# Patient Record
Sex: MALE | Race: BLACK OR AFRICAN AMERICAN | HISPANIC OR LATINO | URBAN - METROPOLITAN AREA
[De-identification: names, ages, dates, MRNs, and addresses within clinical notes are randomized per-mention and may not be internally consistent; named-entity substitution may affect disease eponyms.]

---

## 2019-11-25 ENCOUNTER — EMERGENCY (EMERGENCY)
Facility: HOSPITAL | Age: 59
LOS: 0 days | Discharge: HOME | End: 2019-11-25
Attending: EMERGENCY MEDICINE
Payer: COMMERCIAL

## 2019-11-25 VITALS
OXYGEN SATURATION: 95 % | TEMPERATURE: 99 F | HEART RATE: 72 BPM | DIASTOLIC BLOOD PRESSURE: 82 MMHG | RESPIRATION RATE: 19 BRPM | SYSTOLIC BLOOD PRESSURE: 156 MMHG

## 2019-11-25 VITALS
HEART RATE: 88 BPM | TEMPERATURE: 98 F | WEIGHT: 195.99 LBS | DIASTOLIC BLOOD PRESSURE: 76 MMHG | SYSTOLIC BLOOD PRESSURE: 145 MMHG | RESPIRATION RATE: 17 BRPM | OXYGEN SATURATION: 96 % | HEIGHT: 74 IN

## 2019-11-25 DIAGNOSIS — F17.200 NICOTINE DEPENDENCE, UNSPECIFIED, UNCOMPLICATED: ICD-10-CM

## 2019-11-25 DIAGNOSIS — M79.604 PAIN IN RIGHT LEG: ICD-10-CM

## 2019-11-25 LAB
ALBUMIN SERPL ELPH-MCNC: 3.5 G/DL — SIGNIFICANT CHANGE UP (ref 3.3–5)
ALP SERPL-CCNC: 87 U/L — SIGNIFICANT CHANGE UP (ref 40–120)
ALT FLD-CCNC: 20 U/L — SIGNIFICANT CHANGE UP (ref 12–78)
ANION GAP SERPL CALC-SCNC: 8 MMOL/L — SIGNIFICANT CHANGE UP (ref 5–17)
APTT BLD: 30.4 SEC — SIGNIFICANT CHANGE UP (ref 27.5–36.3)
AST SERPL-CCNC: 10 U/L — LOW (ref 15–37)
BILIRUB SERPL-MCNC: 0.4 MG/DL — SIGNIFICANT CHANGE UP (ref 0.2–1.2)
BUN SERPL-MCNC: 16 MG/DL — SIGNIFICANT CHANGE UP (ref 7–23)
CALCIUM SERPL-MCNC: 9.3 MG/DL — SIGNIFICANT CHANGE UP (ref 8.5–10.1)
CHLORIDE SERPL-SCNC: 103 MMOL/L — SIGNIFICANT CHANGE UP (ref 96–108)
CO2 SERPL-SCNC: 28 MMOL/L — SIGNIFICANT CHANGE UP (ref 22–31)
CREAT SERPL-MCNC: 1.59 MG/DL — HIGH (ref 0.5–1.3)
GLUCOSE SERPL-MCNC: 286 MG/DL — HIGH (ref 70–99)
HCT VFR BLD CALC: 45.4 % — SIGNIFICANT CHANGE UP (ref 39–50)
HGB BLD-MCNC: 15.4 G/DL — SIGNIFICANT CHANGE UP (ref 13–17)
INR BLD: 0.97 RATIO — SIGNIFICANT CHANGE UP (ref 0.88–1.16)
MCHC RBC-ENTMCNC: 32 PG — SIGNIFICANT CHANGE UP (ref 27–34)
MCHC RBC-ENTMCNC: 33.9 GM/DL — SIGNIFICANT CHANGE UP (ref 32–36)
MCV RBC AUTO: 94.2 FL — SIGNIFICANT CHANGE UP (ref 80–100)
NRBC # BLD: 0 /100 WBCS — SIGNIFICANT CHANGE UP (ref 0–0)
PLATELET # BLD AUTO: 255 K/UL — SIGNIFICANT CHANGE UP (ref 150–400)
POTASSIUM SERPL-MCNC: 4.4 MMOL/L — SIGNIFICANT CHANGE UP (ref 3.5–5.3)
POTASSIUM SERPL-SCNC: 4.4 MMOL/L — SIGNIFICANT CHANGE UP (ref 3.5–5.3)
PROT SERPL-MCNC: 6.5 GM/DL — SIGNIFICANT CHANGE UP (ref 6–8.3)
PROTHROM AB SERPL-ACNC: 10.8 SEC — SIGNIFICANT CHANGE UP (ref 10–12.9)
RBC # BLD: 4.82 M/UL — SIGNIFICANT CHANGE UP (ref 4.2–5.8)
RBC # FLD: 12.5 % — SIGNIFICANT CHANGE UP (ref 10.3–14.5)
SODIUM SERPL-SCNC: 139 MMOL/L — SIGNIFICANT CHANGE UP (ref 135–145)
WBC # BLD: 13 K/UL — HIGH (ref 3.8–10.5)
WBC # FLD AUTO: 13 K/UL — HIGH (ref 3.8–10.5)

## 2019-11-25 PROCEDURE — 99284 EMERGENCY DEPT VISIT MOD MDM: CPT

## 2019-11-25 PROCEDURE — 93971 EXTREMITY STUDY: CPT | Mod: 26,RT

## 2019-11-25 PROCEDURE — 93926 LOWER EXTREMITY STUDY: CPT | Mod: 26,RT

## 2019-11-25 RX ORDER — GABAPENTIN 400 MG/1
300 CAPSULE ORAL ONCE
Refills: 0 | Status: COMPLETED | OUTPATIENT
Start: 2019-11-25 | End: 2019-11-25

## 2019-11-25 RX ORDER — OXYCODONE AND ACETAMINOPHEN 5; 325 MG/1; MG/1
1 TABLET ORAL ONCE
Refills: 0 | Status: DISCONTINUED | OUTPATIENT
Start: 2019-11-25 | End: 2019-11-25

## 2019-11-25 RX ORDER — IBUPROFEN 200 MG
1 TABLET ORAL
Qty: 20 | Refills: 0
Start: 2019-11-25 | End: 2019-11-29

## 2019-11-25 RX ORDER — GABAPENTIN 400 MG/1
1 CAPSULE ORAL
Qty: 42 | Refills: 0
Start: 2019-11-25 | End: 2019-12-08

## 2019-11-25 RX ADMIN — OXYCODONE AND ACETAMINOPHEN 1 TABLET(S): 5; 325 TABLET ORAL at 14:06

## 2019-11-25 RX ADMIN — OXYCODONE AND ACETAMINOPHEN 1 TABLET(S): 5; 325 TABLET ORAL at 15:06

## 2019-11-25 RX ADMIN — GABAPENTIN 300 MILLIGRAM(S): 400 CAPSULE ORAL at 14:06

## 2019-11-25 NOTE — ED PROVIDER NOTE - PATIENT PORTAL LINK FT
You can access the FollowMyHealth Patient Portal offered by [facility] by registering at the following website: [email or web address]. By joining [facility]’s FollowMyHealth portal, you will also be able to view your health information using other applications (apps) compatible with our system.

## 2019-11-25 NOTE — ED ADULT TRIAGE NOTE - CHIEF COMPLAINT QUOTE
Pt sent in by dr Huston for RLE pain to r/o PAD vs neuropathy, denies open wounds to the leg hx DM + pedal pulses

## 2019-11-25 NOTE — ED PROVIDER NOTE - OBJECTIVE STATEMENT
58 yo M with RLE pain for about 1-2 weeks.  Pt.'s doctor thinks it's neuropathy, but sent him for US to r/o PVD/clot.  Pt. says pain has gotten so severe he can't walk or go to work.  He denies trauma.  No inciting event.  He feels well otherwise, negative constitutional symptoms.   ROS: negative for fever, cough, headache, chest pain, shortness of breath, abd pain, nausea, vomiting, diarrhea, rash, paresthesia, and weakness--all other systems reviewed are negative.   PMH: HTN, DM; Meds: See EMR; SH: Denies drinking/drug use, + smoker  PCP: Dr. Madiha Huston

## 2019-11-25 NOTE — ED PROVIDER NOTE - PROGRESS NOTE DETAILS
Results reported to patient--grossly benign, labs wnl, US duplex shows no evidence for acute vascular occlusion/DVT, some narrowing of superficial femoral and runoffs noted, non-emergent f/u with vascular will be sufficient   Pt. reports feeling better after meds  pt. agrees to f/u with primary care outpt. as well as vasc--referral given   pt. understands to return to ED if symptoms worsen; will d/c

## 2019-11-25 NOTE — ED PROVIDER NOTE - CARE PROVIDER_API CALL
Lane Garcia)  Surgery  [address], [address]  [place] [address]  Phone: [phone]  Fax: [phone]  Follow Up Time: 4-6 Days

## 2019-11-25 NOTE — ED ADULT NURSE NOTE - OBJECTIVE STATEMENT
Pt c/magnolia right leg pain x 7 days with associated numbness and tingling. Pt was sent by Dr Graham to  rule out DVT.

## 2019-11-25 NOTE — ED PROVIDER NOTE - PHYSICAL EXAMINATION
Vitals: WNL  Gen: AAOx3, NAD, sitting uncomfortably in stretcher, non-toxic   Head: ncat, perrla, eomi b/l  Neck: supple, no lymphadenopathy, no midline deviation  Heart: rrr, no m/r/g  Lungs: CTA b/l, no rales/ronchi/wheezes  Abd: soft, nontender, non-distended, no rebound or guarding  Ext: no clubbing/cyanosis/edema, 1+ dorsalis pedis on R (faint), no discoloration of RLE, no rash, + TTP of R calf, no significant difference in LE circumference noted   Neuro: sensation and muscle strength intact b/l, steady gait

## 2019-11-25 NOTE — ED PROVIDER NOTE - CLINICAL SUMMARY MEDICAL DECISION MAKING FREE TEXT BOX
58 yo M with RLE pain, 60 yo M with RLE pain, doubt acute arterial occlusion, must r/o DVT, likely neuropathy  -basic labs, coags, US RLE doppler venous/arterial, gabapentin, percocet  -f/u results, reeval

## 2020-02-20 PROBLEM — Z00.00 ENCOUNTER FOR PREVENTIVE HEALTH EXAMINATION: Status: ACTIVE | Noted: 2020-02-20

## 2020-03-09 ENCOUNTER — APPOINTMENT (OUTPATIENT)
Dept: VASCULAR SURGERY | Facility: CLINIC | Age: 60
End: 2020-03-09
Payer: COMMERCIAL

## 2020-03-09 ENCOUNTER — FORM ENCOUNTER (OUTPATIENT)
Age: 60
End: 2020-03-09

## 2020-03-09 ENCOUNTER — TRANSCRIPTION ENCOUNTER (OUTPATIENT)
Age: 60
End: 2020-03-09

## 2020-03-09 VITALS
DIASTOLIC BLOOD PRESSURE: 76 MMHG | HEART RATE: 108 BPM | HEIGHT: 74 IN | SYSTOLIC BLOOD PRESSURE: 120 MMHG | TEMPERATURE: 98 F | BODY MASS INDEX: 22.07 KG/M2 | WEIGHT: 172 LBS

## 2020-03-09 DIAGNOSIS — F17.200 NICOTINE DEPENDENCE, UNSPECIFIED, UNCOMPLICATED: ICD-10-CM

## 2020-03-09 DIAGNOSIS — E11.9 TYPE 2 DIABETES MELLITUS W/OUT COMPLICATIONS: ICD-10-CM

## 2020-03-09 DIAGNOSIS — L97.519 NON-PRESSURE CHRONIC ULCER OF OTHER PART OF RIGHT FOOT WITH UNSPECIFIED SEVERITY: ICD-10-CM

## 2020-03-09 PROCEDURE — 93978 VASCULAR STUDY: CPT

## 2020-03-09 PROCEDURE — 93923 UPR/LXTR ART STDY 3+ LVLS: CPT

## 2020-03-09 PROCEDURE — 99204 OFFICE O/P NEW MOD 45 MIN: CPT

## 2020-03-09 RX ORDER — GLIPIZIDE 2.5 MG/1
TABLET ORAL
Refills: 0 | Status: ACTIVE | COMMUNITY

## 2020-03-09 RX ORDER — METFORMIN HYDROCHLORIDE 625 MG/1
TABLET ORAL
Refills: 0 | Status: ACTIVE | COMMUNITY

## 2020-03-10 ENCOUNTER — LABORATORY RESULT (OUTPATIENT)
Age: 60
End: 2020-03-10

## 2020-03-10 ENCOUNTER — APPOINTMENT (OUTPATIENT)
Dept: ENDOVASCULAR SURGERY | Facility: CLINIC | Age: 60
End: 2020-03-10
Payer: COMMERCIAL

## 2020-03-10 VITALS
RESPIRATION RATE: 16 BRPM | HEART RATE: 89 BPM | WEIGHT: 172 LBS | TEMPERATURE: 97.5 F | BODY MASS INDEX: 22.07 KG/M2 | OXYGEN SATURATION: 98 % | DIASTOLIC BLOOD PRESSURE: 73 MMHG | HEIGHT: 74 IN | SYSTOLIC BLOOD PRESSURE: 113 MMHG

## 2020-03-10 PROCEDURE — 37232Z: CUSTOM | Mod: 59,RT

## 2020-03-10 PROCEDURE — 37227Z: CUSTOM | Mod: RT

## 2020-03-10 PROCEDURE — 37228Z: CUSTOM | Mod: 59,RT

## 2020-03-10 PROCEDURE — 75625 CONTRAST EXAM ABDOMINL AORTA: CPT

## 2020-03-10 RX ORDER — CLOPIDOGREL 75 MG/1
75 TABLET, FILM COATED ORAL
Refills: 0 | Status: ACTIVE | COMMUNITY

## 2020-03-10 NOTE — PAST MEDICAL HISTORY
[Increasing age ( >40 years old)] : Increasing age ( >40 years old) [No therapy indicated for cases scheduled for less than one hour] : No therapy indicated for cases scheduled for less than one hour. [Altered mobility] : Altered mobility [FreeTextEntry1] : Malignant Hyperthermia Screening Tool and Risk of Bleeding Assessment\par \par Mr. BIA RAYMOND denies family history of unexpected death following Anesthesia or Exercise.\par Denies Family history of Malignant Hyperthermia, Muscle or Neuromuscular disorder and High Temperature following exercise.\par \par Mr. BIA RAYMOND denies history of Muscle Spasm, Dark or Chocolate - Colored urine and Unanticipated fever immediately following anesthesia or serious exercise. \par Mr. RAYMOND also denies bleeding tendencies/ Risks of Bleeding.\par

## 2020-03-10 NOTE — PROCEDURE
[D/C IV on discharge] : D/C IV on discharge [Resume diet] : resume diet [FreeTextEntry1] : aortogram, right leg angiogram, atherectomy, stent and angioplasty

## 2020-03-10 NOTE — HISTORY OF PRESENT ILLNESS
[FreeTextEntry1] : Cr:0.76 1/28/2020\par accompanied by wife\par uses crutches\par no meds today\par  [FreeTextEntry5] : 4;30pm 3/9/2020 [FreeTextEntry6] : Dr. Evans

## 2020-03-13 NOTE — CONSULT LETTER
[Dear  ___] : Dear  [unfilled], [Consult Letter:] : I had the pleasure of evaluating your patient, [unfilled]. [Please see my note below.] : Please see my note below. [Consult Closing:] : Thank you very much for allowing me to participate in the care of this patient.  If you have any questions, please do not hesitate to contact me. [Sincerely,] : Sincerely, [FreeTextEntry3] : Pierre Perkins M.D., F.FRANCIA.S., R.P.V.I.\par  of Vascular Surgery\par Chief, Vascular Surgery at [facility]\par Chief, Endovascular Surgery at [facility]\par Medical Director of Endovascular Program\par [facility]\par

## 2020-03-13 NOTE — PHYSICAL EXAM
[0] : right 0 [2+] : left 2+ [Calm] : calm [JVD] : no jugular venous distention  [Normal Breath Sounds] : Normal breath sounds [Normal Heart Sounds] : normal heart sounds [Ankle Swelling (On Exam)] : not present [Varicose Veins Of Lower Extremities] : not present [] : not present [Abdomen Masses] : No abdominal masses [Alert] : alert [Oriented to Person] : oriented to person [Oriented to Place] : oriented to place [de-identified] : appears well  [de-identified] : . [de-identified] : right foot with multiple ischemic foot ulcers 2 along the plantar aspect, 1 heal ulcer, 1 ulcer along the lateral aspect of the foot at the base of the 5th digit and the right posterior calf

## 2020-03-13 NOTE — HISTORY OF PRESENT ILLNESS
[FreeTextEntry1] : 58 yo male active daily smoker with a history of dm presents for evaluation of right lower extremity ulcers x 2 months.  pt reports constant pain in the right foot \par pt denies any history of claudications prior to the development of his lower extremity wounds

## 2020-03-13 NOTE — ASSESSMENT
[FreeTextEntry1] : 60 yo male active daily smoker with a history of dm presents for evaluation of right lower extremity ulcers x 2 months\par osmany/pvr shows severe pad right lower extremity \par aortic duplex shows no evidence of aaa \par \par will arrange for angiogram of the right lower extremity \par

## 2020-03-31 ENCOUNTER — APPOINTMENT (OUTPATIENT)
Dept: VASCULAR SURGERY | Facility: CLINIC | Age: 60
End: 2020-03-31
Payer: COMMERCIAL

## 2020-03-31 PROCEDURE — 99213 OFFICE O/P EST LOW 20 MIN: CPT

## 2020-03-31 PROCEDURE — 93926 LOWER EXTREMITY STUDY: CPT

## 2020-03-31 NOTE — HISTORY OF PRESENT ILLNESS
[FreeTextEntry1] : 59-year-old gentleman with a history of smoking status post right lower extremity angioplasty and stenting for foot ulceration.  Patient notes improvement in the wound.

## 2020-03-31 NOTE — REASON FOR VISIT
[Follow-Up: _____] : a [unfilled] follow-up visit [FreeTextEntry1] : Status post angioplasty for right foot ulcer

## 2020-03-31 NOTE — ASSESSMENT
[FreeTextEntry1] : Patient underwent a right leg duplex which shows a patent stent and angioplasty area.\par Currently doing well.  Continue with Plavix.  Follow-up 3 months.

## 2020-03-31 NOTE — PHYSICAL EXAM
[JVD] : no jugular venous distention  [Normal Breath Sounds] : Normal breath sounds [2+] : left 2+ [Ankle Swelling (On Exam)] : not present [Varicose Veins Of Lower Extremities] : not present [] : not present [Abdomen Tenderness] : ~T ~M No abdominal tenderness [No Rash or Lesion] : No rash or lesion [Skin Ulcer] : no ulcer [Alert] : alert [Calm] : calm [de-identified] : Appears well

## 2020-07-28 ENCOUNTER — APPOINTMENT (OUTPATIENT)
Dept: VASCULAR SURGERY | Facility: CLINIC | Age: 60
End: 2020-07-28
Payer: COMMERCIAL

## 2020-07-28 VITALS — TEMPERATURE: 96.9 F

## 2020-07-28 PROCEDURE — 99213 OFFICE O/P EST LOW 20 MIN: CPT

## 2020-07-28 PROCEDURE — 93926 LOWER EXTREMITY STUDY: CPT

## 2020-07-28 NOTE — PHYSICAL EXAM
[Normal Breath Sounds] : Normal breath sounds [JVD] : no jugular venous distention  [2+] : left 2+ [] : not present [Varicose Veins Of Lower Extremities] : not present [Ankle Swelling (On Exam)] : not present [No Rash or Lesion] : No rash or lesion [Abdomen Tenderness] : ~T ~M No abdominal tenderness [Skin Ulcer] : no ulcer [Alert] : alert [Calm] : calm [de-identified] : Appears well

## 2020-07-28 NOTE — HISTORY OF PRESENT ILLNESS
[FreeTextEntry1] : 60-year-old gentleman with a history of smoking status post right lower extremity angioplasty and stenting for foot ulceration.  Patient notes that the wound is healed\par ambulates better

## 2020-10-29 ENCOUNTER — APPOINTMENT (OUTPATIENT)
Dept: VASCULAR SURGERY | Facility: CLINIC | Age: 60
End: 2020-10-29
Payer: COMMERCIAL

## 2020-10-29 VITALS — TEMPERATURE: 97.1 F

## 2020-10-29 PROCEDURE — 99406 BEHAV CHNG SMOKING 3-10 MIN: CPT

## 2020-10-29 PROCEDURE — 99213 OFFICE O/P EST LOW 20 MIN: CPT

## 2020-10-29 PROCEDURE — 93923 UPR/LXTR ART STDY 3+ LVLS: CPT

## 2020-10-29 PROCEDURE — 93880 EXTRACRANIAL BILAT STUDY: CPT

## 2020-10-29 NOTE — PHYSICAL EXAM
[JVD] : no jugular venous distention  [Normal Breath Sounds] : Normal breath sounds [2+] : left 2+ [Ankle Swelling (On Exam)] : not present [Varicose Veins Of Lower Extremities] : not present [] : not present [Abdomen Tenderness] : ~T ~M No abdominal tenderness [No Rash or Lesion] : No rash or lesion [Skin Ulcer] : no ulcer [Alert] : alert [Calm] : calm [de-identified] : Appears well

## 2020-10-29 NOTE — ASSESSMENT
[FreeTextEntry1] : Patient underwent a right leg duplex which shows a patent stent and angioplasty area.\par Currently doing well.  Continue with Plavix.  Follow-up 3 months.\par advised against smoking

## 2021-03-18 ENCOUNTER — APPOINTMENT (OUTPATIENT)
Dept: VASCULAR SURGERY | Facility: CLINIC | Age: 61
End: 2021-03-18
Payer: COMMERCIAL

## 2021-03-18 PROCEDURE — 99072 ADDL SUPL MATRL&STAF TM PHE: CPT

## 2021-03-18 PROCEDURE — 93926 LOWER EXTREMITY STUDY: CPT

## 2021-06-14 RX ORDER — CLOPIDOGREL BISULFATE 75 MG/1
75 TABLET, FILM COATED ORAL
Qty: 30 | Refills: 0 | Status: ACTIVE | COMMUNITY
Start: 2020-03-09 | End: 1900-01-01

## 2021-06-24 ENCOUNTER — APPOINTMENT (OUTPATIENT)
Dept: VASCULAR SURGERY | Facility: CLINIC | Age: 61
End: 2021-06-24

## 2021-07-14 ENCOUNTER — RX RENEWAL (OUTPATIENT)
Age: 61
End: 2021-07-14

## 2022-02-11 ENCOUNTER — INPATIENT (INPATIENT)
Facility: HOSPITAL | Age: 62
LOS: 9 days | Discharge: ANOTHER HOSPITAL | End: 2022-02-21
Attending: INTERNAL MEDICINE | Admitting: INTERNAL MEDICINE
Payer: COMMERCIAL

## 2022-02-11 VITALS
HEART RATE: 102 BPM | RESPIRATION RATE: 19 BRPM | TEMPERATURE: 100 F | HEIGHT: 74 IN | WEIGHT: 179.9 LBS | SYSTOLIC BLOOD PRESSURE: 117 MMHG | OXYGEN SATURATION: 96 % | DIASTOLIC BLOOD PRESSURE: 70 MMHG

## 2022-02-11 LAB
ALBUMIN SERPL ELPH-MCNC: 2.4 G/DL — LOW (ref 3.3–5)
ALP SERPL-CCNC: 82 U/L — SIGNIFICANT CHANGE UP (ref 40–120)
ALT FLD-CCNC: 22 U/L — SIGNIFICANT CHANGE UP (ref 12–78)
ANION GAP SERPL CALC-SCNC: 15 MMOL/L — SIGNIFICANT CHANGE UP (ref 5–17)
APTT BLD: 32.8 SEC — SIGNIFICANT CHANGE UP (ref 27.5–35.5)
AST SERPL-CCNC: 12 U/L — LOW (ref 15–37)
BASOPHILS # BLD AUTO: 0.05 K/UL — SIGNIFICANT CHANGE UP (ref 0–0.2)
BASOPHILS NFR BLD AUTO: 0.2 % — SIGNIFICANT CHANGE UP (ref 0–2)
BILIRUB SERPL-MCNC: 0.5 MG/DL — SIGNIFICANT CHANGE UP (ref 0.2–1.2)
BUN SERPL-MCNC: 17 MG/DL — SIGNIFICANT CHANGE UP (ref 7–23)
CALCIUM SERPL-MCNC: 9.6 MG/DL — SIGNIFICANT CHANGE UP (ref 8.5–10.1)
CHLORIDE SERPL-SCNC: 100 MMOL/L — SIGNIFICANT CHANGE UP (ref 96–108)
CO2 SERPL-SCNC: 18 MMOL/L — LOW (ref 22–31)
CREAT SERPL-MCNC: 0.87 MG/DL — SIGNIFICANT CHANGE UP (ref 0.5–1.3)
EOSINOPHIL # BLD AUTO: 0.03 K/UL — SIGNIFICANT CHANGE UP (ref 0–0.5)
EOSINOPHIL NFR BLD AUTO: 0.1 % — SIGNIFICANT CHANGE UP (ref 0–6)
ERYTHROCYTE [SEDIMENTATION RATE] IN BLOOD: 61 MM/HR — HIGH (ref 0–20)
FLUAV AG NPH QL: SIGNIFICANT CHANGE UP
FLUBV AG NPH QL: SIGNIFICANT CHANGE UP
GLUCOSE BLDC GLUCOMTR-MCNC: 148 MG/DL — HIGH (ref 70–99)
GLUCOSE BLDC GLUCOMTR-MCNC: 165 MG/DL — HIGH (ref 70–99)
GLUCOSE SERPL-MCNC: 128 MG/DL — HIGH (ref 70–99)
HCT VFR BLD CALC: 47.6 % — SIGNIFICANT CHANGE UP (ref 39–50)
HGB BLD-MCNC: 15.3 G/DL — SIGNIFICANT CHANGE UP (ref 13–17)
IMM GRANULOCYTES NFR BLD AUTO: 0.6 % — SIGNIFICANT CHANGE UP (ref 0–1.5)
INR BLD: 1.23 RATIO — HIGH (ref 0.88–1.16)
LACTATE SERPL-SCNC: 0.9 MMOL/L — SIGNIFICANT CHANGE UP (ref 0.7–2)
LYMPHOCYTES # BLD AUTO: 1.06 K/UL — SIGNIFICANT CHANGE UP (ref 1–3.3)
LYMPHOCYTES # BLD AUTO: 5.2 % — LOW (ref 13–44)
MAGNESIUM SERPL-MCNC: 2 MG/DL — SIGNIFICANT CHANGE UP (ref 1.6–2.6)
MCHC RBC-ENTMCNC: 29.1 PG — SIGNIFICANT CHANGE UP (ref 27–34)
MCHC RBC-ENTMCNC: 32.1 G/DL — SIGNIFICANT CHANGE UP (ref 32–36)
MCV RBC AUTO: 90.7 FL — SIGNIFICANT CHANGE UP (ref 80–100)
MONOCYTES # BLD AUTO: 1.14 K/UL — HIGH (ref 0–0.9)
MONOCYTES NFR BLD AUTO: 5.6 % — SIGNIFICANT CHANGE UP (ref 2–14)
NEUTROPHILS # BLD AUTO: 17.86 K/UL — HIGH (ref 1.8–7.4)
NEUTROPHILS NFR BLD AUTO: 88.3 % — HIGH (ref 43–77)
NRBC # BLD: 0 /100 WBCS — SIGNIFICANT CHANGE UP (ref 0–0)
PLATELET # BLD AUTO: 459 K/UL — HIGH (ref 150–400)
POTASSIUM SERPL-MCNC: 4.7 MMOL/L — SIGNIFICANT CHANGE UP (ref 3.5–5.3)
POTASSIUM SERPL-SCNC: 4.7 MMOL/L — SIGNIFICANT CHANGE UP (ref 3.5–5.3)
PROT SERPL-MCNC: 7.6 GM/DL — SIGNIFICANT CHANGE UP (ref 6–8.3)
PROTHROM AB SERPL-ACNC: 14.1 SEC — HIGH (ref 10.6–13.6)
RBC # BLD: 5.25 M/UL — SIGNIFICANT CHANGE UP (ref 4.2–5.8)
RBC # FLD: 12.8 % — SIGNIFICANT CHANGE UP (ref 10.3–14.5)
SARS-COV-2 RNA SPEC QL NAA+PROBE: SIGNIFICANT CHANGE UP
SODIUM SERPL-SCNC: 133 MMOL/L — LOW (ref 135–145)
WBC # BLD: 20.26 K/UL — HIGH (ref 3.8–10.5)
WBC # FLD AUTO: 20.26 K/UL — HIGH (ref 3.8–10.5)

## 2022-02-11 PROCEDURE — 99285 EMERGENCY DEPT VISIT HI MDM: CPT

## 2022-02-11 PROCEDURE — 99222 1ST HOSP IP/OBS MODERATE 55: CPT

## 2022-02-11 PROCEDURE — 93010 ELECTROCARDIOGRAM REPORT: CPT

## 2022-02-11 PROCEDURE — 73610 X-RAY EXAM OF ANKLE: CPT | Mod: 26,RT

## 2022-02-11 PROCEDURE — 71045 X-RAY EXAM CHEST 1 VIEW: CPT | Mod: 26

## 2022-02-11 PROCEDURE — 93306 TTE W/DOPPLER COMPLETE: CPT | Mod: 26

## 2022-02-11 PROCEDURE — 73630 X-RAY EXAM OF FOOT: CPT | Mod: 26,RT

## 2022-02-11 RX ORDER — PIPERACILLIN AND TAZOBACTAM 4; .5 G/20ML; G/20ML
3.38 INJECTION, POWDER, LYOPHILIZED, FOR SOLUTION INTRAVENOUS EVERY 8 HOURS
Refills: 0 | Status: DISCONTINUED | OUTPATIENT
Start: 2022-02-11 | End: 2022-02-15

## 2022-02-11 RX ORDER — SODIUM CHLORIDE 9 MG/ML
1000 INJECTION INTRAMUSCULAR; INTRAVENOUS; SUBCUTANEOUS ONCE
Refills: 0 | Status: COMPLETED | OUTPATIENT
Start: 2022-02-11 | End: 2022-02-11

## 2022-02-11 RX ORDER — OXYCODONE AND ACETAMINOPHEN 5; 325 MG/1; MG/1
1 TABLET ORAL EVERY 4 HOURS
Refills: 0 | Status: DISCONTINUED | OUTPATIENT
Start: 2022-02-11 | End: 2022-02-12

## 2022-02-11 RX ORDER — INSULIN LISPRO 100/ML
VIAL (ML) SUBCUTANEOUS
Refills: 0 | Status: DISCONTINUED | OUTPATIENT
Start: 2022-02-11 | End: 2022-02-15

## 2022-02-11 RX ORDER — OXYCODONE AND ACETAMINOPHEN 5; 325 MG/1; MG/1
1 TABLET ORAL ONCE
Refills: 0 | Status: DISCONTINUED | OUTPATIENT
Start: 2022-02-11 | End: 2022-02-11

## 2022-02-11 RX ORDER — ATORVASTATIN CALCIUM 80 MG/1
10 TABLET, FILM COATED ORAL AT BEDTIME
Refills: 0 | Status: DISCONTINUED | OUTPATIENT
Start: 2022-02-11 | End: 2022-02-15

## 2022-02-11 RX ORDER — DEXTROSE 50 % IN WATER 50 %
25 SYRINGE (ML) INTRAVENOUS ONCE
Refills: 0 | Status: DISCONTINUED | OUTPATIENT
Start: 2022-02-11 | End: 2022-02-15

## 2022-02-11 RX ORDER — HEPARIN SODIUM 5000 [USP'U]/ML
5000 INJECTION INTRAVENOUS; SUBCUTANEOUS EVERY 12 HOURS
Refills: 0 | Status: DISCONTINUED | OUTPATIENT
Start: 2022-02-11 | End: 2022-02-14

## 2022-02-11 RX ORDER — CEFEPIME 1 G/1
1000 INJECTION, POWDER, FOR SOLUTION INTRAMUSCULAR; INTRAVENOUS ONCE
Refills: 0 | Status: COMPLETED | OUTPATIENT
Start: 2022-02-11 | End: 2022-02-11

## 2022-02-11 RX ORDER — VANCOMYCIN HCL 1 G
1000 VIAL (EA) INTRAVENOUS ONCE
Refills: 0 | Status: COMPLETED | OUTPATIENT
Start: 2022-02-11 | End: 2022-02-11

## 2022-02-11 RX ORDER — SODIUM CHLORIDE 9 MG/ML
1000 INJECTION, SOLUTION INTRAVENOUS
Refills: 0 | Status: DISCONTINUED | OUTPATIENT
Start: 2022-02-11 | End: 2022-02-15

## 2022-02-11 RX ORDER — DEXTROSE 50 % IN WATER 50 %
12.5 SYRINGE (ML) INTRAVENOUS ONCE
Refills: 0 | Status: DISCONTINUED | OUTPATIENT
Start: 2022-02-11 | End: 2022-02-15

## 2022-02-11 RX ORDER — GLUCAGON INJECTION, SOLUTION 0.5 MG/.1ML
1 INJECTION, SOLUTION SUBCUTANEOUS ONCE
Refills: 0 | Status: DISCONTINUED | OUTPATIENT
Start: 2022-02-11 | End: 2022-02-15

## 2022-02-11 RX ORDER — ASPIRIN/CALCIUM CARB/MAGNESIUM 324 MG
81 TABLET ORAL DAILY
Refills: 0 | Status: DISCONTINUED | OUTPATIENT
Start: 2022-02-11 | End: 2022-02-14

## 2022-02-11 RX ORDER — DEXTROSE 50 % IN WATER 50 %
15 SYRINGE (ML) INTRAVENOUS ONCE
Refills: 0 | Status: DISCONTINUED | OUTPATIENT
Start: 2022-02-11 | End: 2022-02-15

## 2022-02-11 RX ADMIN — ATORVASTATIN CALCIUM 10 MILLIGRAM(S): 80 TABLET, FILM COATED ORAL at 21:26

## 2022-02-11 RX ADMIN — Medication 250 MILLIGRAM(S): at 18:46

## 2022-02-11 RX ADMIN — OXYCODONE AND ACETAMINOPHEN 1 TABLET(S): 5; 325 TABLET ORAL at 18:27

## 2022-02-11 RX ADMIN — OXYCODONE AND ACETAMINOPHEN 1 TABLET(S): 5; 325 TABLET ORAL at 22:40

## 2022-02-11 RX ADMIN — HEPARIN SODIUM 5000 UNIT(S): 5000 INJECTION INTRAVENOUS; SUBCUTANEOUS at 19:37

## 2022-02-11 RX ADMIN — PIPERACILLIN AND TAZOBACTAM 25 GRAM(S): 4; .5 INJECTION, POWDER, LYOPHILIZED, FOR SOLUTION INTRAVENOUS at 21:25

## 2022-02-11 RX ADMIN — SODIUM CHLORIDE 2000 MILLILITER(S): 9 INJECTION INTRAMUSCULAR; INTRAVENOUS; SUBCUTANEOUS at 17:46

## 2022-02-11 RX ADMIN — OXYCODONE AND ACETAMINOPHEN 1 TABLET(S): 5; 325 TABLET ORAL at 22:10

## 2022-02-11 RX ADMIN — CEFEPIME 100 MILLIGRAM(S): 1 INJECTION, POWDER, FOR SOLUTION INTRAMUSCULAR; INTRAVENOUS at 17:49

## 2022-02-11 NOTE — H&P ADULT - HISTORY OF PRESENT ILLNESS
Patient is a 61y old  Male   h/o  DM  2  on   oral meds,   who presents with a chief complaint of right foot gangrene .  4/5  th toes  for past 3  to 4  months   denies  cp/sob/ abd  pain/ fevers  seen by podiatry in er/  vascular called

## 2022-02-11 NOTE — H&P ADULT - ASSESSMENT
60yo M    h/o  DM 2,  active   smoker     w/ right foot gangrene .  for past  4  months  or  more     *  Right foot gangrene digits 4 and 5 to level of MPJ, still demarcating .     with dusky appearance to the right foot,  right foot plantar hallux eschar   wbc  is 20,000   foot and ankle xrays , no gas, no OM right foot   s/p   I/D  by  podiatry   on  iv  zosyn  vascular  called  by  er  *  DM  2    follow  fs   on Glipizide,  metformin  * smoker  tobacco cessation/  counselling   not ready to  quit   * on asa/  lipitor   echo/  cxr/  ekg  pending    on  dvt ppx   60yo M    h/o  DM 2,  active   smoker     w/ right foot gangrene .  for past  4  months  or  more     *  Right foot gangrene digits 4 and 5 to level of MP,  still demarcating .   with dusky appearance to the right foot,  right foot plantar hallux eschar   wbc  is 20,000   foot and ankle xrays , no gas, no OM right foot   s/p   I/D  by  podiatry   on  iv  zosyn  vascular  called  by  er/  doppler  in 2019, no pad  *  DM  2    follow  fs   on Glipizide,  metformin  * smoker  tobacco cessation/  counselling   not ready to  quit   * on asa/  lipitor   echo/  cxr/  ekg  pending    on  dvt ppx       ra< from: US Duplex Arterial Lower Ext Ltd, Right (11.25.19 @ 14:47)   IMPRESSION:  No evidence of arterial occlusion. Evidence of stenosis   involving distal superficial femoral artery and all three runoff vessels   as noted. Correlation with CT or MR angiography is recommended to   identify underlying vascular disease with greater resolution.  Thank you for this referral.  < end of copied text >

## 2022-02-11 NOTE — ED PROVIDER NOTE - PHYSICAL EXAMINATION
VITAL SIGNS: I have reviewed nursing notes and confirm.  CONSTITUTIONAL: well-appearing, non-toxic, NAD  SKIN: Warm dry, normal skin turgor  HEAD: NCAT  EYES: EOMI, PERRLA, no scleral icterus  ENT: Moist mucous membranes, normal pharynx with no erythema or exudates  NECK: Supple; non tender. Full ROM. No cervical LAD  CARD: RRR, no murmurs, rubs or gallops  RESP: clear to ausculation b/l.  No rales, rhonchi, or wheezing.  ABD: soft, + BS, non-tender, non-distended, no rebound or guarding. No CVA tenderness  EXT: Right foot gangrene digits 4 and 5 to level of MPJ, still demarcating w/ ischemic changes and dusky appearance to the right foot to level of rearfoot, no bogginess, no malodor, fluctuance submet 4 and 5 w/ skin sloughing   plantar hallux eschar w/ wound to subq, no fluctuance, no bogginess, no malodor, no purulence   NEURO: normal motor. normal sensory. Normal gait.  PSYCH: Cooperative, appropriate.

## 2022-02-11 NOTE — ED PROVIDER NOTE - NS ED ROS FT
Constitutional: See HPI.  Eyes: No visual changes, eye pain or discharge. No Photophobia  ENMT: No hearing changes, pain, discharge or infections. No neck pain or stiffness. No limited ROM  Cardiac: No SOB or edema. No chest pain with exertion.  Respiratory: No cough or respiratory distress.   GI: No nausea, vomiting, diarrhea or abdominal pain.  : No dysuria, frequency or burning. No Discharge  MS: No myalgia, muscle weakness, joint pain or back pain.  Neuro: No headache or weakness. No LOC.  Skin: see hpi   Except as documented in the HPI, all other systems are negative.

## 2022-02-11 NOTE — H&P ADULT - NSHPPHYSICALEXAM_GEN_ALL_CORE
PHYSICAL EXAMINATION:  Vital Signs Last 24 Hrs  T(C): 37.6 (11 Feb 2022 14:30), Max: 37.6 (11 Feb 2022 14:30)  T(F): 99.7 (11 Feb 2022 14:30), Max: 99.7 (11 Feb 2022 14:30)  HR: 102 (11 Feb 2022 14:30) (102 - 102)  BP: 117/70 (11 Feb 2022 14:30) (117/70 - 117/70)  BP(mean): --  RR: 19 (11 Feb 2022 14:30) (19 - 19)  SpO2: 96% (11 Feb 2022 14:30) (96% - 96%)  CAPILLARY BLOOD GLUCOSE            GENERAL: NAD, well-groomed,  HEAD:  atraumatic, normocephalic  EYES: sclera anicteric  ENMT: mucous membranes moist  NECK: supple, No JVD  CHEST/LUNG: clear to auscultation bilaterally;    no      rales   ,   no rhonchi,   HEART: normal S1, S2  ABDOMEN: BS+, soft, ND, NT   EXTREMITIES:      right foot,  4/5  th toes  with  gangrene. distal  foot  with  dusky  hue         NEURO: awake, ,     moves all extremities  SKIN: no     rash

## 2022-02-11 NOTE — H&P ADULT - NSHPLABSRESULTS_GEN_ALL_CORE
LABS:                        15.3   20.26 )-----------( 459      ( 11 Feb 2022 16:38 )             47.6     02-11    133<L>  |  100  |  17  ----------------------------<  128<H>  4.7   |  18<L>  |  0.87    Ca    9.6      11 Feb 2022 16:38  Mg     2.0     02-11    TPro  7.6  /  Alb  2.4<L>  /  TBili  0.5  /  DBili  x   /  AST  12<L>  /  ALT  22  /  AlkPhos  82  02-11    PT/INR - ( 11 Feb 2022 16:38 )   PT: 14.1 sec;   INR: 1.23 ratio         PTT - ( 11 Feb 2022 16:38 )  PTT:32.8 sec        Lactate, Blood: 0.9 mmol/L (02-11 @ 16:38)

## 2022-02-11 NOTE — ED PROVIDER NOTE - OBJECTIVE STATEMENT
61M with hx of DM on jardiance, metformin 1000 BID, glipizide p/w right foot wound for several months. Patient states evaluated by wound care in 8/2021 but has not followed up since due to COVID - patient w/ right foot pain, denies any numbness, no drainage, no fevers/chills, no nausea, vomiting or diarrhea. reports pain 8/10, constant, no alleviating factors.

## 2022-02-11 NOTE — CONSULT NOTE ADULT - SUBJECTIVE AND OBJECTIVE BOX
Podiatry pager #: [phone] ([place])/ [phone] ([facility])    Patient is a 61y old  Male who presents with a chief complaint of right foot gangrene     HPI:      PAST MEDICAL & SURGICAL HISTORY:  DM (diabetes mellitus)    No significant past surgical history        MEDICATIONS  (STANDING):  cefepime   IVPB 1000 milliGRAM(s) IV Intermittent once  sodium chloride 0.9% Bolus 1000 milliLiter(s) IV Bolus once  vancomycin  IVPB. 1000 milliGRAM(s) IV Intermittent once    MEDICATIONS  (PRN):      Allergies    No Known Allergies    Intolerances        VITALS:    Vital Signs Last 24 Hrs  T(C): 37.6 (11 Feb 2022 14:30), Max: 37.6 (11 Feb 2022 14:30)  T(F): 99.7 (11 Feb 2022 14:30), Max: 99.7 (11 Feb 2022 14:30)  HR: 102 (11 Feb 2022 14:30) (102 - 102)  BP: 117/70 (11 Feb 2022 14:30) (117/70 - 117/70)  BP(mean): --  RR: 19 (11 Feb 2022 14:30) (19 - 19)  SpO2: 96% (11 Feb 2022 14:30) (96% - 96%)    LABS:                          15.3   20.26 )-----------( 459      ( 11 Feb 2022 16:38 )             47.6               CAPILLARY BLOOD GLUCOSE              LOWER EXTREMITY PHYSICAL EXAM:    Vascular: DP/PT 0/4 B/L, CFT >3 seconds B/L, Temperature gradient warm to cool B/L  Neuro: Epicritic sensation intact to the level of digits B/L  Musculoskeletal/Ortho: unremarkable   Skin: Right foot gangrene digits 4 and 5 to level of MPJ, still demarcating w/ ischemic changes and dusky appearance to the right foot to level of rearfoot, no bogginess, no malodor, fluctuance submet 4 and 5 w/ skin sloughing   pressure wounds to right foot lateral 5th met styloid, posterior heel, and medial malleolus to level of subq, no probing to bone  plantar hallux eschar w/ wound to subq, no fluctuance, no bogginess, no malodor, no purulence     RADIOLOGY & ADDITIONAL STUDIES:    < from: Xray Foot AP + Lateral + Oblique, Right (02.11.22 @ 15:42) >    ACC: [id number] EXAM:  XR FOOT COMP MIN 3 VIEWS RT                        ACC: [id number] EXAM:  XR ANKLE COMP MIN 3 VIEWS RT                          PROCEDURE DATE:  02/11/2022          INTERPRETATION:  Right ankle and right foot. Patient has and ankle ulcer.    Right ankle. 3 views.    COMPARISON: None available.    There is rather mild degeneration of the malleolar tips.    No bone destruction or fracture.    Right foot. 3 views.    Slight first MTP degeneration.    No bone destruction or fracture.    IMPRESSION: No acute findings.    --- End of Report ---            MABEL ESQUEDA MD; Attending Radiologist  This document has been electronically signed. Feb 11 2022  4:03PM    < end of copied text >   Podiatry pager #: [phone] ([place])/ [phone] ([facility])    Patient is a 61y old  Male who presents with a chief complaint of right foot gangrene     HPI:    Patient is a 61y old  Male   h/o  DM  2  on   oral meds,   who presents with a chief complaint of right foot gangrene .  4/5  th toes  for past 3  to 4  months   denies  cp/sob/ abd  pain/ fevers    PAST MEDICAL & SURGICAL HISTORY:  DM (diabetes mellitus)    No significant past surgical history        MEDICATIONS  (STANDING):  cefepime   IVPB 1000 milliGRAM(s) IV Intermittent once  sodium chloride 0.9% Bolus 1000 milliLiter(s) IV Bolus once  vancomycin  IVPB. 1000 milliGRAM(s) IV Intermittent once    MEDICATIONS  (PRN):      Allergies    No Known Allergies    Intolerances        VITALS:    Vital Signs Last 24 Hrs  T(C): 37.6 (11 Feb 2022 14:30), Max: 37.6 (11 Feb 2022 14:30)  T(F): 99.7 (11 Feb 2022 14:30), Max: 99.7 (11 Feb 2022 14:30)  HR: 102 (11 Feb 2022 14:30) (102 - 102)  BP: 117/70 (11 Feb 2022 14:30) (117/70 - 117/70)  BP(mean): --  RR: 19 (11 Feb 2022 14:30) (19 - 19)  SpO2: 96% (11 Feb 2022 14:30) (96% - 96%)    LABS:                          15.3   20.26 )-----------( 459      ( 11 Feb 2022 16:38 )             47.6               CAPILLARY BLOOD GLUCOSE              LOWER EXTREMITY PHYSICAL EXAM:    Vascular: DP/PT 0/4 B/L, CFT >3 seconds B/L, Temperature gradient warm to cool B/L  Neuro: Epicritic sensation intact to the level of digits B/L  Musculoskeletal/Ortho: unremarkable   Skin: Right foot gangrene digits 4 and 5 to level of MPJ, still demarcating w/ ischemic changes and dusky appearance to the right foot to level of rearfoot, no bogginess, no malodor, fluctuance submet 4 and 5 w/ skin sloughing   pressure wounds to right foot lateral 5th met styloid, posterior heel, and medial malleolus to level of subq, no probing to bone  plantar hallux eschar w/ wound to subq, no fluctuance, no bogginess, no malodor, no purulence     RADIOLOGY & ADDITIONAL STUDIES:    < from: Xray Foot AP + Lateral + Oblique, Right (02.11.22 @ 15:42) >    ACC: [id number] EXAM:  XR FOOT COMP MIN 3 VIEWS RT                        ACC: [id number] EXAM:  XR ANKLE COMP MIN 3 VIEWS RT                          PROCEDURE DATE:  02/11/2022          INTERPRETATION:  Right ankle and right foot. Patient has and ankle ulcer.    Right ankle. 3 views.    COMPARISON: None available.    There is rather mild degeneration of the malleolar tips.    No bone destruction or fracture.    Right foot. 3 views.    Slight first MTP degeneration.    No bone destruction or fracture.    IMPRESSION: No acute findings.    --- End of Report ---            MABLE ESQUEDA MD; Attending Radiologist  This document has been electronically signed. Feb 11 2022  4:03PM    < end of copied text >

## 2022-02-11 NOTE — ED ADULT NURSE NOTE - OBJECTIVE STATEMENT
pt presents to ed a&ox4 breathing spont/unlabored to RA. c/o R leg pain and worsening wounds. pt says it has been painful x 2 month and he has been unable to to get an appt with pCP pt presents to ed a&ox4 breathing spont/unlabored to RA. c/o R leg pain and worsening wounds. pt says it has been painful x 2 month and he has been unable to get an appt with PCP.

## 2022-02-11 NOTE — PATIENT PROFILE ADULT - FALL HARM RISK - HARM RISK INTERVENTIONS

## 2022-02-11 NOTE — CONSULT NOTE ADULT - ASSESSMENT
62yo M w/ right foot gangrene   - pt seen and evaluated  - afebrile to 99.7, WBC 20.26  - Right foot gangrene digits 4 and 5 to level of MPJ, still demarcating w/ ischemic changes and dusky appearance to the right foot to level of rearfoot, no bogginess, no malodor, fluctuance submet 4 and 5 w/ skin sloughing, right foot cool to touch   - pressure wounds to right foot lateral 5th met styloid, posterior heel, and medial malleolus to level of subq, no probing to bone, no acute signs of infection   - right foot plantar hallux eschar w/ wound to subq, no fluctuance, no bogginess, no malodor, no purulence   - Right foot and ankle xrays showing no gas, no OM right foot, mild degeneration of malleolar tips   - 20cc 1% lidocaine injection administered as ankle block to right foot  - incision and drainage performed to right plantar lateral foot to level of subq, but not beyond subq, using sterile #15 blade and suture removal kit- no purulence expressed, fascial planes intact, no tracking proximally, no malodor   - right foot wound culture taken from I&D site   - recommend admission to medicine  - recommend IV Vanco, Cefepime  - ordered TISH/PVR   - recommend vascular consult for potential proximal amputation   - salvageability of right foot is highly guarded, discussed possible proximal amputation w/ patient pending TISH/PVR results and he is amenable   - pod plan [facility] pending TISH/vasc recs   - discussed w/ attending

## 2022-02-12 LAB
A1C WITH ESTIMATED AVERAGE GLUCOSE RESULT: 8.6 % — HIGH (ref 4–5.6)
ANION GAP SERPL CALC-SCNC: 17 MMOL/L — SIGNIFICANT CHANGE UP (ref 5–17)
BUN SERPL-MCNC: 17 MG/DL — SIGNIFICANT CHANGE UP (ref 7–23)
CALCIUM SERPL-MCNC: 9.4 MG/DL — SIGNIFICANT CHANGE UP (ref 8.5–10.1)
CHLORIDE SERPL-SCNC: 97 MMOL/L — SIGNIFICANT CHANGE UP (ref 96–108)
CO2 SERPL-SCNC: 17 MMOL/L — LOW (ref 22–31)
CREAT SERPL-MCNC: 0.87 MG/DL — SIGNIFICANT CHANGE UP (ref 0.5–1.3)
CRP SERPL-MCNC: 181 MG/L — HIGH
ESTIMATED AVERAGE GLUCOSE: 200 MG/DL — HIGH (ref 68–114)
GLUCOSE BLDC GLUCOMTR-MCNC: 181 MG/DL — HIGH (ref 70–99)
GLUCOSE BLDC GLUCOMTR-MCNC: 196 MG/DL — HIGH (ref 70–99)
GLUCOSE BLDC GLUCOMTR-MCNC: 196 MG/DL — HIGH (ref 70–99)
GLUCOSE BLDC GLUCOMTR-MCNC: 209 MG/DL — HIGH (ref 70–99)
GLUCOSE SERPL-MCNC: 188 MG/DL — HIGH (ref 70–99)
HCT VFR BLD CALC: 48.1 % — SIGNIFICANT CHANGE UP (ref 39–50)
HCV AB S/CO SERPL IA: 1 S/CO — HIGH (ref 0–0.99)
HCV AB SERPL-IMP: ABNORMAL
HGB BLD-MCNC: 15.3 G/DL — SIGNIFICANT CHANGE UP (ref 13–17)
MCHC RBC-ENTMCNC: 29.4 PG — SIGNIFICANT CHANGE UP (ref 27–34)
MCHC RBC-ENTMCNC: 31.8 G/DL — LOW (ref 32–36)
MCV RBC AUTO: 92.3 FL — SIGNIFICANT CHANGE UP (ref 80–100)
NRBC # BLD: 0 /100 WBCS — SIGNIFICANT CHANGE UP (ref 0–0)
PLATELET # BLD AUTO: 472 K/UL — HIGH (ref 150–400)
POTASSIUM SERPL-MCNC: 4.5 MMOL/L — SIGNIFICANT CHANGE UP (ref 3.5–5.3)
POTASSIUM SERPL-SCNC: 4.5 MMOL/L — SIGNIFICANT CHANGE UP (ref 3.5–5.3)
RBC # BLD: 5.21 M/UL — SIGNIFICANT CHANGE UP (ref 4.2–5.8)
RBC # FLD: 12.7 % — SIGNIFICANT CHANGE UP (ref 10.3–14.5)
SODIUM SERPL-SCNC: 131 MMOL/L — LOW (ref 135–145)
WBC # BLD: 21.95 K/UL — HIGH (ref 3.8–10.5)
WBC # FLD AUTO: 21.95 K/UL — HIGH (ref 3.8–10.5)

## 2022-02-12 PROCEDURE — 73720 MRI LWR EXTREMITY W/O&W/DYE: CPT | Mod: 26,RT

## 2022-02-12 PROCEDURE — 93923 UPR/LXTR ART STDY 3+ LVLS: CPT | Mod: 26

## 2022-02-12 PROCEDURE — 99233 SBSQ HOSP IP/OBS HIGH 50: CPT

## 2022-02-12 RX ORDER — LIDOCAINE HCL 20 MG/ML
20 VIAL (ML) INJECTION ONCE
Refills: 0 | Status: DISCONTINUED | OUTPATIENT
Start: 2022-02-12 | End: 2022-02-12

## 2022-02-12 RX ORDER — OXYCODONE HYDROCHLORIDE 5 MG/1
5 TABLET ORAL EVERY 4 HOURS
Refills: 0 | Status: DISCONTINUED | OUTPATIENT
Start: 2022-02-12 | End: 2022-02-15

## 2022-02-12 RX ORDER — METOCLOPRAMIDE HCL 10 MG
10 TABLET ORAL ONCE
Refills: 0 | Status: COMPLETED | OUTPATIENT
Start: 2022-02-12 | End: 2022-02-12

## 2022-02-12 RX ORDER — ONDANSETRON 8 MG/1
8 TABLET, FILM COATED ORAL EVERY 6 HOURS
Refills: 0 | Status: DISCONTINUED | OUTPATIENT
Start: 2022-02-12 | End: 2022-02-15

## 2022-02-12 RX ORDER — ACETAMINOPHEN 500 MG
650 TABLET ORAL EVERY 4 HOURS
Refills: 0 | Status: DISCONTINUED | OUTPATIENT
Start: 2022-02-12 | End: 2022-02-15

## 2022-02-12 RX ORDER — OXYCODONE HYDROCHLORIDE 5 MG/1
10 TABLET ORAL EVERY 4 HOURS
Refills: 0 | Status: DISCONTINUED | OUTPATIENT
Start: 2022-02-12 | End: 2022-02-15

## 2022-02-12 RX ORDER — NICOTINE POLACRILEX 2 MG
1 GUM BUCCAL DAILY
Refills: 0 | Status: DISCONTINUED | OUTPATIENT
Start: 2022-02-12 | End: 2022-02-15

## 2022-02-12 RX ORDER — OXYCODONE HYDROCHLORIDE 5 MG/1
10 TABLET ORAL EVERY 12 HOURS
Refills: 0 | Status: DISCONTINUED | OUTPATIENT
Start: 2022-02-12 | End: 2022-02-15

## 2022-02-12 RX ADMIN — OXYCODONE HYDROCHLORIDE 10 MILLIGRAM(S): 5 TABLET ORAL at 18:55

## 2022-02-12 RX ADMIN — PIPERACILLIN AND TAZOBACTAM 25 GRAM(S): 4; .5 INJECTION, POWDER, LYOPHILIZED, FOR SOLUTION INTRAVENOUS at 13:08

## 2022-02-12 RX ADMIN — OXYCODONE HYDROCHLORIDE 10 MILLIGRAM(S): 5 TABLET ORAL at 12:06

## 2022-02-12 RX ADMIN — Medication 1: at 17:35

## 2022-02-12 RX ADMIN — PIPERACILLIN AND TAZOBACTAM 25 GRAM(S): 4; .5 INJECTION, POWDER, LYOPHILIZED, FOR SOLUTION INTRAVENOUS at 05:05

## 2022-02-12 RX ADMIN — Medication 81 MILLIGRAM(S): at 12:07

## 2022-02-12 RX ADMIN — Medication 1: at 08:19

## 2022-02-12 RX ADMIN — OXYCODONE HYDROCHLORIDE 10 MILLIGRAM(S): 5 TABLET ORAL at 19:50

## 2022-02-12 RX ADMIN — Medication 30 MILLILITER(S): at 21:04

## 2022-02-12 RX ADMIN — OXYCODONE HYDROCHLORIDE 10 MILLIGRAM(S): 5 TABLET ORAL at 21:05

## 2022-02-12 RX ADMIN — PIPERACILLIN AND TAZOBACTAM 25 GRAM(S): 4; .5 INJECTION, POWDER, LYOPHILIZED, FOR SOLUTION INTRAVENOUS at 21:04

## 2022-02-12 RX ADMIN — Medication 2: at 11:57

## 2022-02-12 RX ADMIN — HEPARIN SODIUM 5000 UNIT(S): 5000 INJECTION INTRAVENOUS; SUBCUTANEOUS at 17:36

## 2022-02-12 RX ADMIN — Medication 1 PATCH: at 19:52

## 2022-02-12 RX ADMIN — OXYCODONE HYDROCHLORIDE 10 MILLIGRAM(S): 5 TABLET ORAL at 13:00

## 2022-02-12 RX ADMIN — OXYCODONE HYDROCHLORIDE 10 MILLIGRAM(S): 5 TABLET ORAL at 17:36

## 2022-02-12 RX ADMIN — OXYCODONE HYDROCHLORIDE 5 MILLIGRAM(S): 5 TABLET ORAL at 15:47

## 2022-02-12 RX ADMIN — HEPARIN SODIUM 5000 UNIT(S): 5000 INJECTION INTRAVENOUS; SUBCUTANEOUS at 05:05

## 2022-02-12 RX ADMIN — ONDANSETRON 8 MILLIGRAM(S): 8 TABLET, FILM COATED ORAL at 15:47

## 2022-02-12 RX ADMIN — OXYCODONE HYDROCHLORIDE 5 MILLIGRAM(S): 5 TABLET ORAL at 16:39

## 2022-02-12 RX ADMIN — Medication 10 MILLIGRAM(S): at 21:04

## 2022-02-12 RX ADMIN — ATORVASTATIN CALCIUM 10 MILLIGRAM(S): 80 TABLET, FILM COATED ORAL at 21:04

## 2022-02-12 RX ADMIN — Medication 1 PATCH: at 12:07

## 2022-02-12 RX ADMIN — ONDANSETRON 8 MILLIGRAM(S): 8 TABLET, FILM COATED ORAL at 07:05

## 2022-02-12 NOTE — PROGRESS NOTE ADULT - ASSESSMENT
62yo M w/ right foot gangrene   - pt seen and evaluated  - afebrile to 99.1, WBC 21.95  - Right foot dry gangrene digits 4 and 5 to level of MPJ, still demarcating w/ ischemic changes and dusky appearance to the right foot to level of rearfoot, no bogginess, no malodor, no fluctuance, no purulence expressed, right foot cool to touch  - plantar right foot submet 4/5 I&D site further explored today w/ fascial planes intact, no purulence expressed, no malodor, no tracking  - pressure wounds to right foot lateral 5th met styloid, posterior heel, and medial malleolus to level of subq, no probing to bone, no acute signs of infection   - right foot plantar hallux eschar w/ wound to bone, no fluctuance, no bogginess, no malodor, no purulence   - Right foot and ankle xrays showing no gas, no OM right foot, mild degeneration of malleolar tips   - right foot wound culture results pending   - recommend continue IV Vanco, Cefepime  - TISH/PVR pending  - ordered RFMR r/o abscess   - recommend vascular consult for potential proximal amputation   - salvageability of right foot is highly guarded, discussed possible proximal amputation w/ patient pending TISH/PVR results and he is amenable   - pod plan [facility] pending TISH/vasc recs   - discussed w/ attending  60yo M w/ right foot gangrene   - pt seen and evaluated  - afebrile to 99.1, WBC 21.95  - Right foot dry gangrene digits 4 and 5 to level of MPJ, still demarcating w/ ischemic changes and dusky appearance to the right foot to level of rearfoot, no bogginess, no malodor, no fluctuance, no purulence expressed, right foot cool to touch  - plantar right foot submet 4/5 I&D site further explored today w/ fascial planes intact, no purulence expressed, no malodor, no tracking  - pressure wounds to right foot lateral 5th met styloid, posterior heel, and medial malleolus to level of subq, no probing to bone, no acute signs of infection   - right foot plantar hallux eschar w/ wound to bone, no fluctuance, no bogginess, no malodor, no purulence   - Right foot and ankle xrays showing no gas, no OM right foot, mild degeneration of malleolar tips   - right foot wound culture results pending   - recommend continue IV Vanco, Cefepime  - TISH/PVR pending  - ordered RFMR r/o abscess   - Recommend ID consult  - recommend vascular consult for potential proximal amputation   - salvageability of right foot is highly guarded, discussed possible proximal amputation w/ patient pending TISH/PVR results and he is amenable   - pod plan [facility] pending TISH/vasc recs   - discussed w/ attending  60yo M w/ right foot gangrene   - pt seen and evaluated  - afebrile to 99.1, WBC 21.95  - Right foot dry gangrene digits 4 and 5 to level of MPJ, still demarcating w/ ischemic changes and dusky appearance to the right foot to level of rearfoot, no bogginess, no malodor, no fluctuance, no purulence expressed, right foot cool to touch  - plantar right foot submet 4/5 I&D site further explored today w/ fascial planes intact, no purulence expressed, no malodor, no tracking  - pressure wounds to right foot lateral 5th met styloid, posterior heel, and medial malleolus to level of subq, no probing to bone, no acute signs of infection   - right foot plantar hallux eschar w/ wound to bone, no fluctuance, no bogginess, no malodor, no purulence   - Right foot and ankle xrays showing no gas, no OM right foot, mild degeneration of malleolar tips   - right foot wound culture results pending   - recommend continue IV Cristiano Luna  - TISH/PVR pending  - ordered RFMR r/o abscess   - Recommend ID consult  - recommend vascular consult for potential proximal amputation   - salvageability of right foot is highly guarded, discussed possible proximal amputation w/ patient pending TISH/PVR results and he is amenable   - pod plan [facility] pending TISH/vasc recs   - discussed w/ attending

## 2022-02-12 NOTE — PROGRESS NOTE ADULT - SUBJECTIVE AND OBJECTIVE BOX
Patient is a 61y old  Male who presents with a chief complaint of gangrene foot (2022 08:23)    HPI:    Patient is a 61y old  Male   h/o  DM  2  on   oral meds,   who presents with a chief complaint of right foot gangrene .  4/5  th toes  for past 3  to 4  months   denies  cp/sob/ abd  pain/ fevers  seen by podiatry in er/  vascular called       (2022 17:26)    SUBJECTIVE & OBJECTIVE: Pt seen and examined at bedside.   PHYSICAL EXAM:  ICU Vital Signs Last 24 Hrs  T(C): 36.8 (2022 11:32), Max: 37.6 (2022 14:30)  T(F): 98.3 (2022 11:32), Max: 99.7 (2022 14:30)  HR: 67 (2022 11:32) (67 - 102)  BP: 142/91 (2022 11:32) (117/70 - 165/74)  BP(mean): --  ABP: --  ABP(mean): --  RR: 18 (2022 11:32) (16 - 19)  SpO2: 95% (2022 11:32) (94% - 99%)  Daily Height in cm: 188 (2022 20:54)    Daily Weight in k.3 (2022 05:39)I&O's Detail    2022 07:01  -  2022 07:00  --------------------------------------------------------  IN:    IV PiggyBack: 400 mL    Oral Fluid: 500 mL  Total IN: 900 mL    OUT:    Voided (mL): 700 mL  Total OUT: 700 mL    Total NET: 200 mL        GENERAL: NAD, well-groomed, well-developed  HEAD:  Atraumatic, Normocephalic  EYES: EOMI, PERRLA, conjunctiva and sclera clear  ENMT: Moist mucous membranes  NECK: Supple, No JVD  NERVOUS SYSTEM:  Alert & Oriented X3, Motor Strength 5/5 B/L upper and lower extremities; DTRs 2+ intact and symmetric  CHEST/LUNG: Clear to auscultation bilaterally; No rales, rhonchi, wheezing, or rubs  HEART: Regular rate and rhythm; No murmurs, rubs, or gallops  ABDOMEN: Soft, Nontender, Nondistended; Bowel sounds present  EXTREMITIES:  2+ Peripheral Pulses, No clubbing, cyanosis, or edema  LABS:                        15.3   21.95 )-----------( 472      ( 2022 07:07 )             48.1   PT/INR - ( 2022 16:38 )   PT: 14.1 sec;   INR: 1.23 ratio         PTT - ( 2022 16:38 )  PTT:32.8 secCAPILLARY BLOOD GLUCOSE      POCT Blood Glucose.: 209 mg/dL (2022 11:18)  POCT Blood Glucose.: 196 mg/dL (2022 08:17)  POCT Blood Glucose.: 165 mg/dL (2022 21:17)  POCT Blood Glucose.: 148 mg/dL (2022 18:30)    RECENT CULTURES:  RADIOLOGY & ADDITIONAL TESTS:

## 2022-02-12 NOTE — PROGRESS NOTE ADULT - SUBJECTIVE AND OBJECTIVE BOX
Podiatry pager #: [phone] ([place])/ [phone] ([facility])    Patient is a 61y old  Male who presents with a chief complaint of gangrene foot (11 Feb 2022 17:26)       INTERVAL HPI/OVERNIGHT EVENTS:  Patient seen and evaluated at bedside.  Pt is resting comfortable in NAD. Denies N/V/F/C.     Allergies    No Known Allergies    Intolerances        Vital Signs Last 24 Hrs  T(C): 37.3 (12 Feb 2022 05:39), Max: 37.6 (11 Feb 2022 14:30)  T(F): 99.1 (12 Feb 2022 05:39), Max: 99.7 (11 Feb 2022 14:30)  HR: 90 (12 Feb 2022 05:39) (84 - 102)  BP: 160/82 (12 Feb 2022 05:04) (117/70 - 165/74)  BP(mean): --  RR: 19 (12 Feb 2022 05:39) (16 - 19)  SpO2: 94% (12 Feb 2022 05:39) (94% - 99%)    LABS:                        15.3   21.95 )-----------( 472      ( 12 Feb 2022 07:07 )             48.1     02-12    131<L>  |  97  |  17  ----------------------------<  188<H>  4.5   |  17<L>  |  0.87    Ca    9.4      12 Feb 2022 07:07  Mg     2.0     02-11    TPro  7.6  /  Alb  2.4<L>  /  TBili  0.5  /  DBili  x   /  AST  12<L>  /  ALT  22  /  AlkPhos  82  02-11    PT/INR - ( 11 Feb 2022 16:38 )   PT: 14.1 sec;   INR: 1.23 ratio         PTT - ( 11 Feb 2022 16:38 )  PTT:32.8 sec    CAPILLARY BLOOD GLUCOSE      POCT Blood Glucose.: 196 mg/dL (12 Feb 2022 08:17)  POCT Blood Glucose.: 165 mg/dL (11 Feb 2022 21:17)  POCT Blood Glucose.: 148 mg/dL (11 Feb 2022 18:30)      Lower Extremity Physical Exam:    Vascular: DP/PT 0/4 B/L, CFT >3 seconds B/L, Temperature gradient warm to cool B/L  Neuro: Epicritic sensation intact to the level of digits B/L  Musculoskeletal/Ortho: unremarkable   Skin: Right foot gangrene digits 4 and 5 to level of MPJ, still demarcating w/ ischemic changes and dusky appearance to the right foot to level of rearfoot, no bogginess, no malodor, no fluctuance  pressure wounds to right foot lateral 5th met styloid, posterior heel, and medial malleolus to level of subq, no probing to bone  plantar hallux eschar w/ wound to subq, no fluctuance, no bogginess, no malodor, no purulence     RADIOLOGY & ADDITIONAL TESTS:

## 2022-02-12 NOTE — PROGRESS NOTE ADULT - ASSESSMENT
62yo M w/ right foot gangrene   - pt seen and evaluated  - afebrile to 99.1, WBC 21.95  - Right foot dry gangrene digits 4 and 5 to level of MPJ, still demarcating w/ ischemic changes and dusky appearance to the right foot to level of rearfoot, no bogginess, no malodor, no fluctuance, no purulence expressed, right foot cool to touch  - plantar right foot submet 4/5 I&D site further explored today w/ fascial planes intact, no purulence expressed, no malodor, no tracking  - pressure wounds to right foot lateral 5th met styloid, posterior heel, and medial malleolus to level of subq, no probing to bone, no acute signs of infection   - right foot plantar hallux eschar w/ wound to bone, no fluctuance, no bogginess, no malodor, no purulence   - Right foot and ankle xrays showing no gas, no OM right foot, mild degeneration of malleolar tips   - right foot wound culture results pending   - recommend continue IV Cristiano Luna  - TISH/PVR pending  - ordered RFMR r/o abscess   - Recommend ID consult  - recommend vascular consult for potential proximal amputation   - salvageability of right foot is highly guarded, discussed possible proximal amputation w/ patient pending TISH/PVR results and he is amenable   - pod plan [facility] pending TISH/vasc recs   - discussed w/ attending

## 2022-02-13 DIAGNOSIS — E43 UNSPECIFIED SEVERE PROTEIN-CALORIE MALNUTRITION: ICD-10-CM

## 2022-02-13 DIAGNOSIS — E11.9 TYPE 2 DIABETES MELLITUS WITHOUT COMPLICATIONS: ICD-10-CM

## 2022-02-13 DIAGNOSIS — Z72.0 TOBACCO USE: ICD-10-CM

## 2022-02-13 DIAGNOSIS — I96 GANGRENE, NOT ELSEWHERE CLASSIFIED: ICD-10-CM

## 2022-02-13 LAB
CHOLEST SERPL-MCNC: 174 MG/DL — SIGNIFICANT CHANGE UP
GLUCOSE BLDC GLUCOMTR-MCNC: 203 MG/DL — HIGH (ref 70–99)
GLUCOSE BLDC GLUCOMTR-MCNC: 208 MG/DL — HIGH (ref 70–99)
GLUCOSE BLDC GLUCOMTR-MCNC: 210 MG/DL — HIGH (ref 70–99)
GLUCOSE BLDC GLUCOMTR-MCNC: 225 MG/DL — HIGH (ref 70–99)
GLUCOSE BLDC GLUCOMTR-MCNC: 231 MG/DL — HIGH (ref 70–99)
HDLC SERPL-MCNC: 47 MG/DL — SIGNIFICANT CHANGE UP
LIPID PNL WITH DIRECT LDL SERPL: 102 MG/DL — HIGH
NON HDL CHOLESTEROL: 130 MG/DL — HIGH
TRIGL SERPL-MCNC: 123 MG/DL — SIGNIFICANT CHANGE UP

## 2022-02-13 PROCEDURE — 99233 SBSQ HOSP IP/OBS HIGH 50: CPT

## 2022-02-13 RX ORDER — METOCLOPRAMIDE HCL 10 MG
10 TABLET ORAL ONCE
Refills: 0 | Status: COMPLETED | OUTPATIENT
Start: 2022-02-13 | End: 2022-02-13

## 2022-02-13 RX ADMIN — OXYCODONE HYDROCHLORIDE 10 MILLIGRAM(S): 5 TABLET ORAL at 08:05

## 2022-02-13 RX ADMIN — Medication 2: at 17:01

## 2022-02-13 RX ADMIN — OXYCODONE HYDROCHLORIDE 10 MILLIGRAM(S): 5 TABLET ORAL at 05:03

## 2022-02-13 RX ADMIN — PIPERACILLIN AND TAZOBACTAM 25 GRAM(S): 4; .5 INJECTION, POWDER, LYOPHILIZED, FOR SOLUTION INTRAVENOUS at 21:02

## 2022-02-13 RX ADMIN — OXYCODONE HYDROCHLORIDE 10 MILLIGRAM(S): 5 TABLET ORAL at 06:14

## 2022-02-13 RX ADMIN — OXYCODONE HYDROCHLORIDE 10 MILLIGRAM(S): 5 TABLET ORAL at 18:53

## 2022-02-13 RX ADMIN — OXYCODONE HYDROCHLORIDE 10 MILLIGRAM(S): 5 TABLET ORAL at 20:59

## 2022-02-13 RX ADMIN — Medication 10 MILLIGRAM(S): at 17:02

## 2022-02-13 RX ADMIN — Medication 1 PATCH: at 19:26

## 2022-02-13 RX ADMIN — Medication 2: at 11:25

## 2022-02-13 RX ADMIN — OXYCODONE HYDROCHLORIDE 5 MILLIGRAM(S): 5 TABLET ORAL at 17:08

## 2022-02-13 RX ADMIN — ATORVASTATIN CALCIUM 10 MILLIGRAM(S): 80 TABLET, FILM COATED ORAL at 21:03

## 2022-02-13 RX ADMIN — Medication 1 PATCH: at 07:53

## 2022-02-13 RX ADMIN — Medication 30 MILLILITER(S): at 21:00

## 2022-02-13 RX ADMIN — OXYCODONE HYDROCHLORIDE 10 MILLIGRAM(S): 5 TABLET ORAL at 09:05

## 2022-02-13 RX ADMIN — OXYCODONE HYDROCHLORIDE 10 MILLIGRAM(S): 5 TABLET ORAL at 21:43

## 2022-02-13 RX ADMIN — Medication 1 PATCH: at 11:26

## 2022-02-13 RX ADMIN — Medication 1 PATCH: at 11:30

## 2022-02-13 RX ADMIN — HEPARIN SODIUM 5000 UNIT(S): 5000 INJECTION INTRAVENOUS; SUBCUTANEOUS at 17:03

## 2022-02-13 RX ADMIN — PIPERACILLIN AND TAZOBACTAM 25 GRAM(S): 4; .5 INJECTION, POWDER, LYOPHILIZED, FOR SOLUTION INTRAVENOUS at 13:25

## 2022-02-13 RX ADMIN — Medication 81 MILLIGRAM(S): at 11:28

## 2022-02-13 RX ADMIN — HEPARIN SODIUM 5000 UNIT(S): 5000 INJECTION INTRAVENOUS; SUBCUTANEOUS at 05:05

## 2022-02-13 RX ADMIN — OXYCODONE HYDROCHLORIDE 5 MILLIGRAM(S): 5 TABLET ORAL at 16:05

## 2022-02-13 RX ADMIN — ONDANSETRON 8 MILLIGRAM(S): 8 TABLET, FILM COATED ORAL at 16:05

## 2022-02-13 RX ADMIN — Medication 2: at 08:05

## 2022-02-13 RX ADMIN — OXYCODONE HYDROCHLORIDE 10 MILLIGRAM(S): 5 TABLET ORAL at 17:03

## 2022-02-13 RX ADMIN — PIPERACILLIN AND TAZOBACTAM 25 GRAM(S): 4; .5 INJECTION, POWDER, LYOPHILIZED, FOR SOLUTION INTRAVENOUS at 05:05

## 2022-02-13 NOTE — PROGRESS NOTE ADULT - SUBJECTIVE AND OBJECTIVE BOX
Podiatry pager #: [phone] ([place])/ [phone] ([facility])    Patient is a 61y old  Male who presents with a chief complaint of gangrene foot (12 Feb 2022 13:27)       INTERVAL HPI/OVERNIGHT EVENTS:  Patient seen and evaluated at bedside.  Pt is resting comfortable in NAD. Denies N/V/F/C.     Allergies    No Known Allergies    Intolerances        Vital Signs Last 24 Hrs  T(C): 36.8 (13 Feb 2022 05:38), Max: 36.8 (12 Feb 2022 11:32)  T(F): 98.2 (13 Feb 2022 05:38), Max: 98.3 (12 Feb 2022 11:32)  HR: 97 (13 Feb 2022 05:38) (67 - 102)  BP: 128/83 (13 Feb 2022 05:38) (128/83 - 155/72)  BP(mean): --  RR: 19 (13 Feb 2022 05:38) (17 - 19)  SpO2: 98% (13 Feb 2022 05:38) (94% - 98%)    LABS:                        15.3   21.95 )-----------( 472      ( 12 Feb 2022 07:07 )             48.1     02-12    131<L>  |  97  |  17  ----------------------------<  188<H>  4.5   |  17<L>  |  0.87    Ca    9.4      12 Feb 2022 07:07  Mg     2.0     02-11    TPro  7.6  /  Alb  2.4<L>  /  TBili  0.5  /  DBili  x   /  AST  12<L>  /  ALT  22  /  AlkPhos  82  02-11    PT/INR - ( 11 Feb 2022 16:38 )   PT: 14.1 sec;   INR: 1.23 ratio         PTT - ( 11 Feb 2022 16:38 )  PTT:32.8 sec    CAPILLARY BLOOD GLUCOSE      POCT Blood Glucose.: 231 mg/dL (13 Feb 2022 07:52)  POCT Blood Glucose.: 203 mg/dL (13 Feb 2022 01:01)  POCT Blood Glucose.: 196 mg/dL (12 Feb 2022 21:15)  POCT Blood Glucose.: 181 mg/dL (12 Feb 2022 16:48)  POCT Blood Glucose.: 209 mg/dL (12 Feb 2022 11:18)  POCT Blood Glucose.: 196 mg/dL (12 Feb 2022 08:17)      Lower Extremity Physical Exam:    Vascular: DP/PT 0/4 B/L, CFT >3 seconds B/L, Temperature gradient warm to cool B/L  Neuro: Epicritic sensation intact to the level of digits B/L  Musculoskeletal/Ortho: unremarkable   Skin: Right foot gangrene digits 4 and 5 now demarcating to plantar met necks w/ ischemic changes and dusky appearance to the right foot to level of rearfoot, no bogginess, no malodor, no fluctuance  pressure wounds to right foot lateral 5th met styloid, posterior heel, and medial malleolus to level of subq, no probing to bone  plantar hallux eschar w/ wound to bone, no fluctuance, no bogginess, no malodor, no purulence     RADIOLOGY & ADDITIONAL TESTS:  < from: [facility] Physiol Extremity Lower 3+ Level, BI (02.12.22 @ 09:52) >    ACC: [id number] EXAM:  US PHYSIOL LWR EXT 3+ LEV BI                          PROCEDURE DATE:  02/12/2022          INTERPRETATION:  Clinical Information: Peripheral vascular disease. Right   foot gangrene    Technique: Bilateral lower extremity ABIs/PVR    Comparison: Arterial duplex 11/25/2019    Findings/  Impression:  Right lower extremity: The ankle brachial index is unobtainable. The   pulse waveforms are reduced in the calf and ankle.    Left lower extremity: The ankle brachial index is unobtainable. The pulse   waveforms are monophasic with normal to near-normal amplitude.    Diffusely calcified noncompressible vessels limiting evaluation.    Diminished flow below the right knee.    --- End of Report ---            ROD GRIFFIN MD; Attending Radiologist  This document has been electronically signed. Feb 12 2022  1:07PM    < end of copied text >

## 2022-02-13 NOTE — PROGRESS NOTE ADULT - PROBLEM SELECTOR PLAN 1
-MR foot reordered today, first study subopitimal due to patient movemnet  - TISH ordered  - Discussed case with Vascular who will see patient tomorrow  - ID called and message left with service   - Podiatry on board, planning surgery this week  - pain control with oral medications  - wbc increased likely due to infectious process.   - check crp and procalciotnin  - check am cbc

## 2022-02-13 NOTE — PROGRESS NOTE ADULT - SUBJECTIVE AND OBJECTIVE BOX
Patient is a 61y old  Male who presents with a chief complaint of gangrene foot (13 Feb 2022 08:14)    HPI:    Patient is a 61y old  Male   h/o  DM  2  on   oral meds,   who presents with a chief complaint of right foot gangrene .  4/5  th toes  for past 3  to 4  months   denies  cp/sob/ abd  pain/ fevers  seen by podiatry in er/  vascular called       (11 Feb 2022 17:26)    SUBJECTIVE & OBJECTIVE: Pt seen and examined at bedside.   PHYSICAL EXAM:  T(C): 37.2 (02-13-22 @ 17:10), Max: 37.2 (02-13-22 @ 13:16)  HR: 73 (02-13-22 @ 17:10) (73 - 102)  BP: 115/73 (02-13-22 @ 17:10) (115/73 - 150/86)  RR: 19 (02-13-22 @ 17:10) (18 - 19)  SpO2: 96% (02-13-22 @ 17:10) (94% - 98%) Daily     Daily I&O's Detail    13 Feb 2022 07:01  -  13 Feb 2022 22:02  --------------------------------------------------------  IN:    IV PiggyBack: 100 mL  Total IN: 100 mL    OUT:    Oral Fluid: 0 mL  Total OUT: 0 mL    Total NET: 100 mL        GENERAL: NAD, thin and emaciated  HEAD:  Atraumatic, Normocephalic  EYES: EOMI, PERRLA, conjunctiva and sclera clear  ENMT: Moist mucous membranes  NECK: Supple, No JVD  NERVOUS SYSTEM:  Alert & Oriented X3, Motor Strength 5/5 B/L upper and lower extremities; DTRs 2+ intact and symmetric  CHEST/LUNG: Clear to auscultation bilaterally; No rales, rhonchi, wheezing, or rubs  HEART: Regular rate and rhythm; No murmurs, rubs, or gallops  ABDOMEN: Soft, Nontender, Nondistended; Bowel sounds present  EXTREMITIES:  right foot cool to touch, diminished pedal pulses, surgical dressing in place  LABS:                        15.3   21.95 )-----------( 472      ( 12 Feb 2022 07:07 )             48.1   CAPILLARY BLOOD GLUCOSE      POCT Blood Glucose.: 225 mg/dL (13 Feb 2022 21:49)  POCT Blood Glucose.: 208 mg/dL (13 Feb 2022 16:40)  POCT Blood Glucose.: 210 mg/dL (13 Feb 2022 10:55)  POCT Blood Glucose.: 231 mg/dL (13 Feb 2022 07:52)  POCT Blood Glucose.: 203 mg/dL (13 Feb 2022 01:01)    RECENT CULTURES:   RADIOLOGY & ADDITIONAL TESTS:

## 2022-02-13 NOTE — PROGRESS NOTE ADULT - PROBLEM SELECTOR PROBLEM 5
R/O DM (diabetes mellitus) Paramedian Forehead Flap Text: A decision was made to reconstruct the defect utilizing an interpolation axial flap and a staged reconstruction.  A telfa template was made of the defect.  This telfa template was then used to outline the paramedian forehead pedicle flap.  The donor area for the pedicle flap was then injected with anesthesia.  The flap was excised through the skin and subcutaneous tissue down to the layer of the underlying musculature.  The pedicle flap was carefully excised within this deep plane to maintain its blood supply.  The edges of the donor site were undermined.   The donor site was closed in a primary fashion.  The pedicle was then rotated into position and sutured.  Once the tube was sutured into place, adequate blood supply was confirmed with blanching and refill.  The pedicle was then wrapped with xeroform gauze and dressed appropriately with a telfa and gauze bandage to ensure continued blood supply and protect the attached pedicle.

## 2022-02-14 ENCOUNTER — TRANSCRIPTION ENCOUNTER (OUTPATIENT)
Age: 62
End: 2022-02-14

## 2022-02-14 LAB
-  AMIKACIN: SIGNIFICANT CHANGE UP
-  AMOXICILLIN/CLAVULANIC ACID: SIGNIFICANT CHANGE UP
-  AMPICILLIN/SULBACTAM: SIGNIFICANT CHANGE UP
-  AMPICILLIN/SULBACTAM: SIGNIFICANT CHANGE UP
-  AMPICILLIN: SIGNIFICANT CHANGE UP
-  AZTREONAM: SIGNIFICANT CHANGE UP
-  CEFAZOLIN: SIGNIFICANT CHANGE UP
-  CEFAZOLIN: SIGNIFICANT CHANGE UP
-  CEFEPIME: SIGNIFICANT CHANGE UP
-  CEFOXITIN: SIGNIFICANT CHANGE UP
-  CEFTRIAXONE: SIGNIFICANT CHANGE UP
-  CIPROFLOXACIN: SIGNIFICANT CHANGE UP
-  CLINDAMYCIN: SIGNIFICANT CHANGE UP
-  ERTAPENEM: SIGNIFICANT CHANGE UP
-  ERYTHROMYCIN: SIGNIFICANT CHANGE UP
-  GENTAMICIN: SIGNIFICANT CHANGE UP
-  GENTAMICIN: SIGNIFICANT CHANGE UP
-  LEVOFLOXACIN: SIGNIFICANT CHANGE UP
-  MEROPENEM: SIGNIFICANT CHANGE UP
-  OXACILLIN: SIGNIFICANT CHANGE UP
-  PENICILLIN: SIGNIFICANT CHANGE UP
-  PIPERACILLIN/TAZOBACTAM: SIGNIFICANT CHANGE UP
-  RIFAMPIN: SIGNIFICANT CHANGE UP
-  TETRACYCLINE: SIGNIFICANT CHANGE UP
-  TOBRAMYCIN: SIGNIFICANT CHANGE UP
-  TRIMETHOPRIM/SULFAMETHOXAZOLE: SIGNIFICANT CHANGE UP
-  TRIMETHOPRIM/SULFAMETHOXAZOLE: SIGNIFICANT CHANGE UP
-  VANCOMYCIN: SIGNIFICANT CHANGE UP
ANION GAP SERPL CALC-SCNC: 14 MMOL/L — SIGNIFICANT CHANGE UP (ref 5–17)
BUN SERPL-MCNC: 31 MG/DL — HIGH (ref 7–23)
CALCIUM SERPL-MCNC: 9.9 MG/DL — SIGNIFICANT CHANGE UP (ref 8.5–10.1)
CHLORIDE SERPL-SCNC: 96 MMOL/L — SIGNIFICANT CHANGE UP (ref 96–108)
CO2 SERPL-SCNC: 23 MMOL/L — SIGNIFICANT CHANGE UP (ref 22–31)
CREAT SERPL-MCNC: 1.23 MG/DL — SIGNIFICANT CHANGE UP (ref 0.5–1.3)
CRP SERPL-MCNC: 150 MG/L — HIGH
CULTURE RESULTS: SIGNIFICANT CHANGE UP
GLUCOSE BLDC GLUCOMTR-MCNC: 209 MG/DL — HIGH (ref 70–99)
GLUCOSE BLDC GLUCOMTR-MCNC: 233 MG/DL — HIGH (ref 70–99)
GLUCOSE BLDC GLUCOMTR-MCNC: 254 MG/DL — HIGH (ref 70–99)
GLUCOSE BLDC GLUCOMTR-MCNC: 276 MG/DL — HIGH (ref 70–99)
GLUCOSE SERPL-MCNC: 271 MG/DL — HIGH (ref 70–99)
HCT VFR BLD CALC: 48.9 % — SIGNIFICANT CHANGE UP (ref 39–50)
HGB BLD-MCNC: 15.8 G/DL — SIGNIFICANT CHANGE UP (ref 13–17)
MCHC RBC-ENTMCNC: 29.2 PG — SIGNIFICANT CHANGE UP (ref 27–34)
MCHC RBC-ENTMCNC: 32.3 G/DL — SIGNIFICANT CHANGE UP (ref 32–36)
MCV RBC AUTO: 90.4 FL — SIGNIFICANT CHANGE UP (ref 80–100)
METHOD TYPE: SIGNIFICANT CHANGE UP
METHOD TYPE: SIGNIFICANT CHANGE UP
NRBC # BLD: 0 /100 WBCS — SIGNIFICANT CHANGE UP (ref 0–0)
ORGANISM # SPEC MICROSCOPIC CNT: SIGNIFICANT CHANGE UP
PLATELET # BLD AUTO: 523 K/UL — HIGH (ref 150–400)
POTASSIUM SERPL-MCNC: 4.6 MMOL/L — SIGNIFICANT CHANGE UP (ref 3.5–5.3)
POTASSIUM SERPL-SCNC: 4.6 MMOL/L — SIGNIFICANT CHANGE UP (ref 3.5–5.3)
PROCALCITONIN SERPL-MCNC: 0.07 NG/ML — SIGNIFICANT CHANGE UP (ref 0.02–0.1)
RBC # BLD: 5.41 M/UL — SIGNIFICANT CHANGE UP (ref 4.2–5.8)
RBC # FLD: 12.8 % — SIGNIFICANT CHANGE UP (ref 10.3–14.5)
SODIUM SERPL-SCNC: 133 MMOL/L — LOW (ref 135–145)
SPECIMEN SOURCE: SIGNIFICANT CHANGE UP
WBC # BLD: 19.36 K/UL — HIGH (ref 3.8–10.5)
WBC # FLD AUTO: 19.36 K/UL — HIGH (ref 3.8–10.5)

## 2022-02-14 PROCEDURE — 99233 SBSQ HOSP IP/OBS HIGH 50: CPT

## 2022-02-14 PROCEDURE — 99223 1ST HOSP IP/OBS HIGH 75: CPT

## 2022-02-14 PROCEDURE — 73718 MRI LOWER EXTREMITY W/O DYE: CPT | Mod: 26,RT

## 2022-02-14 PROCEDURE — 74018 RADEX ABDOMEN 1 VIEW: CPT | Mod: 26

## 2022-02-14 PROCEDURE — 75635 CT ANGIO ABDOMINAL ARTERIES: CPT | Mod: 26

## 2022-02-14 PROCEDURE — 93925 LOWER EXTREMITY STUDY: CPT | Mod: 26

## 2022-02-14 RX ORDER — SODIUM CHLORIDE 9 MG/ML
1000 INJECTION INTRAMUSCULAR; INTRAVENOUS; SUBCUTANEOUS
Refills: 0 | Status: DISCONTINUED | OUTPATIENT
Start: 2022-02-14 | End: 2022-02-15

## 2022-02-14 RX ORDER — SENNA PLUS 8.6 MG/1
2 TABLET ORAL AT BEDTIME
Refills: 0 | Status: DISCONTINUED | OUTPATIENT
Start: 2022-02-14 | End: 2022-02-14

## 2022-02-14 RX ORDER — ONDANSETRON 8 MG/1
4 TABLET, FILM COATED ORAL ONCE
Refills: 0 | Status: COMPLETED | OUTPATIENT
Start: 2022-02-14 | End: 2022-02-14

## 2022-02-14 RX ORDER — METOCLOPRAMIDE HCL 10 MG
10 TABLET ORAL ONCE
Refills: 0 | Status: COMPLETED | OUTPATIENT
Start: 2022-02-14 | End: 2022-02-14

## 2022-02-14 RX ORDER — PANTOPRAZOLE SODIUM 20 MG/1
40 TABLET, DELAYED RELEASE ORAL EVERY 12 HOURS
Refills: 0 | Status: DISCONTINUED | OUTPATIENT
Start: 2022-02-14 | End: 2022-02-15

## 2022-02-14 RX ORDER — POLYETHYLENE GLYCOL 3350 17 G/17G
17 POWDER, FOR SOLUTION ORAL DAILY
Refills: 0 | Status: DISCONTINUED | OUTPATIENT
Start: 2022-02-14 | End: 2022-02-15

## 2022-02-14 RX ADMIN — Medication 3: at 16:46

## 2022-02-14 RX ADMIN — PIPERACILLIN AND TAZOBACTAM 25 GRAM(S): 4; .5 INJECTION, POWDER, LYOPHILIZED, FOR SOLUTION INTRAVENOUS at 13:43

## 2022-02-14 RX ADMIN — OXYCODONE HYDROCHLORIDE 10 MILLIGRAM(S): 5 TABLET ORAL at 18:03

## 2022-02-14 RX ADMIN — Medication 81 MILLIGRAM(S): at 11:54

## 2022-02-14 RX ADMIN — SODIUM CHLORIDE 100 MILLILITER(S): 9 INJECTION INTRAMUSCULAR; INTRAVENOUS; SUBCUTANEOUS at 13:43

## 2022-02-14 RX ADMIN — PANTOPRAZOLE SODIUM 40 MILLIGRAM(S): 20 TABLET, DELAYED RELEASE ORAL at 20:40

## 2022-02-14 RX ADMIN — OXYCODONE HYDROCHLORIDE 10 MILLIGRAM(S): 5 TABLET ORAL at 06:04

## 2022-02-14 RX ADMIN — Medication 30 MILLILITER(S): at 12:21

## 2022-02-14 RX ADMIN — ONDANSETRON 8 MILLIGRAM(S): 8 TABLET, FILM COATED ORAL at 16:46

## 2022-02-14 RX ADMIN — ATORVASTATIN CALCIUM 10 MILLIGRAM(S): 80 TABLET, FILM COATED ORAL at 21:35

## 2022-02-14 RX ADMIN — Medication 1 PATCH: at 11:50

## 2022-02-14 RX ADMIN — Medication 2: at 11:52

## 2022-02-14 RX ADMIN — Medication 1 PATCH: at 11:52

## 2022-02-14 RX ADMIN — PIPERACILLIN AND TAZOBACTAM 25 GRAM(S): 4; .5 INJECTION, POWDER, LYOPHILIZED, FOR SOLUTION INTRAVENOUS at 21:36

## 2022-02-14 RX ADMIN — ONDANSETRON 8 MILLIGRAM(S): 8 TABLET, FILM COATED ORAL at 06:00

## 2022-02-14 RX ADMIN — OXYCODONE HYDROCHLORIDE 10 MILLIGRAM(S): 5 TABLET ORAL at 17:51

## 2022-02-14 RX ADMIN — Medication 2: at 08:20

## 2022-02-14 RX ADMIN — PIPERACILLIN AND TAZOBACTAM 25 GRAM(S): 4; .5 INJECTION, POWDER, LYOPHILIZED, FOR SOLUTION INTRAVENOUS at 05:02

## 2022-02-14 RX ADMIN — Medication 30 MILLILITER(S): at 06:00

## 2022-02-14 RX ADMIN — HEPARIN SODIUM 5000 UNIT(S): 5000 INJECTION INTRAVENOUS; SUBCUTANEOUS at 17:52

## 2022-02-14 RX ADMIN — POLYETHYLENE GLYCOL 3350 17 GRAM(S): 17 POWDER, FOR SOLUTION ORAL at 21:36

## 2022-02-14 RX ADMIN — ONDANSETRON 4 MILLIGRAM(S): 8 TABLET, FILM COATED ORAL at 10:04

## 2022-02-14 RX ADMIN — Medication 10 MILLIGRAM(S): at 21:35

## 2022-02-14 RX ADMIN — HEPARIN SODIUM 5000 UNIT(S): 5000 INJECTION INTRAVENOUS; SUBCUTANEOUS at 05:02

## 2022-02-14 RX ADMIN — OXYCODONE HYDROCHLORIDE 10 MILLIGRAM(S): 5 TABLET ORAL at 05:02

## 2022-02-14 RX ADMIN — Medication 1 PATCH: at 08:20

## 2022-02-14 RX ADMIN — SENNA PLUS 2 TABLET(S): 8.6 TABLET ORAL at 21:35

## 2022-02-14 NOTE — CONSULT NOTE ADULT - ATTENDING COMMENTS
I have seen and examined the patient , agree with the above findings, ,dry gangre right 4th and 5th toes, non healing right med malleolus ulcer, PAD, no palpable pulses distal to femorals Bilaterally.  I have discussed with DR Alves, IR, for angio, CTA is orders, NO amputation of toes for now.Potential for poor healing.

## 2022-02-14 NOTE — PROGRESS NOTE ADULT - ASSESSMENT
60 y/o male w/ PMH DM, PAD, p/w R 4th/5th digit dry gangrene w/ non healing R medial malleolar ulcer, no palpable pulses distal to femoral b/l. Seen by podiatry and vascular, MRI not suggestive of acute osteomyelitis, ? suspected fluid collection. LE Arterial Doppler showed occluded R superficial femoral and popliteal arteries with multiple stents.  Occluded proximal and mid left superficial femoral artery with reconstitution distally.      # Gangrene of right foot - Seen by podiatry and vascular, MRI not suggestive of acute osteomyelitis, ? suspected fluid collection. LE Arterial Doppler showed occluded R superficial femoral and popliteal arteries with multiple stents.  Occluded proximal and mid left superficial femoral artery with reconstitution distally.  Awaiting ID input.  Surgery, vascular following.  Continue Zosyn  # Diabetes Type II - monitor fingersticks.  Insulin coverage for hyperglycemia.  # Severe protein-calorie malnutrition. ·  Plan: - nutriton consult- would benefit from glucerna.  # Smoker - Nicotine Patch.  I strongly encouraged the patient to quit smoking.  I outlined the extensive negative impact of smoking on quality of life and the numerous consequences of continued smoking including lung disease, heart attack, stroke, vascular disease, malignancy, increased mortality, etc.  The patient understood these effects and agreed on smoking cessation.  # Inpatient DVT prophylaxis - subcutaneous Heparin

## 2022-02-14 NOTE — PROGRESS NOTE ADULT - ASSESSMENT
62yo M w/ right foot gangrene   - pt seen and evaluated  - afebrile, WBC 19   - Right foot dry gangrene digits 4 and 5 now demarcating to plantar met necks w/ ischemic changes and dusky appearance to the right foot to level of rearfoot, no bogginess, no malodor, no fluctuance, no purulence expressed, right foot cool to touch  - using suture removal kit plantar right foot submet 4/5 I&D site further explored today w/ fascial planes intact, no purulence expressed, no malodor, no tracking  - verbal consent obtained for stab incisions to sub mt5, using sterile suture removal kit stab incision made to sub mt 4, no pus, no discharge, no malodor   - pressure wounds to right foot lateral 5th met styloid, posterior heel, and medial malleolus to level of subq, no probing to bone, no acute signs of infection   - right foot plantar hallux eschar w/ wound to bone, no fluctuance, no bogginess, no malodor, no purulence   - left foot no open wounds or lesions  - Right foot and ankle xrays showing no gas, no OM right foot, mild degeneration of malleolar tips   - right foot wound culture staph aureus, serratia, strep agalactiae prelim  - recommend continue IV Vanco, Zosyn  - TISH/PVR: calcified noncompressible vessels b/l, RLE waveforms reduced in calf and ankle, LLE waveforms monophasic   - RFMR results pending  - Recommend ID consult  - recommend vascular consult for potential proximal amputation   - salvageability of right foot is highly guarded, discussed possible proximal amputation w/ patient pending vasc recs and he is amenable   - pod plan [facility] pending vasc recs   - discussed w/ attending

## 2022-02-14 NOTE — PROGRESS NOTE ADULT - SUBJECTIVE AND OBJECTIVE BOX
Patient: BIA RAYMOND [id number] 61y Male                            Hospitalist Attending Note    No complaints.  No pain.    ____________________PHYSICAL EXAM:  GENERAL:  NAD Alert and Oriented x 3   HEENT: NCAT  CARDIOVASCULAR:  S1, S2  LUNGS: CTAB  ABDOMEN:  soft, (-) tenderness, (-) distension, (+) bowel sounds, (-) guarding, (-) rebound (-) rigidity  EXTREMITIES:  R 4th and 5th toe gangrene  ____________________     VITALS:  Vital Signs Last 24 Hrs  T(C): 36.7 (14 Feb 2022 12:22), Max: 37.2 (13 Feb 2022 17:10)  T(F): 98.1 (14 Feb 2022 12:22), Max: 99 (13 Feb 2022 17:10)  HR: 110 (14 Feb 2022 12:22) (73 - 110)  BP: 141/74 (14 Feb 2022 12:22) (109/70 - 141/74)  BP(mean): --  RR: 18 (14 Feb 2022 12:22) (18 - 19)  SpO2: 95% (14 Feb 2022 12:22) (95% - 96%) Daily     Daily   CAPILLARY BLOOD GLUCOSE      POCT Blood Glucose.: 254 mg/dL (14 Feb 2022 16:36)  POCT Blood Glucose.: 233 mg/dL (14 Feb 2022 11:46)  POCT Blood Glucose.: 209 mg/dL (14 Feb 2022 08:05)  POCT Blood Glucose.: 225 mg/dL (13 Feb 2022 21:49)    I&O's Summary    13 Feb 2022 07:01  -  14 Feb 2022 07:00  --------------------------------------------------------  IN: 200 mL / OUT: 0 mL / NET: 200 mL        HISTORY:  PAST MEDICAL & SURGICAL HISTORY:  DM (diabetes mellitus)    No significant past surgical history    Allergies    No Known Allergies    Intolerances       LABS:                        15.8   19.36 )-----------( 523      ( 14 Feb 2022 06:46 )             48.9       Culture - Abscess with Gram Stain (collected 12 Feb 2022 01:13)  Source: .Abscess right foot  Final Report (14 Feb 2022 12:50):    Numerous Staphylococcus aureus    Numerous Serratia marcescens    Numerous Streptococcus agalactiae (Group B) isolated    Group B streptococci are susceptible to ampicillin,    penicillin and cefazolin, but may be resistant to    erythromycin and clindamycin.    Recommendations for intrapartum prophylaxis for Group B    streptococci are penicillin or ampicillin.    Few Finegoldia magna "Susceptibilities not performed"  Organism: Staphylococcus aureus  Serratia marcescens (14 Feb 2022 12:50)  Organism: Serratia marcescens (14 Feb 2022 12:50)  Organism: Staphylococcus aureus (14 Feb 2022 12:50)    02-14    133<L>  |  96  |  31<H>  ----------------------------<  271<H>  4.6   |  23  |  1.23    Ca    9.9      14 Feb 2022 06:46                Culture - Abscess with Gram Stain (collected 12 Feb 2022 01:13)  Source: .Abscess right foot  Final Report (14 Feb 2022 12:50):    Numerous Staphylococcus aureus    Numerous Serratia marcescens    Numerous Streptococcus agalactiae (Group B) isolated    Group B streptococci are susceptible to ampicillin,    penicillin and cefazolin, but may be resistant to    erythromycin and clindamycin.    Recommendations for intrapartum prophylaxis for Group B    streptococci are penicillin or ampicillin.    Few Finegoldia magna "Susceptibilities not performed"  Organism: Staphylococcus aureus  Serratia marcescens (14 Feb 2022 12:50)  Organism: Serratia marcescens (14 Feb 2022 12:50)  Organism: Staphylococcus aureus (14 Feb 2022 12:50)          MEDICATIONS:  MEDICATIONS  (STANDING):  aspirin enteric coated 81 milliGRAM(s) Oral daily  atorvastatin 10 milliGRAM(s) Oral at bedtime  dextrose 40% Gel 15 Gram(s) Oral once  dextrose 5%. 1000 milliLiter(s) (50 mL/Hr) IV Continuous <Continuous>  dextrose 5%. 1000 milliLiter(s) (100 mL/Hr) IV Continuous <Continuous>  dextrose 50% Injectable 25 Gram(s) IV Push once  dextrose 50% Injectable 12.5 Gram(s) IV Push once  dextrose 50% Injectable 25 Gram(s) IV Push once  glucagon  Injectable 1 milliGRAM(s) IntraMuscular once  heparin   Injectable 5000 Unit(s) SubCutaneous every 12 hours  insulin lispro (ADMELOG) corrective regimen sliding scale   SubCutaneous three times a day before meals  LORazepam   Injectable 0.5 milliGRAM(s) IV Push once  nicotine - 21 mG/24Hr(s) Patch 1 patch Transdermal daily  oxyCODONE  ER Tablet 10 milliGRAM(s) Oral every 12 hours  piperacillin/tazobactam IVPB.. 3.375 Gram(s) IV Intermittent every 8 hours  sodium chloride 0.9%. 1000 milliLiter(s) (100 mL/Hr) IV Continuous <Continuous>    MEDICATIONS  (PRN):  acetaminophen     Tablet .. 650 milliGRAM(s) Oral every 4 hours PRN Mild Pain (1 - 3)  aluminum hydroxide/magnesium hydroxide/simethicone Suspension 30 milliLiter(s) Oral every 6 hours PRN Dyspepsia  ondansetron Injectable 8 milliGRAM(s) IV Push every 6 hours PRN Nausea and/or Vomiting  oxyCODONE    IR 5 milliGRAM(s) Oral every 4 hours PRN Moderate Pain (4 - 6)  oxyCODONE    IR 10 milliGRAM(s) Oral every 4 hours PRN Severe Pain (7 - 10)

## 2022-02-14 NOTE — PROGRESS NOTE ADULT - SUBJECTIVE AND OBJECTIVE BOX
Patient is a 61y old  Male who presents with a chief complaint of gangrene foot (13 Feb 2022 22:01)       INTERVAL HPI/OVERNIGHT EVENTS:  Patient seen and evaluated at bedside.  Pt is resting comfortable in NAD. Denies N/V/F/C.  Pain rated at X/10    Allergies    No Known Allergies    Intolerances        Vital Signs Last 24 Hrs  T(C): 37.2 (14 Feb 2022 00:11), Max: 37.2 (13 Feb 2022 13:16)  T(F): 98.9 (14 Feb 2022 00:11), Max: 99 (13 Feb 2022 17:10)  HR: 103 (14 Feb 2022 00:11) (73 - 103)  BP: 109/70 (14 Feb 2022 00:11) (109/70 - 150/86)  BP(mean): --  RR: 19 (14 Feb 2022 00:11) (19 - 19)  SpO2: 95% (14 Feb 2022 00:11) (95% - 96%)    LABS:                        15.8   19.36 )-----------( 523      ( 14 Feb 2022 06:46 )             48.9     02-14    133<L>  |  96  |  31<H>  ----------------------------<  271<H>  4.6   |  23  |  1.23    Ca    9.9      14 Feb 2022 06:46          CAPILLARY BLOOD GLUCOSE      POCT Blood Glucose.: 209 mg/dL (14 Feb 2022 08:05)  POCT Blood Glucose.: 225 mg/dL (13 Feb 2022 21:49)  POCT Blood Glucose.: 208 mg/dL (13 Feb 2022 16:40)  POCT Blood Glucose.: 210 mg/dL (13 Feb 2022 10:55)      Lower Extremity Physical Exam:    Vascular: DP/PT 0/4 B/L, CFT >3 seconds B/L, Temperature gradient warm to cool B/L  Neuro: Epicritic sensation intact to the level of digits B/L  Musculoskeletal/Ortho: unremarkable   Skin: Right foot gangrene digits 4 and 5 now demarcating to plantar met necks w/ ischemic changes and dusky appearance to the right foot to level of rearfoot, no bogginess, no malodor, no fluctuance  pressure wounds to right foot lateral 5th met styloid, posterior heel, and medial malleolus to level of subq, no probing to bone  plantar hallux eschar w/ wound to bone, no fluctuance, no bogginess, no malodor, no purulence     2/14 s/p stab incision to R foot sub mt 4: no drainage, no purulence, no malodor     RADIOLOGY & ADDITIONAL TESTS:

## 2022-02-14 NOTE — CONSULT NOTE ADULT - SUBJECTIVE AND OBJECTIVE BOX
Patient is a 61y old  Male who presents with a chief complaint of gangrene foot (14 Feb 2022 10:08)      HPI:    Patient is a 61y old  Male   h/o  DM  2  on   oral meds,   who presents with a chief complaint of right foot gangrene .  4/5  th toes  for past 3  to 4  months   denies  cp/sob/ abd  pain/ fevers  seen by podiatry in er/  vascular called   (11 Feb 2022 17:26)    Pt admitted with dry gangrene R 4th and 5th toe, non healing r medial malleolus ulcer, PAD, non palpable pulses distal to femorals b/l.   Scheduled for IR angio 2/16      PAST MEDICAL & SURGICAL HISTORY:  DM (diabetes mellitus)    No significant past surgical history        Allergies    No Known Allergies    Intolerances        MEDICATIONS  (STANDING):  aspirin enteric coated 81 milliGRAM(s) Oral daily  atorvastatin 10 milliGRAM(s) Oral at bedtime  dextrose 40% Gel 15 Gram(s) Oral once  dextrose 5%. 1000 milliLiter(s) (50 mL/Hr) IV Continuous <Continuous>  dextrose 5%. 1000 milliLiter(s) (100 mL/Hr) IV Continuous <Continuous>  dextrose 50% Injectable 25 Gram(s) IV Push once  dextrose 50% Injectable 12.5 Gram(s) IV Push once  dextrose 50% Injectable 25 Gram(s) IV Push once  glucagon  Injectable 1 milliGRAM(s) IntraMuscular once  heparin   Injectable 5000 Unit(s) SubCutaneous every 12 hours  insulin lispro (ADMELOG) corrective regimen sliding scale   SubCutaneous three times a day before meals  LORazepam   Injectable 0.5 milliGRAM(s) IV Push once  nicotine - 21 mG/24Hr(s) Patch 1 patch Transdermal daily  oxyCODONE  ER Tablet 10 milliGRAM(s) Oral every 12 hours  piperacillin/tazobactam IVPB.. 3.375 Gram(s) IV Intermittent every 8 hours  sodium chloride 0.9%. 1000 milliLiter(s) (100 mL/Hr) IV Continuous <Continuous>    MEDICATIONS  (PRN):  acetaminophen     Tablet .. 650 milliGRAM(s) Oral every 4 hours PRN Mild Pain (1 - 3)  aluminum hydroxide/magnesium hydroxide/simethicone Suspension 30 milliLiter(s) Oral every 6 hours PRN Dyspepsia  ondansetron Injectable 8 milliGRAM(s) IV Push every 6 hours PRN Nausea and/or Vomiting  oxyCODONE    IR 5 milliGRAM(s) Oral every 4 hours PRN Moderate Pain (4 - 6)  oxyCODONE    IR 10 milliGRAM(s) Oral every 4 hours PRN Severe Pain (7 - 10)      PHYSICAL EXAM:    Vital Signs Last 24 Hrs  T(C): 36.7 (14 Feb 2022 12:22), Max: 37.2 (13 Feb 2022 17:10)  T(F): 98.1 (14 Feb 2022 12:22), Max: 99 (13 Feb 2022 17:10)  HR: 110 (14 Feb 2022 12:22) (73 - 110)  BP: 141/74 (14 Feb 2022 12:22) (109/70 - 141/74)  BP(mean): --  RR: 18 (14 Feb 2022 12:22) (18 - 19)  SpO2: 95% (14 Feb 2022 12:22) (95% - 96%)    General:  Appears stated age, NAD, A&O x3  Lungs:  CTAB  Cardiovascular:  S1, S2, no murmurs  Abdomen:  Soft, non-tender, non-distended,   Extremities: R 4th/5th digits dry gangrene, 1/x cm chronic R lateral foot ulcer, no palpable pulses distally.   Musculoskeletal:  Full ROM in all joints w/o swelling/tenderness/effusion      LABS:                        15.8   19.36 )-----------( 523      ( 14 Feb 2022 06:46 )             48.9     02-14    133<L>  |  96  |  31<H>  ----------------------------<  271<H>  4.6   |  23  |  1.23    Ca    9.9      14 Feb 2022 06:46

## 2022-02-14 NOTE — CONSULT NOTE ADULT - SUBJECTIVE AND OBJECTIVE BOX
HPI:  Patient is a 61 year old male with h/o DM who presented to the ED c/o right foot infection, worsening over the last few months. Had been following up with a wound care center, but stopped during the pandemic.       PAST MEDICAL & SURGICAL HISTORY:  DM (diabetes mellitus)  No significant past surgical history    REVIEW OF SYSTEMS  Contained within \[facility]\""     MEDICATIONS  (STANDING):  aspirin enteric coated 81 milliGRAM(s) Oral daily  atorvastatin 10 milliGRAM(s) Oral at bedtime  dextrose 40% Gel 15 Gram(s) Oral once  dextrose 5%. 1000 milliLiter(s) (50 mL/Hr) IV Continuous <Continuous>  dextrose 5%. 1000 milliLiter(s) (100 mL/Hr) IV Continuous <Continuous>  dextrose 50% Injectable 25 Gram(s) IV Push once  dextrose 50% Injectable 12.5 Gram(s) IV Push once  dextrose 50% Injectable 25 Gram(s) IV Push once  glucagon  Injectable 1 milliGRAM(s) IntraMuscular once  heparin   Injectable 5000 Unit(s) SubCutaneous every 12 hours  insulin lispro (ADMELOG) corrective regimen sliding scale   SubCutaneous three times a day before meals  LORazepam   Injectable 0.5 milliGRAM(s) IV Push once  nicotine - 21 mG/24Hr(s) Patch 1 patch Transdermal daily  oxyCODONE  ER Tablet 10 milliGRAM(s) Oral every 12 hours  piperacillin/tazobactam IVPB.. 3.375 Gram(s) IV Intermittent every 8 hours    MEDICATIONS  (PRN):  acetaminophen     Tablet .. 650 milliGRAM(s) Oral every 4 hours PRN Mild Pain (1 - 3)  aluminum hydroxide/magnesium hydroxide/simethicone Suspension 30 milliLiter(s) Oral every 6 hours PRN Dyspepsia  ondansetron Injectable 8 milliGRAM(s) IV Push every 6 hours PRN Nausea and/or Vomiting  oxyCODONE    IR 5 milliGRAM(s) Oral every 4 hours PRN Moderate Pain (4 - 6)  oxyCODONE    IR 10 milliGRAM(s) Oral every 4 hours PRN Severe Pain (7 - 10)      Allergies  No Known Allergies      Vital Signs Last 24 Hrs  T(C): 37.2 (14 Feb 2022 00:11), Max: 37.2 (13 Feb 2022 13:16)  T(F): 98.9 (14 Feb 2022 00:11), Max: 99 (13 Feb 2022 17:10)  HR: 103 (14 Feb 2022 00:11) (73 - 103)  BP: 109/70 (14 Feb 2022 00:11) (109/70 - 150/86)  RR: 19 (14 Feb 2022 00:11) (19 - 19)  SpO2: 95% (14 Feb 2022 00:11) (95% - 96%)    PHYSICAL EXAM:  Gen: Alert, NAD  CV: S1S2  Lungs: Respirations nonlabored  Ext: R foot dry gangrene of digits 4 and 5, mummified. 1x1cm chronic ulcer right lateral foot. 2+ b/l femoral pulses, no palpable pulses distally.     LABS:                        15.8   19.36 )-----------( 523      ( 14 Feb 2022 06:46 )             48.9     02-14    133<L>  |  96  |  31<H>  ----------------------------<  271<H>  4.6   |  23  |  1.23    Ca    9.9      14 Feb 2022 06:46      RADIOLOGY & ADDITIONAL STUDIES  < from: US Duplex Arterial Lower Ext Ltd, Right (11.25.19 @ 14:47) >  Right leg: There is no evidence of arterial occlusion in the right leg.   There is an abnormal velocity decrease between the right distal   superficial femoral artery and popliteal artery indicating underlying   stenosis. There also are abnormal velocity and waveform decrease is   between the proximal anterior tibial artery and mid anterior tibial   artery indicating underlying stenosis. There is also an abnormal velocity   and waveform decrease between the right popliteal artery both the   posterior tibial artery and peroneal artery indicating underlying   stenoses.        IMPRESSION:  No evidence of arterial occlusion. Evidence of stenosis   involving distal superficial femoral artery and all three runoff vessels   as noted. Correlation with CT or MR angiography is recommended to   identify underlying vascular disease with greater resolution.    Thank you for this referral.    < end of copied text >    < from: [facility] Physiol Extremity Lower 3+ Level, BI (02.12.22 @ 09:52) >  Findings/  Impression:  Right lower extremity: The ankle brachial index is unobtainable. The   pulse waveforms are reduced in the calf and ankle.    Left lower extremity: The ankle brachial index is unobtainable. The pulse   waveforms are monophasic with normal to near-normal amplitude.    Diffusely calcified noncompressible vessels limiting evaluation.    Diminished flow below the right knee.    --- End of Report ---      < end of copied text >      Impression and Plan:

## 2022-02-14 NOTE — CHART NOTE - NSCHARTNOTEFT_GEN_A_CORE
Called by RN stating patient had 2 episodes of small amount of coffee ground emesis.  Patient states that he has had multiple episodes of vomiting over the last few days.  Patient denies abdominal pain.  Denies h/o gastric ulcers of h/o GI bleeding in the past.    Vitals /73    T 98.3  General: sitting in bed, NAD, noted to vomit coffee ground material x 1  Lungs: CTAB  Cardiac: + S1, S2  Abdomen: hypoactive BS, abdomen soft, non-distended, non-tender  Ext: no edema, RLE dressing C/D/I    A/P: 62 y/o male w/ PMH DM, PAD, active smoker admitted with R 4th/5th digit dry gangrene with non healing R medial malleolar ulcer.  Patient now with coffee ground emesis.  -Hold ASA & subcut heparin  -Stat CBC & T & S  -IV PPI bid  -stat KUB  -Will cont to monitor closely Called by RN stating patient had 2 episodes of small amount of coffee ground emesis.  Patient states that he has had multiple episodes of vomiting over the last few days.  Patient denies abdominal pain.  Denies h/o gastric ulcers of h/o GI bleeding in the past.    Vitals /73    T 98.3  General: sitting in bed, NAD, noted to vomit coffee ground material x 1  Lungs: CTAB  Cardiac: + S1, S2  Abdomen: hypoactive BS, abdomen soft, non-distended, non-tender  Ext: no edema, RLE dressing C/D/I    A/P: 60 y/o male w/ PMH DM, PAD, active smoker admitted with R 4th/5th digit dry gangrene with non healing R medial malleolar ulcer.  Patient now with coffee ground emesis.  -Hold subcut heparin; will continue ASA for now given h/o PAD with stents  -Stat CBC & T & S  -IV PPI bid  -stat KUB  -Will cont to monitor closely Called by RN stating patient had 2 episodes of small amount of coffee ground emesis.  Patient states that he has had multiple episodes of vomiting over the last few days.  Patient denies abdominal pain.  Denies h/o gastric ulcers of h/o GI bleeding in the past.    Vitals /73    T 98.3  General: sitting in bed, NAD, noted to vomit coffee ground material x 1  Lungs: CTAB  Cardiac: + S1, S2  Abdomen: hypoactive BS, abdomen soft, non-distended, non-tender  Ext: no edema, RLE dressing C/D/I    A/P: 62 y/o male w/ PMH DM, PAD, active smoker admitted with R 4th/5th digit dry gangrene with non healing R medial malleolar ulcer.  Patient now with coffee ground emesis.  -Hold subcut heparin; will continue ASA for now given h/o PAD with stents  -Stat CBC & T & S  -IV PPI bid  -stat KUB  -Will cont to monitor closely    Addendum  Received sign out to follow up repeat cbc after coffee ground emesis .  Initial cbc h/h 15.8/48.9  and repeat cbc   16.3 /50.5.   will continue to monitor the patient  [facility]

## 2022-02-14 NOTE — CONSULT NOTE ADULT - ASSESSMENT
A/P: 61M a/w dry gangrene of right digits #4 and #5, b/l LE PAD  -- recommend CTA b/l LE  -- possible IR angio pending results  -- would hold off on amputation for now   -- discussed with DR. Garcia

## 2022-02-14 NOTE — CONSULT NOTE ADULT - ASSESSMENT
62 y/o male w/ PMH DM, PAD, p/w R 4th/5th digit dry gangrene w/ non healing R medial malleolar ulcer, no palpable pulses distal to femoral b/l.  - CTA 2/16  - Rpt COVID (ordered)  - F/U Am labs 2/16 at 3am  - Hold heparin am on 2/16  - NPO p MN 2/15  - Consent signed

## 2022-02-15 ENCOUNTER — RESULT REVIEW (OUTPATIENT)
Age: 62
End: 2022-02-15

## 2022-02-15 ENCOUNTER — TRANSCRIPTION ENCOUNTER (OUTPATIENT)
Age: 62
End: 2022-02-15

## 2022-02-15 LAB
ALBUMIN SERPL ELPH-MCNC: 2.5 G/DL — LOW (ref 3.3–5)
ALP SERPL-CCNC: 88 U/L — SIGNIFICANT CHANGE UP (ref 40–120)
ALT FLD-CCNC: 17 U/L — SIGNIFICANT CHANGE UP (ref 12–78)
ANION GAP SERPL CALC-SCNC: 11 MMOL/L — SIGNIFICANT CHANGE UP (ref 5–17)
ANION GAP SERPL CALC-SCNC: 12 MMOL/L — SIGNIFICANT CHANGE UP (ref 5–17)
APTT BLD: 31.3 SEC — SIGNIFICANT CHANGE UP (ref 27.5–35.5)
AST SERPL-CCNC: 12 U/L — LOW (ref 15–37)
BILIRUB DIRECT SERPL-MCNC: 0.1 MG/DL — SIGNIFICANT CHANGE UP (ref 0–0.3)
BILIRUB INDIRECT FLD-MCNC: 0.4 MG/DL — SIGNIFICANT CHANGE UP (ref 0.2–1)
BILIRUB SERPL-MCNC: 0.5 MG/DL — SIGNIFICANT CHANGE UP (ref 0.2–1.2)
BLD GP AB SCN SERPL QL: SIGNIFICANT CHANGE UP
BUN SERPL-MCNC: 38 MG/DL — HIGH (ref 7–23)
BUN SERPL-MCNC: 39 MG/DL — HIGH (ref 7–23)
CALCIUM SERPL-MCNC: 9.4 MG/DL — SIGNIFICANT CHANGE UP (ref 8.5–10.1)
CALCIUM SERPL-MCNC: 9.7 MG/DL — SIGNIFICANT CHANGE UP (ref 8.5–10.1)
CHLORIDE SERPL-SCNC: 95 MMOL/L — LOW (ref 96–108)
CHLORIDE SERPL-SCNC: 97 MMOL/L — SIGNIFICANT CHANGE UP (ref 96–108)
CO2 SERPL-SCNC: 29 MMOL/L — SIGNIFICANT CHANGE UP (ref 22–31)
CO2 SERPL-SCNC: 29 MMOL/L — SIGNIFICANT CHANGE UP (ref 22–31)
CREAT SERPL-MCNC: 1.28 MG/DL — SIGNIFICANT CHANGE UP (ref 0.5–1.3)
CREAT SERPL-MCNC: 1.31 MG/DL — HIGH (ref 0.5–1.3)
GLUCOSE BLDC GLUCOMTR-MCNC: 200 MG/DL — HIGH (ref 70–99)
GLUCOSE BLDC GLUCOMTR-MCNC: 214 MG/DL — HIGH (ref 70–99)
GLUCOSE BLDC GLUCOMTR-MCNC: 249 MG/DL — HIGH (ref 70–99)
GLUCOSE BLDC GLUCOMTR-MCNC: 253 MG/DL — HIGH (ref 70–99)
GLUCOSE BLDC GLUCOMTR-MCNC: 260 MG/DL — HIGH (ref 70–99)
GLUCOSE BLDC GLUCOMTR-MCNC: 286 MG/DL — HIGH (ref 70–99)
GLUCOSE SERPL-MCNC: 262 MG/DL — HIGH (ref 70–99)
GLUCOSE SERPL-MCNC: 288 MG/DL — HIGH (ref 70–99)
HCT VFR BLD CALC: 47.8 % — SIGNIFICANT CHANGE UP (ref 39–50)
HCT VFR BLD CALC: 50.5 % — HIGH (ref 39–50)
HGB BLD-MCNC: 15.7 G/DL — SIGNIFICANT CHANGE UP (ref 13–17)
HGB BLD-MCNC: 16.3 G/DL — SIGNIFICANT CHANGE UP (ref 13–17)
INR BLD: 1.19 RATIO — HIGH (ref 0.88–1.16)
MCHC RBC-ENTMCNC: 29.2 PG — SIGNIFICANT CHANGE UP (ref 27–34)
MCHC RBC-ENTMCNC: 29.5 PG — SIGNIFICANT CHANGE UP (ref 27–34)
MCHC RBC-ENTMCNC: 32.3 G/DL — SIGNIFICANT CHANGE UP (ref 32–36)
MCHC RBC-ENTMCNC: 32.8 G/DL — SIGNIFICANT CHANGE UP (ref 32–36)
MCV RBC AUTO: 89.7 FL — SIGNIFICANT CHANGE UP (ref 80–100)
MCV RBC AUTO: 90.3 FL — SIGNIFICANT CHANGE UP (ref 80–100)
NRBC # BLD: 0 /100 WBCS — SIGNIFICANT CHANGE UP (ref 0–0)
NRBC # BLD: 0 /100 WBCS — SIGNIFICANT CHANGE UP (ref 0–0)
OB PNL STL: POSITIVE
PLATELET # BLD AUTO: 499 K/UL — HIGH (ref 150–400)
PLATELET # BLD AUTO: 563 K/UL — HIGH (ref 150–400)
POTASSIUM SERPL-MCNC: 4.6 MMOL/L — SIGNIFICANT CHANGE UP (ref 3.5–5.3)
POTASSIUM SERPL-MCNC: 4.7 MMOL/L — SIGNIFICANT CHANGE UP (ref 3.5–5.3)
POTASSIUM SERPL-SCNC: 4.6 MMOL/L — SIGNIFICANT CHANGE UP (ref 3.5–5.3)
POTASSIUM SERPL-SCNC: 4.7 MMOL/L — SIGNIFICANT CHANGE UP (ref 3.5–5.3)
PROT SERPL-MCNC: 7.8 GM/DL — SIGNIFICANT CHANGE UP (ref 6–8.3)
PROTHROM AB SERPL-ACNC: 13.7 SEC — HIGH (ref 10.6–13.6)
RAPID RVP RESULT: SIGNIFICANT CHANGE UP
RAPID RVP RESULT: SIGNIFICANT CHANGE UP
RBC # BLD: 5.33 M/UL — SIGNIFICANT CHANGE UP (ref 4.2–5.8)
RBC # BLD: 5.59 M/UL — SIGNIFICANT CHANGE UP (ref 4.2–5.8)
RBC # FLD: 12.7 % — SIGNIFICANT CHANGE UP (ref 10.3–14.5)
RBC # FLD: 12.8 % — SIGNIFICANT CHANGE UP (ref 10.3–14.5)
SARS-COV-2 RNA SPEC QL NAA+PROBE: SIGNIFICANT CHANGE UP
SARS-COV-2 RNA SPEC QL NAA+PROBE: SIGNIFICANT CHANGE UP
SODIUM SERPL-SCNC: 136 MMOL/L — SIGNIFICANT CHANGE UP (ref 135–145)
SODIUM SERPL-SCNC: 137 MMOL/L — SIGNIFICANT CHANGE UP (ref 135–145)
WBC # BLD: 18.63 K/UL — HIGH (ref 3.8–10.5)
WBC # BLD: 21.3 K/UL — HIGH (ref 3.8–10.5)
WBC # FLD AUTO: 18.63 K/UL — HIGH (ref 3.8–10.5)
WBC # FLD AUTO: 21.3 K/UL — HIGH (ref 3.8–10.5)

## 2022-02-15 PROCEDURE — 99233 SBSQ HOSP IP/OBS HIGH 50: CPT

## 2022-02-15 PROCEDURE — 93010 ELECTROCARDIOGRAM REPORT: CPT

## 2022-02-15 PROCEDURE — 99222 1ST HOSP IP/OBS MODERATE 55: CPT

## 2022-02-15 PROCEDURE — 88307 TISSUE EXAM BY PATHOLOGIST: CPT | Mod: 26

## 2022-02-15 PROCEDURE — 99232 SBSQ HOSP IP/OBS MODERATE 35: CPT

## 2022-02-15 RX ORDER — DEXTROSE 50 % IN WATER 50 %
12.5 SYRINGE (ML) INTRAVENOUS ONCE
Refills: 0 | Status: DISCONTINUED | OUTPATIENT
Start: 2022-02-15 | End: 2022-02-21

## 2022-02-15 RX ORDER — INSULIN HUMAN 100 [IU]/ML
5 INJECTION, SOLUTION SUBCUTANEOUS ONCE
Refills: 0 | Status: COMPLETED | OUTPATIENT
Start: 2022-02-15 | End: 2022-02-15

## 2022-02-15 RX ORDER — CEFEPIME 1 G/1
1000 INJECTION, POWDER, FOR SOLUTION INTRAMUSCULAR; INTRAVENOUS EVERY 8 HOURS
Refills: 0 | Status: DISCONTINUED | OUTPATIENT
Start: 2022-02-15 | End: 2022-02-15

## 2022-02-15 RX ORDER — METRONIDAZOLE 500 MG
500 TABLET ORAL THREE TIMES A DAY
Refills: 0 | Status: DISCONTINUED | OUTPATIENT
Start: 2022-02-15 | End: 2022-02-15

## 2022-02-15 RX ORDER — ONDANSETRON 8 MG/1
8 TABLET, FILM COATED ORAL EVERY 6 HOURS
Refills: 0 | Status: DISCONTINUED | OUTPATIENT
Start: 2022-02-15 | End: 2022-02-21

## 2022-02-15 RX ORDER — MORPHINE SULFATE 50 MG/1
2 CAPSULE, EXTENDED RELEASE ORAL EVERY 4 HOURS
Refills: 0 | Status: DISCONTINUED | OUTPATIENT
Start: 2022-02-15 | End: 2022-02-21

## 2022-02-15 RX ORDER — CEFEPIME 1 G/1
1000 INJECTION, POWDER, FOR SOLUTION INTRAMUSCULAR; INTRAVENOUS EVERY 8 HOURS
Refills: 0 | Status: DISCONTINUED | OUTPATIENT
Start: 2022-02-15 | End: 2022-02-21

## 2022-02-15 RX ORDER — OXYCODONE AND ACETAMINOPHEN 5; 325 MG/1; MG/1
1 TABLET ORAL EVERY 4 HOURS
Refills: 0 | Status: DISCONTINUED | OUTPATIENT
Start: 2022-02-15 | End: 2022-02-21

## 2022-02-15 RX ORDER — METRONIDAZOLE 500 MG
500 TABLET ORAL THREE TIMES A DAY
Refills: 0 | Status: DISCONTINUED | OUTPATIENT
Start: 2022-02-15 | End: 2022-02-21

## 2022-02-15 RX ORDER — SIMETHICONE 80 MG/1
80 TABLET, CHEWABLE ORAL EVERY 6 HOURS
Refills: 0 | Status: DISCONTINUED | OUTPATIENT
Start: 2022-02-15 | End: 2022-02-21

## 2022-02-15 RX ORDER — SODIUM CHLORIDE 9 MG/ML
1000 INJECTION, SOLUTION INTRAVENOUS
Refills: 0 | Status: DISCONTINUED | OUTPATIENT
Start: 2022-02-15 | End: 2022-02-21

## 2022-02-15 RX ORDER — METOPROLOL TARTRATE 50 MG
5 TABLET ORAL ONCE
Refills: 0 | Status: COMPLETED | OUTPATIENT
Start: 2022-02-15 | End: 2022-02-15

## 2022-02-15 RX ORDER — SODIUM CHLORIDE 9 MG/ML
1000 INJECTION INTRAMUSCULAR; INTRAVENOUS; SUBCUTANEOUS
Refills: 0 | Status: DISCONTINUED | OUTPATIENT
Start: 2022-02-15 | End: 2022-02-21

## 2022-02-15 RX ORDER — PANTOPRAZOLE SODIUM 20 MG/1
40 TABLET, DELAYED RELEASE ORAL EVERY 12 HOURS
Refills: 0 | Status: DISCONTINUED | OUTPATIENT
Start: 2022-02-15 | End: 2022-02-21

## 2022-02-15 RX ORDER — INSULIN LISPRO 100/ML
VIAL (ML) SUBCUTANEOUS
Refills: 0 | Status: DISCONTINUED | OUTPATIENT
Start: 2022-02-15 | End: 2022-02-21

## 2022-02-15 RX ORDER — ASPIRIN/CALCIUM CARB/MAGNESIUM 324 MG
81 TABLET ORAL DAILY
Refills: 0 | Status: DISCONTINUED | OUTPATIENT
Start: 2022-02-15 | End: 2022-02-15

## 2022-02-15 RX ORDER — HYDROMORPHONE HYDROCHLORIDE 2 MG/ML
0.5 INJECTION INTRAMUSCULAR; INTRAVENOUS; SUBCUTANEOUS
Refills: 0 | Status: DISCONTINUED | OUTPATIENT
Start: 2022-02-15 | End: 2022-02-15

## 2022-02-15 RX ORDER — ASPIRIN/CALCIUM CARB/MAGNESIUM 324 MG
81 TABLET ORAL DAILY
Refills: 0 | Status: DISCONTINUED | OUTPATIENT
Start: 2022-02-15 | End: 2022-02-17

## 2022-02-15 RX ORDER — HYDROMORPHONE HYDROCHLORIDE 2 MG/ML
1 INJECTION INTRAMUSCULAR; INTRAVENOUS; SUBCUTANEOUS
Refills: 0 | Status: DISCONTINUED | OUTPATIENT
Start: 2022-02-15 | End: 2022-02-15

## 2022-02-15 RX ORDER — ACETAMINOPHEN 500 MG
650 TABLET ORAL EVERY 4 HOURS
Refills: 0 | Status: DISCONTINUED | OUTPATIENT
Start: 2022-02-15 | End: 2022-02-21

## 2022-02-15 RX ORDER — DEXTROSE 50 % IN WATER 50 %
25 SYRINGE (ML) INTRAVENOUS ONCE
Refills: 0 | Status: DISCONTINUED | OUTPATIENT
Start: 2022-02-15 | End: 2022-02-21

## 2022-02-15 RX ORDER — NICOTINE POLACRILEX 2 MG
1 GUM BUCCAL DAILY
Refills: 0 | Status: DISCONTINUED | OUTPATIENT
Start: 2022-02-15 | End: 2022-02-21

## 2022-02-15 RX ORDER — METOPROLOL TARTRATE 50 MG
5 TABLET ORAL ONCE
Refills: 0 | Status: DISCONTINUED | OUTPATIENT
Start: 2022-02-15 | End: 2022-02-15

## 2022-02-15 RX ORDER — POLYETHYLENE GLYCOL 3350 17 G/17G
17 POWDER, FOR SOLUTION ORAL DAILY
Refills: 0 | Status: DISCONTINUED | OUTPATIENT
Start: 2022-02-15 | End: 2022-02-21

## 2022-02-15 RX ORDER — ATORVASTATIN CALCIUM 80 MG/1
10 TABLET, FILM COATED ORAL AT BEDTIME
Refills: 0 | Status: DISCONTINUED | OUTPATIENT
Start: 2022-02-15 | End: 2022-02-21

## 2022-02-15 RX ORDER — GLUCAGON INJECTION, SOLUTION 0.5 MG/.1ML
1 INJECTION, SOLUTION SUBCUTANEOUS ONCE
Refills: 0 | Status: DISCONTINUED | OUTPATIENT
Start: 2022-02-15 | End: 2022-02-21

## 2022-02-15 RX ORDER — SODIUM CHLORIDE 9 MG/ML
1000 INJECTION, SOLUTION INTRAVENOUS
Refills: 0 | Status: DISCONTINUED | OUTPATIENT
Start: 2022-02-15 | End: 2022-02-15

## 2022-02-15 RX ORDER — SENNA PLUS 8.6 MG/1
2 TABLET ORAL AT BEDTIME
Refills: 0 | Status: DISCONTINUED | OUTPATIENT
Start: 2022-02-15 | End: 2022-02-21

## 2022-02-15 RX ORDER — ONDANSETRON 8 MG/1
4 TABLET, FILM COATED ORAL ONCE
Refills: 0 | Status: DISCONTINUED | OUTPATIENT
Start: 2022-02-15 | End: 2022-02-15

## 2022-02-15 RX ADMIN — Medication 5 MILLIGRAM(S): at 19:51

## 2022-02-15 RX ADMIN — Medication 1 PATCH: at 11:18

## 2022-02-15 RX ADMIN — PIPERACILLIN AND TAZOBACTAM 25 GRAM(S): 4; .5 INJECTION, POWDER, LYOPHILIZED, FOR SOLUTION INTRAVENOUS at 05:31

## 2022-02-15 RX ADMIN — ATORVASTATIN CALCIUM 10 MILLIGRAM(S): 80 TABLET, FILM COATED ORAL at 22:00

## 2022-02-15 RX ADMIN — Medication 1 PATCH: at 20:43

## 2022-02-15 RX ADMIN — Medication 1 PATCH: at 07:00

## 2022-02-15 RX ADMIN — PANTOPRAZOLE SODIUM 40 MILLIGRAM(S): 20 TABLET, DELAYED RELEASE ORAL at 17:00

## 2022-02-15 RX ADMIN — Medication 81 MILLIGRAM(S): at 11:18

## 2022-02-15 RX ADMIN — SODIUM CHLORIDE 100 MILLILITER(S): 9 INJECTION INTRAMUSCULAR; INTRAVENOUS; SUBCUTANEOUS at 22:00

## 2022-02-15 RX ADMIN — Medication 3: at 11:16

## 2022-02-15 RX ADMIN — OXYCODONE HYDROCHLORIDE 10 MILLIGRAM(S): 5 TABLET ORAL at 05:32

## 2022-02-15 RX ADMIN — Medication 1 PATCH: at 11:23

## 2022-02-15 RX ADMIN — Medication 500 MILLIGRAM(S): at 21:59

## 2022-02-15 RX ADMIN — CEFEPIME 100 MILLIGRAM(S): 1 INJECTION, POWDER, FOR SOLUTION INTRAMUSCULAR; INTRAVENOUS at 21:59

## 2022-02-15 RX ADMIN — Medication 3: at 08:20

## 2022-02-15 RX ADMIN — ONDANSETRON 8 MILLIGRAM(S): 8 TABLET, FILM COATED ORAL at 08:21

## 2022-02-15 RX ADMIN — OXYCODONE HYDROCHLORIDE 10 MILLIGRAM(S): 5 TABLET ORAL at 17:00

## 2022-02-15 RX ADMIN — SENNA PLUS 2 TABLET(S): 8.6 TABLET ORAL at 21:59

## 2022-02-15 RX ADMIN — PANTOPRAZOLE SODIUM 40 MILLIGRAM(S): 20 TABLET, DELAYED RELEASE ORAL at 05:32

## 2022-02-15 RX ADMIN — SODIUM CHLORIDE 100 MILLILITER(S): 9 INJECTION, SOLUTION INTRAVENOUS at 19:52

## 2022-02-15 RX ADMIN — Medication 1 ENEMA: at 01:42

## 2022-02-15 RX ADMIN — Medication 3: at 16:53

## 2022-02-15 RX ADMIN — OXYCODONE HYDROCHLORIDE 10 MILLIGRAM(S): 5 TABLET ORAL at 09:22

## 2022-02-15 RX ADMIN — PIPERACILLIN AND TAZOBACTAM 25 GRAM(S): 4; .5 INJECTION, POWDER, LYOPHILIZED, FOR SOLUTION INTRAVENOUS at 13:42

## 2022-02-15 NOTE — PROGRESS NOTE ADULT - ASSESSMENT
62yo M w/ right foot gangrene   - pt seen and evaluated  - afebrile, WBC trending up to 21, ESR 61, CRP  18  - Right foot and ankle xrays showing no gas, no OM right foot, mild degeneration of malleolar tips   - RFMR:   - right foot hallux plantar fibronecrotic wound with fissuring at sulcus and exposed tendon, Right foot gangrene digits 4 and 5 with plantar tenderness and bogginess, , malodor present, 5 cc of purulence, tracking distal to 2nd interspace, tracking proximal to midfoot    pressure wounds to right foot lateral 5th met styloid, posterior heel, and medial malleolus to level of subq, with no acute signs of infection  - consented pt for bedside right foot I&D  - right PT block using 5 cc of 1 % lidocaine plain, then performed sharp incision and drainage to the level of and not beyond subQ using #a5 blade and suture removal kit  - discussed with pt the need for OR debridement and removal of all infected skin soft tissue and bone, consented for right foot TMA for source control  - pressure wounds to right foot lateral 5th met styloid, posterior heel, and medial malleolus to level of subq, no probing to bone, no acute signs of infection   - left foot no open wounds or lesions  - NPO STAT, Coags STAT, pt is COVID negative on 2/14/22  - added on for R foot TMA today after 5PM with Dr. Dimas for source control  - salvageability of right foot is guarded  - discussed w/ attending  62yo M w/ right foot gangrene   - pt seen and evaluated  - afebrile, WBC trending up to 21, ESR 61, CRP  18  - Right foot and ankle xrays showing no gas, no OM right foot, mild degeneration of malleolar tips   - RFMR:   - right foot hallux plantar fibronecrotic wound with fissuring at sulcus and exposed tendon, Right foot gangrene digits 4 and 5 with plantar tenderness and bogginess, , malodor present, 5 cc of purulence, tracking distal to 2nd interspace, tracking proximal to midfoot    pressure wounds to right foot lateral 5th met styloid, posterior heel, and medial malleolus to level of subq, with no acute signs of infection  - consented pt for bedside right foot I&D  - right PT block using 5 cc of 1 % lidocaine plain, then performed sharp incision and drainage to the level of and not beyond subQ using #a5 blade and suture removal kit  - discussed with pt the need for OR debridement and removal of all infected skin soft tissue and bone, consented for right foot TMA for source control  - pressure wounds to right foot lateral 5th met styloid, posterior heel, and medial malleolus to level of subq, no probing to bone, no acute signs of infection   - left foot no open wounds or lesions  - NPO STAT, Coags STAT, pt is COVID negative on 2/14/22  - added on for R foot TMA today after 5PM with Dr. Dimas for source control  - salvageability of right foot is guarded  - please document medical optimization for surgery under sedation with local anesthesia  - discussed w/ attending  62yo M w/ right foot gangrene   - pt seen and evaluated  - afebrile, WBC trending up to 21, ESR 61, CRP  18  - Right foot and ankle xrays showing no gas, no OM right foot, mild degeneration of malleolar tips   - RFMR:   - right foot hallux plantar fibronecrotic wound with fissuring at sulcus and exposed tendon, Right foot gangrene digits 4 and 5 with plantar tenderness and bogginess, , malodor present, 5 cc of purulence, tracking distal to 2nd interspace, tracking proximal to midfoot    pressure wounds to right foot lateral 5th met styloid, posterior heel, and medial malleolus to level of subq, with no acute signs of infection  - consented pt for bedside right foot I&D  - right PT block using 5 cc of 1 % lidocaine plain, then performed sharp incision and drainage to the level of and not beyond subQ using #a5 blade and suture removal kit  - discussed with pt the need for OR debridement and removal of all infected skin soft tissue and bone, consented for right foot TMA for source control  - pressure wounds to right foot lateral 5th met styloid, posterior heel, and medial malleolus to level of subq, no probing to bone, no acute signs of infection   - WBC unresponsive to IV abx, purulence expressed today, concerning for worsening infection and sepsis, emergent open TMA indicated for infection source control  - left foot no open wounds or lesions  - NPO STAT, Coags STAT, pt is COVID negative on 2/14/22  - added on for R foot TMA today after 5PM with Dr. Dimas for source control  - salvageability of right foot is guarded  - please document medical optimization for surgery under sedation with local anesthesia  - discussed w/ attending

## 2022-02-15 NOTE — PROGRESS NOTE ADULT - SUBJECTIVE AND OBJECTIVE BOX
Patient: BIA RAYMOND [id number] 61y Male                            Hospitalist Attending Note    C/o some nausea.  No vomiting.  No Abdominal pain.   Per podiatry, they were able to illicit 5ml of purulent drainage this am.   Pt denies pain.  No fever / chills.       ____________________PHYSICAL EXAM:  GENERAL:  NAD Alert and Oriented x 3   HEENT: NCAT  CARDIOVASCULAR:  S1, S2  LUNGS: CTAB  ABDOMEN:  soft, (-) tenderness, (-) distension, (+) bowel sounds, (-) guarding, (-) rebound (-) rigidity  EXTREMITIES:  R 4th and 5th toe gangrene  ____________________    VITALS:  Vital Signs Last 24 Hrs  T(C): 36.6 (15 Feb 2022 11:48), Max: 37.2 (14 Feb 2022 23:38)  T(F): 97.9 (15 Feb 2022 11:48), Max: 99 (14 Feb 2022 23:38)  HR: 101 (15 Feb 2022 11:48) (97 - 112)  BP: 102/68 (15 Feb 2022 11:48) (102/68 - 161/75)  BP(mean): --  RR: 18 (15 Feb 2022 11:48) (17 - 18)  SpO2: 94% (15 Feb 2022 11:48) (94% - 98%) Daily     Daily   CAPILLARY BLOOD GLUCOSE      POCT Blood Glucose.: 260 mg/dL (15 Feb 2022 11:15)  POCT Blood Glucose.: 253 mg/dL (15 Feb 2022 07:47)  POCT Blood Glucose.: 276 mg/dL (14 Feb 2022 21:14)  POCT Blood Glucose.: 254 mg/dL (14 Feb 2022 16:36)    I&O's Summary    14 Feb 2022 07:01  -  15 Feb 2022 07:00  --------------------------------------------------------  IN: 1850 mL / OUT: 425 mL / NET: 1425 mL        LABS:                        15.7   18.63 )-----------( 499      ( 15 Feb 2022 11:45 )             47.8     02-15    137  |  97  |  39<H>  ----------------------------<  288<H>  4.7   |  29  |  1.31<H>    Ca    9.4      15 Feb 2022 11:45    TPro  7.8  /  Alb  2.5<L>  /  TBili  0.5  /  DBili  0.1  /  AST  12<L>  /  ALT  17  /  AlkPhos  88  02-15    PT/INR - ( 15 Feb 2022 11:45 )   PT: 13.7 sec;   INR: 1.19 ratio         PTT - ( 15 Feb 2022 11:45 )  PTT:31.3 sec  LIVER FUNCTIONS - ( 15 Feb 2022 01:19 )  Alb: 2.5 g/dL / Pro: 7.8 gm/dL / ALK PHOS: 88 U/L / ALT: 17 U/L / AST: 12 U/L / GGT: x                     MEDICATIONS:  acetaminophen     Tablet .. 650 milliGRAM(s) Oral every 4 hours PRN  aluminum hydroxide/magnesium hydroxide/simethicone Suspension 30 milliLiter(s) Oral every 6 hours PRN  aspirin enteric coated 81 milliGRAM(s) Oral daily  atorvastatin 10 milliGRAM(s) Oral at bedtime  cefepime   IVPB 1000 milliGRAM(s) IV Intermittent every 8 hours  dextrose 40% Gel 15 Gram(s) Oral once  dextrose 5%. 1000 milliLiter(s) IV Continuous <Continuous>  dextrose 5%. 1000 milliLiter(s) IV Continuous <Continuous>  dextrose 50% Injectable 25 Gram(s) IV Push once  dextrose 50% Injectable 12.5 Gram(s) IV Push once  dextrose 50% Injectable 25 Gram(s) IV Push once  glucagon  Injectable 1 milliGRAM(s) IntraMuscular once  insulin lispro (ADMELOG) corrective regimen sliding scale   SubCutaneous three times a day before meals  LORazepam   Injectable 0.5 milliGRAM(s) IV Push once  metroNIDAZOLE    Tablet 500 milliGRAM(s) Oral three times a day  nicotine - 21 mG/24Hr(s) Patch 1 patch Transdermal daily  ondansetron Injectable 8 milliGRAM(s) IV Push every 6 hours PRN  oxyCODONE    IR 5 milliGRAM(s) Oral every 4 hours PRN  oxyCODONE    IR 10 milliGRAM(s) Oral every 4 hours PRN  oxyCODONE  ER Tablet 10 milliGRAM(s) Oral every 12 hours  pantoprazole  Injectable 40 milliGRAM(s) IV Push every 12 hours  polyethylene glycol 3350 17 Gram(s) Oral daily PRN  sodium chloride 0.9%. 1000 milliLiter(s) IV Continuous <Continuous>

## 2022-02-15 NOTE — CONSULT NOTE ADULT - SUBJECTIVE AND OBJECTIVE BOX
BIA RAYMOND  MRN-[id number]    Patient is a 61y old  Male who presents with a chief complaint of gangrene foot (15 Feb 2022 10:46)    HPI: Patient is a 61y old  Male  h/o  DM  2  on   oral meds, who presents with a chief complaint of right foot gangrene . 4/5th toes for past 3  to 4  months  denies  cp/sob/ abd  pain/ fevers seen by podiatry in er/  vascular called (11 Feb 2022 17:26)    ID consulted for workup and antibiotic management     PAST MEDICAL & SURGICAL HISTORY:  DM (diabetes mellitus)    No significant past surgical history        Allergies  No Known Allergies        ANTIMICROBIALS:  cefepime   IVPB 1000 every 8 hours  metroNIDAZOLE    Tablet 500 three times a day      MEDICATIONS  (STANDING):  cefepime   IVPB   100 mL/Hr IV Intermittent (02-11-22 @ 17:49)    piperacillin/tazobactam IVPB..   25 mL/Hr IV Intermittent (02-15-22 @ 13:42)   25 mL/Hr IV Intermittent (02-15-22 @ 05:31)   25 mL/Hr IV Intermittent (02-14-22 @ 21:36)   25 mL/Hr IV Intermittent (02-14-22 @ 13:43)   25 mL/Hr IV Intermittent (02-14-22 @ 05:02)   25 mL/Hr IV Intermittent (02-13-22 @ 21:02)   25 mL/Hr IV Intermittent (02-13-22 @ 13:25)   25 mL/Hr IV Intermittent (02-13-22 @ 05:05)   25 mL/Hr IV Intermittent (02-12-22 @ 21:04)   25 mL/Hr IV Intermittent (02-12-22 @ 13:08)   25 mL/Hr IV Intermittent (02-12-22 @ 05:05)   25 mL/Hr IV Intermittent (02-11-22 @ 21:25)    vancomycin  IVPB.   250 mL/Hr IV Intermittent (02-11-22 @ 18:46)        OTHER MEDS: MEDICATIONS  (STANDING):  acetaminophen     Tablet .. 650 every 4 hours PRN  aluminum hydroxide/magnesium hydroxide/simethicone Suspension 30 every 6 hours PRN  aspirin enteric coated 81 daily  atorvastatin 10 at bedtime  dextrose 40% Gel 15 once  dextrose 50% Injectable 25 once  dextrose 50% Injectable 12.5 once  dextrose 50% Injectable 25 once  glucagon  Injectable 1 once  insulin lispro (ADMELOG) corrective regimen sliding scale  three times a day before meals  LORazepam   Injectable 0.5 once  ondansetron Injectable 8 every 6 hours PRN  oxyCODONE    IR 5 every 4 hours PRN  oxyCODONE    IR 10 every 4 hours PRN  oxyCODONE  ER Tablet 10 every 12 hours  pantoprazole  Injectable 40 every 12 hours  polyethylene glycol 3350 17 daily PRN      SOCIAL HISTORY:     Smoked up to this hospitalization     FAMILY HISTORY:  father and mother had DM     REVIEW OF SYSTEMS  [  ] ROS unobtainable because:    [ X ] All other systems negative except as noted below:	    Constitutional:  [ ] fever [ ] chills  [ ] weight loss  [ ] weakness  Skin:  [ ] rash [ ] phlebitis [x] right foot wounds 	  Eyes: [ ] icterus [ ] pain  [ ] discharge	  ENMT: [ ] sore throat  [ ] thrush [ ] ulcers [ ] exudates  Respiratory: [ ] dyspnea [ ] hemoptysis [ ] cough [ ] sputum	  Cardiovascular:  [ ] chest pain [ ] palpitations [ ] edema	  Gastrointestinal:  [ ] nausea [ ] vomiting [ ] diarrhea [ ] constipation [ ] pain	  Genitourinary:  [ ] dysuria [ ] frequency [ ] hematuria [ ] discharge [ ] flank pain  [ ] incontinence  Musculoskeletal:  [ ] myalgias [ ] arthralgias [ ] arthritis  [ ] back pain  Neurological:  [ ] headache [ ] seizures  [ ] confusion/altered mental status  Psychiatric:  [ ] anxiety [ ] depression	  Hematology/Lymphatics:  [ ] lymphadenopathy  Endocrine:  [ ] adrenal [ ] thyroid  Allergic/Immunologic:	 [ ] transplant [ ] seasonal    Vital Signs Last 24 Hrs  T(F): 97.9 (02-15-22 @ 11:48), Max: 99.7 (02-11-22 @ 14:30)    Vital Signs Last 24 Hrs  HR: 101 (02-15-22 @ 11:48) (97 - 112)  BP: 102/68 (02-15-22 @ 11:48) (102/68 - 161/75)  RR: 18 (02-15-22 @ 11:48)  SpO2: 94% (02-15-22 @ 11:48) (94% - 98%)  Wt(kg): --    PHYSICAL EXAM:  Constitutional: non-toxic, no distress  HEAD/EYES: anicteric, no conjunctival injection  ENT:  supple, no thrush  Cardiovascular:   normal S1, S2, no murmur, no edema  Respiratory:  clear BS bilaterally, no wheezes, no rales  GI:  soft, non-tender, normal bowel sounds  :  no diaz, no CVA tenderness  Musculoskeletal:  no synovitis, normal ROM  Neurologic: awake and alert, normal strength, no focal findings  Skin:  no rash, no erythema, no phlebitis  Heme/Onc: no lymphadenopathy   Psychiatric:  awake, alert, appropriate mood    WBC Count: 18.63 K/uL (02-15 @ 11:45)  WBC Count: 21.30 K/uL (02-15 @ 01:15)  WBC Count: 19.36 K/uL (02-14 @ 06:46)  WBC Count: 21.95 K/uL (02-12 @ 07:07)  WBC Count: 20.26 K/uL (02-11 @ 16:38)      Auto Neutrophil %: 88.3 % *H* (02-11-22 @ 16:38)  Auto Neutrophil #: 17.86 K/uL *H* (02-11-22 @ 16:38)                            15.7   18.63 )-----------( 499      ( 15 Feb 2022 11:45 )             47.8       02-15    137  |  97  |  39<H>  ----------------------------<  288<H>  4.7   |  29  |  1.31<H>    Ca    9.4      15 Feb 2022 11:45    TPro  7.8  /  Alb  2.5<L>  /  TBili  0.5  /  DBili  0.1  /  AST  12<L>  /  ALT  17  /  AlkPhos  88  02-15      Creatinine Trend: 1.31<--, 1.28<--, 1.23<--, 0.87<--, 0.87<--        MICROBIOLOGY:  .Abscess right foot  02-12-22   Numerous Staphylococcus aureus  Numerous Serratia marcescens  Numerous Streptococcus agalactiae (Group B) isolated  Group B streptococci are susceptible to ampicillin,  penicillin and cefazolin, but may be resistant to  erythromycin and clindamycin.  Recommendations for intrapartum prophylaxis for Group B  streptococci are penicillin or ampicillin.  Few Finegoldia magna "Susceptibilities not performed"  --  Staphylococcus aureus  Serratia marcescens        SARS-CoV-2: NotDetec (14 Feb 2022 22:12)    Rapid RVP Result: NotDetec (02-14 @ 22:12)      C-Reactive Protein, Serum: 150 (02-14)  C-Reactive Protein, Serum: 181 (02-12)    Procalcitonin, Serum: 0.07 (02-14-22 @ 09:52)    SARS-CoV-2: NotDetec (02-14-22 @ 22:12)  Rapid RVP Result: NotDetec (02-14-22 @ 22:12)  SARS-CoV-2 Result: NotDetec (02-11-22 @ 16:38)      RADIOLOGY:  MR Foot w/wo IV Cont, Right (02.12.22 @ 10:12)  IMPRESSION:  1.  Study limited by patient motion artifact and limited number of   sequences.  2.  Focal STIR signal is seen in the region of the plantar fascia/flexor   tendons. Consider diagnostic ultrasound of the foot forfurther   evaluation of suspected fluid collection.  3.  No MR evidence of acute osteomyelitis on this limited examination.      CT Angio Abd Aorta w/run-off w/ IV Cont (02.14.22 @ 15:37)  IMPRESSION:  Occluded right superficial femoral and popliteal arteries containing   multiple overlapping stents with reconstitution in the mid popliteal   artery.    Three-vessel runoff into the right foot.    Occluded left superficial femoral artery with reconstitution distally.    Moderate narrowing in the mid left popliteal artery.    Three-vessel runoff into the left foot.    Moderate narrowing in the proximal left external iliac artery with   associated areas of ulcerated plaque.

## 2022-02-15 NOTE — CONSULT NOTE ADULT - ASSESSMENT
Patient is a 61y old  Male h/o  DM2 on oral meds, who presents with a chief complaint of right foot gangrene . 4/5th toes  for past 3 to 4  months.  Has leukocytosis    MRI did not show acute osteomyelitis but poor study due to motion artifact   vascular US and CTA shows very extensive vascular disease with stents and occlusions    patient has pus in his toes, and is planned for OR possibly today( 2/15/22) or tomorrow   IR procedure tomorrow to help with vasculature   abscess cultures growing cefoxitin resistant serratia (possible ampC producing organism and hence zosyn not a great choice) as well as GBS and MSSA  needs source control but healing may be difficult in this patient given his vascular disease     Toe ulcer with abscess   leukocytosis   DM2 hba1c 8.6    Plan  stop zosyn   start Cefepime 1g q8hrs   start PO flagyl q6hrs   follow all cultures  OR per podiatry plan   if MRSA, then add vancomycin but for now can hold off   trend wbc until normal   control glucose especially in the setting of active infection which may make it more difficult   good but not too tight glycemic control     D/W Dr. Jose David Low, DO  Infectious Disease Attending  Reachable via Microsoft Teams or ID office: [phone]  After 5pm/weekends please call [phone] for all inquiries and new consults

## 2022-02-15 NOTE — BRIEF OPERATIVE NOTE - OPERATION/FINDINGS
Right foot transmetatarsal amputation and plantar right foot incision and drainage to the level of fascia. approximately 10cc of sero-purulent drainage expressed from plantar incision. Diffuse liquefactive necrosis of plantar soft tissue structures with wound tracking proximally along tarsal tunnel. The prognosis of the right foot is guarded, this procedure was done for emergent infection source control as is part of a stage procedure approach. Pt may need proximal amputation in the future.

## 2022-02-15 NOTE — DISCHARGE NOTE PROVIDER - NSDCCPCAREPLAN_GEN_ALL_CORE_FT
PRINCIPAL DISCHARGE DIAGNOSIS  Diagnosis: Gangrene of toe  Assessment and Plan of Treatment: R foot MRI not suggestive of acute osteomyelitis, ? suspected fluid collection.   LE Arterial Doppler showed occluded R superficial femoral and popliteal arteries with multiple stents.  Occluded proximal and mid left superficial femoral artery with reconstitution distally.   Treated with IV vanc/cefepime

## 2022-02-15 NOTE — PROGRESS NOTE ADULT - SUBJECTIVE AND OBJECTIVE BOX
Podiatry pager #: [phone] ([place])/ [phone] ([facility])    Patient is a 61y old  Male who presents with a chief complaint of gangrene foot (14 Feb 2022 16:41)       INTERVAL HPI/OVERNIGHT EVENTS:  Patient seen and evaluated at bedside.  Pt is resting comfortable in NAD. Denies N/V/F/C.     Allergies    No Known Allergies    Intolerances        Vital Signs Last 24 Hrs  T(C): 36.1 (15 Feb 2022 05:41), Max: 37.2 (14 Feb 2022 23:38)  T(F): 97 (15 Feb 2022 05:41), Max: 99 (14 Feb 2022 23:38)  HR: 102 (15 Feb 2022 06:54) (97 - 112)  BP: 148/82 (15 Feb 2022 06:54) (133/82 - 161/75)  BP(mean): --  RR: 18 (15 Feb 2022 05:41) (17 - 18)  SpO2: 98% (15 Feb 2022 05:41) (95% - 98%)    LABS:                        16.3   21.30 )-----------( 563      ( 15 Feb 2022 01:15 )             50.5     02-15    136  |  95<L>  |  38<H>  ----------------------------<  262<H>  4.6   |  29  |  1.28    Ca    9.7      15 Feb 2022 01:19          CAPILLARY BLOOD GLUCOSE      POCT Blood Glucose.: 253 mg/dL (15 Feb 2022 07:47)  POCT Blood Glucose.: 276 mg/dL (14 Feb 2022 21:14)  POCT Blood Glucose.: 254 mg/dL (14 Feb 2022 16:36)  POCT Blood Glucose.: 233 mg/dL (14 Feb 2022 11:46)      Lower Extremity Physical Exam:  Vascular: DP/PT 0/4 B/L, CFT >3 seconds B/L, Temperature gradient warm to cool B/L  Neuro: Epicritic sensation intact to the level of digits B/L  Musculoskeletal/Ortho: unremarkable   Skin: right foot hallux plantar fibronecrotic wound with fissuring at sulcus and exposed tendon, Right foot gangrene digits 4 and 5 with plantar tenderness and bogginess, malodor present, 5 cc of purulence, tracking distal to 2nd interspace, tracking proximal to midfoot   pressure wounds to right foot lateral 5th met styloid, posterior heel, and medial malleolus to level of subq, with no acute signs of infection, left foot no open wounds or lesions      RADIOLOGY & ADDITIONAL TESTS:

## 2022-02-15 NOTE — DISCHARGE NOTE PROVIDER - NSDCMRMEDTOKEN_GEN_ALL_CORE_FT
empagliflozin 25 mg oral tablet: 1 tab(s) orally once a day (in the morning)  enalapril 5 mg oral tablet: 1 tab(s) orally once a day  gabapentin 400 mg oral capsule: 1 cap(s) orally 3 times a day  glipiZIDE 10 mg oral tablet: 1 tab(s) orally 2 times a day  ibuprofen 600 mg oral tablet: 1 tab(s) orally every 6 hours, as needed for pain  Jardiance 25 mg oral tablet: 1 tab(s) orally once a day (in the morning)  metFORMIN 1000 mg oral tablet: 1 tab(s) orally 2 times a day  Plavix 75 mg oral tablet: 1 tab(s) orally once a day  pregabalin 300 mg oral capsule: 1 cap(s) orally 2 times a day   acetaminophen 325 mg oral tablet: 2 tab(s) orally every 4 hours, As needed, Mild Pain (1 - 3)  aluminum hydroxide-magnesium hydroxide 200 mg-200 mg/5 mL oral suspension: 30 milliliter(s) orally every 6 hours, As needed, Dyspepsia  Aspirin Enteric Coated 81 mg oral delayed release tablet: 1 tab(s) orally once a day  atorvastatin 10 mg oral tablet: 1 tab(s) orally once a day (at bedtime)  cefepime 1 g intravenous injection: 1 gram(s) intravenous every 8 hours  metroNIDAZOLE 500 mg oral tablet: 1 tab(s) orally 3 times a day  nicotine 21 mg/24 hr transdermal film, extended release: transdermal once a day  oxycodone-acetaminophen 2.5 mg-325 mg oral tablet: 1 tab(s) orally every 6 hours, As Needed - 6) for moderate pain (4-6)  pantoprazole 40 mg intravenous injection: 40 milligram(s) intravenous every 12 hours  simethicone 80 mg oral tablet, chewable: 1 tab(s) orally every 6 hours, As needed, Dyspepsia

## 2022-02-15 NOTE — PROGRESS NOTE ADULT - ASSESSMENT
62 y/o male w/ PMH DM, PAD, p/w R 4th/5th digit dry gangrene w/ non healing R medial malleolar ulcer, no palpable pulses distal to femoral b/l. Seen by podiatry and vascular, MRI not suggestive of acute osteomyelitis, ? suspected fluid collection. LE Arterial Doppler showed occluded R superficial femoral and popliteal arteries with multiple stents.  Occluded proximal and mid left superficial femoral artery with reconstitution distally.      # Gangrene of right foot - Seen by podiatry and vascular, MRI not suggestive of acute osteomyelitis, ? suspected fluid collection. ID consulted, discussed with Dr. Low.  Surgery, vascular following.  Continue Cefepime, Vancomycin.  Podiatry plans TMA today.  Pt medically optimized for the procedure.  Based on the Revised Cardiac Risk Index for Pre-Operative Risk, patient is at a Class II risk, respresenting a 6% 30-day risk of death, MI, or cardiac arrest.  # PAD - LE Arterial Doppler showed occluded R superficial femoral and popliteal arteries with multiple stents.  Occluded proximal and mid left superficial femoral artery with reconstitution distally.  Discussed with Dr. Garcia - plan transfer to [facility] 2/16 for IR thrombectomy 2/17.    # Diabetes Type II - monitor fingersticks.  Insulin coverage for hyperglycemia.  Will titrate Insulin post procedure as pt currently NPO.   # Severe protein-calorie malnutrition. ·  Plan: - nutriton consult- would benefit from glucerna.  # Smoker - Nicotine Patch.    # Inpatient DVT prophylaxis - subcutaneous Heparin      TMA planned 2/15  Plan transfer to [facility] 2/16 for IR thrombectomy 2/17.

## 2022-02-15 NOTE — BRIEF OPERATIVE NOTE - COMMENTS
Pt is s/p R foot TMA  - Diffuse necrotic soft tissue structures with wound tracking proximally along tarsal tunnel, high concern for residual infection  - Inadequate intra-operative bleeding, high concern for viability Pt is s/p R foot TMA  - Diffuse necrotic soft tissue structures with wound tracking proximally along tarsal tunnel, high concern for residual infection  - Inadequate intra-operative bleeding, high concern for viability  - Pod to assess viability of surgical site tomorrow and f/u OR culture  - Will follow here and after transfer to [facility], will likely require revisional surgery s/p vascular intervention pending viability

## 2022-02-15 NOTE — CHART NOTE - NSCHARTNOTEFT_GEN_A_CORE
Called [facility] for KUB reading - moderate amount of stool, no obstruction noted.  -Miralax  -Fleet enema   -Will cont to monitor

## 2022-02-15 NOTE — DISCHARGE NOTE PROVIDER - HOSPITAL COURSE
62 y/o male w/ PMH DM, PAD, p/w R 4th/5th digit dry gangrene w/ non healing R medial malleolar ulcer, no palpable pulses distal to femoral b/l. Seen by podiatry and vascular, MRI not suggestive of acute osteomyelitis, ? suspected fluid collection. LE Arterial Doppler showed occluded R superficial femoral and popliteal arteries with multiple stents.  Occluded proximal and mid left superficial femoral artery with reconstitution distally. ID consulted and on cefepime and vancomycin. wound culture of $ foot abscess grew numerous staph auerus, serratia marcescens, and strep agalactase. Patient was medically optimized for TMA on 2/15. Per vascular plan is for patient to transfer to [facility] for IR thrombectomy for R superficial femoral and popliteal artery occlusion with multiple stents and Occluded proximal and mid left superficial femoral artery with reconstitution distally seen on LE arterial doppler.   60 y/o male w/ PMH DM, PAD, p/w R 4th/5th digit dry gangrene w/ non healing R medial malleolar ulcer, no palpable pulses distal to femoral b/l. Seen by podiatry and vascular, MRI not suggestive of acute osteomyelitis; suspected fluid collection. LE Arterial Doppler showed occluded R superficial femoral and popliteal arteries with multiple stents.  Occluded proximal and mid left superficial femoral artery with reconstitution distally. ID consulted and on cefepime and vancomycin. wound culture of $ foot abscess grew numerous staph auerus, serratia marcescens, and strep agalactase. Patient was medically optimized for TMA on 2/15. Per vascular plan is for patient to transfer to [facility] for IR thrombectomy for R superficial femoral and popliteal artery occlusion with multiple stents and Occluded proximal and mid left superficial femoral artery with reconstitution distally seen on LE arterial doppler.   62 y/o male w/ PMH DM, PAD, p/w R 4th/5th digit dry gangrene w/ non healing R medial malleolar ulcer, no palpable pulses distal to femoral b/l. Seen by podiatry and vascular, MRI not suggestive of acute osteomyelitis; suspected fluid collection. LE Arterial Doppler showed occluded R superficial femoral and popliteal arteries with multiple stents.  Occluded proximal and mid left superficial femoral artery with reconstitution distally. ID consulted and treated R foot wound with zosyn 2/11-2/15, STAT vanc, then switched over to cefepime and flagyl. wound culture of R foot abscess grew numerous staph aureus, serratia marcescens, and strep agalactase. Patient was medically optimized for TMA on 2/15. Per vascular plan is for patient to transfer to [facility] for IR thrombectomy for R superficial femoral and popliteal artery occlusion with multiple stents and Occluded proximal and mid left superficial femoral artery with reconstitution distally seen on LE arterial doppler.   62 y/o male w/ PMH of DM II & PAD p/w R 4th/5th digit dry gangrene w/ non healing R medial malleolar ulcer, no palpable pulses distal to femoral b/l. He was seen by podiatry and vascular. MRI was not suggestive of acute osteomyelitis. Vascular study showed an unobtainable ankle brachial index w/ diffusely calcified noncompressible vessels limiting evaluation and diminished flow below the right knee. US showed occluded right superficial femoral and popliteal arteries with multiple stents. CTA showed occluded right superficial femoral and popliteal arteries containing multiple overlapping stents with reconstitution in the mid popliteal artery, three-vessel runoff into b/l feet, occluded left superficial femoral artery with reconstitution distally, moderate narrowing in the mid left popliteal artery and moderate narrowing in the proximal left external iliac artery with associated areas of ulcerated plaque. ID was consulted and treated R foot wound with zosyn 2/11-2/15, STAT vanc, then switched over to cefepime and flagyl. Wound culture of R foot abscess grew MSSA, serratia marcescens, and strep agalactase. Patient had TMA on 2/15. Per vascular patient was subsequently transferred to [facility] for IR thrombectomy.     Hospital course was complicated by Coffee ground emesis on 2/14. Pt was put on Protonix & Carafate. EGD on 2/18 showed distal moderate esophagitis s/p biopsy, proximal-mid esophageal whitish exudate s/p biopsy, mild antral gastritis s/p biopsy & mild duodenitis s/p biopsy. As per GI, pt's ASA was restarted on 2/20.    Of note, the transfer to [facility] was delayed because the patient developed A. fib post TMA on EKG per anaesthesia. Echo showed EF 55-60% & moderate aortic stenosis. Cardio was consulted and noted that they would ideally start AC for A. fib but could not in light of recent GIB/coffee grounds emesis. Pt was started on metoprolol & was also on ASA.       Discharge time: 43 minutes

## 2022-02-15 NOTE — DISCHARGE NOTE PROVIDER - NSDCFUADDINST_GEN_ALL_CORE_FT
Podiatry Discharge Instructions:  Follow up: Please follow up with Dr. Steve Dimas within 1 week of discharge from the hospital, please call [phone] for appointment and discuss that you recently were seen in the hospital.  Wound Care: Please apply 1/4 iodoform packing to plantar right foot wound and dress right foot with sterile 4x4 gauze, abd pads, kerlex, daily  Weight bearing: Please remain off-weight bearing to right foot  Antibiotics: Please continue as instructed.

## 2022-02-16 LAB
ANION GAP SERPL CALC-SCNC: 9 MMOL/L — SIGNIFICANT CHANGE UP (ref 5–17)
APTT BLD: 28.8 SEC — SIGNIFICANT CHANGE UP (ref 27.5–35.5)
BUN SERPL-MCNC: 33 MG/DL — HIGH (ref 7–23)
CALCIUM SERPL-MCNC: 9.4 MG/DL — SIGNIFICANT CHANGE UP (ref 8.5–10.1)
CHLORIDE SERPL-SCNC: 97 MMOL/L — SIGNIFICANT CHANGE UP (ref 96–108)
CO2 SERPL-SCNC: 29 MMOL/L — SIGNIFICANT CHANGE UP (ref 22–31)
CREAT SERPL-MCNC: 1.12 MG/DL — SIGNIFICANT CHANGE UP (ref 0.5–1.3)
GLUCOSE BLDC GLUCOMTR-MCNC: 180 MG/DL — HIGH (ref 70–99)
GLUCOSE BLDC GLUCOMTR-MCNC: 208 MG/DL — HIGH (ref 70–99)
GLUCOSE BLDC GLUCOMTR-MCNC: 214 MG/DL — HIGH (ref 70–99)
GLUCOSE BLDC GLUCOMTR-MCNC: 237 MG/DL — HIGH (ref 70–99)
GLUCOSE SERPL-MCNC: 332 MG/DL — HIGH (ref 70–99)
HCT VFR BLD CALC: 45.7 % — SIGNIFICANT CHANGE UP (ref 39–50)
HCV RNA FLD QL NAA+PROBE: SIGNIFICANT CHANGE UP
HGB BLD-MCNC: 14.7 G/DL — SIGNIFICANT CHANGE UP (ref 13–17)
INR BLD: 1.26 RATIO — HIGH (ref 0.88–1.16)
MCHC RBC-ENTMCNC: 29.3 PG — SIGNIFICANT CHANGE UP (ref 27–34)
MCHC RBC-ENTMCNC: 32.2 G/DL — SIGNIFICANT CHANGE UP (ref 32–36)
MCV RBC AUTO: 91 FL — SIGNIFICANT CHANGE UP (ref 80–100)
NRBC # BLD: 0 /100 WBCS — SIGNIFICANT CHANGE UP (ref 0–0)
PLATELET # BLD AUTO: 494 K/UL — HIGH (ref 150–400)
POTASSIUM SERPL-MCNC: 4.9 MMOL/L — SIGNIFICANT CHANGE UP (ref 3.5–5.3)
POTASSIUM SERPL-SCNC: 4.9 MMOL/L — SIGNIFICANT CHANGE UP (ref 3.5–5.3)
PROTHROM AB SERPL-ACNC: 14.5 SEC — HIGH (ref 10.6–13.6)
RBC # BLD: 5.02 M/UL — SIGNIFICANT CHANGE UP (ref 4.2–5.8)
RBC # FLD: 12.8 % — SIGNIFICANT CHANGE UP (ref 10.3–14.5)
SODIUM SERPL-SCNC: 135 MMOL/L — SIGNIFICANT CHANGE UP (ref 135–145)
WBC # BLD: 15.58 K/UL — HIGH (ref 3.8–10.5)
WBC # FLD AUTO: 15.58 K/UL — HIGH (ref 3.8–10.5)

## 2022-02-16 PROCEDURE — 99232 SBSQ HOSP IP/OBS MODERATE 35: CPT

## 2022-02-16 PROCEDURE — 73630 X-RAY EXAM OF FOOT: CPT | Mod: 26,RT

## 2022-02-16 RX ORDER — EMPAGLIFLOZIN 10 MG/1
1 TABLET, FILM COATED ORAL
Qty: 0 | Refills: 0 | DISCHARGE

## 2022-02-16 RX ORDER — CLOPIDOGREL BISULFATE 75 MG/1
1 TABLET, FILM COATED ORAL
Qty: 0 | Refills: 0 | DISCHARGE

## 2022-02-16 RX ORDER — LACTULOSE 10 G/15ML
20 SOLUTION ORAL
Refills: 0 | Status: COMPLETED | OUTPATIENT
Start: 2022-02-16 | End: 2022-02-18

## 2022-02-16 RX ORDER — NICOTINE POLACRILEX 2 MG
0 GUM BUCCAL
Qty: 0 | Refills: 0 | DISCHARGE
Start: 2022-02-16

## 2022-02-16 RX ORDER — ATORVASTATIN CALCIUM 80 MG/1
1 TABLET, FILM COATED ORAL
Qty: 0 | Refills: 0 | DISCHARGE
Start: 2022-02-16

## 2022-02-16 RX ORDER — METRONIDAZOLE 500 MG
1 TABLET ORAL
Qty: 0 | Refills: 0 | DISCHARGE
Start: 2022-02-16

## 2022-02-16 RX ORDER — GABAPENTIN 400 MG/1
100 CAPSULE ORAL THREE TIMES A DAY
Refills: 0 | Status: DISCONTINUED | OUTPATIENT
Start: 2022-02-16 | End: 2022-02-18

## 2022-02-16 RX ORDER — CEFEPIME 1 G/1
1 INJECTION, POWDER, FOR SOLUTION INTRAMUSCULAR; INTRAVENOUS
Qty: 0 | Refills: 0 | DISCHARGE
Start: 2022-02-16

## 2022-02-16 RX ORDER — SIMETHICONE 80 MG/1
1 TABLET, CHEWABLE ORAL
Qty: 0 | Refills: 0 | DISCHARGE
Start: 2022-02-16

## 2022-02-16 RX ORDER — PANTOPRAZOLE SODIUM 20 MG/1
40 TABLET, DELAYED RELEASE ORAL
Qty: 0 | Refills: 0 | DISCHARGE
Start: 2022-02-16

## 2022-02-16 RX ORDER — GABAPENTIN 400 MG/1
1 CAPSULE ORAL
Qty: 0 | Refills: 0 | DISCHARGE

## 2022-02-16 RX ORDER — METFORMIN HYDROCHLORIDE 850 MG/1
1 TABLET ORAL
Qty: 0 | Refills: 0 | DISCHARGE

## 2022-02-16 RX ORDER — ACETAMINOPHEN 500 MG
2 TABLET ORAL
Qty: 0 | Refills: 0 | DISCHARGE
Start: 2022-02-16

## 2022-02-16 RX ADMIN — OXYCODONE AND ACETAMINOPHEN 1 TABLET(S): 5; 325 TABLET ORAL at 22:09

## 2022-02-16 RX ADMIN — CEFEPIME 100 MILLIGRAM(S): 1 INJECTION, POWDER, FOR SOLUTION INTRAMUSCULAR; INTRAVENOUS at 13:30

## 2022-02-16 RX ADMIN — GABAPENTIN 100 MILLIGRAM(S): 400 CAPSULE ORAL at 22:12

## 2022-02-16 RX ADMIN — Medication 2: at 17:22

## 2022-02-16 RX ADMIN — MORPHINE SULFATE 2 MILLIGRAM(S): 50 CAPSULE, EXTENDED RELEASE ORAL at 23:09

## 2022-02-16 RX ADMIN — SENNA PLUS 2 TABLET(S): 8.6 TABLET ORAL at 22:12

## 2022-02-16 RX ADMIN — CEFEPIME 100 MILLIGRAM(S): 1 INJECTION, POWDER, FOR SOLUTION INTRAMUSCULAR; INTRAVENOUS at 06:07

## 2022-02-16 RX ADMIN — Medication 2: at 11:44

## 2022-02-16 RX ADMIN — MORPHINE SULFATE 2 MILLIGRAM(S): 50 CAPSULE, EXTENDED RELEASE ORAL at 23:22

## 2022-02-16 RX ADMIN — SODIUM CHLORIDE 100 MILLILITER(S): 9 INJECTION INTRAMUSCULAR; INTRAVENOUS; SUBCUTANEOUS at 06:07

## 2022-02-16 RX ADMIN — SIMETHICONE 80 MILLIGRAM(S): 80 TABLET, CHEWABLE ORAL at 06:07

## 2022-02-16 RX ADMIN — PANTOPRAZOLE SODIUM 40 MILLIGRAM(S): 20 TABLET, DELAYED RELEASE ORAL at 06:07

## 2022-02-16 RX ADMIN — Medication 500 MILLIGRAM(S): at 06:07

## 2022-02-16 RX ADMIN — Medication 1 PATCH: at 11:30

## 2022-02-16 RX ADMIN — Medication 1 PATCH: at 07:44

## 2022-02-16 RX ADMIN — Medication 500 MILLIGRAM(S): at 22:12

## 2022-02-16 RX ADMIN — OXYCODONE AND ACETAMINOPHEN 1 TABLET(S): 5; 325 TABLET ORAL at 21:10

## 2022-02-16 RX ADMIN — PANTOPRAZOLE SODIUM 40 MILLIGRAM(S): 20 TABLET, DELAYED RELEASE ORAL at 17:22

## 2022-02-16 RX ADMIN — Medication 500 MILLIGRAM(S): at 13:31

## 2022-02-16 RX ADMIN — Medication 1 PATCH: at 11:44

## 2022-02-16 RX ADMIN — SIMETHICONE 80 MILLIGRAM(S): 80 TABLET, CHEWABLE ORAL at 00:24

## 2022-02-16 RX ADMIN — Medication 81 MILLIGRAM(S): at 11:44

## 2022-02-16 RX ADMIN — ATORVASTATIN CALCIUM 10 MILLIGRAM(S): 80 TABLET, FILM COATED ORAL at 22:12

## 2022-02-16 RX ADMIN — Medication 2: at 08:08

## 2022-02-16 RX ADMIN — Medication 1 PATCH: at 19:00

## 2022-02-16 RX ADMIN — GABAPENTIN 100 MILLIGRAM(S): 400 CAPSULE ORAL at 13:30

## 2022-02-16 RX ADMIN — CEFEPIME 100 MILLIGRAM(S): 1 INJECTION, POWDER, FOR SOLUTION INTRAMUSCULAR; INTRAVENOUS at 22:12

## 2022-02-16 NOTE — PROGRESS NOTE ADULT - SUBJECTIVE AND OBJECTIVE BOX
62 yo male w/ history of DM II, PAD, p/w R 4th/5th digit dry gangrene w/ non healing R medial malleolar ulcer, no palpable pulses distal to femoral b/l. He is lying in bed in NAD.    MEDICATIONS  (STANDING):  aspirin enteric coated 81 milliGRAM(s) Oral daily  atorvastatin 10 milliGRAM(s) Oral at bedtime  cefepime   IVPB 1000 milliGRAM(s) IV Intermittent every 8 hours  dextrose 5%. 1000 milliLiter(s) (100 mL/Hr) IV Continuous <Continuous>  dextrose 5%. 1000 milliLiter(s) (50 mL/Hr) IV Continuous <Continuous>  dextrose 50% Injectable 25 Gram(s) IV Push once  dextrose 50% Injectable 12.5 Gram(s) IV Push once  dextrose 50% Injectable 25 Gram(s) IV Push once  gabapentin 100 milliGRAM(s) Oral three times a day  glucagon  Injectable 1 milliGRAM(s) IntraMuscular once  insulin lispro (ADMELOG) corrective regimen sliding scale   SubCutaneous three times a day before meals  LORazepam   Injectable 0.5 milliGRAM(s) IV Push once  metroNIDAZOLE    Tablet 500 milliGRAM(s) Oral three times a day  nicotine - 21 mG/24Hr(s) Patch 1 patch Transdermal daily  pantoprazole  Injectable 40 milliGRAM(s) IV Push every 12 hours  senna 2 Tablet(s) Oral at bedtime  sodium chloride 0.9%. 1000 milliLiter(s) (100 mL/Hr) IV Continuous <Continuous>    MEDICATIONS  (PRN):  acetaminophen     Tablet .. 650 milliGRAM(s) Oral every 4 hours PRN Mild Pain (1 - 3)  aluminum hydroxide/magnesium hydroxide/simethicone Suspension 30 milliLiter(s) Oral every 6 hours PRN Dyspepsia  morphine  - Injectable 2 milliGRAM(s) IV Push every 4 hours PRN Severe Pain (7 - 10)  ondansetron Injectable 8 milliGRAM(s) IV Push every 6 hours PRN Nausea and/or Vomiting  oxycodone    5 mG/acetaminophen 325 mG 1 Tablet(s) Oral every 4 hours PRN Moderate Pain (4 - 6)  polyethylene glycol 3350 17 Gram(s) Oral daily PRN Constipation  simethicone 80 milliGRAM(s) Chew every 6 hours PRN Dyspepsia      Allergies    No Known Allergies    Intolerances        Vital Signs Last 24 Hrs  T(C): 36.5 (16 Feb 2022 16:50), Max: 37 (15 Feb 2022 21:35)  T(F): 97.7 (16 Feb 2022 16:50), Max: 98.6 (15 Feb 2022 21:35)  HR: 80 (16 Feb 2022 16:50) (76 - 115)  BP: 131/84 (16 Feb 2022 16:50) (119/75 - 166/83)  BP(mean): --  RR: 18 (16 Feb 2022 16:50) (18 - 20)  SpO2: 97% (16 Feb 2022 16:50) (93% - 97%)    PHYSICAL EXAM:  GENERAL: NAD, well-groomed, well-developed  HEAD: Atraumatic, Normocephalic  EYES: EOMI, PERRLA   NECK: Supple   NERVOUS SYSTEM:  Alert  CHEST/LUNG: Clear to auscultation bilaterally; No rales, rhonchi, wheezing, or rubs  HEART: Regular rate and rhythm; No murmurs, rubs, or gallops  ABDOMEN: Soft, Nontender, Nondistended; Bowel sounds present  EXTREMITIES: RLE foot wound dressed and wrapped       LABS:                        14.7   15.58 )-----------( 494      ( 16 Feb 2022 03:27 )             45.7     02-16    135  |  97  |  33<H>  ----------------------------<  332<H>  4.9   |  29  |  1.12    Ca    9.4      16 Feb 2022 03:27    TPro  7.8  /  Alb  2.5<L>  /  TBili  0.5  /  DBili  0.1  /  AST  12<L>  /  ALT  17  /  AlkPhos  88  02-15    PT/INR - ( 16 Feb 2022 03:27 )   PT: 14.5 sec;   INR: 1.26 ratio         PTT - ( 16 Feb 2022 03:27 )  PTT:28.8 sec    CAPILLARY BLOOD GLUCOSE      POCT Blood Glucose.: 214 mg/dL (16 Feb 2022 16:45)  POCT Blood Glucose.: 208 mg/dL (16 Feb 2022 11:43)  POCT Blood Glucose.: 237 mg/dL (16 Feb 2022 08:07)  POCT Blood Glucose.: 200 mg/dL (15 Feb 2022 20:56)      Culture - Abscess with Gram Stain (collected 12 Feb 2022 01:13)  Source: .Abscess right foot  Final Report (14 Feb 2022 12:50):    Numerous Staphylococcus aureus    Numerous Serratia marcescens    Numerous Streptococcus agalactiae (Group B) isolated    Group B streptococci are susceptible to ampicillin,    penicillin and cefazolin, but may be resistant to    erythromycin and clindamycin.    Recommendations for intrapartum prophylaxis for Group B    streptococci are penicillin or ampicillin.    Few Finegoldia magna "Susceptibilities not performed"  Organism: Staphylococcus aureus  Serratia marcescens (14 Feb 2022 12:50)  Organism: Serratia marcescens (14 Feb 2022 12:50)  Organism: Staphylococcus aureus (14 Feb 2022 12:50)      RADIOLOGY & ADDITIONAL TESTS:    02-15-22 @ 07:01  -  02-16-22 @ 07:00  --------------------------------------------------------  IN:    Lactated Ringers: 100 mL  Total IN: 100 mL    OUT:    Voided (mL): 400 mL  Total OUT: 400 mL    Total NET: -300 mL

## 2022-02-16 NOTE — PROGRESS NOTE ADULT - SUBJECTIVE AND OBJECTIVE BOX
Podiatry pager #: [phone] ([place])/ [phone] ([facility])    Patient is a 61y old  Male who presents with a chief complaint of gangrene foot (15 Feb 2022 21:26)       INTERVAL HPI/OVERNIGHT EVENTS:  Patient seen and evaluated at bedside.  Pt is resting comfortable in NAD. Denies N/V/F/C.     Allergies    No Known Allergies    Intolerances        Vital Signs Last 24 Hrs  T(C): 36.3 (16 Feb 2022 05:20), Max: 37 (15 Feb 2022 21:35)  T(F): 97.3 (16 Feb 2022 05:20), Max: 98.6 (15 Feb 2022 21:35)  HR: 85 (16 Feb 2022 05:20) (76 - 135)  BP: 120/77 (16 Feb 2022 05:20) (99/71 - 136/74)  BP(mean): --  RR: 18 (16 Feb 2022 05:20) (13 - 20)  SpO2: 97% (16 Feb 2022 05:20) (93% - 100%)    LABS:                        14.7   15.58 )-----------( 494      ( 16 Feb 2022 03:27 )             45.7     02-16    135  |  97  |  33<H>  ----------------------------<  332<H>  4.9   |  29  |  1.12    Ca    9.4      16 Feb 2022 03:27    TPro  7.8  /  Alb  2.5<L>  /  TBili  0.5  /  DBili  0.1  /  AST  12<L>  /  ALT  17  /  AlkPhos  88  02-15    PT/INR - ( 16 Feb 2022 03:27 )   PT: 14.5 sec;   INR: 1.26 ratio         PTT - ( 16 Feb 2022 03:27 )  PTT:28.8 sec    CAPILLARY BLOOD GLUCOSE      POCT Blood Glucose.: 237 mg/dL (16 Feb 2022 08:07)  POCT Blood Glucose.: 200 mg/dL (15 Feb 2022 20:56)  POCT Blood Glucose.: 214 mg/dL (15 Feb 2022 19:52)  POCT Blood Glucose.: 249 mg/dL (15 Feb 2022 18:00)  POCT Blood Glucose.: 286 mg/dL (15 Feb 2022 16:13)  POCT Blood Glucose.: 260 mg/dL (15 Feb 2022 11:15)      Lower Extremity Physical Exam:  Vascular: DP/PT 0/4 B/L, CFT >3 seconds B/L, Temperature gradient warm to cool B/L  Neuro: Epicritic sensation intact to the level of digits B/L  Musculoskeletal/Ortho: unremarkable   Skin: s/p 2/15/22 right foot TMA open: mild strikethrough dressing, flaps warm and viable, plantar incision and flap with no signs of necrosis, sanguinous drainage, no purulence, no malodor, left foot no open wounds or lesions    RADIOLOGY & ADDITIONAL TESTS:

## 2022-02-16 NOTE — PROGRESS NOTE ADULT - ASSESSMENT
60yo M w/ right foot gangrene   - pt seen and evaluated  - afebrile, WBC down to 15.5, ESR 61, CRP 18  -s/p 2/15/22 right foot TMA open: mild strikethrough dressing, flaps warm and viable, plantar incision and flap with no signs of necrosis, sanguinous drainage, no purulence, no malodor, left foot no open wounds or lesions  - per intraop findings high concern for residual infection and viability  - OR wound culture pending  - continue IV cefepime, PO flagyl per ID recs, appreciated  - Rec cardiology consult as pt had A.fib on postop EKG per anaesthesia   - salvageability of right foot is guarded  - pt may be transferred to [facility] for further vascular work up  - will follow  - discussed w/ attending

## 2022-02-16 NOTE — PROGRESS NOTE ADULT - ASSESSMENT
60 yo male w/ history of DM II, PAD, p/w R 4th/5th digit dry gangrene w/ non healing R medial malleolar ulcer, no palpable pulses distal to femoral b/l.     R foot gangrene w/ pus  - podiatry and vascular following  - MRI not suggestive of acute osteomyelitis, but was a poor study due to motion artifact   - vascular study showed an unobtainable ankle brachial index w/ diffusely calcified noncompressible vessels limiting evaluation and diminished flow below the right knee  - US showed occluded right superficial femoral and popliteal arteries with multiple stents   - CTA showed occluded right superficial femoral and popliteal arteries containing multiple overlapping stents with reconstitution in the mid popliteal artery, three-vessel runoff into b/l feet, occluded left superficial femoral artery with reconstitution distally, moderate narrowing in the mid left popliteal artery and moderate narrowing in the proximal left external iliac artery with associated areas of ulcerated plaque  - abscess cultures grew Serratia, GBS and MSSA  - as per ID, will c/w Cefepime & Flagyl  - s/p TMA 2/15 - surgical culture and pathology are pending  - c/w morphine and percocet for pain    PAD  - US showed occluded right superficial femoral and popliteal arteries with multiple stents   - CTA showed occluded right superficial femoral and popliteal arteries containing multiple overlapping stents with reconstitution in the mid popliteal artery, three-vessel runoff into b/l feet, occluded left superficial femoral artery with reconstitution distally, moderate narrowing in the mid left popliteal artery and moderate narrowing in the proximal left external iliac artery with associated areas of ulcerated plaque  - as per Dr. Garcia, patient to be transferred to [facility] 2/16 for IR thrombectomy 2/17  - c/w ASA & Lipitor    A. fib on post TMA EKG per anaesthesia   - no more episodes of A. fib on tele today  - Echo showed EF 55-60% & moderate aortic stenosis  - will hold off on AC given GIB  - c/w ASA    Coffee ground emesis on 2/14  - c/w Protonix  - FOBT positive  - will consult GI if episode recurs and if patient does not get transferred to [facility] tomorrow  - H&H is stable    Hiccups  - c/w Protonix  - start gabapentin    Constipation  - c/w Senna  - add lactulose  - c/w PRN Miralax    DM II  - c/w ISS  - Hgb A1C is 8.6    Severe protein-calorie malnutrition  - nutrition consult     Smoker  - c/w Nicotine Patch    Prophylaxis:  DVT: subcutaneous Heparin on hold post TMA and due to possible GI bleed  GI: Protonix    Plan transfer to [facility] 2/16 for IR thrombectomy 2/17.    I called the pt's wife, Richa [phone] and updated her on his status and plan of care.

## 2022-02-16 NOTE — PROGRESS NOTE ADULT - ASSESSMENT
Patient is a 61y old  Male h/o  DM2 on oral meds, who presents with a chief complaint of right foot gangrene . 4/5th toes  for past 3 to 4  months.  Has leukocytosis    MRI did not show acute osteomyelitis but poor study due to motion artifact   vascular US and CTA shows very extensive vascular disease with stents and occlusions    patient has pus in his toes, and is planned for OR possibly today( 2/15/22) or tomorrow   IR procedure tomorrow to help with vasculature   abscess cultures growing cefoxitin resistant serratia (possible ampC producing organism and hence zosyn not a great choice) as well as GBS and MSSA  needs source control but healing may be difficult in this patient given his vascular disease     2/16: afebrile, on RA, WBC better 15.58, Cr normalized, RVP negative, surgical culture and pathology are pending, high concern for residual infection as per podiatry note, Cefepime and flagyl continued. ?possible transfer to another facility for further vascular workup.     Toe ulcer with abscess   leukocytosis   DM2 hba1c 8.6    Plan  continue Cefepime 1g q8hrs   continue PO flagyl q6hrs   follow all cultures  awaiting for surgical culture and pathology   if MRSA, then add vancomycin but for now can hold off   trend wbc until normal   control glucose especially in the setting of active infection which may make it more difficult   good but not too tight glycemic control

## 2022-02-16 NOTE — PROGRESS NOTE ADULT - SUBJECTIVE AND OBJECTIVE BOX
BIA RAYMOND  MRN-[id number]    Follow Up:  acute OM, R foot gangrene     Interval History: The pt was seen and examined earlier, no distress, no new complains. The pt is afebrile, WBC better, Cr normalized. The pt is s/p transmetatarsal amputation of right foot, had a episode of Afib,  now on tele unit.     PAST MEDICAL & SURGICAL HISTORY:  DM (diabetes mellitus)    No significant past surgical history        ROS:    [ ] Unobtainable because:  [ x] All other systems negative    Constitutional: no fever, no chills  Head: no trauma  Eyes: no vision changes, no eye pain  ENT:  no sore throat, no rhinorrhea  Cardiovascular:  no chest pain, no palpitation  Respiratory:  no SOB, no cough  GI:  no abd pain, no vomiting, no diarrhea  urinary: no dysuria, no hematuria, no flank pain  musculoskeletal:  post op pain is controlled   skin:  no rash  neurology:  no headache, no seizure, no change in mental status  psych: no anxiety, no depression         Allergies  No Known Allergies        ANTIMICROBIALS:  cefepime   IVPB 1000 every 8 hours  metroNIDAZOLE    Tablet 500 three times a day      OTHER MEDS:  acetaminophen     Tablet .. 650 milliGRAM(s) Oral every 4 hours PRN  aluminum hydroxide/magnesium hydroxide/simethicone Suspension 30 milliLiter(s) Oral every 6 hours PRN  aspirin enteric coated 81 milliGRAM(s) Oral daily  atorvastatin 10 milliGRAM(s) Oral at bedtime  dextrose 5%. 1000 milliLiter(s) IV Continuous <Continuous>  dextrose 5%. 1000 milliLiter(s) IV Continuous <Continuous>  dextrose 50% Injectable 25 Gram(s) IV Push once  dextrose 50% Injectable 12.5 Gram(s) IV Push once  dextrose 50% Injectable 25 Gram(s) IV Push once  gabapentin 100 milliGRAM(s) Oral three times a day  glucagon  Injectable 1 milliGRAM(s) IntraMuscular once  insulin lispro (ADMELOG) corrective regimen sliding scale   SubCutaneous three times a day before meals  LORazepam   Injectable 0.5 milliGRAM(s) IV Push once  morphine  - Injectable 2 milliGRAM(s) IV Push every 4 hours PRN  nicotine - 21 mG/24Hr(s) Patch 1 patch Transdermal daily  ondansetron Injectable 8 milliGRAM(s) IV Push every 6 hours PRN  oxycodone    5 mG/acetaminophen 325 mG 1 Tablet(s) Oral every 4 hours PRN  pantoprazole  Injectable 40 milliGRAM(s) IV Push every 12 hours  polyethylene glycol 3350 17 Gram(s) Oral daily PRN  senna 2 Tablet(s) Oral at bedtime  simethicone 80 milliGRAM(s) Chew every 6 hours PRN  sodium chloride 0.9%. 1000 milliLiter(s) IV Continuous <Continuous>      Vital Signs Last 24 Hrs  T(C): 36.5 (16 Feb 2022 16:50), Max: 37 (15 Feb 2022 21:35)  T(F): 97.7 (16 Feb 2022 16:50), Max: 98.6 (15 Feb 2022 21:35)  HR: 80 (16 Feb 2022 16:50) (76 - 135)  BP: 131/84 (16 Feb 2022 16:50) (99/71 - 166/83)  BP(mean): --  RR: 18 (16 Feb 2022 16:50) (13 - 20)  SpO2: 97% (16 Feb 2022 16:50) (93% - 100%)    Physical Exam:  Constitutional: non-toxic, no distress  HEAD/EYES: anicteric, no conjunctival injection  ENT:  supple, no thrush  Cardiovascular:   normal S1, S2, no murmur, no edema  Respiratory:  clear BS bilaterally, no wheezes, no rales  GI:  soft, non-tender, normal bowel sounds  :  no diaz, no CVA tenderness  Musculoskeletal:  no synovitis, normal ROM, Right foot dressed with surgical dressing and ace bandage   Neurologic: awake and alert, normal strength, no focal findings  Skin:  no rash, no erythema, no phlebitis  Heme/Onc: no lymphadenopathy   Psychiatric:  awake, alert, appropriate mood    WBC Count: 15.58 K/uL (02-16 @ 03:27)  WBC Count: 18.63 K/uL (02-15 @ 11:45)  WBC Count: 21.30 K/uL (02-15 @ 01:15)  WBC Count: 19.36 K/uL (02-14 @ 06:46)  WBC Count: 21.95 K/uL (02-12 @ 07:07)  WBC Count: 20.26 K/uL (02-11 @ 16:38)                            14.7   15.58 )-----------( 494      ( 16 Feb 2022 03:27 )             45.7       02-16    135  |  97  |  33<H>  ----------------------------<  332<H>  4.9   |  29  |  1.12    Ca    9.4      16 Feb 2022 03:27    TPro  7.8  /  Alb  2.5<L>  /  TBili  0.5  /  DBili  0.1  /  AST  12<L>  /  ALT  17  /  AlkPhos  88  02-15          Creatinine Trend: 1.12<--, 1.31<--, 1.28<--, 1.23<--, 0.87<--, 0.87<--      MICROBIOLOGY:  v  .Abscess right foot  02-12-22   Numerous Staphylococcus aureus  Numerous Serratia marcescens  Numerous Streptococcus agalactiae (Group B) isolated  Group B streptococci are susceptible to ampicillin,  penicillin and cefazolin, but may be resistant to  erythromycin and clindamycin.  Recommendations for intrapartum prophylaxis for Group B  streptococci are penicillin or ampicillin.  Few Finegoldia magna "Susceptibilities not performed"  --  Staphylococcus aureus  Serratia marcescens          Rapid RVP Result: NotDetec (02-15 @ 15:26)  Rapid RVP Result: NotDetec (02-14 @ 22:12)        C-Reactive Protein, Serum: 150 (02-14)  C-Reactive Protein, Serum: 181 (02-12)          Procalcitonin, Serum: 0.07 (02-14-22 @ 09:52)    SARS-CoV-2: NotDetec (02-15-22 @ 15:26)  Rapid RVP Result: NotDetec (02-15-22 @ 15:26)  SARS-CoV-2: NotDetec (02-14-22 @ 22:12)  Rapid RVP Result: NotDetec (02-14-22 @ 22:12)    SARS-CoV-2: NotDetec (15 Feb 2022 15:26)  SARS-CoV-2: NotDetec (14 Feb 2022 22:12)    RADIOLOGY:

## 2022-02-17 ENCOUNTER — TRANSCRIPTION ENCOUNTER (OUTPATIENT)
Age: 62
End: 2022-02-17

## 2022-02-17 LAB
ANION GAP SERPL CALC-SCNC: 9 MMOL/L — SIGNIFICANT CHANGE UP (ref 5–17)
APTT BLD: 30.4 SEC — SIGNIFICANT CHANGE UP (ref 27.5–35.5)
BUN SERPL-MCNC: 25 MG/DL — HIGH (ref 7–23)
CALCIUM SERPL-MCNC: 8.8 MG/DL — SIGNIFICANT CHANGE UP (ref 8.5–10.1)
CHLORIDE SERPL-SCNC: 101 MMOL/L — SIGNIFICANT CHANGE UP (ref 96–108)
CO2 SERPL-SCNC: 26 MMOL/L — SIGNIFICANT CHANGE UP (ref 22–31)
CREAT SERPL-MCNC: 0.76 MG/DL — SIGNIFICANT CHANGE UP (ref 0.5–1.3)
GLUCOSE BLDC GLUCOMTR-MCNC: 156 MG/DL — HIGH (ref 70–99)
GLUCOSE BLDC GLUCOMTR-MCNC: 178 MG/DL — HIGH (ref 70–99)
GLUCOSE BLDC GLUCOMTR-MCNC: 180 MG/DL — HIGH (ref 70–99)
GLUCOSE BLDC GLUCOMTR-MCNC: 231 MG/DL — HIGH (ref 70–99)
GLUCOSE SERPL-MCNC: 203 MG/DL — HIGH (ref 70–99)
HCT VFR BLD CALC: 46.3 % — SIGNIFICANT CHANGE UP (ref 39–50)
HGB BLD-MCNC: 14.5 G/DL — SIGNIFICANT CHANGE UP (ref 13–17)
INR BLD: 1.36 RATIO — HIGH (ref 0.88–1.16)
MAGNESIUM SERPL-MCNC: 2.2 MG/DL — SIGNIFICANT CHANGE UP (ref 1.6–2.6)
MCHC RBC-ENTMCNC: 29.1 PG — SIGNIFICANT CHANGE UP (ref 27–34)
MCHC RBC-ENTMCNC: 31.3 G/DL — LOW (ref 32–36)
MCV RBC AUTO: 93 FL — SIGNIFICANT CHANGE UP (ref 80–100)
NRBC # BLD: 0 /100 WBCS — SIGNIFICANT CHANGE UP (ref 0–0)
PHOSPHATE SERPL-MCNC: 3.2 MG/DL — SIGNIFICANT CHANGE UP (ref 2.5–4.5)
PLATELET # BLD AUTO: 442 K/UL — HIGH (ref 150–400)
POTASSIUM SERPL-MCNC: 4.3 MMOL/L — SIGNIFICANT CHANGE UP (ref 3.5–5.3)
POTASSIUM SERPL-SCNC: 4.3 MMOL/L — SIGNIFICANT CHANGE UP (ref 3.5–5.3)
PROTHROM AB SERPL-ACNC: 15.5 SEC — HIGH (ref 10.6–13.6)
RBC # BLD: 4.98 M/UL — SIGNIFICANT CHANGE UP (ref 4.2–5.8)
RBC # FLD: 12.7 % — SIGNIFICANT CHANGE UP (ref 10.3–14.5)
SODIUM SERPL-SCNC: 136 MMOL/L — SIGNIFICANT CHANGE UP (ref 135–145)
WBC # BLD: 13.07 K/UL — HIGH (ref 3.8–10.5)
WBC # FLD AUTO: 13.07 K/UL — HIGH (ref 3.8–10.5)

## 2022-02-17 PROCEDURE — 99232 SBSQ HOSP IP/OBS MODERATE 35: CPT

## 2022-02-17 PROCEDURE — 99221 1ST HOSP IP/OBS SF/LOW 40: CPT

## 2022-02-17 RX ORDER — METOPROLOL TARTRATE 50 MG
12.5 TABLET ORAL
Refills: 0 | Status: DISCONTINUED | OUTPATIENT
Start: 2022-02-17 | End: 2022-02-21

## 2022-02-17 RX ORDER — METOCLOPRAMIDE HCL 10 MG
10 TABLET ORAL ONCE
Refills: 0 | Status: COMPLETED | OUTPATIENT
Start: 2022-02-17 | End: 2022-02-17

## 2022-02-17 RX ADMIN — Medication 500 MILLIGRAM(S): at 21:18

## 2022-02-17 RX ADMIN — ATORVASTATIN CALCIUM 10 MILLIGRAM(S): 80 TABLET, FILM COATED ORAL at 21:18

## 2022-02-17 RX ADMIN — CEFEPIME 100 MILLIGRAM(S): 1 INJECTION, POWDER, FOR SOLUTION INTRAMUSCULAR; INTRAVENOUS at 13:41

## 2022-02-17 RX ADMIN — Medication 500 MILLIGRAM(S): at 05:28

## 2022-02-17 RX ADMIN — Medication 500 MILLIGRAM(S): at 13:42

## 2022-02-17 RX ADMIN — OXYCODONE AND ACETAMINOPHEN 1 TABLET(S): 5; 325 TABLET ORAL at 11:00

## 2022-02-17 RX ADMIN — SODIUM CHLORIDE 100 MILLILITER(S): 9 INJECTION INTRAMUSCULAR; INTRAVENOUS; SUBCUTANEOUS at 08:01

## 2022-02-17 RX ADMIN — Medication 10 MILLIGRAM(S): at 22:48

## 2022-02-17 RX ADMIN — Medication 1 PATCH: at 11:50

## 2022-02-17 RX ADMIN — OXYCODONE AND ACETAMINOPHEN 1 TABLET(S): 5; 325 TABLET ORAL at 10:28

## 2022-02-17 RX ADMIN — LACTULOSE 20 GRAM(S): 10 SOLUTION ORAL at 17:31

## 2022-02-17 RX ADMIN — PANTOPRAZOLE SODIUM 40 MILLIGRAM(S): 20 TABLET, DELAYED RELEASE ORAL at 17:31

## 2022-02-17 RX ADMIN — Medication 1: at 17:32

## 2022-02-17 RX ADMIN — CEFEPIME 100 MILLIGRAM(S): 1 INJECTION, POWDER, FOR SOLUTION INTRAMUSCULAR; INTRAVENOUS at 05:28

## 2022-02-17 RX ADMIN — OXYCODONE AND ACETAMINOPHEN 1 TABLET(S): 5; 325 TABLET ORAL at 02:40

## 2022-02-17 RX ADMIN — Medication 650 MILLIGRAM(S): at 00:21

## 2022-02-17 RX ADMIN — CEFEPIME 100 MILLIGRAM(S): 1 INJECTION, POWDER, FOR SOLUTION INTRAMUSCULAR; INTRAVENOUS at 21:17

## 2022-02-17 RX ADMIN — PANTOPRAZOLE SODIUM 40 MILLIGRAM(S): 20 TABLET, DELAYED RELEASE ORAL at 05:28

## 2022-02-17 RX ADMIN — Medication 81 MILLIGRAM(S): at 11:47

## 2022-02-17 RX ADMIN — Medication 12.5 MILLIGRAM(S): at 17:32

## 2022-02-17 RX ADMIN — OXYCODONE AND ACETAMINOPHEN 1 TABLET(S): 5; 325 TABLET ORAL at 01:52

## 2022-02-17 RX ADMIN — Medication 1 PATCH: at 11:47

## 2022-02-17 RX ADMIN — Medication 650 MILLIGRAM(S): at 01:10

## 2022-02-17 RX ADMIN — Medication 2: at 11:48

## 2022-02-17 RX ADMIN — GABAPENTIN 100 MILLIGRAM(S): 400 CAPSULE ORAL at 13:41

## 2022-02-17 RX ADMIN — MORPHINE SULFATE 2 MILLIGRAM(S): 50 CAPSULE, EXTENDED RELEASE ORAL at 23:15

## 2022-02-17 RX ADMIN — GABAPENTIN 100 MILLIGRAM(S): 400 CAPSULE ORAL at 05:28

## 2022-02-17 RX ADMIN — MORPHINE SULFATE 2 MILLIGRAM(S): 50 CAPSULE, EXTENDED RELEASE ORAL at 05:28

## 2022-02-17 RX ADMIN — LACTULOSE 20 GRAM(S): 10 SOLUTION ORAL at 05:28

## 2022-02-17 RX ADMIN — Medication 1 PATCH: at 07:58

## 2022-02-17 RX ADMIN — GABAPENTIN 100 MILLIGRAM(S): 400 CAPSULE ORAL at 21:18

## 2022-02-17 NOTE — PROGRESS NOTE ADULT - ASSESSMENT
60yo M w/ right foot gangrene   - pt seen and evaluated  - afebrile, WBC down to 13.07  -s/p 2/15/22 right foot TMA open: mild strikethrough dressing, flaps warm and viable, plantar incision and flap with no signs of necrosis, sanguinous drainage, no purulence, no malodor, left foot no open wounds or lesions  - per intraop findings high concern for residual infection and viability  - continue IV cefepime, PO flagyl per ID recs, appreciated  - Rec cardiology consult as pt had A.fib on postop EKG per anaesthesia   - salvageability of right foot is guarded  - IR plan to transfer pt to [facility] for further vascular work up/ IR thrombectomy 2/17  - podiatry plan pending IR/vascular intervention  - will follow  - discussed w/ attending  62yo M w/ right foot gangrene   - pt seen and evaluated  - afebrile, WBC down to 13.07  -s/p 2/15/22 right foot TMA open: mild strikethrough dressing, flaps warm and viable, plantar incision and flap with no signs of necrosis, sanguinous drainage, no purulence, no malodor, left foot no open wounds or lesions  - per intraop findings high concern for residual infection and viability  - continue IV cefepime, PO flagyl per ID recs, appreciated  - Rec cardiology consult as pt had A.fib on postop EKG per anaesthesia   - salvageability of right foot is guarded  - IR plan to transfer pt to [facility] for further vascular work up/ IR thrombectomy 2/17  - podiatry plan pending IR/vascular intervention  - will follow  - discussed w/ attending  60yo M w/ right foot gangrene   - pt seen and evaluated  - afebrile, WBC down to 13.07  -s/p 2/15/22 right foot TMA open: mild strikethrough dressing, flaps cool to touch but still viable, plantar incision and flap with no signs of necrosis, sanguinous drainage, no purulence, no malodor, left foot no open wounds or lesions  - per intraop findings high concern for residual infection and viability  - continue IV cefepime, PO flagyl per ID recs, appreciated  - Rec cardiology consult as pt had A.fib on postop EKG per anaesthesia   - salvageability of right foot is guarded  - IR plan to transfer pt to [facility] for further vascular work up/ IR thrombectomy 2/17  - podiatry plan pending IR/vascular intervention  - will follow  - discussed w/ attending

## 2022-02-17 NOTE — PROGRESS NOTE ADULT - ASSESSMENT
60 yo male w/ history of DM II, PAD, p/w R 4th/5th digit dry gangrene w/ non healing R medial malleolar ulcer, no palpable pulses distal to femoral b/l.     R foot gangrene w/ pus  - podiatry and vascular following  - MRI not suggestive of acute osteomyelitis, but was a poor study due to motion artifact   - vascular study showed an unobtainable ankle brachial index w/ diffusely calcified noncompressible vessels limiting evaluation and diminished flow below the right knee  - US showed occluded right superficial femoral and popliteal arteries with multiple stents   - CTA showed occluded right superficial femoral and popliteal arteries containing multiple overlapping stents with reconstitution in the mid popliteal artery, three-vessel runoff into b/l feet, occluded left superficial femoral artery with reconstitution distally, moderate narrowing in the mid left popliteal artery and moderate narrowing in the proximal left external iliac artery with associated areas of ulcerated plaque  - abscess cultures grew Serratia, GBS and MSSA  - as per ID, will c/w Cefepime & Flagyl  - s/p TMA 2/15 - surgical culture and pathology are pending  - c/w morphine and percocet for pain    PAD  - US showed occluded right superficial femoral and popliteal arteries with multiple stents   - CTA showed occluded right superficial femoral and popliteal arteries containing multiple overlapping stents with reconstitution in the mid popliteal artery, three-vessel runoff into b/l feet, occluded left superficial femoral artery with reconstitution distally, moderate narrowing in the mid left popliteal artery and moderate narrowing in the proximal left external iliac artery with associated areas of ulcerated plaque  - as per Dr. Garcia, patient to be transferred to [facility] 2/16 for IR thrombectomy 2/17  - c/w ASA & Lipitor    A. fib on post TMA EKG per anaesthesia   - no more episodes of A. fib on tele today  - Echo showed EF 55-60% & moderate aortic stenosis  - as per cardio, would ideally start AC for A. fib but in light of recent GIB/coffee grounds emesis will hold off  - started metoprolol   - ASA on hold as per GI    Coffee ground emesis on 2/14  - c/w Protonix  - FOBT positive  - as per GI, will get EGD tomorrow  - H&H is stable    Hiccups  - c/w Protonix  - c/w gabapentin    Constipation  - c/w Senna  - add lactulose  - c/w PRN Miralax    DM II  - c/w ISS  - Hgb A1C is 8.6    Severe protein-calorie malnutrition  - nutrition consult     Smoker  - c/w Nicotine Patch    Prophylaxis:  DVT: subcutaneous Heparin on hold post TMA and due to possible GI bleed  GI: Protonix    Plan pending transfer to [facility] for IR thrombectomy on Monday or Tuesday    I called the pt's wife, Richa [phone] and updated her on his status and plan of care.

## 2022-02-17 NOTE — CONSULT NOTE ADULT - SUBJECTIVE AND OBJECTIVE BOX
CARDIOLOGY CONSULT NOTE    Patient is a 61y Male with a known history of :  Gangrene of right foot [I96]    DM (diabetes mellitus) [E11.9]    Severe protein-calorie malnutrition [E43]    Tobacco user [Z72.0]      HPI:  60 yo male w/ history of DM II, PAD, p/w R 4th/5th digit dry gangrene w/ non healing R medial malleolar ulcer, no palpable pulses distal to femoral b/l.  Initially awaiting tx to NS for further surgical care.  Overnight with coffee ground emesis... awaiting EGD now.  No prior hx of afib/flutter; briefly went into flutter; now back in NSR.  ECG: sinus 86bpm  ECG2: aflutter 120bpm  Echo: LVEF 60%; moderate AS  Hg 14    REVIEW OF SYSTEMS:  CONSTITUTIONAL: No fever, weight loss, or fatigue  EYES: No eye pain, visual disturbances, or discharge  ENMT:  No difficulty hearing, tinnitus, vertigo; No sinus or throat pain  NECK: No pain or stiffness  BREASTS: No pain, masses, or nipple discharge  RESPIRATORY: No cough, wheezing, chills or hemoptysis; No shortness of breath  CARDIOVASCULAR: No chest pain, palpitations, dizziness, or leg swelling  GASTROINTESTINAL: No abdominal or epigastric pain. No nausea, vomiting, or hematemesis; No diarrhea or constipation. No melena or hematochezia.  GENITOURINARY: No dysuria, frequency, hematuria, or incontinence  NEUROLOGICAL: No headaches, memory loss, loss of strength, numbness, or tremors  SKIN: No itching, burning, rashes, or lesions   LYMPH NODES: No enlarged glands  ENDOCRINE: No heat or cold intolerance; No hair loss  MUSCULOSKELETAL: No joint pain or swelling; No muscle, back, or extremity pain  PSYCHIATRIC: No depression, anxiety, mood swings, or difficulty sleeping  HEME/LYMPH: No easy bruising, or bleeding gums  ALLERGY AND IMMUNOLOGIC: No hives or eczema    MEDICATIONS  (STANDING):  atorvastatin 10 milliGRAM(s) Oral at bedtime  cefepime   IVPB 1000 milliGRAM(s) IV Intermittent every 8 hours  gabapentin 100 milliGRAM(s) Oral three times a day  glucagon  Injectable 1 milliGRAM(s) IntraMuscular once  insulin lispro (ADMELOG) corrective regimen sliding scale   SubCutaneous three times a day before meals  lactulose Syrup 20 Gram(s) Oral two times a day  LORazepam   Injectable 0.5 milliGRAM(s) IV Push once  metroNIDAZOLE    Tablet 500 milliGRAM(s) Oral three times a day  nicotine - 21 mG/24Hr(s) Patch 1 patch Transdermal daily  pantoprazole  Injectable 40 milliGRAM(s) IV Push every 12 hours  senna 2 Tablet(s) Oral at bedtime  sodium chloride 0.9%. 1000 milliLiter(s) (100 mL/Hr) IV Continuous <Continuous>    MEDICATIONS  (PRN):  acetaminophen     Tablet .. 650 milliGRAM(s) Oral every 4 hours PRN Mild Pain (1 - 3)  aluminum hydroxide/magnesium hydroxide/simethicone Suspension 30 milliLiter(s) Oral every 6 hours PRN Dyspepsia  morphine  - Injectable 2 milliGRAM(s) IV Push every 4 hours PRN Severe Pain (7 - 10)  ondansetron Injectable 8 milliGRAM(s) IV Push every 6 hours PRN Nausea and/or Vomiting  oxycodone    5 mG/acetaminophen 325 mG 1 Tablet(s) Oral every 4 hours PRN Moderate Pain (4 - 6)  polyethylene glycol 3350 17 Gram(s) Oral daily PRN Constipation  simethicone 80 milliGRAM(s) Chew every 6 hours PRN Dyspepsia      ALLERGIES: No Known Allergies      FAMILY HISTORY:      PHYSICAL EXAMINATION:  -----------------------------  T(C): 36.4 (02-17-22 @ 10:50), Max: 36.7 (02-17-22 @ 04:44)  HR: 69 (02-17-22 @ 10:50) (69 - 87)  BP: 137/73 (02-17-22 @ 10:50) (124/61 - 137/73)  RR: 18 (02-17-22 @ 10:50) (18 - 18)  SpO2: 99% (02-17-22 @ 10:50) (95% - 99%)      Constitutional: well developed, normal appearance, well groomed, well nourished, no deformities and no acute distress.   Eyes: the conjunctiva exhibited no abnormalities and the eyelids demonstrated no xanthelasmas.   HEENT: normal oral mucosa, no oral pallor and no oral cyanosis.   Neck: normal jugular venous A waves present, normal jugular venous V waves present and no jugular venous pastrana A waves.   Pulmonary: no respiratory distress, normal respiratory rhythm and effort, no accessory muscle use and lungs were clear to auscultation bilaterally.   Cardiovascular: heart rate and rhythm were normal, 2/6 SM  Abdomen: soft, non-tender, no hepato-splenomegaly and no abdominal mass palpated.   Musculoskeletal: the gait could not be assessed..   Extremities: R foot bandaged  Skin: normal skin color and pigmentation, no rash, no venous stasis, no skin lesions, no skin ulcer and no xanthoma was observed.   Psychiatric: oriented to person, place, and time, the affect was normal, the mood was normal and not feeling anxious.     LABS:   --------  02-17    136  |  101  |  25<H>  ----------------------------<  203<H>  4.3   |  26  |  0.76    Ca    8.8      17 Feb 2022 08:32  Phos  3.2     02-17  Mg     2.2     02-17                           14.5   13.07 )-----------( 442      ( 17 Feb 2022 08:32 )             46.3     PT/INR - ( 17 Feb 2022 08:32 )   PT: 15.5 sec;   INR: 1.36 ratio         PTT - ( 17 Feb 2022 08:32 )  PTT:30.4 sec        Culture Results:   Few Serratia marcescens Susceptibility to follow.  Few Streptococcus agalactiae (Group B) isolated  Group B streptococci are susceptible to ampicillin,  penicillin and cefazolin, but may be resistant to  erythromycin and clindamycin.  Recommendations for intrapartum prophylaxis for Group B  streptococci are penicillin or ampicillin. (02-16 @ 08:53)      RADIOLOGY:  -----------------    ECG: sinus 86bpm  ECG2: aflutter 120bpm    < from: TTE Echo Complete w/o Contrast w/ Doppler (02.11.22 @ 19:51) >  Summary:   1. Left ventricular ejection fraction, by visual estimation, is 55 to   60%.   2. Normal global left ventricular systolic function.   3. Trace mitral valve regurgitation.   4. Mild aortic regurgitation.   5. Sclerotic aortic valve with normal opening.   6. Peak transaortic gradient equals 31.9 mmHg, mean transaortic gradient   equals 14.6 mmHg, the calculated aortic valve area equals 1.25 cm² by the   continuity equation consistent with moderate aortic stenosis.      [id number] Tay Huffman MD, [facility]  Electronically signed on 2/12/2022 at 10:12:45 AM      < end of copied text >

## 2022-02-17 NOTE — PROGRESS NOTE ADULT - SUBJECTIVE AND OBJECTIVE BOX
BIA RAYMOND  MRN-[id number]    Follow Up:  OM    Interval History: The pt was seen and examined earlier, no distress, no new complains. The pt is afebrile, on RA, WBC better.     PAST MEDICAL & SURGICAL HISTORY:  DM (diabetes mellitus)    No significant past surgical history        ROS:    [ ] Unobtainable because:  [x ] All other systems negative    Constitutional: no fever, no chills  Head: no trauma  Eyes: no vision changes, no eye pain  ENT:  no sore throat, no rhinorrhea  Cardiovascular:  no chest pain, no palpitation  Respiratory:  no SOB, no cough  GI:  no abd pain, no vomiting, no diarrhea  urinary: no dysuria, no hematuria, no flank pain  musculoskeletal:  post op pain is controlled   skin:  no rash  neurology:  no headache, no seizure, no change in mental status  psych: no anxiety, no depression         Allergies  No Known Allergies        ANTIMICROBIALS:  cefepime   IVPB 1000 every 8 hours  metroNIDAZOLE    Tablet 500 three times a day      OTHER MEDS:  acetaminophen     Tablet .. 650 milliGRAM(s) Oral every 4 hours PRN  aluminum hydroxide/magnesium hydroxide/simethicone Suspension 30 milliLiter(s) Oral every 6 hours PRN  atorvastatin 10 milliGRAM(s) Oral at bedtime  dextrose 5%. 1000 milliLiter(s) IV Continuous <Continuous>  dextrose 5%. 1000 milliLiter(s) IV Continuous <Continuous>  dextrose 50% Injectable 25 Gram(s) IV Push once  dextrose 50% Injectable 12.5 Gram(s) IV Push once  dextrose 50% Injectable 25 Gram(s) IV Push once  gabapentin 100 milliGRAM(s) Oral three times a day  glucagon  Injectable 1 milliGRAM(s) IntraMuscular once  insulin lispro (ADMELOG) corrective regimen sliding scale   SubCutaneous three times a day before meals  lactulose Syrup 20 Gram(s) Oral two times a day  LORazepam   Injectable 0.5 milliGRAM(s) IV Push once  metoprolol tartrate 12.5 milliGRAM(s) Oral two times a day  morphine  - Injectable 2 milliGRAM(s) IV Push every 4 hours PRN  nicotine - 21 mG/24Hr(s) Patch 1 patch Transdermal daily  ondansetron Injectable 8 milliGRAM(s) IV Push every 6 hours PRN  oxycodone    5 mG/acetaminophen 325 mG 1 Tablet(s) Oral every 4 hours PRN  pantoprazole  Injectable 40 milliGRAM(s) IV Push every 12 hours  polyethylene glycol 3350 17 Gram(s) Oral daily PRN  senna 2 Tablet(s) Oral at bedtime  simethicone 80 milliGRAM(s) Chew every 6 hours PRN  sodium chloride 0.9%. 1000 milliLiter(s) IV Continuous <Continuous>      Vital Signs Last 24 Hrs  T(C): 36.4 (17 Feb 2022 10:50), Max: 36.7 (17 Feb 2022 04:44)  T(F): 97.5 (17 Feb 2022 10:50), Max: 98.1 (17 Feb 2022 04:44)  HR: 69 (17 Feb 2022 10:50) (69 - 87)  BP: 137/73 (17 Feb 2022 10:50) (124/61 - 137/73)  BP(mean): --  RR: 18 (17 Feb 2022 10:50) (18 - 18)  SpO2: 99% (17 Feb 2022 10:50) (95% - 99%)    Physical Exam:  Constitutional: non-toxic, no distress  HEAD/EYES: anicteric, no conjunctival injection  ENT:  supple, no thrush  Cardiovascular:   normal S1, S2, no murmur, no edema  Respiratory:  clear BS bilaterally, no wheezes, no rales  GI:  soft, non-tender, normal bowel sounds  :  no diaz, no CVA tenderness  Musculoskeletal:  no synovitis, normal ROM, Right foot dressed with surgical dressing and ace bandage   Neurologic: awake and alert, normal strength, no focal findings  Skin:  no rash, no erythema, no phlebitis  Heme/Onc: no lymphadenopathy   Psychiatric:  awake, alert, appropriate mood    WBC Count: 13.07 K/uL (02-17 @ 08:32)  WBC Count: 15.58 K/uL (02-16 @ 03:27)  WBC Count: 18.63 K/uL (02-15 @ 11:45)  WBC Count: 21.30 K/uL (02-15 @ 01:15)  WBC Count: 19.36 K/uL (02-14 @ 06:46)  WBC Count: 21.95 K/uL (02-12 @ 07:07)  WBC Count: 20.26 K/uL (02-11 @ 16:38)                            14.5   13.07 )-----------( 442      ( 17 Feb 2022 08:32 )             46.3       02-17    136  |  101  |  25<H>  ----------------------------<  203<H>  4.3   |  26  |  0.76    Ca    8.8      17 Feb 2022 08:32  Phos  3.2     02-17  Mg     2.2     02-17            Creatinine Trend: 0.76<--, 1.12<--, 1.31<--, 1.28<--, 1.23<--, 0.87<--      MICROBIOLOGY:  v  .Surgical Swab RIGHT FOOT DEEP WOUND  02-16-22   Few Serratia marcescens Susceptibility to follow.  Few Streptococcus agalactiae (Group B) isolated  Group B streptococci are susceptible to ampicillin,  penicillin and cefazolin, but may be resistant to  erythromycin and clindamycin.  Recommendations for intrapartum prophylaxis for Group B  streptococci are penicillin or ampicillin.  --  --      .Abscess right foot  02-12-22   Numerous Staphylococcus aureus  Numerous Serratia marcescens  Numerous Streptococcus agalactiae (Group B) isolated  Group B streptococci are susceptible to ampicillin,  penicillin and cefazolin, but may be resistant to  erythromycin and clindamycin.  Recommendations for intrapartum prophylaxis for Group B  streptococci are penicillin or ampicillin.  Few Finegoldia magna "Susceptibilities not performed"  --  Staphylococcus aureus  Serratia marcescens          Rapid RVP Result: NotDetec (02-15 @ 15:26)  Rapid RVP Result: NotDetec (02-14 @ 22:12)        C-Reactive Protein, Serum: 150 (02-14)  C-Reactive Protein, Serum: 181 (02-12)          Procalcitonin, Serum: 0.07 (02-14-22 @ 09:52)    SARS-CoV-2: NotDetec (02-15-22 @ 15:26)  Rapid RVP Result: NotDetec (02-15-22 @ 15:26)  SARS-CoV-2: NotDetec (02-14-22 @ 22:12)  Rapid RVP Result: NotDetec (02-14-22 @ 22:12)    SARS-CoV-2: NotDetec (15 Feb 2022 15:26)  SARS-CoV-2: Jones (14 Feb 2022 22:12)    RADIOLOGY:

## 2022-02-17 NOTE — CONSULT NOTE ADULT - SUBJECTIVE AND OBJECTIVE BOX
HPI:    Patient is a 61y old  Male   h/o  DM  2  on   oral meds,   who presents with a chief complaint of right foot gangrene .  4/5  th toes  for past 3  to 4  months   denies  cp/sob/ abd  pain/ fevers  seen by podiatry in er/  vascular called       (11 Feb 2022 17:26)      PAST MEDICAL & SURGICAL HISTORY:  DM (diabetes mellitus)    No significant past surgical history        MEDICATIONS  (STANDING):  aspirin enteric coated 81 milliGRAM(s) Oral daily  atorvastatin 10 milliGRAM(s) Oral at bedtime  cefepime   IVPB 1000 milliGRAM(s) IV Intermittent every 8 hours  dextrose 5%. 1000 milliLiter(s) (50 mL/Hr) IV Continuous <Continuous>  dextrose 5%. 1000 milliLiter(s) (100 mL/Hr) IV Continuous <Continuous>  dextrose 50% Injectable 25 Gram(s) IV Push once  dextrose 50% Injectable 12.5 Gram(s) IV Push once  dextrose 50% Injectable 25 Gram(s) IV Push once  gabapentin 100 milliGRAM(s) Oral three times a day  glucagon  Injectable 1 milliGRAM(s) IntraMuscular once  insulin lispro (ADMELOG) corrective regimen sliding scale   SubCutaneous three times a day before meals  lactulose Syrup 20 Gram(s) Oral two times a day  LORazepam   Injectable 0.5 milliGRAM(s) IV Push once  metroNIDAZOLE    Tablet 500 milliGRAM(s) Oral three times a day  nicotine - 21 mG/24Hr(s) Patch 1 patch Transdermal daily  pantoprazole  Injectable 40 milliGRAM(s) IV Push every 12 hours  senna 2 Tablet(s) Oral at bedtime  sodium chloride 0.9%. 1000 milliLiter(s) (100 mL/Hr) IV Continuous <Continuous>    MEDICATIONS  (PRN):  acetaminophen     Tablet .. 650 milliGRAM(s) Oral every 4 hours PRN Mild Pain (1 - 3)  aluminum hydroxide/magnesium hydroxide/simethicone Suspension 30 milliLiter(s) Oral every 6 hours PRN Dyspepsia  morphine  - Injectable 2 milliGRAM(s) IV Push every 4 hours PRN Severe Pain (7 - 10)  ondansetron Injectable 8 milliGRAM(s) IV Push every 6 hours PRN Nausea and/or Vomiting  oxycodone    5 mG/acetaminophen 325 mG 1 Tablet(s) Oral every 4 hours PRN Moderate Pain (4 - 6)  polyethylene glycol 3350 17 Gram(s) Oral daily PRN Constipation  simethicone 80 milliGRAM(s) Chew every 6 hours PRN Dyspepsia      Allergies    No Known Allergies    Intolerances        FAMILY HISTORY:      REVIEW OF SYSTEMS:    CONSTITUTIONAL: No fever, weight loss, or fatigue  EYES: No eye pain, visual disturbances, or discharge  ENMT:  No difficulty hearing, tinnitus, vertigo; No sinus or throat pain  NECK: No pain or stiffness  BREASTS: No pain, masses, or nipple discharge  RESPIRATORY: No cough, wheezing, chills or hemoptysis; No shortness of breath  CARDIOVASCULAR: No chest pain, palpitations, dizziness, or leg swelling  GASTROINTESTINAL: No abdominal or epigastric pain. No nausea, vomiting, or hematemesis; No diarrhea or constipation. No melena or hematochezia.  GENITOURINARY: No dysuria, frequency, hematuria, or incontinence  NEUROLOGICAL: No headaches, memory loss, loss of strength, numbness, or tremors  SKIN: No itching, burning, rashes, or lesions   LYMPH NODES: No enlarged glands  ENDOCRINE: No heat or cold intolerance; No hair loss  MUSCULOSKELETAL: No joint pain or swelling; No muscle, back, or extremity pain  PSYCHIATRIC: No depression, anxiety, mood swings, or difficulty sleeping  HEME/LYMPH: No easy bruising, or bleeding gums  ALLERGY AND IMMUNOLOGIC: No hives or eczema          SOCIAL HISTORY:    FAMILY HISTORY:      Vital Signs Last 24 Hrs  T(C): 36.4 (17 Feb 2022 10:50), Max: 36.7 (17 Feb 2022 04:44)  T(F): 97.5 (17 Feb 2022 10:50), Max: 98.1 (17 Feb 2022 04:44)  HR: 69 (17 Feb 2022 10:50) (69 - 87)  BP: 137/73 (17 Feb 2022 10:50) (124/61 - 137/73)  BP(mean): --  RR: 18 (17 Feb 2022 10:50) (18 - 18)  SpO2: 99% (17 Feb 2022 10:50) (95% - 99%)    PHYSICAL EXAM:    GENERAL: NAD, well-groomed, well-developed  HEAD:  Atraumatic, Normocephalic  EYES: EOMI, PERRLA, conjunctiva and sclera clear  ENMT: No tonsillar erythema, exudates, or enlargement; Moist mucous membranes, Good dentition, No lesions  NECK: Supple, No JVD, Normal thyroid  NERVOUS SYSTEM:  Alert & Oriented X3, Good concentration; Motor Strength 5/5 B/L upper and lower extremities; DTRs 2+ intact and symmetric  CHEST/LUNG: Clear to percussion bilaterally; No rales, rhonchi, wheezing, or rubs  HEART: Regular rate and rhythm; No murmurs, rubs, or gallops  ABDOMEN: Soft, Nontender, Nondistended; Bowel sounds present  EXTREMITIES:  2+ Peripheral Pulses, No clubbing, cyanosis, or edema  LYMPH: No lymphadenopathy noted   RECTAL: No masses, prostate normal size and smooth, Guaiaci negative   BREAST: No palpable masses, skin no lesions no retractions, no discharges. adnexal no palpable masses noted   GYN: uterus normal size, adnexal, no palpable masses, no CMT, no uterine discharge   SKIN: No rashes or lesions    LABS:                        14.5   13.07 )-----------( 442      ( 17 Feb 2022 08:32 )             46.3       CBC:  02-17 @ 08:32  WBC  13.07  HGB 14.5  HCT 46.3 Plate 442  MCV 93.0  02-16 @ 03:27  WBC  15.58  HGB 14.7  HCT 45.7 Plate 494  MCV 91.0  02-15 @ 11:45  WBC  18.63  HGB 15.7  HCT 47.8 Plate 499  MCV 89.7  02-15 @ 01:15  WBC  21.30  HGB 16.3  HCT 50.5 Plate 563  MCV 90.3  02-14 @ 06:46  WBC  19.36  HGB 15.8  HCT 48.9 Plate 523  MCV 90.4  02-12 @ 07:07  WBC  21.95  HGB 15.3  HCT 48.1 Plate 472  MCV 92.3  02-11 @ 16:38  WBC  20.26  HGB 15.3  HCT 47.6 Plate 459  MCV 90.7           17 Feb 2022 08:32    136    |  101    |  25     ----------------------------<  203    4.3     |  26     |  0.76   16 Feb 2022 03:27    135    |  97     |  33     ----------------------------<  332    4.9     |  29     |  1.12   15 Feb 2022 11:45    137    |  97     |  39     ----------------------------<  288    4.7     |  29     |  1.31   15 Feb 2022 01:19    136    |  95     |  38     ----------------------------<  262    4.6     |  29     |  1.28   14 Feb 2022 06:46    133    |  96     |  31     ----------------------------<  271    4.6     |  23     |  1.23   12 Feb 2022 07:07    131    |  97     |  17     ----------------------------<  188    4.5     |  17     |  0.87   11 Feb 2022 16:38    133    |  100    |  17     ----------------------------<  128    4.7     |  18     |  0.87     Ca    8.8        17 Feb 2022 08:32  Ca    9.4        16 Feb 2022 03:27  Ca    9.4        15 Feb 2022 11:45  Ca    9.7        15 Feb 2022 01:19  Ca    9.9        14 Feb 2022 06:46  Ca    9.4        12 Feb 2022 07:07  Ca    9.6        11 Feb 2022 16:38  Phos  3.2       17 Feb 2022 08:32  Mg     2.2       17 Feb 2022 08:32  Mg     2.0       11 Feb 2022 16:38    TPro  7.8    /  Alb  2.5    /  TBili  0.5    /  DBili  0.1    /  AST  12     /  ALT  17     /  AlkPhos  88     15 Feb 2022 01:19  TPro  7.6    /  Alb  2.4    /  TBili  0.5    /  DBili  x      /  AST  12     /  ALT  22     /  AlkPhos  82     11 Feb 2022 16:38    PT/INR - ( 17 Feb 2022 08:32 )   PT: 15.5 sec;   INR: 1.36 ratio         PTT - ( 17 Feb 2022 08:32 )  PTT:30.4 sec    C-Reactive Protein, Serum: 150 mg/L (02-14 @ 09:52)  C-Reactive Protein, Serum: 181 mg/L (02-12 @ 10:30)      RADIOLOGY & ADDITIONAL STUDIES: HPI:    Patient is a 61y old  Male   h/o  DM  2  on   oral meds,   who presents with a chief complaint of right foot gangrene .  4/5  th toes  for past 3  to 4  months   denies  cp/sob/ abd  pain/ fevers  seen by podiatry in er/  vascular called       (11 Feb 2022 17:26)  ----- As Above ------- His  Called to see patient because of coffee ground emesis. It happened [id number] at night time x 2. It was not associated with abdominal pain. Patient denies  melena, hematochezia, hematemesis, nausea, vomiting, abdominal pain, constipation, diarrhea, or change in bowel movements since then. Patient on ASA and a PPI BID. Only c/o is hiccups.   Prior to this admission, the patient / wife deny melena, hematochezia, hematemesis, nausea, vomiting, abdominal pain, constipation, diarrhea, or change in bowel movements Patient never had a GI bleed before nor had any history of PUD  Patient today denies all GI symptoms except hiccups.       PAST MEDICAL & SURGICAL HISTORY:  DM (diabetes mellitus)    No significant past surgical history        MEDICATIONS  (STANDING):  aspirin enteric coated 81 milliGRAM(s) Oral daily  atorvastatin 10 milliGRAM(s) Oral at bedtime  cefepime   IVPB 1000 milliGRAM(s) IV Intermittent every 8 hours  dextrose 5%. 1000 milliLiter(s) (50 mL/Hr) IV Continuous <Continuous>  dextrose 5%. 1000 milliLiter(s) (100 mL/Hr) IV Continuous <Continuous>  dextrose 50% Injectable 25 Gram(s) IV Push once  dextrose 50% Injectable 12.5 Gram(s) IV Push once  dextrose 50% Injectable 25 Gram(s) IV Push once  gabapentin 100 milliGRAM(s) Oral three times a day  glucagon  Injectable 1 milliGRAM(s) IntraMuscular once  insulin lispro (ADMELOG) corrective regimen sliding scale   SubCutaneous three times a day before meals  lactulose Syrup 20 Gram(s) Oral two times a day  LORazepam   Injectable 0.5 milliGRAM(s) IV Push once  metroNIDAZOLE    Tablet 500 milliGRAM(s) Oral three times a day  nicotine - 21 mG/24Hr(s) Patch 1 patch Transdermal daily  pantoprazole  Injectable 40 milliGRAM(s) IV Push every 12 hours  senna 2 Tablet(s) Oral at bedtime  sodium chloride 0.9%. 1000 milliLiter(s) (100 mL/Hr) IV Continuous <Continuous>    MEDICATIONS  (PRN):  acetaminophen     Tablet .. 650 milliGRAM(s) Oral every 4 hours PRN Mild Pain (1 - 3)  aluminum hydroxide/magnesium hydroxide/simethicone Suspension 30 milliLiter(s) Oral every 6 hours PRN Dyspepsia  morphine  - Injectable 2 milliGRAM(s) IV Push every 4 hours PRN Severe Pain (7 - 10)  ondansetron Injectable 8 milliGRAM(s) IV Push every 6 hours PRN Nausea and/or Vomiting  oxycodone    5 mG/acetaminophen 325 mG 1 Tablet(s) Oral every 4 hours PRN Moderate Pain (4 - 6)  polyethylene glycol 3350 17 Gram(s) Oral daily PRN Constipation  simethicone 80 milliGRAM(s) Chew every 6 hours PRN Dyspepsia      Allergies    No Known Allergies    Intolerances        FAMILY HISTORY:      REVIEW OF SYSTEMS:    CONSTITUTIONAL: No fever, weight loss,  EYES: No eye pain, visual disturbances, or discharge  ENMT:  No difficulty hearing, tinnitus, vertigo; No sinus or throat pain  NECK: No pain or stiffness  BREASTS: No pain, masses, or nipple discharge  RESPIRATORY: No cough, wheezing, chills or hemoptysis; No shortness of breath  CARDIOVASCULAR: No chest pain, palpitations, dizziness, or leg swelling  GASTROINTESTINAL: See above   GENITOURINARY: No dysuria, frequency, hematuria, or incontinence  NEUROLOGICAL: No headaches, memory loss, loss of strength, numbness, or tremors  SKIN: No itching, burning, rashes, or lesions   LYMPH NODES: No enlarged glands  ENDOCRINE: No heat or cold intolerance; No hair loss  MUSCULOSKELETAL: No joint pain or swelling; No muscle, back, or extremity pain  PSYCHIATRIC: No depression, anxiety, mood swings, or difficulty sleeping  HEME/LYMPH: No easy bruising, or bleeding gums  ALLERGY AND IMMUNOLOGIC: No hives or eczema          SOCIAL HISTORY:    FAMILY HISTORY:      Vital Signs Last 24 Hrs  T(C): 36.4 (17 Feb 2022 10:50), Max: 36.7 (17 Feb 2022 04:44)  T(F): 97.5 (17 Feb 2022 10:50), Max: 98.1 (17 Feb 2022 04:44)  HR: 69 (17 Feb 2022 10:50) (69 - 87)  BP: 137/73 (17 Feb 2022 10:50) (124/61 - 137/73)  BP(mean): --  RR: 18 (17 Feb 2022 10:50) (18 - 18)  SpO2: 99% (17 Feb 2022 10:50) (95% - 99%)    PHYSICAL EXAM:    GENERAL: NAD, well-groomed, well-developed  HEAD:  Atraumatic, Normocephalic  EYES: EOMI, PERRLA, conjunctiva and sclera clear  NECK: Supple, No JVD, Normal thyroid  NERVOUS SYSTEM:  Alert & Oriented X3, Good concentration; Motor Strength 5/5 B/L upper and lower extremities;   CHEST/LUNG: Clear to percussion bilaterally; No rales, rhonchi, wheezing, or rubs  HEART: Regular rate and rhythm; No murmurs, rubs, or gallops  ABDOMEN: Soft, Nontender, Nondistended; Bowel sounds present  EXTREMITIES:  2+ Peripheral Pulses, No clubbing, cyanosis, or edema  LYMPH: No lymphadenopathy noted   RECTAL: Patient refused ( discussed importance )   SKIN: No rashes or lesions    LABS:                        14.5   13.07 )-----------( 442      ( 17 Feb 2022 08:32 )             46.3       CBC:  02-17 @ 08:32  WBC  13.07  HGB 14.5  HCT 46.3 Plate 442  MCV 93.0  02-16 @ 03:27  WBC  15.58  HGB 14.7  HCT 45.7 Plate 494  MCV 91.0  02-15 @ 11:45  WBC  18.63  HGB 15.7  HCT 47.8 Plate 499  MCV 89.7  02-15 @ 01:15  WBC  21.30  HGB 16.3  HCT 50.5 Plate 563  MCV 90.3  02-14 @ 06:46  WBC  19.36  HGB 15.8  HCT 48.9 Plate 523  MCV 90.4  02-12 @ 07:07  WBC  21.95  HGB 15.3  HCT 48.1 Plate 472  MCV 92.3  02-11 @ 16:38  WBC  20.26  HGB 15.3  HCT 47.6 Plate 459  MCV 90.7           17 Feb 2022 08:32    136    |  101    |  25     ----------------------------<  203    4.3     |  26     |  0.76   16 Feb 2022 03:27    135    |  97     |  33     ----------------------------<  332    4.9     |  29     |  1.12   15 Feb 2022 11:45    137    |  97     |  39     ----------------------------<  288    4.7     |  29     |  1.31   15 Feb 2022 01:19    136    |  95     |  38     ----------------------------<  262    4.6     |  29     |  1.28   14 Feb 2022 06:46    133    |  96     |  31     ----------------------------<  271    4.6     |  23     |  1.23   12 Feb 2022 07:07    131    |  97     |  17     ----------------------------<  188    4.5     |  17     |  0.87   11 Feb 2022 16:38    133    |  100    |  17     ----------------------------<  128    4.7     |  18     |  0.87     Ca    8.8        17 Feb 2022 08:32  Ca    9.4        16 Feb 2022 03:27  Ca    9.4        15 Feb 2022 11:45  Ca    9.7        15 Feb 2022 01:19  Ca    9.9        14 Feb 2022 06:46  Ca    9.4        12 Feb 2022 07:07  Ca    9.6        11 Feb 2022 16:38  Phos  3.2       17 Feb 2022 08:32  Mg     2.2       17 Feb 2022 08:32  Mg     2.0       11 Feb 2022 16:38    TPro  7.8    /  Alb  2.5    /  TBili  0.5    /  DBili  0.1    /  AST  12     /  ALT  17     /  AlkPhos  88     15 Feb 2022 01:19  TPro  7.6    /  Alb  2.4    /  TBili  0.5    /  DBili  x      /  AST  12     /  ALT  22     /  AlkPhos  82     11 Feb 2022 16:38    PT/INR - ( 17 Feb 2022 08:32 )   PT: 15.5 sec;   INR: 1.36 ratio         PTT - ( 17 Feb 2022 08:32 )  PTT:30.4 sec    C-Reactive Protein, Serum: 150 mg/L (02-14 @ 09:52)  C-Reactive Protein, Serum: 181 mg/L (02-12 @ 10:30)      RADIOLOGY & ADDITIONAL STUDIES:  < from: Xray Kidney Ureter Bladder (02.14.22 @ 21:17) >    ACC: [id number] EXAM:  XR KUB 1 VIEW                          PROCEDURE DATE:  02/14/2022          INTERPRETATION:  Clinical history: 61-year-old male, coffee ground emesis.    Portable view of the abdomen is correlated with an abdominal CT 6 hours   prior.    FINDINGS: Nonobstructive bowel gas pattern with no pneumoperitoneum.    IMPRESSION:  Nonobstructive bowel gas pattern,, unchanged    --- End of Report ---            ROBIN WREN DO; Attending Radiologist  This document has been electronically signed. Feb 15 2022  9:48AM    < end of copied text >

## 2022-02-17 NOTE — CONSULT NOTE ADULT - ASSESSMENT
HPI:    Patient is a 61y old  Male   h/o  DM  2  on   oral meds,   who presents with a chief complaint of right foot gangrene .  4/5  th toes  for past 3  to 4  months   denies  cp/sob/ abd  pain/ fevers  seen by podiatry in er/  vascular called       (11 Feb 2022 17:26)  ----- As Above ------- His  Called to see patient because of coffee ground emesis. It happened 021422 at night time x 2. It was not associated with abdominal pain. Patient denies  melena, hematochezia, hematemesis, nausea, vomiting, abdominal pain, constipation, diarrhea, or change in bowel movements since then. Patient on ASA and a PPI BID. Only c/o is hiccups.   Prior to this admission, the patient / wife deny melena, hematochezia, hematemesis, nausea, vomiting, abdominal pain, constipation, diarrhea, or change in bowel movements Patient never had a GI bleed before nor had any history of PUD  Patient today denies all GI symptoms except hiccups.     A) Coffee ground emesis - Patient on ASA - See KUB - 1) Hold ASA temporarily  2) EGD tomorrow 30 F/U labs 4) PPI 5) Hold Carafate   B) Hiccups HPI:    Patient is a 61y old  Male   h/o  DM  2  on   oral meds,   who presents with a chief complaint of right foot gangrene .  4/5  th toes  for past 3  to 4  months   denies  cp/sob/ abd  pain/ fevers  seen by podiatry in er/  vascular called       (11 Feb 2022 17:26)  ----- As Above ------- His  Called to see patient because of coffee ground emesis. It happened 021422 at night time x 2. It was not associated with abdominal pain. Patient denies  melena, hematochezia, hematemesis, nausea, vomiting, abdominal pain, constipation, diarrhea, or change in bowel movements since then. Patient on ASA and a PPI BID. Only c/o is hiccups.   Prior to this admission, the patient / wife deny melena, hematochezia, hematemesis, nausea, vomiting, abdominal pain, constipation, diarrhea, or change in bowel movements Patient never had a GI bleed before nor had any history of PUD  Patient today denies all GI symptoms except hiccups.     A) Coffee ground emesis - Patient on ASA - See KUB - 1) Hold ASA temporarily  2) EGD tomorrow 30 F/U labs 4) PPI 5) Hold Carafate 6) Cardiology consult  B) Hiccups  - Presently on Gabapentin and a PPI  1) Thorazine PRN    C) colon cancer screening  - Refer as out patient

## 2022-02-17 NOTE — PROGRESS NOTE ADULT - ASSESSMENT
Patient is a 61y old  Male h/o  DM2 on oral meds, who presents with a chief complaint of right foot gangrene . 4/5th toes  for past 3 to 4  months.  Has leukocytosis    MRI did not show acute osteomyelitis but poor study due to motion artifact   vascular US and CTA shows very extensive vascular disease with stents and occlusions    patient has pus in his toes, and is planned for OR possibly today( 2/15/22) or tomorrow   IR procedure tomorrow to help with vasculature   abscess cultures growing cefoxitin resistant serratia (possible ampC producing organism and hence zosyn not a great choice) as well as GBS and MSSA  needs source control but healing may be difficult in this patient given his vascular disease     2/16: afebrile, on RA, WBC better 15.58, Cr normalized, RVP negative, surgical culture and pathology are pending, high concern for residual infection as per podiatry note, Cefepime and flagyl continued. ?possible transfer to another facility for further vascular workup.   2/17: pending transfer for further vascular workup, surgical culture with Few Serratia marcescens, Few Streptococcus agalactiae (Group B) - sensitivity to follow, Cefepime and flagyl continued, pathology is pending.     Toe ulcer with abscess   leukocytosis   DM2 hba1c 8.6    Plan  continue Cefepime 1g q8hrs   continue PO flagyl q6hrs   follow all cultures  awaiting for pathology   awaiting for surgical culture sensitivity  no MRSA, no role for vanco  trend wbc until normal   control glucose especially in the setting of active infection which may make it more difficult   good but not too tight glycemic control

## 2022-02-17 NOTE — PROGRESS NOTE ADULT - SUBJECTIVE AND OBJECTIVE BOX
Podiatry pager #: [phone] ([place])/ [phone] ([facility])    Patient is a 61y old  Male who presents with a chief complaint of gangrene foot (16 Feb 2022 20:31)       INTERVAL HPI/OVERNIGHT EVENTS:  Patient seen and evaluated at bedside.  Pt is resting comfortable in NAD. Denies N/V/F/C.     Allergies    No Known Allergies    Intolerances        Vital Signs Last 24 Hrs  T(C): 36.7 (17 Feb 2022 04:44), Max: 36.7 (17 Feb 2022 04:44)  T(F): 98.1 (17 Feb 2022 04:44), Max: 98.1 (17 Feb 2022 04:44)  HR: 69 (17 Feb 2022 04:44) (69 - 87)  BP: 136/71 (17 Feb 2022 04:44) (124/61 - 166/83)  BP(mean): --  RR: 18 (17 Feb 2022 04:44) (18 - 18)  SpO2: 95% (17 Feb 2022 04:44) (95% - 97%)    LABS:                        14.5   13.07 )-----------( 442      ( 17 Feb 2022 08:32 )             46.3     02-17    136  |  101  |  25<H>  ----------------------------<  203<H>  4.3   |  26  |  0.76    Ca    8.8      17 Feb 2022 08:32  Phos  3.2     02-17  Mg     2.2     02-17      PT/INR - ( 17 Feb 2022 08:32 )   PT: 15.5 sec;   INR: 1.36 ratio         PTT - ( 17 Feb 2022 08:32 )  PTT:30.4 sec    CAPILLARY BLOOD GLUCOSE      POCT Blood Glucose.: 180 mg/dL (17 Feb 2022 08:16)  POCT Blood Glucose.: 180 mg/dL (16 Feb 2022 20:47)  POCT Blood Glucose.: 214 mg/dL (16 Feb 2022 16:45)  POCT Blood Glucose.: 208 mg/dL (16 Feb 2022 11:43)      Lower Extremity Physical Exam:  Vascular: DP/PT 0/4 B/L, CFT >3 seconds B/L, Temperature gradient warm to cool B/L  Neuro: Epicritic sensation intact to the level of digits B/L  Musculoskeletal/Ortho: unremarkable   Skin: s/p 2/15/22 right foot TMA open: strikethrough noted on dressing, flaps warm and viable, plantar incision and flap with no signs of necrosis, sanguinous drainage, no purulence, no malodor, left foot no open wounds or lesions    RADIOLOGY & ADDITIONAL TESTS:   Podiatry pager #: [phone] ([place])/ [phone] ([facility])    Patient is a 61y old  Male who presents with a chief complaint of gangrene foot (16 Feb 2022 20:31)       INTERVAL HPI/OVERNIGHT EVENTS:  Patient seen and evaluated at bedside.  Pt is resting comfortable in NAD. Denies N/V/F/C.     Allergies    No Known Allergies    Intolerances        Vital Signs Last 24 Hrs  T(C): 36.7 (17 Feb 2022 04:44), Max: 36.7 (17 Feb 2022 04:44)  T(F): 98.1 (17 Feb 2022 04:44), Max: 98.1 (17 Feb 2022 04:44)  HR: 69 (17 Feb 2022 04:44) (69 - 87)  BP: 136/71 (17 Feb 2022 04:44) (124/61 - 166/83)  BP(mean): --  RR: 18 (17 Feb 2022 04:44) (18 - 18)  SpO2: 95% (17 Feb 2022 04:44) (95% - 97%)    LABS:                        14.5   13.07 )-----------( 442      ( 17 Feb 2022 08:32 )             46.3     02-17    136  |  101  |  25<H>  ----------------------------<  203<H>  4.3   |  26  |  0.76    Ca    8.8      17 Feb 2022 08:32  Phos  3.2     02-17  Mg     2.2     02-17      PT/INR - ( 17 Feb 2022 08:32 )   PT: 15.5 sec;   INR: 1.36 ratio         PTT - ( 17 Feb 2022 08:32 )  PTT:30.4 sec    CAPILLARY BLOOD GLUCOSE      POCT Blood Glucose.: 180 mg/dL (17 Feb 2022 08:16)  POCT Blood Glucose.: 180 mg/dL (16 Feb 2022 20:47)  POCT Blood Glucose.: 214 mg/dL (16 Feb 2022 16:45)  POCT Blood Glucose.: 208 mg/dL (16 Feb 2022 11:43)      Lower Extremity Physical Exam:  Vascular: DP/PT 0/4 B/L, CFT >3 seconds B/L, Temperature gradient warm to cool B/L  Neuro: Epicritic sensation intact to the level of digits B/L  Musculoskeletal/Ortho: unremarkable   Skin: s/p 2/15/22 right foot TMA open: strikethrough noted on dressing, flaps cool to touch but still viable, plantar incision and flap with no signs of necrosis, sanguinous drainage, no purulence, no malodor, left foot no open wounds or lesions    RADIOLOGY & ADDITIONAL TESTS:

## 2022-02-17 NOTE — CONSULT NOTE ADULT - CONSULT REQUESTED DATE/TIME
14-Feb-2022
11-Feb-2022 16:43
14-Feb-2022 13:38
15-Feb-2022 15:28
17-Feb-2022 13:00
17-Feb-2022 16:28

## 2022-02-17 NOTE — PROGRESS NOTE ADULT - SUBJECTIVE AND OBJECTIVE BOX
62 yo male w/ history of DM II, PAD, p/w R 4th/5th digit dry gangrene w/ non healing R medial malleolar ulcer, no palpable pulses distal to femoral b/l. He is lying in bed in NAD.      MEDICATIONS  (STANDING):  atorvastatin 10 milliGRAM(s) Oral at bedtime  cefepime   IVPB 1000 milliGRAM(s) IV Intermittent every 8 hours  dextrose 5%. 1000 milliLiter(s) (50 mL/Hr) IV Continuous <Continuous>  dextrose 5%. 1000 milliLiter(s) (100 mL/Hr) IV Continuous <Continuous>  dextrose 50% Injectable 25 Gram(s) IV Push once  dextrose 50% Injectable 12.5 Gram(s) IV Push once  dextrose 50% Injectable 25 Gram(s) IV Push once  gabapentin 100 milliGRAM(s) Oral three times a day  glucagon  Injectable 1 milliGRAM(s) IntraMuscular once  insulin lispro (ADMELOG) corrective regimen sliding scale Subcutaneous three times a day before meals  lactulose Syrup 20 Gram(s) Oral two times a day  LORazepam   Injectable 0.5 milliGRAM(s) IV Push once  metoprolol tartrate 12.5 milliGRAM(s) Oral two times a day  metroNIDAZOLE    Tablet 500 milliGRAM(s) Oral three times a day  nicotine - 21 mG/24Hr(s) Patch 1 patch Transdermal daily  pantoprazole  Injectable 40 milliGRAM(s) IV Push every 12 hours  senna 2 Tablet(s) Oral at bedtime  sodium chloride 0.9%. 1000 milliLiter(s) (100 mL/Hr) IV Continuous <Continuous>    MEDICATIONS  (PRN):  acetaminophen     Tablet .. 650 milliGRAM(s) Oral every 4 hours PRN Mild Pain (1 - 3)  aluminum hydroxide/magnesium hydroxide/simethicone Suspension 30 milliLiter(s) Oral every 6 hours PRN Dyspepsia  morphine  - Injectable 2 milliGRAM(s) IV Push every 4 hours PRN Severe Pain (7 - 10)  ondansetron Injectable 8 milliGRAM(s) IV Push every 6 hours PRN Nausea and/or Vomiting  oxycodone    5 mG/acetaminophen 325 mG 1 Tablet(s) Oral every 4 hours PRN Moderate Pain (4 - 6)  polyethylene glycol 3350 17 Gram(s) Oral daily PRN Constipation  simethicone 80 milliGRAM(s) Chew every 6 hours PRN Dyspepsia      Allergies    No Known Allergies    Intolerances      Vital Signs Last 24 Hrs  T(C): 36.5 (17 Feb 2022 16:33), Max: 36.7 (17 Feb 2022 04:44)  T(F): 97.7 (17 Feb 2022 16:33), Max: 98.1 (17 Feb 2022 04:44)  HR: 70 (17 Feb 2022 16:33) (69 - 83)  BP: 157/73 (17 Feb 2022 16:33) (124/61 - 157/73)   RR: 18 (17 Feb 2022 16:33) (18 - 18)  SpO2: 98% (17 Feb 2022 16:33) (95% - 99%)    PHYSICAL EXAM:  GENERAL: NAD, well-groomed, well-developed  HEAD: Atraumatic, Normocephalic  EYES: EOMI, PERRLA   NECK: Supple   NERVOUS SYSTEM:  Alert  CHEST/LUNG: Clear to auscultation bilaterally; No rales, rhonchi, wheezing, or rubs  HEART: Regular rate and rhythm; No murmurs, rubs, or gallops  ABDOMEN: Soft, Nontender, Nondistended; Bowel sounds present  EXTREMITIES: RLE foot wound dressed and wrapped      LABS:                        14.5   13.07 )-----------( 442      ( 17 Feb 2022 08:32 )             46.3     02-17    136  |  101  |  25<H>  ----------------------------<  203<H>  4.3   |  26  |  0.76    Ca    8.8      17 Feb 2022 08:32  Phos  3.2     02-17  Mg     2.2     02-17      PT/INR - ( 17 Feb 2022 08:32 )   PT: 15.5 sec;   INR: 1.36 ratio         PTT - ( 17 Feb 2022 08:32 )  PTT:30.4 sec    CAPILLARY BLOOD GLUCOSE      POCT Blood Glucose.: 156 mg/dL (17 Feb 2022 16:57)  POCT Blood Glucose.: 231 mg/dL (17 Feb 2022 11:25)  POCT Blood Glucose.: 180 mg/dL (17 Feb 2022 08:16)      Culture - Surgical Swab (collected 16 Feb 2022 08:53)  Source: .Surgical Swab RIGHT FOOT DEEP WOUND  Preliminary Report (17 Feb 2022 14:18):    Few Serratia marcescens Susceptibility to follow.    Few Streptococcus agalactiae (Group B) isolated    Group B streptococci are susceptible to ampicillin,    penicillin and cefazolin, but may be resistant to    erythromycin and clindamycin.    Recommendations for intrapartum prophylaxis for Group B    streptococci are penicillin or ampicillin.      RADIOLOGY & ADDITIONAL TESTS:    02-17-22 @ 07:01  -  02-17-22 @ 21:32  --------------------------------------------------------  IN:    IV PiggyBack: 100 mL    Oral Fluid: 240 mL    sodium chloride 0.9%: 1200 mL  Total IN: 1540 mL    OUT:  Total OUT: 0 mL    Total NET: 1540 mL

## 2022-02-17 NOTE — CONSULT NOTE ADULT - ASSESSMENT
60 yo male w/ history of DM II, PAD, p/w R 4th/5th digit dry gangrene w/ non healing R medial malleolar ulcer, no palpable pulses distal to femoral b/l.  Initially awaiting tx to NS for further surgical care.  Overnight with coffee ground emesis... awaiting EGD now.  No prior hx of afib/flutter; briefly went into flutter; now back in NSR.  ECG: sinus 86bpm  ECG2: aflutter 120bpm  Echo: LVEF 60%; moderate AS  Hg 14    -pt currently back in NSR and rate controlled  -would start low-dose bb  -CHADSvasc 2... would ideally start AC for afib but in light of recent GIB/coffee grounds emesis, will hold off  -keep Hg>9  -check TSH  -awaiting EGD  in AM; no signs of ischmia but pt with new arrhythmia and known moderate AS... at intermediate cardio vascular risk for a low risk procedure.

## 2022-02-17 NOTE — CONSULT NOTE ADULT - CONSULT REASON
right foot gangrene
vascular clearance for amputation
afib
Coffee ground emesis
foot abscess
RLE angiogram

## 2022-02-18 ENCOUNTER — RESULT REVIEW (OUTPATIENT)
Age: 62
End: 2022-02-18

## 2022-02-18 LAB
-  AMIKACIN: SIGNIFICANT CHANGE UP
-  AMOXICILLIN/CLAVULANIC ACID: SIGNIFICANT CHANGE UP
-  AMPICILLIN/SULBACTAM: SIGNIFICANT CHANGE UP
-  AMPICILLIN: SIGNIFICANT CHANGE UP
-  AZTREONAM: SIGNIFICANT CHANGE UP
-  CEFAZOLIN: SIGNIFICANT CHANGE UP
-  CEFEPIME: SIGNIFICANT CHANGE UP
-  CEFOXITIN: SIGNIFICANT CHANGE UP
-  CEFTRIAXONE: SIGNIFICANT CHANGE UP
-  CIPROFLOXACIN: SIGNIFICANT CHANGE UP
-  ERTAPENEM: SIGNIFICANT CHANGE UP
-  GENTAMICIN: SIGNIFICANT CHANGE UP
-  LEVOFLOXACIN: SIGNIFICANT CHANGE UP
-  MEROPENEM: SIGNIFICANT CHANGE UP
-  PIPERACILLIN/TAZOBACTAM: SIGNIFICANT CHANGE UP
-  TOBRAMYCIN: SIGNIFICANT CHANGE UP
-  TRIMETHOPRIM/SULFAMETHOXAZOLE: SIGNIFICANT CHANGE UP
ANION GAP SERPL CALC-SCNC: 10 MMOL/L — SIGNIFICANT CHANGE UP (ref 5–17)
BUN SERPL-MCNC: 14 MG/DL — SIGNIFICANT CHANGE UP (ref 7–23)
CALCIUM SERPL-MCNC: 8.2 MG/DL — LOW (ref 8.5–10.1)
CHLORIDE SERPL-SCNC: 105 MMOL/L — SIGNIFICANT CHANGE UP (ref 96–108)
CO2 SERPL-SCNC: 24 MMOL/L — SIGNIFICANT CHANGE UP (ref 22–31)
CREAT SERPL-MCNC: 0.65 MG/DL — SIGNIFICANT CHANGE UP (ref 0.5–1.3)
FLUAV AG NPH QL: SIGNIFICANT CHANGE UP
FLUBV AG NPH QL: SIGNIFICANT CHANGE UP
GLUCOSE BLDC GLUCOMTR-MCNC: 134 MG/DL — HIGH (ref 70–99)
GLUCOSE BLDC GLUCOMTR-MCNC: 148 MG/DL — HIGH (ref 70–99)
GLUCOSE BLDC GLUCOMTR-MCNC: 151 MG/DL — HIGH (ref 70–99)
GLUCOSE BLDC GLUCOMTR-MCNC: 321 MG/DL — HIGH (ref 70–99)
GLUCOSE SERPL-MCNC: 168 MG/DL — HIGH (ref 70–99)
HCT VFR BLD CALC: 44.7 % — SIGNIFICANT CHANGE UP (ref 39–50)
HGB BLD-MCNC: 14.1 G/DL — SIGNIFICANT CHANGE UP (ref 13–17)
MAGNESIUM SERPL-MCNC: 2.1 MG/DL — SIGNIFICANT CHANGE UP (ref 1.6–2.6)
MCHC RBC-ENTMCNC: 29.6 PG — SIGNIFICANT CHANGE UP (ref 27–34)
MCHC RBC-ENTMCNC: 31.5 G/DL — LOW (ref 32–36)
MCV RBC AUTO: 93.7 FL — SIGNIFICANT CHANGE UP (ref 80–100)
METHOD TYPE: SIGNIFICANT CHANGE UP
NRBC # BLD: 0 /100 WBCS — SIGNIFICANT CHANGE UP (ref 0–0)
PHOSPHATE SERPL-MCNC: 2.4 MG/DL — LOW (ref 2.5–4.5)
PLATELET # BLD AUTO: 404 K/UL — HIGH (ref 150–400)
POTASSIUM SERPL-MCNC: 4.2 MMOL/L — SIGNIFICANT CHANGE UP (ref 3.5–5.3)
POTASSIUM SERPL-SCNC: 4.2 MMOL/L — SIGNIFICANT CHANGE UP (ref 3.5–5.3)
RBC # BLD: 4.77 M/UL — SIGNIFICANT CHANGE UP (ref 4.2–5.8)
RBC # FLD: 12.8 % — SIGNIFICANT CHANGE UP (ref 10.3–14.5)
SARS-COV-2 RNA SPEC QL NAA+PROBE: SIGNIFICANT CHANGE UP
SODIUM SERPL-SCNC: 139 MMOL/L — SIGNIFICANT CHANGE UP (ref 135–145)
SURGICAL PATHOLOGY STUDY: SIGNIFICANT CHANGE UP
TSH SERPL-MCNC: 1.16 UU/ML — SIGNIFICANT CHANGE UP (ref 0.36–3.74)
WBC # BLD: 13.06 K/UL — HIGH (ref 3.8–10.5)
WBC # FLD AUTO: 13.06 K/UL — HIGH (ref 3.8–10.5)

## 2022-02-18 PROCEDURE — 88305 TISSUE EXAM BY PATHOLOGIST: CPT | Mod: 26

## 2022-02-18 PROCEDURE — 99232 SBSQ HOSP IP/OBS MODERATE 35: CPT

## 2022-02-18 PROCEDURE — 88312 SPECIAL STAINS GROUP 1: CPT | Mod: 26

## 2022-02-18 RX ORDER — GABAPENTIN 400 MG/1
300 CAPSULE ORAL THREE TIMES A DAY
Refills: 0 | Status: DISCONTINUED | OUTPATIENT
Start: 2022-02-18 | End: 2022-02-21

## 2022-02-18 RX ORDER — HYDROMORPHONE HYDROCHLORIDE 2 MG/ML
0.5 INJECTION INTRAMUSCULAR; INTRAVENOUS; SUBCUTANEOUS
Refills: 0 | Status: DISCONTINUED | OUTPATIENT
Start: 2022-02-18 | End: 2022-02-21

## 2022-02-18 RX ORDER — SODIUM CHLORIDE 9 MG/ML
1000 INJECTION, SOLUTION INTRAVENOUS
Refills: 0 | Status: DISCONTINUED | OUTPATIENT
Start: 2022-02-18 | End: 2022-02-18

## 2022-02-18 RX ORDER — INSULIN LISPRO 100/ML
VIAL (ML) SUBCUTANEOUS AT BEDTIME
Refills: 0 | Status: DISCONTINUED | OUTPATIENT
Start: 2022-02-18 | End: 2022-02-21

## 2022-02-18 RX ORDER — SUCRALFATE 1 G
1 TABLET ORAL
Refills: 0 | Status: DISCONTINUED | OUTPATIENT
Start: 2022-02-18 | End: 2022-02-21

## 2022-02-18 RX ORDER — SODIUM,POTASSIUM PHOSPHATES 278-250MG
2 POWDER IN PACKET (EA) ORAL
Refills: 0 | Status: COMPLETED | OUTPATIENT
Start: 2022-02-18 | End: 2022-02-19

## 2022-02-18 RX ORDER — ASPIRIN/CALCIUM CARB/MAGNESIUM 324 MG
81 TABLET ORAL DAILY
Refills: 0 | Status: DISCONTINUED | OUTPATIENT
Start: 2022-02-20 | End: 2022-02-21

## 2022-02-18 RX ADMIN — Medication 4: at 21:32

## 2022-02-18 RX ADMIN — HYDROMORPHONE HYDROCHLORIDE 0.5 MILLIGRAM(S): 2 INJECTION INTRAMUSCULAR; INTRAVENOUS; SUBCUTANEOUS at 17:49

## 2022-02-18 RX ADMIN — Medication 1 PATCH: at 11:22

## 2022-02-18 RX ADMIN — GABAPENTIN 300 MILLIGRAM(S): 400 CAPSULE ORAL at 21:25

## 2022-02-18 RX ADMIN — SODIUM CHLORIDE 100 MILLILITER(S): 9 INJECTION INTRAMUSCULAR; INTRAVENOUS; SUBCUTANEOUS at 05:33

## 2022-02-18 RX ADMIN — Medication 1 PATCH: at 07:51

## 2022-02-18 RX ADMIN — MORPHINE SULFATE 2 MILLIGRAM(S): 50 CAPSULE, EXTENDED RELEASE ORAL at 21:24

## 2022-02-18 RX ADMIN — SENNA PLUS 2 TABLET(S): 8.6 TABLET ORAL at 21:25

## 2022-02-18 RX ADMIN — MORPHINE SULFATE 2 MILLIGRAM(S): 50 CAPSULE, EXTENDED RELEASE ORAL at 22:00

## 2022-02-18 RX ADMIN — Medication 12.5 MILLIGRAM(S): at 17:43

## 2022-02-18 RX ADMIN — Medication 500 MILLIGRAM(S): at 21:26

## 2022-02-18 RX ADMIN — CEFEPIME 100 MILLIGRAM(S): 1 INJECTION, POWDER, FOR SOLUTION INTRAMUSCULAR; INTRAVENOUS at 05:28

## 2022-02-18 RX ADMIN — Medication 500 MILLIGRAM(S): at 13:20

## 2022-02-18 RX ADMIN — PANTOPRAZOLE SODIUM 40 MILLIGRAM(S): 20 TABLET, DELAYED RELEASE ORAL at 05:30

## 2022-02-18 RX ADMIN — Medication 2 PACKET(S): at 11:56

## 2022-02-18 RX ADMIN — MORPHINE SULFATE 2 MILLIGRAM(S): 50 CAPSULE, EXTENDED RELEASE ORAL at 08:56

## 2022-02-18 RX ADMIN — Medication 2 PACKET(S): at 17:43

## 2022-02-18 RX ADMIN — ATORVASTATIN CALCIUM 10 MILLIGRAM(S): 80 TABLET, FILM COATED ORAL at 21:25

## 2022-02-18 RX ADMIN — PANTOPRAZOLE SODIUM 40 MILLIGRAM(S): 20 TABLET, DELAYED RELEASE ORAL at 17:42

## 2022-02-18 RX ADMIN — Medication 1 PATCH: at 11:25

## 2022-02-18 RX ADMIN — HYDROMORPHONE HYDROCHLORIDE 0.5 MILLIGRAM(S): 2 INJECTION INTRAMUSCULAR; INTRAVENOUS; SUBCUTANEOUS at 16:31

## 2022-02-18 RX ADMIN — Medication 1 PATCH: at 19:55

## 2022-02-18 RX ADMIN — GABAPENTIN 300 MILLIGRAM(S): 400 CAPSULE ORAL at 11:28

## 2022-02-18 RX ADMIN — Medication 1 GRAM(S): at 17:43

## 2022-02-18 RX ADMIN — CEFEPIME 100 MILLIGRAM(S): 1 INJECTION, POWDER, FOR SOLUTION INTRAMUSCULAR; INTRAVENOUS at 13:20

## 2022-02-18 RX ADMIN — LACTULOSE 20 GRAM(S): 10 SOLUTION ORAL at 17:43

## 2022-02-18 RX ADMIN — CEFEPIME 100 MILLIGRAM(S): 1 INJECTION, POWDER, FOR SOLUTION INTRAMUSCULAR; INTRAVENOUS at 21:25

## 2022-02-18 RX ADMIN — MORPHINE SULFATE 2 MILLIGRAM(S): 50 CAPSULE, EXTENDED RELEASE ORAL at 07:50

## 2022-02-18 NOTE — PROGRESS NOTE ADULT - ASSESSMENT
60 yo male w/ history of DM II, PAD, p/w R 4th/5th digit dry gangrene w/ non healing R medial malleolar ulcer, no palpable pulses distal to femoral b/l.  Transmetatarsal amputation of right foot 2/15  awaiting tx to NS for further surgical care.  On 2/14 - coffee ground emesis, awaiting EGD now.  No prior hx of afib/flutter; briefly went into flutter; now back in NSR.  ECG: sinus 86bpm  ECG2: aflutter 120bpm  Echo: LVEF 60%; moderate AS  Hg 14    -pt currently back in NSR and rate controlled  -cont low-dose bb  -CHADSvasc 2, would ideally start AC for afib but in light of recent GIB/coffee grounds emesis, will hold off  -keep Hg>9  -TSH 1.160  -wound care and ABx as per podiatry and ID  -For EGD today

## 2022-02-18 NOTE — DIETITIAN INITIAL EVALUATION ADULT. - WEIGHT (LBS)
Scheduled  Received: Today  Charlysician KIARA Mix MD  Good afternoon   Patient scheduled:   Facility: [facility]   Date :  12.15.2021   Time:  10:15AM   Procedure:  Lap Salpingectomy   Length:  1 Hour   Anesthesia:  General     COVID-19 Testing scheduled 12.13.21 - 8:30am.       Thank you   Ezequiel      190

## 2022-02-18 NOTE — DIETITIAN NUTRITION RISK NOTIFICATION - TREATMENT: THE FOLLOWING DIET HAS BEEN RECOMMENDED
Diet, Consistent Carbohydrate w/Evening Snack:   Supplement Feeding Modality:  Oral  Glucerna Shake Cans or Servings Per Day:  1       Frequency:  Three Times a day (02-18-22 @ 11:07) [Pending Verification By Attending]  Diet, NPO after Midnight:      NPO Start Date: 17-Feb-2022,   NPO Start Time: 23:59  Except Medications  With Ice Chips/Sips of Water (02-17-22 @ 14:31) [Active]  Diet, Consistent Carbohydrate w/Evening Snack (02-17-22 @ 11:04) [Active]

## 2022-02-18 NOTE — DIETITIAN INITIAL EVALUATION ADULT. - PERTINENT LABORATORY DATA
02-18 Na139 mmol/L Glu 168 mg/dL<H> K+ 4.2 mmol/L Cr  0.65 mg/dL BUN 14 mg/dL 02-18 Phos 2.4 mg/dL<L> 02-15 Alb 2.5 g/dL<L> 02-12 Chol 174 mg/dL LDL --    HDL 47 mg/dL Trig 123 mg/dL02-15 ALT 17 U/L AST 12 U/L<L> Alkaline Phosphatase 88 [id number]/[id number] @ 12:34 A1C 8.6

## 2022-02-18 NOTE — DIETITIAN INITIAL EVALUATION ADULT. - ENERGY INTAKE
Good (>75%) 57 y/o presents with likely COVID. Will get basic labs, EKG, CXR, likely discharge home if workup negative and patient stable.

## 2022-02-18 NOTE — PROGRESS NOTE ADULT - ATTENDING COMMENTS
agree with above   continue antibiotics   transfer as per vascular and medicine     Fredis Low, DO  Infectious Disease Attending  Reachable via Microsoft Teams or ID office: [phone]  After 5pm/weekends please call [phone] for all inquiries and new consults
agree with above  source control obtained from [facility] but needs to be good glycemic cont  and revascularizations to help with blood flow   continue antibiotics and await pathology     Fredis Low, DO  Infectious Disease Attending  Reachable via Microsoft Teams or ID office: [phone]  After 5pm/weekends please call [phone] for all inquiries and new consults
Agree with above note and plan.Status post transmetatarsal amputation of the right foot and course complicated with GI bleed going for EGD today.  Consider resuming anticoagulation post procedure and when hemoglobin appears to be more stable for a flutter related stroke risk reduction.

## 2022-02-18 NOTE — PROGRESS NOTE ADULT - SUBJECTIVE AND OBJECTIVE BOX
Patient is a 61y old  Male who presents with a chief complaint of gangrene foot (18 Feb 2022 08:14)    PAST MEDICAL & SURGICAL HISTORY:  DM (diabetes mellitus)    PAD    INTERVAL HISTORY: in no acute distress, denies any chest pain or sob  	  MEDICATIONS:  MEDICATIONS  (STANDING):  atorvastatin 10 milliGRAM(s) Oral at bedtime  cefepime   IVPB 1000 milliGRAM(s) IV Intermittent every 8 hours  gabapentin 300 milliGRAM(s) Oral three times a day  insulin lispro (ADMELOG) corrective regimen sliding scale   SubCutaneous three times a day before meals  lactulose Syrup 20 Gram(s) Oral two times a day  LORazepam   Injectable 0.5 milliGRAM(s) IV Push once  metoprolol tartrate 12.5 milliGRAM(s) Oral two times a day  metroNIDAZOLE    Tablet 500 milliGRAM(s) Oral three times a day  nicotine - 21 mG/24Hr(s) Patch 1 patch Transdermal daily  pantoprazole  Injectable 40 milliGRAM(s) IV Push every 12 hours  potassium phosphate / sodium phosphate Powder (PHOS-NaK) 2 Packet(s) Oral four times a day  senna 2 Tablet(s) Oral at bedtime  sodium chloride 0.9%. 1000 milliLiter(s) (100 mL/Hr) IV Continuous <Continuous>    MEDICATIONS  (PRN):  acetaminophen     Tablet .. 650 milliGRAM(s) Oral every 4 hours PRN Mild Pain (1 - 3)  aluminum hydroxide/magnesium hydroxide/simethicone Suspension 30 milliLiter(s) Oral every 6 hours PRN Dyspepsia  morphine  - Injectable 2 milliGRAM(s) IV Push every 4 hours PRN Severe Pain (7 - 10)  ondansetron Injectable 8 milliGRAM(s) IV Push every 6 hours PRN Nausea and/or Vomiting  oxycodone    5 mG/acetaminophen 325 mG 1 Tablet(s) Oral every 4 hours PRN Moderate Pain (4 - 6)  polyethylene glycol 3350 17 Gram(s) Oral daily PRN Constipation  simethicone 80 milliGRAM(s) Chew every 6 hours PRN Dyspepsia    Vitals:  T(F): 98 (02-18-22 @ 04:39), Max: 98 (02-18-22 @ 04:39)  HR: 63 (02-18-22 @ 04:39) (63 - 98)  BP: 129/69 (02-18-22 @ 04:39) (105/56 - 157/73)  RR: 18 (02-18-22 @ 04:39) (18 - 18)  SpO2: 96% (02-18-22 @ 04:39) (96% - 98%)    02-17 @ 07:01  -  02-18 @ 07:00  --------------------------------------------------------  IN:    IV PiggyBack: 100 mL    Oral Fluid: 240 mL    sodium chloride 0.9%: 2400 mL  Total IN: 2740 mL    OUT:  Total OUT: 0 mL    Total NET: 2740 mL    PHYSICAL EXAM:  Neuro: Awake, responsive  CV: S1 S2 RRR + SM  Lungs: CTABL  GI: Soft, BS +, ND, NT  Extremities: + LE edema with Rt foot dressing intact, Lt lower leg with blister    TELEMETRY: sinus     RADIOLOGY: < from: CT Angio Abd Aorta w/run-off w/ IV Cont (02.14.22 @ 15:37) >  LOWER CHEST: Dependent changes/atelectasis at the lung bases.    LIVER: Within normal limits.  BILE DUCTS: Normal caliber.  GALLBLADDER: Within normal limits.  SPLEEN: Within normal limits.  PANCREAS: Within normal limits.  ADRENALS: Within normal limits.  KIDNEYS/URETERS: Within normal limits.    BLADDER: Unremarkable.  REPRODUCTIVE ORGANS: Enlarged.    BOWEL: Moderate amount retained fecal material in the colon. No bowel   obstruction. Appendix is normal.  PERITONEUM: No ascites.  RETROPERITONEUM/LYMPH NODES: No lymphadenopathy.  ABDOMINAL WALL: Unremarkable.  BONES: No aggressive osseous lesion. Soft tissue edema along the plantar   surface of the right foot; see foot MRI of the same day.    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    Three-vessel runoff into the right foot.    Occluded left superficial femoral artery with reconstitution distally.    Moderate narrowing in the mid left popliteal artery.    Three-vessel runoff into the left foot.    Moderate narrowing in the proximal left external iliac artery with   associated areas of ulcerated plaque.    < end of copied text >      DIAGNOSTIC TESTING:    [x]< from: TTE Echo Complete w/o Contrast w/ Doppler (02.11.22 @ 19:51) >   ] Echocardiogram: < from: TTE Echo Complete w/o Contrast w/ Doppler (02.11.22 @ 19:51) >  Left Ventricle: Normal left ventricular size and wall thicknesses, with   normal systolic and diastolic function.  Global LV systolic function was normal. Left ventricular ejection   fraction, by visual estimation, is 55 to 60%. Spectral Doppler shows   normal pattern of LV diastolic filling.  Right Ventricle: Normal right ventricular size and function.  Left Atrium: The left atrium is normal in size.  Right Atrium: The right atrium is normal in size.  Pericardium: There is no evidence of pericardial effusion.  Mitral Valve: Structurally normal mitral valve, with normal leaflet   excursion. Trace mitral valve regurgitation is seen.  Tricuspid Valve: Structurally normal tricuspid valve, with normal leaflet   excursion. No tricuspid regurgitation is visualized.  Aortic Valve: Sclerotic aortic valve with normal opening. Peak   transaortic gradient equals 31.9 mmHg, mean transaortic gradient equals   14.6 mmHg, the calculated aortic valve area equals 1.25 cm² by the   continuity equation consistent with moderate aortic stenosis. Mild aortic   valve regurgitation is seen.  Pulmonic Valve: Structurally normal pulmonic valve, with normal leaflet   excursion. No indication of pulmonic valve regurgitation.  Aorta: The aortic root and ascending aorta are structurally normal, with   no evidence of dilitation.  Pulmonary Artery: The main pulmonary artery is normal in size.      Summary:   1. Left ventricular ejection fraction, by visual estimation, is 55 to   60%.   2. Normal global left ventricular systolic function.   3. Trace mitral valve regurgitation.   4. Mild aortic regurgitation.   5. Sclerotic aortic valve with normal opening.   6. Peak transaortic gradient equals 31.9 mmHg, mean transaortic gradient   equals 14.6 mmHg, the calculated aortic valve area equals 1.25 cm² by the   continuity equation consistent with moderate aortic stenosis.    < end of copied text >    LABS:	 	    18 Feb 2022 07:35    139    |  105    |  14     ----------------------------<  168    4.2     |  24     |  0.65   17 Feb 2022 08:32    136    |  101    |  25     ----------------------------<  203    4.3     |  26     |  0.76   16 Feb 2022 03:27    135    |  97     |  33     ----------------------------<  332    4.9     |  29     |  1.12     Ca    8.2        18 Feb 2022 07:35  Phos  2.4       18 Feb 2022 07:35  Mg     2.1       18 Feb 2022 07:35                        14.1   13.06 )-----------( 404      ( 18 Feb 2022 07:35 )             44.7 ,                       14.5   13.07 )-----------( 442      ( 17 Feb 2022 08:32 )             46.3 ,                       14.7   15.58 )-----------( 494      ( 16 Feb 2022 03:27 )             45.7 ,                       15.7   18.63 )-----------( 499      ( 15 Feb 2022 11:45 )             47.8     TSH: Thyroid Stimulating Hormone, Serum: 1.160 uU/mL (02-18 @ 07:35)  INR: 1.36 ratio (02-17 @ 08:32)  INR: 1.26 ratio (02-16 @ 03:27)  INR: 1.19 ratio (02-15 @ 11:45)           Patient is a 61y old  Male who presents with a chief complaint of gangrene foot (18 Feb 2022 08:14)    PAST MEDICAL & SURGICAL HISTORY:  DM (diabetes mellitus)    PAD    INTERVAL HISTORY: in no acute distress, denies any chest pain or sob  	  MEDICATIONS:  MEDICATIONS  (STANDING):  atorvastatin 10 milliGRAM(s) Oral at bedtime  cefepime   IVPB 1000 milliGRAM(s) IV Intermittent every 8 hours  gabapentin 300 milliGRAM(s) Oral three times a day  insulin lispro (ADMELOG) corrective regimen sliding scale   SubCutaneous three times a day before meals  lactulose Syrup 20 Gram(s) Oral two times a day  LORazepam   Injectable 0.5 milliGRAM(s) IV Push once  metoprolol tartrate 12.5 milliGRAM(s) Oral two times a day  metroNIDAZOLE    Tablet 500 milliGRAM(s) Oral three times a day  nicotine - 21 mG/24Hr(s) Patch 1 patch Transdermal daily  pantoprazole  Injectable 40 milliGRAM(s) IV Push every 12 hours  potassium phosphate / sodium phosphate Powder (PHOS-NaK) 2 Packet(s) Oral four times a day  senna 2 Tablet(s) Oral at bedtime  sodium chloride 0.9%. 1000 milliLiter(s) (100 mL/Hr) IV Continuous <Continuous>    MEDICATIONS  (PRN):  acetaminophen     Tablet .. 650 milliGRAM(s) Oral every 4 hours PRN Mild Pain (1 - 3)  aluminum hydroxide/magnesium hydroxide/simethicone Suspension 30 milliLiter(s) Oral every 6 hours PRN Dyspepsia  morphine  - Injectable 2 milliGRAM(s) IV Push every 4 hours PRN Severe Pain (7 - 10)  ondansetron Injectable 8 milliGRAM(s) IV Push every 6 hours PRN Nausea and/or Vomiting  oxycodone    5 mG/acetaminophen 325 mG 1 Tablet(s) Oral every 4 hours PRN Moderate Pain (4 - 6)  polyethylene glycol 3350 17 Gram(s) Oral daily PRN Constipation  simethicone 80 milliGRAM(s) Chew every 6 hours PRN Dyspepsia    Vitals:  T(F): 98 (02-18-22 @ 04:39), Max: 98 (02-18-22 @ 04:39)  HR: 63 (02-18-22 @ 04:39) (63 - 98)  BP: 129/69 (02-18-22 @ 04:39) (105/56 - 157/73)  RR: 18 (02-18-22 @ 04:39) (18 - 18)  SpO2: 96% (02-18-22 @ 04:39) (96% - 98%)    02-17 @ 07:01  -  02-18 @ 07:00  --------------------------------------------------------  IN:    IV PiggyBack: 100 mL    Oral Fluid: 240 mL    sodium chloride 0.9%: 2400 mL  Total IN: 2740 mL    OUT:  Total OUT: 0 mL    Total NET: 2740 mL    PHYSICAL EXAM:  Neuro: Awake, responsive  CV: S1 S2 RRR + SM  Lungs: CTABL  GI: Soft, BS +, ND, NT  Extremities: + LE edema with Rt foot dressing intact, Lt lower leg with blister    TELEMETRY: sinus, few PVCs    RADIOLOGY: < from: CT Angio Abd Aorta w/run-off w/ IV Cont (02.14.22 @ 15:37) >  LOWER CHEST: Dependent changes/atelectasis at the lung bases.    LIVER: Within normal limits.  BILE DUCTS: Normal caliber.  GALLBLADDER: Within normal limits.  SPLEEN: Within normal limits.  PANCREAS: Within normal limits.  ADRENALS: Within normal limits.  KIDNEYS/URETERS: Within normal limits.    BLADDER: Unremarkable.  REPRODUCTIVE ORGANS: Enlarged.    BOWEL: Moderate amount retained fecal material in the colon. No bowel   obstruction. Appendix is normal.  PERITONEUM: No ascites.  RETROPERITONEUM/LYMPH NODES: No lymphadenopathy.  ABDOMINAL WALL: Unremarkable.  BONES: No aggressive osseous lesion. Soft tissue edema along the plantar   surface of the right foot; see foot MRI of the same day.    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    Three-vessel runoff into the right foot.    Occluded left superficial femoral artery with reconstitution distally.    Moderate narrowing in the mid left popliteal artery.    Three-vessel runoff into the left foot.    Moderate narrowing in the proximal left external iliac artery with   associated areas of ulcerated plaque.    < end of copied text >      DIAGNOSTIC TESTING:    [x]< from: TTE Echo Complete w/o Contrast w/ Doppler (02.11.22 @ 19:51) >   ] Echocardiogram: < from: TTE Echo Complete w/o Contrast w/ Doppler (02.11.22 @ 19:51) >  Left Ventricle: Normal left ventricular size and wall thicknesses, with   normal systolic and diastolic function.  Global LV systolic function was normal. Left ventricular ejection   fraction, by visual estimation, is 55 to 60%. Spectral Doppler shows   normal pattern of LV diastolic filling.  Right Ventricle: Normal right ventricular size and function.  Left Atrium: The left atrium is normal in size.  Right Atrium: The right atrium is normal in size.  Pericardium: There is no evidence of pericardial effusion.  Mitral Valve: Structurally normal mitral valve, with normal leaflet   excursion. Trace mitral valve regurgitation is seen.  Tricuspid Valve: Structurally normal tricuspid valve, with normal leaflet   excursion. No tricuspid regurgitation is visualized.  Aortic Valve: Sclerotic aortic valve with normal opening. Peak   transaortic gradient equals 31.9 mmHg, mean transaortic gradient equals   14.6 mmHg, the calculated aortic valve area equals 1.25 cm² by the   continuity equation consistent with moderate aortic stenosis. Mild aortic   valve regurgitation is seen.  Pulmonic Valve: Structurally normal pulmonic valve, with normal leaflet   excursion. No indication of pulmonic valve regurgitation.  Aorta: The aortic root and ascending aorta are structurally normal, with   no evidence of dilitation.  Pulmonary Artery: The main pulmonary artery is normal in size.      Summary:   1. Left ventricular ejection fraction, by visual estimation, is 55 to   60%.   2. Normal global left ventricular systolic function.   3. Trace mitral valve regurgitation.   4. Mild aortic regurgitation.   5. Sclerotic aortic valve with normal opening.   6. Peak transaortic gradient equals 31.9 mmHg, mean transaortic gradient   equals 14.6 mmHg, the calculated aortic valve area equals 1.25 cm² by the   continuity equation consistent with moderate aortic stenosis.    < end of copied text >    LABS:	 	    18 Feb 2022 07:35    139    |  105    |  14     ----------------------------<  168    4.2     |  24     |  0.65   17 Feb 2022 08:32    136    |  101    |  25     ----------------------------<  203    4.3     |  26     |  0.76   16 Feb 2022 03:27    135    |  97     |  33     ----------------------------<  332    4.9     |  29     |  1.12     Ca    8.2        18 Feb 2022 07:35  Phos  2.4       18 Feb 2022 07:35  Mg     2.1       18 Feb 2022 07:35                        14.1   13.06 )-----------( 404      ( 18 Feb 2022 07:35 )             44.7 ,                       14.5   13.07 )-----------( 442      ( 17 Feb 2022 08:32 )             46.3 ,                       14.7   15.58 )-----------( 494      ( 16 Feb 2022 03:27 )             45.7 ,                       15.7   18.63 )-----------( 499      ( 15 Feb 2022 11:45 )             47.8     TSH: Thyroid Stimulating Hormone, Serum: 1.160 uU/mL (02-18 @ 07:35)  INR: 1.36 ratio (02-17 @ 08:32)  INR: 1.26 ratio (02-16 @ 03:27)  INR: 1.19 ratio (02-15 @ 11:45)

## 2022-02-18 NOTE — PROGRESS NOTE ADULT - SUBJECTIVE AND OBJECTIVE BOX
60 yo male w/ history of DM II, PAD, p/w R 4th/5th digit dry gangrene w/ non healing R medial malleolar ulcer, no palpable pulses distal to femoral b/l. He is lying in bed in NAD.       MEDICATIONS  (STANDING):  atorvastatin 10 milliGRAM(s) Oral at bedtime  cefepime   IVPB 1000 milliGRAM(s) IV Intermittent every 8 hours  dextrose 5%. 1000 milliLiter(s) (100 mL/Hr) IV Continuous <Continuous>  dextrose 5%. 1000 milliLiter(s) (50 mL/Hr) IV Continuous <Continuous>  dextrose 50% Injectable 25 Gram(s) IV Push once  dextrose 50% Injectable 12.5 Gram(s) IV Push once  dextrose 50% Injectable 25 Gram(s) IV Push once  gabapentin 300 milliGRAM(s) Oral three times a day  glucagon  Injectable 1 milliGRAM(s) IntraMuscular once  insulin lispro (ADMELOG) corrective regimen sliding scale   SubCutaneous at bedtime  insulin lispro (ADMELOG) corrective regimen sliding scale   SubCutaneous three times a day before meals  LORazepam   Injectable 0.5 milliGRAM(s) IV Push once  metoprolol tartrate 12.5 milliGRAM(s) Oral two times a day  metroNIDAZOLE    Tablet 500 milliGRAM(s) Oral three times a day  nicotine - 21 mG/24Hr(s) Patch 1 patch Transdermal daily  pantoprazole  Injectable 40 milliGRAM(s) IV Push every 12 hours  potassium phosphate / sodium phosphate Powder (PHOS-NaK) 2 Packet(s) Oral four times a day  senna 2 Tablet(s) Oral at bedtime  sodium chloride 0.9%. 1000 milliLiter(s) (100 mL/Hr) IV Continuous <Continuous>  sucralfate 1 Gram(s) Oral four times a day    MEDICATIONS  (PRN):  acetaminophen     Tablet .. 650 milliGRAM(s) Oral every 4 hours PRN Mild Pain (1 - 3)  aluminum hydroxide/magnesium hydroxide/simethicone Suspension 30 milliLiter(s) Oral every 6 hours PRN Dyspepsia  HYDROmorphone  Injectable 0.5 milliGRAM(s) IV Push every 10 minutes PRN Severe Pain (7 - 10)  morphine  - Injectable 2 milliGRAM(s) IV Push every 4 hours PRN Severe Pain (7 - 10)  ondansetron Injectable 8 milliGRAM(s) IV Push every 6 hours PRN Nausea and/or Vomiting  oxycodone    5 mG/acetaminophen 325 mG 1 Tablet(s) Oral every 4 hours PRN Moderate Pain (4 - 6)  polyethylene glycol 3350 17 Gram(s) Oral daily PRN Constipation  simethicone 80 milliGRAM(s) Chew every 6 hours PRN Dyspepsia      Allergies    No Known Allergies    Intolerances        Vital Signs Last 24 Hrs  T(C): 36.3 (18 Feb 2022 17:10), Max: 36.8 (18 Feb 2022 16:14)  T(F): 97.4 (18 Feb 2022 17:10), Max: 98.3 (18 Feb 2022 16:14)  HR: 70 (18 Feb 2022 17:10) (63 - 98)  BP: 155/67 (18 Feb 2022 17:10) (105/56 - 155/67)   RR: 17 (18 Feb 2022 17:10) (17 - 21)  SpO2: 97% (18 Feb 2022 17:10) (96% - 100%)    PHYSICAL EXAM:  GENERAL: NAD, well-groomed, well-developed  HEAD: Atraumatic, Normocephalic  EYES: EOMI, PERRLA   NECK: Supple   NERVOUS SYSTEM:  Alert  CHEST/LUNG: Clear to auscultation bilaterally; No rales, rhonchi, wheezing, or rubs  HEART: Regular rate and rhythm; No murmurs, rubs, or gallops  ABDOMEN: Soft, Nontender, Nondistended; Bowel sounds present  EXTREMITIES: RLE foot wound dressed and wrapped    LABS:                        14.1   13.06 )-----------( 404      ( 18 Feb 2022 07:35 )             44.7     02-18    139  |  105  |  14  ----------------------------<  168<H>  4.2   |  24  |  0.65    Ca    8.2<L>      18 Feb 2022 07:35  Phos  2.4     02-18  Mg     2.1     02-18      PT/INR - ( 17 Feb 2022 08:32 )   PT: 15.5 sec;   INR: 1.36 ratio         PTT - ( 17 Feb 2022 08:32 )  PTT:30.4 sec    CAPILLARY BLOOD GLUCOSE      POCT Blood Glucose.: 321 mg/dL (18 Feb 2022 20:54)  POCT Blood Glucose.: 134 mg/dL (18 Feb 2022 17:35)  POCT Blood Glucose.: 151 mg/dL (18 Feb 2022 11:06)  POCT Blood Glucose.: 148 mg/dL (18 Feb 2022 07:47)      Culture - Surgical Swab (collected 16 Feb 2022 08:53)  Source: .Surgical Swab RIGHT FOOT DEEP WOUND  Preliminary Report (18 Feb 2022 12:42):    Few Serratia marcescens    Rare Staphylococcus aureus Susceptibility to follow.    Few Streptococcus agalactiae (Group B) isolated    Group B streptococci are susceptible to ampicillin,    penicillin and cefazolin, but may be resistant to    erythromycin andclindamycin.    Recommendations for intrapartum prophylaxis for Group B    streptococci are penicillin or ampicillin.  Organism: Serratia marcescens (18 Feb 2022 08:45)  Organism: Serratia marcescens (18 Feb 2022 08:45)      RADIOLOGY & ADDITIONAL TESTS:    02-17-22 @ 07:01  -  02-18-22 @ 07:00  --------------------------------------------------------  IN:    IV PiggyBack: 100 mL    Oral Fluid: 240 mL    sodium chloride 0.9%: 2400 mL  Total IN: 2740 mL    OUT:  Total OUT: 0 mL    Total NET: 2740 mL      02-18-22 @ 07:01  -  02-18-22 @ 21:46  --------------------------------------------------------  IN:    sodium chloride 0.9%: 1200 mL  Total IN: 1200 mL    OUT:  Total OUT: 0 mL    Total NET: 1200 mL

## 2022-02-18 NOTE — PRE-OP CHECKLIST - SPO2 (%)
96
96
Alert and oriented to person, place and time, memory intact, behavior appropriate to situation

## 2022-02-18 NOTE — DIETITIAN INITIAL EVALUATION ADULT. - ORAL INTAKE PTA/DIET HISTORY
Pt reports he followed a diabetic PTA and checked his FS 2 - 4x/d. Also reports UBW of 190 - 195 #. However ? accuracy of this. Pt states this wt loss is x 7days of being in hospital.

## 2022-02-18 NOTE — PROGRESS NOTE ADULT - ASSESSMENT
60yo M w/ right foot gangrene   - pt seen and evaluated  - afebrile, WBC down to 13.06  -s/p 2/15/22 right foot TMA open: mild strikethrough dressing, flaps cool to touch but still viable, plantar incision and flap with no signs of necrosis, sanguinous drainage, no purulence, no malodor, left foot no open wounds or lesions  - per intraop findings high concern for residual infection and viability  - continue IV cefepime, PO flagyl per ID recs, appreciated  - Rec cardiology consult as pt had A.fib on postop EKG per anaesthesia   - salvageability of right foot is guarded  - IR plan to transfer pt to [facility] for further vascular work up/ IR thrombectomy 2/21 or 2/22  - podiatry plan pending IR/vascular intervention  - will follow  - discussed w/ attending    60yo M w/ right foot gangrene   - pt seen and evaluated  - afebrile, WBC down to 13.06  -s/p 2/15/22 right foot TMA open: mild strikethrough dressing, flaps cool to touch but still viable, plantar incision and flap with no signs of necrosis, sanguinous drainage, no purulence, no malodor, left foot no open wounds or lesions  - per intraop findings high concern for residual infection and viability  - continue IV cefepime, PO flagyl per ID recs, appreciated  - Rec cardiology consult as pt had A.fib on postop EKG per anaesthesia   - salvageability of right foot is guarded  - Intra op culture growing serratia and group B strep prelim  - IR plan to transfer pt to [facility] for further vascular work up/ IR thrombectomy 2/21 or 2/22  - podiatry plan pending IR/vascular intervention  - will follow  - discussed w/ attending

## 2022-02-18 NOTE — DIETITIAN INITIAL EVALUATION ADULT. - DIET TYPE
Glucerna Shake 8 oz 3 times per day (660 laura, 30 gm pro)/consistent carbohydrate (evening snack)/supplement (specify)

## 2022-02-18 NOTE — PROGRESS NOTE ADULT - SUBJECTIVE AND OBJECTIVE BOX
Procedure:           Upper GI endoscopy with biopsy 02-18-22 @ 16:08    Indications:                 Coffee ground emesis    Monitored Anesthesia Care Provided by :     ____________________________________________________________________________________________________  Procedure:           Pre-Anesthesia Assessment:                       - Prior to the procedure, a History and Physical was performed, and patient                        medications and allergies were reviewed. The patient is competent. The risks                        and benefits of the procedure and the sedation options and risks were                        discussed with the patient. All questions were answered and informed consent                        was obtained. Patient identification and proposed procedure were verified by                        the physician, the nurse and the anesthesiologist in the procedure room.                        Mental Status Examination:    alert and oriented. Airway Examination: normal                        oropharyngeal airway and neck mobility. Respiratory Examination: clear to                        auscultation. CV Examination: normal. Prophylactic Antibiotics: The patient                        does not require prophylactic antibiotics.                        Grade Assessment: -. After                        reviewing the risks and benefits, the patient was deemed in satisfactory                        condition to undergo the procedure. The anesthesia plan was to use monitored                        anesthesia care (MAC). Immediately prior to administration of medications,                        the patient was re-assessed for adequacy to receive sedatives. The heart        		     rate, respiratory rate, oxygen saturations, blood pressure, adequacy of                        pulmonary ventilation, and response to care were monitored throughout the                        procedure. The physical status of the patient was re-assessed after the                        procedure.                       After obtaining informed consent, the endoscope was passed under direct                        vision. Throughout the procedure, the patient's blood pressure, pulse, and                        oxygen saturations were monitored continuously. The Endoscope was introduced                        through the mouth, and advanced to the second part of duodenum. Retroflexion was done in the stomach  The upper GI                        endoscopy was accomplished with ease. The patient tolerated the procedure                        well.    ESOPHAGUS:  Mid --> distal moderate esophagitis s/p biopsy,,,  proximal --> mid whitish exudate s/p biopsy    STOMACH:    Mild antral gastritis s/p biopsy    DUODENUM:  mild duodenitis s/p biopsy      Assessment : As Above     PLAN :   PPI / Carafate  / Restart aspirin Sunday

## 2022-02-18 NOTE — DIETITIAN INITIAL EVALUATION ADULT. - OTHER INFO
Pt adm to medical floor w/ 4th / 5th digit (R) gangrene w/ non healing R medial malleolar ulcer. H/O DM2 ( HgbA1c = 8.6 % on 2/12/22) ; coffee ground emesis on 2/14 ( for EGD on 2/18; constipation (on senna) and lactulose. Denies N/V/D/C/Chewing/Swallowing issues, No food allergies. Pt consuming 75 - 100% meals documented in flow sheets, however pt reports he's consuming < 25% meals since admission. Wts have remained stable since admission w/ no wt loss noted. Pt appears extremely thin. Instructed and provide pt on CHO counting for patients w/ DM and pressure ulcer nutrition therapy.

## 2022-02-18 NOTE — PROGRESS NOTE ADULT - SUBJECTIVE AND OBJECTIVE BOX
BIA RAYMOND  MRN-[id number]    Follow Up:  OM    Interval History: The pt was seen and examined earlier, no distress, npo, awaiting for endoscopy. The pt is afebrile, on RA, WBC is about the same as yesterday.     PAST MEDICAL & SURGICAL HISTORY:  DM (diabetes mellitus)    No significant past surgical history        ROS:    [ ] Unobtainable because:  [x ] All other systems negative    Constitutional: no fever, no chills  Head: no trauma  Eyes: no vision changes, no eye pain  ENT:  no sore throat, no rhinorrhea  Cardiovascular:  no chest pain, no palpitation  Respiratory:  no SOB, no cough  GI:  no abd pain, no vomiting, no diarrhea  urinary: no dysuria, no hematuria, no flank pain  musculoskeletal:  post op pain is controlled   skin:  no rash  neurology:  no headache, no seizure, no change in mental status  psych: no anxiety, no depression         Allergies  No Known Allergies        ANTIMICROBIALS:  cefepime   IVPB 1000 every 8 hours  metroNIDAZOLE    Tablet 500 three times a day      OTHER MEDS:  acetaminophen     Tablet .. 650 milliGRAM(s) Oral every 4 hours PRN  aluminum hydroxide/magnesium hydroxide/simethicone Suspension 30 milliLiter(s) Oral every 6 hours PRN  atorvastatin 10 milliGRAM(s) Oral at bedtime  dextrose 5%. 1000 milliLiter(s) IV Continuous <Continuous>  dextrose 5%. 1000 milliLiter(s) IV Continuous <Continuous>  dextrose 50% Injectable 25 Gram(s) IV Push once  dextrose 50% Injectable 25 Gram(s) IV Push once  dextrose 50% Injectable 12.5 Gram(s) IV Push once  gabapentin 300 milliGRAM(s) Oral three times a day  glucagon  Injectable 1 milliGRAM(s) IntraMuscular once  insulin lispro (ADMELOG) corrective regimen sliding scale   SubCutaneous three times a day before meals  lactated ringers. 1000 milliLiter(s) IV Continuous <Continuous>  lactulose Syrup 20 Gram(s) Oral two times a day  LORazepam   Injectable 0.5 milliGRAM(s) IV Push once  metoprolol tartrate 12.5 milliGRAM(s) Oral two times a day  morphine  - Injectable 2 milliGRAM(s) IV Push every 4 hours PRN  nicotine - 21 mG/24Hr(s) Patch 1 patch Transdermal daily  ondansetron Injectable 8 milliGRAM(s) IV Push every 6 hours PRN  oxycodone    5 mG/acetaminophen 325 mG 1 Tablet(s) Oral every 4 hours PRN  pantoprazole  Injectable 40 milliGRAM(s) IV Push every 12 hours  polyethylene glycol 3350 17 Gram(s) Oral daily PRN  potassium phosphate / sodium phosphate Powder (PHOS-NaK) 2 Packet(s) Oral four times a day  senna 2 Tablet(s) Oral at bedtime  simethicone 80 milliGRAM(s) Chew every 6 hours PRN  sodium chloride 0.9%. 1000 milliLiter(s) IV Continuous <Continuous>  sucralfate 1 Gram(s) Oral four times a day      Vital Signs Last 24 Hrs  T(C): 36.7 (18 Feb 2022 15:09), Max: 36.7 (18 Feb 2022 04:39)  T(F): 98 (18 Feb 2022 15:09), Max: 98 (18 Feb 2022 04:39)  HR: 72 (18 Feb 2022 11:01) (63 - 98)  BP: 153/77 (18 Feb 2022 15:09) (105/56 - 157/73)  BP(mean): --  RR: 18 (18 Feb 2022 11:01) (18 - 18)  SpO2: 96% (18 Feb 2022 15:09) (96% - 98%)    Physical Exam:  Constitutional: non-toxic, no distress  HEAD/EYES: anicteric, no conjunctival injection  ENT:  supple, no thrush  Cardiovascular:   normal S1, S2, no murmur, no edema  Respiratory:  clear BS bilaterally, no wheezes, no rales  GI:  soft, non-tender, normal bowel sounds  :  no diaz, no CVA tenderness  Musculoskeletal:  no synovitis, normal ROM, Right foot dressed with surgical dressing and ace bandage   Neurologic: awake and alert, normal strength, no focal findings  Skin:  no rash, no erythema, no phlebitis  Heme/Onc: no lymphadenopathy   Psychiatric:  awake, alert, appropriate mood    WBC Count: 13.06 K/uL (02-18 @ 07:35)  WBC Count: 13.07 K/uL (02-17 @ 08:32)  WBC Count: 15.58 K/uL (02-16 @ 03:27)  WBC Count: 18.63 K/uL (02-15 @ 11:45)  WBC Count: 21.30 K/uL (02-15 @ 01:15)  WBC Count: 19.36 K/uL (02-14 @ 06:46)  WBC Count: 21.95 K/uL (02-12 @ 07:07)                            14.1   13.06 )-----------( 404      ( 18 Feb 2022 07:35 )             44.7       02-18    139  |  105  |  14  ----------------------------<  168<H>  4.2   |  24  |  0.65    Ca    8.2<L>      18 Feb 2022 07:35  Phos  2.4     02-18  Mg     2.1     02-18            Creatinine Trend: 0.65<--, 0.76<--, 1.12<--, 1.31<--, 1.28<--, 1.23<--      MICROBIOLOGY:  v  .Surgical Swab RIGHT FOOT DEEP WOUND  02-16-22   Few Serratia marcescens  Rare Staphylococcus aureus Susceptibility to follow.  Few Streptococcus agalactiae (Group B) isolated  Group B streptococci are susceptible to ampicillin,  penicillin and cefazolin, but may be resistant to  erythromycin andclindamycin.  Recommendations for intrapartum prophylaxis for Group B  streptococci are penicillin or ampicillin.  --  Serratia marcescens      .Abscess right foot  02-12-22   Numerous Staphylococcus aureus  Numerous Serratia marcescens  Numerous Streptococcus agalactiae (Group B) isolated  Group B streptococci are susceptible to ampicillin,  penicillin and cefazolin, but may be resistant to  erythromycin and clindamycin.  Recommendations for intrapartum prophylaxis for Group B  streptococci are penicillin or ampicillin.  Few Finegoldia magna "Susceptibilities not performed"  --  Staphylococcus aureus  Serratia marcescens          Rapid RVP Result: NotDetec (02-15 @ 15:26)  Rapid RVP Result: NotDetec (02-14 @ 22:12)        C-Reactive Protein, Serum: 150 (02-14)  C-Reactive Protein, Serum: 181 (02-12)          Procalcitonin, Serum: 0.07 (02-14-22 @ 09:52)    SARS-CoV-2 Result: NotDetec (02-18-22 @ 07:09)  SARS-CoV-2: NotDetec (02-15-22 @ 15:26)  Rapid RVP Result: NotDetec (02-15-22 @ 15:26)  SARS-CoV-2: NotDetec (02-14-22 @ 22:12)  Rapid RVP Result: NotDetec (02-14-22 @ 22:12)    SARS-CoV-2: NotDetec (15 Feb 2022 15:26)  SARS-CoV-2: NotDetec (14 Feb 2022 22:12)    RADIOLOGY:

## 2022-02-18 NOTE — DIETITIAN INITIAL EVALUATION ADULT. - ETIOLOGY
Increased energy/protein needs ; Inadequate energy/protein intake related to4th / 5th digit (R) gangrene w/ non healing R medial malleolar ulcer

## 2022-02-18 NOTE — PROGRESS NOTE ADULT - SUBJECTIVE AND OBJECTIVE BOX
Patient is a 61y old  Male who presents with a chief complaint of gangrene foot (17 Feb 2022 21:32)       INTERVAL HPI/OVERNIGHT EVENTS:  Patient seen and evaluated at bedside.  Pt is resting comfortable in NAD. Denies N/V/F/C.  Pain rated at 6/10    Allergies    No Known Allergies    Intolerances        Vital Signs Last 24 Hrs  T(C): 36.7 (18 Feb 2022 04:39), Max: 36.7 (18 Feb 2022 04:39)  T(F): 98 (18 Feb 2022 04:39), Max: 98 (18 Feb 2022 04:39)  HR: 63 (18 Feb 2022 04:39) (63 - 98)  BP: 129/69 (18 Feb 2022 04:39) (105/56 - 157/73)  BP(mean): --  RR: 18 (18 Feb 2022 04:39) (18 - 18)  SpO2: 96% (18 Feb 2022 04:39) (96% - 99%)    LABS:                        14.1   13.06 )-----------( 404      ( 18 Feb 2022 07:35 )             44.7     02-18    139  |  105  |  14  ----------------------------<  168<H>  4.2   |  24  |  0.65    Ca    8.2<L>      18 Feb 2022 07:35  Phos  2.4     02-18  Mg     2.1     02-18      PT/INR - ( 17 Feb 2022 08:32 )   PT: 15.5 sec;   INR: 1.36 ratio         PTT - ( 17 Feb 2022 08:32 )  PTT:30.4 sec    CAPILLARY BLOOD GLUCOSE      POCT Blood Glucose.: 148 mg/dL (18 Feb 2022 07:47)  POCT Blood Glucose.: 178 mg/dL (17 Feb 2022 21:39)  POCT Blood Glucose.: 156 mg/dL (17 Feb 2022 16:57)  POCT Blood Glucose.: 231 mg/dL (17 Feb 2022 11:25)  POCT Blood Glucose.: 180 mg/dL (17 Feb 2022 08:16)      Lower Extremity Physical Exam:    Vascular: DP/PT 0/4 B/L, CFT >3 seconds B/L, Temperature gradient warm to cool B/L  Neuro: Epicritic sensation intact to the level of digits B/L  Musculoskeletal/Ortho: unremarkable   Skin: s/p 2/15/22 right foot TMA open: strikethrough noted on dressing, flaps cool to touch but still viable, plantar incision and flap with no signs of necrosis, sanguinous drainage, no purulence, no malodor, left foot no open wounds or lesions      RADIOLOGY & ADDITIONAL TESTS:

## 2022-02-18 NOTE — PROGRESS NOTE ADULT - ASSESSMENT
60 yo male w/ history of DM II, PAD, p/w R 4th/5th digit dry gangrene w/ non healing R medial malleolar ulcer, no palpable pulses distal to femoral b/l.     R foot gangrene w/ pus  - podiatry and vascular following  - MRI not suggestive of acute osteomyelitis, but was a poor study due to motion artifact   - vascular study showed an unobtainable ankle brachial index w/ diffusely calcified noncompressible vessels limiting evaluation and diminished flow below the right knee  - US showed occluded right superficial femoral and popliteal arteries with multiple stents   - CTA showed occluded right superficial femoral and popliteal arteries containing multiple overlapping stents with reconstitution in the mid popliteal artery, three-vessel runoff into b/l feet, occluded left superficial femoral artery with reconstitution distally, moderate narrowing in the mid left popliteal artery and moderate narrowing in the proximal left external iliac artery with associated areas of ulcerated plaque  - abscess cultures grew Serratia, GBS and MSSA  - as per ID, will c/w Cefepime & Flagyl  - s/p TMA 2/15 - surgical culture and pathology are pending  - c/w morphine and percocet for pain    PAD  - US showed occluded right superficial femoral and popliteal arteries with multiple stents   - CTA showed occluded right superficial femoral and popliteal arteries containing multiple overlapping stents with reconstitution in the mid popliteal artery, three-vessel runoff into b/l feet, occluded left superficial femoral artery with reconstitution distally, moderate narrowing in the mid left popliteal artery and moderate narrowing in the proximal left external iliac artery with associated areas of ulcerated plaque  - as per Dr. Garcia, patient to be transferred to [facility] 2/16 for IR thrombectomy 2/17  - c/w ASA & Lipitor    A. fib on post TMA EKG per anaesthesia   - no more episodes of A. fib on tele today  - Echo showed EF 55-60% & moderate aortic stenosis  - as per cardio, would ideally start AC for A. fib but in light of recent GIB/coffee grounds emesis will hold off  - started metoprolol   - ASA on hold as per GI - will restart on 2/20    Coffee ground emesis on 2/14  - c/w Protonix & carafate  - FOBT positive  - EGD on 2/18 showed distal moderate esophagitis s/p biopsy, proximal-mid esophageal whitish exudate s/p biopsy, mild antral gastritis s/p biopsy & mild duodenitis s/p biopsy   - H&H is stable    Hiccups  - c/w Protonix  - increase gabapentin    Constipation  - c/w Senna  - add lactulose  - c/w PRN Miralax    DM II  - c/w ISS  - Hgb A1C is 8.6    Severe protein-calorie malnutrition  - nutrition consult     Smoker  - c/w Nicotine Patch    Prophylaxis:  DVT: subcutaneous Heparin on hold post TMA and due to possible GI bleed  GI: Protonix    Plan pending transfer to [facility] for IR thrombectomy on Monday or Tuesday. I called and gave sign out to Dr. Dotson at [facility].     I called the pt's wife, Richa [phone] and updated her on his status and plan of care yesterday.

## 2022-02-18 NOTE — PROGRESS NOTE ADULT - ASSESSMENT
Patient is a 61y old  Male h/o  DM2 on oral meds, who presents with a chief complaint of right foot gangrene . 4/5th toes  for past 3 to 4  months.  Has leukocytosis    MRI did not show acute osteomyelitis but poor study due to motion artifact   vascular US and CTA shows very extensive vascular disease with stents and occlusions    patient has pus in his toes, and is planned for OR possibly today( 2/15/22) or tomorrow   IR procedure tomorrow to help with vasculature   abscess cultures growing cefoxitin resistant serratia (possible ampC producing organism and hence zosyn not a great choice) as well as GBS and MSSA  needs source control but healing may be difficult in this patient given his vascular disease     2/16: afebrile, on RA, WBC better 15.58, Cr normalized, RVP negative, surgical culture and pathology are pending, high concern for residual infection as per podiatry note, Cefepime and flagyl continued. ?possible transfer to another facility for further vascular workup.   2/17: pending transfer for further vascular workup, surgical culture with Few Serratia marcescens, Few Streptococcus agalactiae (Group B) - sensitivity to follow, Cefepime and flagyl continued, pathology is pending.   Attending addendum:  agree with above  source control obtained from TMA but needs to be good glycemic cont  and revascularizations to help with blood flow   continue antibiotics and await pathology     2/18: no fevers, WBC with no significant change, surgical culture with serratia marcescens sensitive to cefepime, pathology - Gangrene, soft tissue resection margin: involved. Cefepime IV and Flagyl continued, awaiting for IR thrombectomy possibly on 2/20 or 21.    Toe ulcer with abscess   leukocytosis   DM2 hba1c 8.6    Plan  continue Cefepime 1g q8hrs   continue PO flagyl q6hrs   follow all cultures  no MRSA, no role for vanco  trend wbc until normal   control glucose especially in the setting of active infection which may make it more difficult   good but not too tight glycemic control   podiatry follow up, pending plan  if not further surgical intervention, pt will need a PICC line and long term abx

## 2022-02-18 NOTE — DIETITIAN INITIAL EVALUATION ADULT. - PERTINENT MEDS FT
MEDICATIONS  (STANDING):  atorvastatin 10 milliGRAM(s) Oral at bedtime  cefepime   IVPB 1000 milliGRAM(s) IV Intermittent every 8 hours  dextrose 5%. 1000 milliLiter(s) (50 mL/Hr) IV Continuous <Continuous>  dextrose 5%. 1000 milliLiter(s) (100 mL/Hr) IV Continuous <Continuous>  dextrose 50% Injectable 25 Gram(s) IV Push once  dextrose 50% Injectable 12.5 Gram(s) IV Push once  dextrose 50% Injectable 25 Gram(s) IV Push once  gabapentin 100 milliGRAM(s) Oral three times a day  glucagon  Injectable 1 milliGRAM(s) IntraMuscular once  insulin lispro (ADMELOG) corrective regimen sliding scale   SubCutaneous three times a day before meals  lactulose Syrup 20 Gram(s) Oral two times a day  LORazepam   Injectable 0.5 milliGRAM(s) IV Push once  metoprolol tartrate 12.5 milliGRAM(s) Oral two times a day  metroNIDAZOLE    Tablet 500 milliGRAM(s) Oral three times a day  nicotine - 21 mG/24Hr(s) Patch 1 patch Transdermal daily  pantoprazole  Injectable 40 milliGRAM(s) IV Push every 12 hours  potassium phosphate / sodium phosphate Powder (PHOS-NaK) 2 Packet(s) Oral four times a day  senna 2 Tablet(s) Oral at bedtime  sodium chloride 0.9%. 1000 milliLiter(s) (100 mL/Hr) IV Continuous <Continuous>    MEDICATIONS  (PRN):  acetaminophen     Tablet .. 650 milliGRAM(s) Oral every 4 hours PRN Mild Pain (1 - 3)  aluminum hydroxide/magnesium hydroxide/simethicone Suspension 30 milliLiter(s) Oral every 6 hours PRN Dyspepsia  morphine  - Injectable 2 milliGRAM(s) IV Push every 4 hours PRN Severe Pain (7 - 10)  ondansetron Injectable 8 milliGRAM(s) IV Push every 6 hours PRN Nausea and/or Vomiting  oxycodone    5 mG/acetaminophen 325 mG 1 Tablet(s) Oral every 4 hours PRN Moderate Pain (4 - 6)  polyethylene glycol 3350 17 Gram(s) Oral daily PRN Constipation  simethicone 80 milliGRAM(s) Chew every 6 hours PRN Dyspepsia

## 2022-02-19 LAB
-  AMPICILLIN/SULBACTAM: SIGNIFICANT CHANGE UP
-  CEFAZOLIN: SIGNIFICANT CHANGE UP
-  CLINDAMYCIN: SIGNIFICANT CHANGE UP
-  ERYTHROMYCIN: SIGNIFICANT CHANGE UP
-  GENTAMICIN: SIGNIFICANT CHANGE UP
-  OXACILLIN: SIGNIFICANT CHANGE UP
-  PENICILLIN: SIGNIFICANT CHANGE UP
-  RIFAMPIN: SIGNIFICANT CHANGE UP
-  TETRACYCLINE: SIGNIFICANT CHANGE UP
-  TRIMETHOPRIM/SULFAMETHOXAZOLE: SIGNIFICANT CHANGE UP
-  VANCOMYCIN: SIGNIFICANT CHANGE UP
ANION GAP SERPL CALC-SCNC: 6 MMOL/L — SIGNIFICANT CHANGE UP (ref 5–17)
BUN SERPL-MCNC: 11 MG/DL — SIGNIFICANT CHANGE UP (ref 7–23)
CALCIUM SERPL-MCNC: 8.1 MG/DL — LOW (ref 8.5–10.1)
CHLORIDE SERPL-SCNC: 103 MMOL/L — SIGNIFICANT CHANGE UP (ref 96–108)
CO2 SERPL-SCNC: 28 MMOL/L — SIGNIFICANT CHANGE UP (ref 22–31)
CREAT SERPL-MCNC: 0.72 MG/DL — SIGNIFICANT CHANGE UP (ref 0.5–1.3)
GLUCOSE BLDC GLUCOMTR-MCNC: 198 MG/DL — HIGH (ref 70–99)
GLUCOSE BLDC GLUCOMTR-MCNC: 229 MG/DL — HIGH (ref 70–99)
GLUCOSE BLDC GLUCOMTR-MCNC: 247 MG/DL — HIGH (ref 70–99)
GLUCOSE BLDC GLUCOMTR-MCNC: 253 MG/DL — HIGH (ref 70–99)
GLUCOSE SERPL-MCNC: 253 MG/DL — HIGH (ref 70–99)
HCT VFR BLD CALC: 40.2 % — SIGNIFICANT CHANGE UP (ref 39–50)
HGB BLD-MCNC: 13 G/DL — SIGNIFICANT CHANGE UP (ref 13–17)
MAGNESIUM SERPL-MCNC: 2.5 MG/DL — SIGNIFICANT CHANGE UP (ref 1.6–2.6)
MCHC RBC-ENTMCNC: 29.7 PG — SIGNIFICANT CHANGE UP (ref 27–34)
MCHC RBC-ENTMCNC: 32.3 G/DL — SIGNIFICANT CHANGE UP (ref 32–36)
MCV RBC AUTO: 91.8 FL — SIGNIFICANT CHANGE UP (ref 80–100)
METHOD TYPE: SIGNIFICANT CHANGE UP
NRBC # BLD: 0 /100 WBCS — SIGNIFICANT CHANGE UP (ref 0–0)
PHOSPHATE SERPL-MCNC: 2.3 MG/DL — LOW (ref 2.5–4.5)
PLATELET # BLD AUTO: 360 K/UL — SIGNIFICANT CHANGE UP (ref 150–400)
POTASSIUM SERPL-MCNC: 3.6 MMOL/L — SIGNIFICANT CHANGE UP (ref 3.5–5.3)
POTASSIUM SERPL-SCNC: 3.6 MMOL/L — SIGNIFICANT CHANGE UP (ref 3.5–5.3)
RBC # BLD: 4.38 M/UL — SIGNIFICANT CHANGE UP (ref 4.2–5.8)
RBC # FLD: 12.8 % — SIGNIFICANT CHANGE UP (ref 10.3–14.5)
SODIUM SERPL-SCNC: 137 MMOL/L — SIGNIFICANT CHANGE UP (ref 135–145)
WBC # BLD: 12.22 K/UL — HIGH (ref 3.8–10.5)
WBC # FLD AUTO: 12.22 K/UL — HIGH (ref 3.8–10.5)

## 2022-02-19 PROCEDURE — 99232 SBSQ HOSP IP/OBS MODERATE 35: CPT

## 2022-02-19 RX ORDER — SODIUM,POTASSIUM PHOSPHATES 278-250MG
2 POWDER IN PACKET (EA) ORAL
Refills: 0 | Status: COMPLETED | OUTPATIENT
Start: 2022-02-19 | End: 2022-02-20

## 2022-02-19 RX ADMIN — Medication 500 MILLIGRAM(S): at 21:19

## 2022-02-19 RX ADMIN — Medication 2 PACKET(S): at 18:54

## 2022-02-19 RX ADMIN — PANTOPRAZOLE SODIUM 40 MILLIGRAM(S): 20 TABLET, DELAYED RELEASE ORAL at 17:17

## 2022-02-19 RX ADMIN — CEFEPIME 100 MILLIGRAM(S): 1 INJECTION, POWDER, FOR SOLUTION INTRAMUSCULAR; INTRAVENOUS at 21:19

## 2022-02-19 RX ADMIN — GABAPENTIN 300 MILLIGRAM(S): 400 CAPSULE ORAL at 14:39

## 2022-02-19 RX ADMIN — Medication 2: at 21:18

## 2022-02-19 RX ADMIN — Medication 1 PATCH: at 12:04

## 2022-02-19 RX ADMIN — Medication 1: at 17:17

## 2022-02-19 RX ADMIN — Medication 2: at 08:09

## 2022-02-19 RX ADMIN — MORPHINE SULFATE 2 MILLIGRAM(S): 50 CAPSULE, EXTENDED RELEASE ORAL at 08:50

## 2022-02-19 RX ADMIN — Medication 1 PATCH: at 12:10

## 2022-02-19 RX ADMIN — Medication 500 MILLIGRAM(S): at 14:39

## 2022-02-19 RX ADMIN — Medication 2 PACKET(S): at 21:18

## 2022-02-19 RX ADMIN — Medication 1 PATCH: at 07:48

## 2022-02-19 RX ADMIN — MORPHINE SULFATE 2 MILLIGRAM(S): 50 CAPSULE, EXTENDED RELEASE ORAL at 17:23

## 2022-02-19 RX ADMIN — Medication 2: at 12:06

## 2022-02-19 RX ADMIN — ATORVASTATIN CALCIUM 10 MILLIGRAM(S): 80 TABLET, FILM COATED ORAL at 21:19

## 2022-02-19 RX ADMIN — Medication 1 PATCH: at 19:31

## 2022-02-19 RX ADMIN — Medication 12.5 MILLIGRAM(S): at 17:18

## 2022-02-19 RX ADMIN — Medication 500 MILLIGRAM(S): at 05:07

## 2022-02-19 RX ADMIN — MORPHINE SULFATE 2 MILLIGRAM(S): 50 CAPSULE, EXTENDED RELEASE ORAL at 08:21

## 2022-02-19 RX ADMIN — GABAPENTIN 300 MILLIGRAM(S): 400 CAPSULE ORAL at 21:19

## 2022-02-19 RX ADMIN — CEFEPIME 100 MILLIGRAM(S): 1 INJECTION, POWDER, FOR SOLUTION INTRAMUSCULAR; INTRAVENOUS at 05:06

## 2022-02-19 RX ADMIN — SODIUM CHLORIDE 100 MILLILITER(S): 9 INJECTION INTRAMUSCULAR; INTRAVENOUS; SUBCUTANEOUS at 05:06

## 2022-02-19 RX ADMIN — PANTOPRAZOLE SODIUM 40 MILLIGRAM(S): 20 TABLET, DELAYED RELEASE ORAL at 05:04

## 2022-02-19 RX ADMIN — GABAPENTIN 300 MILLIGRAM(S): 400 CAPSULE ORAL at 05:07

## 2022-02-19 RX ADMIN — SODIUM CHLORIDE 100 MILLILITER(S): 9 INJECTION INTRAMUSCULAR; INTRAVENOUS; SUBCUTANEOUS at 18:58

## 2022-02-19 RX ADMIN — MORPHINE SULFATE 2 MILLIGRAM(S): 50 CAPSULE, EXTENDED RELEASE ORAL at 17:50

## 2022-02-19 RX ADMIN — Medication 1 GRAM(S): at 05:09

## 2022-02-19 RX ADMIN — Medication 1 GRAM(S): at 12:07

## 2022-02-19 RX ADMIN — Medication 1 GRAM(S): at 17:17

## 2022-02-19 RX ADMIN — Medication 2 PACKET(S): at 05:07

## 2022-02-19 RX ADMIN — MORPHINE SULFATE 2 MILLIGRAM(S): 50 CAPSULE, EXTENDED RELEASE ORAL at 21:32

## 2022-02-19 RX ADMIN — MORPHINE SULFATE 2 MILLIGRAM(S): 50 CAPSULE, EXTENDED RELEASE ORAL at 22:25

## 2022-02-19 RX ADMIN — CEFEPIME 100 MILLIGRAM(S): 1 INJECTION, POWDER, FOR SOLUTION INTRAMUSCULAR; INTRAVENOUS at 14:38

## 2022-02-19 RX ADMIN — Medication 12.5 MILLIGRAM(S): at 05:08

## 2022-02-19 NOTE — PROGRESS NOTE ADULT - SUBJECTIVE AND OBJECTIVE BOX
Patient is a 61y old  Male who presents with a chief complaint of gangrene foot (18 Feb 2022 21:46)       INTERVAL HPI/OVERNIGHT EVENTS:  Patient seen and evaluated at bedside.  Pt is resting comfortable in NAD. Denies N/V/F/C.      Allergies    No Known Allergies    Intolerances        Vital Signs Last 24 Hrs  T(C): 37 (19 Feb 2022 04:46), Max: 37 (19 Feb 2022 04:46)  T(F): 98.6 (19 Feb 2022 04:46), Max: 98.6 (19 Feb 2022 04:46)  HR: 70 (19 Feb 2022 04:46) (65 - 72)  BP: 140/62 (19 Feb 2022 04:46) (130/70 - 155/67)  BP(mean): --  RR: 16 (19 Feb 2022 04:46) (16 - 21)  SpO2: 97% (19 Feb 2022 04:46) (96% - 100%)    LABS:                        13.0   12.22 )-----------( 360      ( 19 Feb 2022 07:16 )             40.2     02-19    137  |  103  |  11  ----------------------------<  253<H>  3.6   |  28  |  0.72    Ca    8.1<L>      19 Feb 2022 07:16  Phos  2.3     02-19  Mg     2.5     02-19          CAPILLARY BLOOD GLUCOSE      POCT Blood Glucose.: 247 mg/dL (19 Feb 2022 07:54)  POCT Blood Glucose.: 321 mg/dL (18 Feb 2022 20:54)  POCT Blood Glucose.: 134 mg/dL (18 Feb 2022 17:35)  POCT Blood Glucose.: 151 mg/dL (18 Feb 2022 11:06)      Lower Extremity Physical Exam:  Vascular: DP/PT 0/4 B/L, CFT >3 seconds B/L, Temperature gradient warm to cool B/L  Neuro: Epicritic sensation intact to the level of digits B/L  Musculoskeletal/Ortho: unremarkable   Skin: s/p 2/15/22 right foot TMA open: strikethrough noted on dressing, flaps cool to touch but still viable, plantar incision and flap with no signs of necrosis, sanguinous drainage, no purulence, no malodor, left foot no open wounds or lesions    RADIOLOGY & ADDITIONAL TESTS:

## 2022-02-19 NOTE — PROGRESS NOTE ADULT - ASSESSMENT
62yo M w/ right foot gangrene   - pt seen and evaluated  - afebrile, WBC down to 12  -s/p 2/15/22 right foot TMA open: mild strikethrough dressing, flaps cool to touch but still viable, plantar incision and flap with no signs of necrosis, sanguinous drainage, no purulence, no malodor, left foot no open wounds or lesions  - per intraop findings high concern for residual infection and viability  - continue IV cefepime, PO flagyl per ID recs, appreciated  - salvageability of right foot is guarded  - Intra op culture growing serratia and group B strep prelim  - IR plan to transfer pt to [facility] for further vascular work up/ IR thrombectomy 2/21 or 2/22  - podiatry plan pending IR/vascular intervention  - will follow  - discussed w/ attending

## 2022-02-19 NOTE — PROGRESS NOTE ADULT - SUBJECTIVE AND OBJECTIVE BOX
Patient is a 61y old  Male who presents with a chief complaint of gangrene foot (18 Feb 2022 08:14)    INTERVAL HISTORY: in no acute distress, denies any chest pain or sob      Vitals:  Vital Signs Last 24 Hrs  T(C): 36.6 (19 Feb 2022 11:00), Max: 37 (19 Feb 2022 04:46)  T(F): 97.9 (19 Feb 2022 11:00), Max: 98.6 (19 Feb 2022 04:46)  HR: 62 (19 Feb 2022 11:00) (62 - 72)  BP: 131/74 (19 Feb 2022 11:00) (130/70 - 155/67)  RR: 16 (19 Feb 2022 11:00) (16 - 21)  SpO2: 97% (19 Feb 2022 11:00) (96% - 100%)    PHYSICAL EXAM:  Neuro: Awake, responsive  CV: S1 S2 RRR + SM  Lungs: CTABL  GI: Soft, BS +, ND, NT  Extremities: + LE edema with Rt foot dressing intact, Lt lower leg with blister    TELEMETRY: sinus, few PVCs    RADIOLOGY: < from: CT Angio Abd Aorta w/run-off w/ IV Cont (02.14.22 @ 15:37) >  LOWER CHEST: Dependent changes/atelectasis at the lung bases.    LIVER: Within normal limits.  BILE DUCTS: Normal caliber.  GALLBLADDER: Within normal limits.  SPLEEN: Within normal limits.  PANCREAS: Within normal limits.  ADRENALS: Within normal limits.  KIDNEYS/URETERS: Within normal limits.    BLADDER: Unremarkable.  REPRODUCTIVE ORGANS: Enlarged.    BOWEL: Moderate amount retained fecal material in the colon. No bowel   obstruction. Appendix is normal.  PERITONEUM: No ascites.  RETROPERITONEUM/LYMPH NODES: No lymphadenopathy.  ABDOMINAL WALL: Unremarkable.  BONES: No aggressive osseous lesion. Soft tissue edema along the plantar   surface of the right foot; see foot MRI of the same day.    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    Three-vessel runoff into the right foot.    Occluded left superficial femoral artery with reconstitution distally.    Moderate narrowing in the mid left popliteal artery.    Three-vessel runoff into the left foot.    Moderate narrowing in the proximal left external iliac artery with   associated areas of ulcerated plaque.    < end of copied text >      DIAGNOSTIC TESTING:    [x]< from: TTE Echo Complete w/o Contrast w/ Doppler (02.11.22 @ 19:51) >   ] Echocardiogram: < from: TTE Echo Complete w/o Contrast w/ Doppler (02.11.22 @ 19:51) >  Left Ventricle: Normal left ventricular size and wall thicknesses, with   normal systolic and diastolic function.  Global LV systolic function was normal. Left ventricular ejection   fraction, by visual estimation, is 55 to 60%. Spectral Doppler shows   normal pattern of LV diastolic filling.  Right Ventricle: Normal right ventricular size and function.  Left Atrium: The left atrium is normal in size.  Right Atrium: The right atrium is normal in size.  Pericardium: There is no evidence of pericardial effusion.  Mitral Valve: Structurally normal mitral valve, with normal leaflet   excursion. Trace mitral valve regurgitation is seen.  Tricuspid Valve: Structurally normal tricuspid valve, with normal leaflet   excursion. No tricuspid regurgitation is visualized.  Aortic Valve: Sclerotic aortic valve with normal opening. Peak   transaortic gradient equals 31.9 mmHg, mean transaortic gradient equals   14.6 mmHg, the calculated aortic valve area equals 1.25 cm² by the   continuity equation consistent with moderate aortic stenosis. Mild aortic   valve regurgitation is seen.  Pulmonic Valve: Structurally normal pulmonic valve, with normal leaflet   excursion. No indication of pulmonic valve regurgitation.  Aorta: The aortic root and ascending aorta are structurally normal, with   no evidence of dilitation.  Pulmonary Artery: The main pulmonary artery is normal in size.      Summary:   1. Left ventricular ejection fraction, by visual estimation, is 55 to   60%.   2. Normal global left ventricular systolic function.   3. Trace mitral valve regurgitation.   4. Mild aortic regurgitation.   5. Sclerotic aortic valve with normal opening.   6. Peak transaortic gradient equals 31.9 mmHg, mean transaortic gradient   equals 14.6 mmHg, the calculated aortic valve area equals 1.25 cm² by the   continuity equation consistent with moderate aortic stenosis.    < end of copied text >    LABS:	 	                          13.0   12.22 )-----------( 360      ( 19 Feb 2022 07:16 )             40.2     02-19    137  |  103  |  11  ----------------------------<  253<H>  3.6   |  28  |  0.72    Ca    8.1<L>      19 Feb 2022 07:16  Phos  2.3     02-19  Mg     2.5     02-19      TSH: Thyroid Stimulating Hormone, Serum: 1.160 uU/mL (02-18 @ 07:35)  INR: 1.36 ratio (02-17 @ 08:32)  INR: 1.26 ratio (02-16 @ 03:27)  INR: 1.19 ratio (02-15 @ 11:45)

## 2022-02-19 NOTE — PROGRESS NOTE ADULT - ASSESSMENT
60 yo male w/ history of DM II, PAD, p/w R 4th/5th digit dry gangrene w/ non healing R medial malleolar ulcer, no palpable pulses distal to femoral b/l.  Transmetatarsal amputation of right foot 2/15  awaiting tx to NS for further surgical care.  On 2/14 - coffee ground emesis, awaiting EGD now.  No prior hx of afib/flutter; briefly went into flutter; now back in NSR.  ECG: sinus 86bpm  ECG2: aflutter 120bpm  Echo: LVEF 60%; moderate AS  Hg 14    -pt currently back in NSR and rate controlled  -cont low-dose bb  -CHADSvasc 2, would ideally start AC for afib but in light of recent GIB/coffee grounds emesis, will hold off  -keep Hg>9  -TSH 1.160  -wound care and ABx as per podiatry and ID      atorvastatin 10 milliGRAM(s) Oral at bedtime  cefepime   IVPB 1000 milliGRAM(s) IV Intermittent every 8 hours  gabapentin 300 milliGRAM(s) Oral three times a day  insulin lispro (ADMELOG) corrective regimen sliding scale   SubCutaneous at bedtime  insulin lispro (ADMELOG) corrective regimen sliding scale   SubCutaneous three times a day before meals  LORazepam   Injectable 0.5 milliGRAM(s) IV Push once  metoprolol tartrate 12.5 milliGRAM(s) Oral two times a day  metroNIDAZOLE    Tablet 500 milliGRAM(s) Oral three times a day  nicotine - 21 mG/24Hr(s) Patch 1 patch Transdermal daily  pantoprazole  Injectable 40 milliGRAM(s) IV Push every 12 hours  senna 2 Tablet(s) Oral at bedtime  sodium chloride 0.9%. 1000 milliLiter(s) (100 mL/Hr) IV Continuous <Continuous>  sucralfate 1 Gram(s) Oral four times a day    -For EGD today

## 2022-02-19 NOTE — PROGRESS NOTE ADULT - SUBJECTIVE AND OBJECTIVE BOX
60 yo male w/ history of DM II, PAD, p/w R 4th/5th digit dry gangrene w/ non healing R medial malleolar ulcer, no palpable pulses distal to femoral b/l. He is lying in bed in NAD.      MEDICATIONS  (STANDING):  atorvastatin 10 milliGRAM(s) Oral at bedtime  cefepime   IVPB 1000 milliGRAM(s) IV Intermittent every 8 hours  dextrose 5%. 1000 milliLiter(s) (50 mL/Hr) IV Continuous <Continuous>  dextrose 5%. 1000 milliLiter(s) (100 mL/Hr) IV Continuous <Continuous>  dextrose 50% Injectable 25 Gram(s) IV Push once  dextrose 50% Injectable 12.5 Gram(s) IV Push once  dextrose 50% Injectable 25 Gram(s) IV Push once  gabapentin 300 milliGRAM(s) Oral three times a day  glucagon  Injectable 1 milliGRAM(s) IntraMuscular once  insulin lispro (ADMELOG) corrective regimen sliding scale   SubCutaneous at bedtime  insulin lispro (ADMELOG) corrective regimen sliding scale   SubCutaneous three times a day before meals  LORazepam   Injectable 0.5 milliGRAM(s) IV Push once  metoprolol tartrate 12.5 milliGRAM(s) Oral two times a day  metroNIDAZOLE    Tablet 500 milliGRAM(s) Oral three times a day  nicotine - 21 mG/24Hr(s) Patch 1 patch Transdermal daily  pantoprazole  Injectable 40 milliGRAM(s) IV Push every 12 hours  potassium phosphate / sodium phosphate Powder (PHOS-NaK) 2 Packet(s) Oral four times a day with meals  senna 2 Tablet(s) Oral at bedtime  sodium chloride 0.9%. 1000 milliLiter(s) (100 mL/Hr) IV Continuous <Continuous>  sucralfate 1 Gram(s) Oral four times a day    MEDICATIONS  (PRN):  acetaminophen     Tablet .. 650 milliGRAM(s) Oral every 4 hours PRN Mild Pain (1 - 3)  aluminum hydroxide/magnesium hydroxide/simethicone Suspension 30 milliLiter(s) Oral every 6 hours PRN Dyspepsia  HYDROmorphone  Injectable 0.5 milliGRAM(s) IV Push every 10 minutes PRN Severe Pain (7 - 10)  morphine  - Injectable 2 milliGRAM(s) IV Push every 4 hours PRN Severe Pain (7 - 10)  ondansetron Injectable 8 milliGRAM(s) IV Push every 6 hours PRN Nausea and/or Vomiting  oxycodone    5 mG/acetaminophen 325 mG 1 Tablet(s) Oral every 4 hours PRN Moderate Pain (4 - 6)  polyethylene glycol 3350 17 Gram(s) Oral daily PRN Constipation  simethicone 80 milliGRAM(s) Chew every 6 hours PRN Dyspepsia      Allergies    No Known Allergies    Intolerances        Vital Signs Last 24 Hrs  T(C): 36.7 (19 Feb 2022 13:10), Max: 37 (19 Feb 2022 04:46)  T(F): 98 (19 Feb 2022 13:10), Max: 98.6 (19 Feb 2022 04:46)  HR: 70 (19 Feb 2022 13:10) (62 - 70)  BP: 150/72 (19 Feb 2022 13:10) (131/74 - 150/72)   RR: 18 (19 Feb 2022 13:10) (16 - 18)  SpO2: 98% (19 Feb 2022 13:10) (97% - 98%)    PHYSICAL EXAM:  GENERAL: NAD, well-groomed, well-developed  HEAD: Atraumatic, Normocephalic  EYES: EOMI, PERRLA   NECK: Supple   NERVOUS SYSTEM:  Alert  CHEST/LUNG: Clear to auscultation bilaterally; No rales, rhonchi, wheezing, or rubs  HEART: Regular rate and rhythm; No murmurs, rubs, or gallops  ABDOMEN: Soft, Nontender, Nondistended; Bowel sounds present  EXTREMITIES: RLE foot wound dressed and wrapped      LABS:                        13.0   12.22 )-----------( 360      ( 19 Feb 2022 07:16 )             40.2     02-19    137  |  103  |  11  ----------------------------<  253<H>  3.6   |  28  |  0.72    Ca    8.1<L>      19 Feb 2022 07:16  Phos  2.3     02-19  Mg     2.5     02-19          CAPILLARY BLOOD GLUCOSE      POCT Blood Glucose.: 253 mg/dL (19 Feb 2022 21:08)  POCT Blood Glucose.: 198 mg/dL (19 Feb 2022 16:50)  POCT Blood Glucose.: 229 mg/dL (19 Feb 2022 11:50)  POCT Blood Glucose.: 247 mg/dL (19 Feb 2022 07:54)      Culture - Surgical Swab (collected 16 Feb 2022 08:53)  Source: .Surgical Swab RIGHT FOOT DEEP WOUND  Preliminary Report (19 Feb 2022 12:01):    Few Serratia marcescens    Rare Staphylococcus aureus    Few Streptococcus agalactiae (Group B) isolated    Group B streptococci are susceptible to ampicillin,    penicillin and cefazolin, but may be resistant to    erythromycin and clindamycin.    Recommendations for intrapartum prophylaxis for Group B    streptococci are penicillin or ampicillin.  Organism: Serratia marcescens  Staphylococcus aureus (19 Feb 2022 11:54)  Organism: Staphylococcus aureus (19 Feb 2022 11:54)  Organism: Serratia marcescens (18 Feb 2022 08:45)      RADIOLOGY & ADDITIONAL TESTS:    02-18-22 @ 07:01  -  02-19-22 @ 07:00  --------------------------------------------------------  IN:    sodium chloride 0.9%: 1200 mL    sodium chloride 0.9%: 1200 mL  Total IN: 2400 mL    OUT:  Total OUT: 0 mL    Total NET: 2400 mL      02-19-22 @ 07:01  -  02-19-22 @ 23:07  --------------------------------------------------------  IN:    sodium chloride 0.9%: 900 mL    sodium chloride 0.9%: 50 mL  Total IN: 950 mL    OUT:  Total OUT: 0 mL    Total NET: 950 mL

## 2022-02-20 LAB
ANION GAP SERPL CALC-SCNC: 5 MMOL/L — SIGNIFICANT CHANGE UP (ref 5–17)
BUN SERPL-MCNC: 8 MG/DL — SIGNIFICANT CHANGE UP (ref 7–23)
CALCIUM SERPL-MCNC: 7.9 MG/DL — LOW (ref 8.5–10.1)
CHLORIDE SERPL-SCNC: 104 MMOL/L — SIGNIFICANT CHANGE UP (ref 96–108)
CO2 SERPL-SCNC: 28 MMOL/L — SIGNIFICANT CHANGE UP (ref 22–31)
CREAT SERPL-MCNC: 0.79 MG/DL — SIGNIFICANT CHANGE UP (ref 0.5–1.3)
FLUAV AG NPH QL: SIGNIFICANT CHANGE UP
FLUBV AG NPH QL: SIGNIFICANT CHANGE UP
GLUCOSE BLDC GLUCOMTR-MCNC: 160 MG/DL — HIGH (ref 70–99)
GLUCOSE BLDC GLUCOMTR-MCNC: 165 MG/DL — HIGH (ref 70–99)
GLUCOSE BLDC GLUCOMTR-MCNC: 281 MG/DL — HIGH (ref 70–99)
GLUCOSE BLDC GLUCOMTR-MCNC: 283 MG/DL — HIGH (ref 70–99)
GLUCOSE BLDC GLUCOMTR-MCNC: 293 MG/DL — HIGH (ref 70–99)
GLUCOSE SERPL-MCNC: 196 MG/DL — HIGH (ref 70–99)
HCT VFR BLD CALC: 42.4 % — SIGNIFICANT CHANGE UP (ref 39–50)
HGB BLD-MCNC: 13.3 G/DL — SIGNIFICANT CHANGE UP (ref 13–17)
MAGNESIUM SERPL-MCNC: 2.6 MG/DL — SIGNIFICANT CHANGE UP (ref 1.6–2.6)
MCHC RBC-ENTMCNC: 29 PG — SIGNIFICANT CHANGE UP (ref 27–34)
MCHC RBC-ENTMCNC: 31.4 G/DL — LOW (ref 32–36)
MCV RBC AUTO: 92.4 FL — SIGNIFICANT CHANGE UP (ref 80–100)
NRBC # BLD: 0 /100 WBCS — SIGNIFICANT CHANGE UP (ref 0–0)
PHOSPHATE SERPL-MCNC: 2.2 MG/DL — LOW (ref 2.5–4.5)
PLATELET # BLD AUTO: 367 K/UL — SIGNIFICANT CHANGE UP (ref 150–400)
POTASSIUM SERPL-MCNC: 3.9 MMOL/L — SIGNIFICANT CHANGE UP (ref 3.5–5.3)
POTASSIUM SERPL-SCNC: 3.9 MMOL/L — SIGNIFICANT CHANGE UP (ref 3.5–5.3)
RBC # BLD: 4.59 M/UL — SIGNIFICANT CHANGE UP (ref 4.2–5.8)
RBC # FLD: 12.8 % — SIGNIFICANT CHANGE UP (ref 10.3–14.5)
SARS-COV-2 RNA SPEC QL NAA+PROBE: SIGNIFICANT CHANGE UP
SODIUM SERPL-SCNC: 137 MMOL/L — SIGNIFICANT CHANGE UP (ref 135–145)
WBC # BLD: 10.14 K/UL — SIGNIFICANT CHANGE UP (ref 3.8–10.5)
WBC # FLD AUTO: 10.14 K/UL — SIGNIFICANT CHANGE UP (ref 3.8–10.5)

## 2022-02-20 PROCEDURE — 99232 SBSQ HOSP IP/OBS MODERATE 35: CPT

## 2022-02-20 RX ADMIN — MORPHINE SULFATE 2 MILLIGRAM(S): 50 CAPSULE, EXTENDED RELEASE ORAL at 05:44

## 2022-02-20 RX ADMIN — PANTOPRAZOLE SODIUM 40 MILLIGRAM(S): 20 TABLET, DELAYED RELEASE ORAL at 17:17

## 2022-02-20 RX ADMIN — CEFEPIME 100 MILLIGRAM(S): 1 INJECTION, POWDER, FOR SOLUTION INTRAMUSCULAR; INTRAVENOUS at 13:11

## 2022-02-20 RX ADMIN — MORPHINE SULFATE 2 MILLIGRAM(S): 50 CAPSULE, EXTENDED RELEASE ORAL at 13:10

## 2022-02-20 RX ADMIN — Medication 2: at 22:13

## 2022-02-20 RX ADMIN — Medication 1 PATCH: at 12:53

## 2022-02-20 RX ADMIN — Medication 3: at 12:57

## 2022-02-20 RX ADMIN — Medication 1: at 09:42

## 2022-02-20 RX ADMIN — Medication 12.5 MILLIGRAM(S): at 17:17

## 2022-02-20 RX ADMIN — Medication 1 GRAM(S): at 17:17

## 2022-02-20 RX ADMIN — CEFEPIME 100 MILLIGRAM(S): 1 INJECTION, POWDER, FOR SOLUTION INTRAMUSCULAR; INTRAVENOUS at 05:32

## 2022-02-20 RX ADMIN — Medication 2 PACKET(S): at 09:45

## 2022-02-20 RX ADMIN — MORPHINE SULFATE 2 MILLIGRAM(S): 50 CAPSULE, EXTENDED RELEASE ORAL at 22:23

## 2022-02-20 RX ADMIN — PANTOPRAZOLE SODIUM 40 MILLIGRAM(S): 20 TABLET, DELAYED RELEASE ORAL at 05:32

## 2022-02-20 RX ADMIN — Medication 3: at 17:16

## 2022-02-20 RX ADMIN — CEFEPIME 100 MILLIGRAM(S): 1 INJECTION, POWDER, FOR SOLUTION INTRAMUSCULAR; INTRAVENOUS at 22:13

## 2022-02-20 RX ADMIN — SODIUM CHLORIDE 100 MILLILITER(S): 9 INJECTION INTRAMUSCULAR; INTRAVENOUS; SUBCUTANEOUS at 05:32

## 2022-02-20 RX ADMIN — GABAPENTIN 300 MILLIGRAM(S): 400 CAPSULE ORAL at 22:13

## 2022-02-20 RX ADMIN — Medication 1 PATCH: at 12:52

## 2022-02-20 RX ADMIN — ATORVASTATIN CALCIUM 10 MILLIGRAM(S): 80 TABLET, FILM COATED ORAL at 22:13

## 2022-02-20 RX ADMIN — MORPHINE SULFATE 2 MILLIGRAM(S): 50 CAPSULE, EXTENDED RELEASE ORAL at 06:26

## 2022-02-20 RX ADMIN — Medication 1 PATCH: at 22:14

## 2022-02-20 RX ADMIN — GABAPENTIN 300 MILLIGRAM(S): 400 CAPSULE ORAL at 05:32

## 2022-02-20 RX ADMIN — Medication 1 GRAM(S): at 12:53

## 2022-02-20 RX ADMIN — Medication 500 MILLIGRAM(S): at 13:10

## 2022-02-20 RX ADMIN — GABAPENTIN 300 MILLIGRAM(S): 400 CAPSULE ORAL at 13:11

## 2022-02-20 RX ADMIN — Medication 12.5 MILLIGRAM(S): at 05:32

## 2022-02-20 RX ADMIN — Medication 2 PACKET(S): at 12:51

## 2022-02-20 RX ADMIN — Medication 81 MILLIGRAM(S): at 12:56

## 2022-02-20 RX ADMIN — Medication 500 MILLIGRAM(S): at 22:13

## 2022-02-20 RX ADMIN — MORPHINE SULFATE 2 MILLIGRAM(S): 50 CAPSULE, EXTENDED RELEASE ORAL at 13:40

## 2022-02-20 RX ADMIN — Medication 500 MILLIGRAM(S): at 05:32

## 2022-02-20 RX ADMIN — MORPHINE SULFATE 2 MILLIGRAM(S): 50 CAPSULE, EXTENDED RELEASE ORAL at 23:06

## 2022-02-20 NOTE — PROGRESS NOTE ADULT - ASSESSMENT
60 yo male w/ history of DM II, PAD, p/w R 4th/5th digit dry gangrene w/ non healing R medial malleolar ulcer, no palpable pulses distal to femoral b/l.  Transmetatarsal amputation of right foot 2/15  awaiting tx to NS for further surgical care.  On 2/14 - coffee ground emesis, awaiting EGD now.  No prior hx of afib/flutter; briefly went into flutter; now back in NSR.  ECG: sinus 86bpm  ECG2: aflutter 120bpm  Echo: LVEF 60%; moderate AS  Hg 14    -pt currently back in NSR and rate controlled  -cont low-dose bb  -CHADSvasc 2, would ideally start AC for afib but in light of recent GIB/coffee grounds emesis, will hold off  -keep Hg>9  -TSH 1.160  -wound care and ABx as per podiatry and ID      aspirin  chewable 81 milliGRAM(s) Oral daily  atorvastatin 10 milliGRAM(s) Oral at bedtime  cefepime   IVPB 1000 milliGRAM(s) IV Intermittent every 8 hours  gabapentin 300 milliGRAM(s) Oral three times a day  insulin lispro (ADMELOG) corrective regimen sliding scale   SubCutaneous at bedtime  insulin lispro (ADMELOG) corrective regimen sliding scale   SubCutaneous three times a day before meals  LORazepam   Injectable 0.5 milliGRAM(s) IV Push once  metoprolol tartrate 12.5 milliGRAM(s) Oral two times a day  metroNIDAZOLE    Tablet 500 milliGRAM(s) Oral three times a day  nicotine - 21 mG/24Hr(s) Patch 1 patch Transdermal daily  pantoprazole  Injectable 40 milliGRAM(s) IV Push every 12 hours  senna 2 Tablet(s) Oral at bedtime  sodium chloride 0.9%. 1000 milliLiter(s) (100 mL/Hr) IV Continuous <Continuous>  sucralfate 1 Gram(s) Oral four times a day   60 yo male w/ history of DM II, PAD, p/w R 4th/5th digit dry gangrene w/ non healing R medial malleolar ulcer, no palpable pulses distal to femoral b/l.  Transmetatarsal amputation of right foot 2/15  awaiting tx to NS for further surgical care.  On 2/14 - coffee ground emesis, awaiting EGD now.  No prior hx of afib/flutter; briefly went into flutter; now back in NSR.  ECG: sinus 86bpm  ECG2: aflutter 120bpm  Echo: LVEF 60%; moderate AS  Hg 14    -pt currently back in NSR and rate controlled  -cont low-dose bb  -CHADSvasc 2, would ideally start AC for afib but in light of recent GIB/coffee grounds emesis, will hold off  -keep Hg>9  -TSH 1.160  -wound care and ABx as per podiatry and ID    Plan:  Continue aspirin and statin for cardiac risk reduction.  Gangrenous wound care management and antibiotics to continue.  Nicotine patch and smoking cessation again encouraged.PPI therapy with pantoprazole and sucralfate to continue status post EGD.  Continue metoprolol 12.5 mg twice daily for heart rate and blood pressure control along with insulin for diabetic care management.  Unclear timing of when to consider anticoagulation therapy but may benefit from resuming clopidogrel 75 mg daily for PVD management.  Perhaps a role for rivaroxaban both for a flutter and PVD risk reduction.

## 2022-02-20 NOTE — PROGRESS NOTE ADULT - SUBJECTIVE AND OBJECTIVE BOX
62 yo male w/ history of DM II, PAD, p/w R 4th/5th digit dry gangrene w/ non healing R medial malleolar ulcer, no palpable pulses distal to femoral b/l. He is lying in bed in NAD.    MEDICATIONS  (STANDING):  aspirin  chewable 81 milliGRAM(s) Oral daily  atorvastatin 10 milliGRAM(s) Oral at bedtime  cefepime   IVPB 1000 milliGRAM(s) IV Intermittent every 8 hours  dextrose 5%. 1000 milliLiter(s) (100 mL/Hr) IV Continuous <Continuous>  dextrose 5%. 1000 milliLiter(s) (50 mL/Hr) IV Continuous <Continuous>  dextrose 50% Injectable 25 Gram(s) IV Push once  dextrose 50% Injectable 12.5 Gram(s) IV Push once  dextrose 50% Injectable 25 Gram(s) IV Push once  gabapentin 300 milliGRAM(s) Oral three times a day  glucagon  Injectable 1 milliGRAM(s) IntraMuscular once  insulin lispro (ADMELOG) corrective regimen sliding scale   SubCutaneous at bedtime  insulin lispro (ADMELOG) corrective regimen sliding scale   SubCutaneous three times a day before meals  LORazepam   Injectable 0.5 milliGRAM(s) IV Push once  metoprolol tartrate 12.5 milliGRAM(s) Oral two times a day  metroNIDAZOLE    Tablet 500 milliGRAM(s) Oral three times a day  nicotine - 21 mG/24Hr(s) Patch 1 patch Transdermal daily  pantoprazole  Injectable 40 milliGRAM(s) IV Push every 12 hours  senna 2 Tablet(s) Oral at bedtime  sodium chloride 0.9%. 1000 milliLiter(s) (100 mL/Hr) IV Continuous <Continuous>  sucralfate 1 Gram(s) Oral four times a day    MEDICATIONS  (PRN):  acetaminophen     Tablet .. 650 milliGRAM(s) Oral every 4 hours PRN Mild Pain (1 - 3)  aluminum hydroxide/magnesium hydroxide/simethicone Suspension 30 milliLiter(s) Oral every 6 hours PRN Dyspepsia  HYDROmorphone  Injectable 0.5 milliGRAM(s) IV Push every 10 minutes PRN Severe Pain (7 - 10)  morphine  - Injectable 2 milliGRAM(s) IV Push every 4 hours PRN Severe Pain (7 - 10)  ondansetron Injectable 8 milliGRAM(s) IV Push every 6 hours PRN Nausea and/or Vomiting  oxycodone    5 mG/acetaminophen 325 mG 1 Tablet(s) Oral every 4 hours PRN Moderate Pain (4 - 6)  polyethylene glycol 3350 17 Gram(s) Oral daily PRN Constipation  simethicone 80 milliGRAM(s) Chew every 6 hours PRN Dyspepsia      Allergies    No Known Allergies    Intolerances        Vital Signs Last 24 Hrs  T(C): 37 (20 Feb 2022 16:50), Max: 37 (20 Feb 2022 16:50)  T(F): 98.6 (20 Feb 2022 16:50), Max: 98.6 (20 Feb 2022 16:50)  HR: 68 (20 Feb 2022 16:50) (61 - 75)  BP: 168/76 (20 Feb 2022 16:50) (125/77 - 180/91)   RR: 18 (20 Feb 2022 16:50) (16 - 18)  SpO2: 96% (20 Feb 2022 16:50) (96% - 97%)    PHYSICAL EXAM:  GENERAL: NAD, well-groomed, well-developed  HEAD: Atraumatic, Normocephalic  EYES: EOMI, PERRLA   NECK: Supple   NERVOUS SYSTEM:  Alert  CHEST/LUNG: Clear to auscultation bilaterally; No rales, rhonchi, wheezing, or rubs  HEART: Regular rate and rhythm; No murmurs, rubs, or gallops  ABDOMEN: Soft, Nontender, Nondistended; Bowel sounds present  EXTREMITIES: RLE foot wound dressed and wrapped    LABS:                        13.3   10.14 )-----------( 367      ( 20 Feb 2022 08:11 )             42.4     02-20    137  |  104  |  8   ----------------------------<  196<H>  3.9   |  28  |  0.79    Ca    7.9<L>      20 Feb 2022 08:11  Phos  2.2     02-20  Mg     2.6     02-20    POCT Blood Glucose.: 281 mg/dL (20 Feb 2022 16:24)  POCT Blood Glucose.: 293 mg/dL (20 Feb 2022 12:50)  POCT Blood Glucose.: 165 mg/dL (20 Feb 2022 09:15)  POCT Blood Glucose.: 160 mg/dL (20 Feb 2022 07:49)      Culture - Surgical Swab (collected 16 Feb 2022 08:53)  Source: .Surgical Swab RIGHT FOOT DEEP WOUND  Preliminary Report (19 Feb 2022 12:01):    Few Serratia marcescens    Rare Staphylococcus aureus    Few Streptococcus agalactiae (Group B) isolated    Group B streptococci are susceptible to ampicillin,    penicillin and cefazolin, but may be resistant to    erythromycin and clindamycin.    Recommendations for intrapartum prophylaxis for Group B    streptococci are penicillin or ampicillin.  Organism: Serratia marcescens  Staphylococcus aureus (19 Feb 2022 11:54)  Organism: Staphylococcus aureus (19 Feb 2022 11:54)  Organism: Serratia marcescens (18 Feb 2022 08:45)      RADIOLOGY & ADDITIONAL TESTS:    02-19-22 @ 07:01  -  02-20-22 @ 07:00  --------------------------------------------------------  IN:    sodium chloride 0.9%: 900 mL    sodium chloride 0.9%: 50 mL  Total IN: 950 mL    OUT:  Total OUT: 0 mL    Total NET: 950 mL

## 2022-02-20 NOTE — PROGRESS NOTE ADULT - SUBJECTIVE AND OBJECTIVE BOX
Patient is a 61y old  Male who presents with a chief complaint of gangrene foot (18 Feb 2022 08:14)    INTERVAL HISTORY: in no acute distress, denies any chest pain or sob      Vitals:  ICU Vital Signs Last 24 Hrs  T(C): 36.7 (20 Feb 2022 11:12), Max: 36.7 (20 Feb 2022 11:12)  T(F): 98 (20 Feb 2022 11:12), Max: 98 (20 Feb 2022 11:12)  HR: 75 (20 Feb 2022 11:12) (61 - 75)  BP: 155/75 (20 Feb 2022 11:12) (125/77 - 180/91)  RR: 16 (20 Feb 2022 11:12) (16 - 18)  SpO2: 97% (20 Feb 2022 11:12) (96% - 97%)    PHYSICAL EXAM:  Neuro: Awake, responsive  CV: S1 S2 RRR + SM  Lungs: CTABL  GI: Soft, BS +, ND, NT  Extremities: + LE edema with Rt foot dressing intact, Lt lower leg with blister    TELEMETRY: sinus, few PVCs    RADIOLOGY: < from: CT Angio Abd Aorta w/run-off w/ IV Cont (02.14.22 @ 15:37) >  LOWER CHEST: Dependent changes/atelectasis at the lung bases.    LIVER: Within normal limits.  BILE DUCTS: Normal caliber.  GALLBLADDER: Within normal limits.  SPLEEN: Within normal limits.  PANCREAS: Within normal limits.  ADRENALS: Within normal limits.  KIDNEYS/URETERS: Within normal limits.    BLADDER: Unremarkable.  REPRODUCTIVE ORGANS: Enlarged.    BOWEL: Moderate amount retained fecal material in the colon. No bowel   obstruction. Appendix is normal.  PERITONEUM: No ascites.  RETROPERITONEUM/LYMPH NODES: No lymphadenopathy.  ABDOMINAL WALL: Unremarkable.  BONES: No aggressive osseous lesion. Soft tissue edema along the plantar   surface of the right foot; see foot MRI of the same day.    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    Three-vessel runoff into the right foot.    Occluded left superficial femoral artery with reconstitution distally.    Moderate narrowing in the mid left popliteal artery.    Three-vessel runoff into the left foot.    Moderate narrowing in the proximal left external iliac artery with   associated areas of ulcerated plaque.    < end of copied text >      DIAGNOSTIC TESTING:    [x]< from: TTE Echo Complete w/o Contrast w/ Doppler (02.11.22 @ 19:51) >   ] Echocardiogram: < from: TTE Echo Complete w/o Contrast w/ Doppler (02.11.22 @ 19:51) >  Left Ventricle: Normal left ventricular size and wall thicknesses, with   normal systolic and diastolic function.  Global LV systolic function was normal. Left ventricular ejection   fraction, by visual estimation, is 55 to 60%. Spectral Doppler shows   normal pattern of LV diastolic filling.  Right Ventricle: Normal right ventricular size and function.  Left Atrium: The left atrium is normal in size.  Right Atrium: The right atrium is normal in size.  Pericardium: There is no evidence of pericardial effusion.  Mitral Valve: Structurally normal mitral valve, with normal leaflet   excursion. Trace mitral valve regurgitation is seen.  Tricuspid Valve: Structurally normal tricuspid valve, with normal leaflet   excursion. No tricuspid regurgitation is visualized.  Aortic Valve: Sclerotic aortic valve with normal opening. Peak   transaortic gradient equals 31.9 mmHg, mean transaortic gradient equals   14.6 mmHg, the calculated aortic valve area equals 1.25 cm² by the   continuity equation consistent with moderate aortic stenosis. Mild aortic   valve regurgitation is seen.  Pulmonic Valve: Structurally normal pulmonic valve, with normal leaflet   excursion. No indication of pulmonic valve regurgitation.  Aorta: The aortic root and ascending aorta are structurally normal, with   no evidence of dilitation.  Pulmonary Artery: The main pulmonary artery is normal in size.      Summary:   1. Left ventricular ejection fraction, by visual estimation, is 55 to   60%.   2. Normal global left ventricular systolic function.   3. Trace mitral valve regurgitation.   4. Mild aortic regurgitation.   5. Sclerotic aortic valve with normal opening.   6. Peak transaortic gradient equals 31.9 mmHg, mean transaortic gradient   equals 14.6 mmHg, the calculated aortic valve area equals 1.25 cm² by the   continuity equation consistent with moderate aortic stenosis.    < end of copied text >    LABS:	 	                            13.3   10.14 )-----------( 367      ( 20 Feb 2022 08:11 )             42.4       02-20    137  |  104  |  8   ----------------------------<  196<H>  3.9   |  28  |  0.79    Ca    7.9<L>      20 Feb 2022 08:11  Phos  2.2     02-20  Mg     2.6     02-20

## 2022-02-20 NOTE — PROGRESS NOTE ADULT - NUTRITIONAL ASSESSMENT
This patient has been assessed with a concern for Malnutrition and has been determined to have a diagnosis/diagnoses of Severe protein-calorie malnutrition.    This patient is being managed with:   Diet Consistent Carbohydrate w/Evening Snack-  Supplement Feeding Modality:  Oral  Glucerna Shake Cans or Servings Per Day:  1       Frequency:  Three Times a day  Entered: Feb 18 2022 11:06AM    Diet Consistent Carbohydrate w/Evening Snack-  Entered: Feb 17 2022 10:17AM    The following pending diet order is being considered for treatment of Severe protein-calorie malnutrition:null
This patient has been assessed with a concern for Malnutrition and has been determined to have a diagnosis/diagnoses of Severe protein-calorie malnutrition.    This patient is being managed with:   Diet Consistent Carbohydrate w/Evening Snack-  Supplement Feeding Modality:  Oral  Glucerna Shake Cans or Servings Per Day:  1       Frequency:  Three Times a day  Entered: Feb 18 2022 11:06AM    
This patient has been assessed with a concern for Malnutrition and has been determined to have a diagnosis/diagnoses of Severe protein-calorie malnutrition.    This patient is being managed with:   Diet Consistent Carbohydrate w/Evening Snack-  Supplement Feeding Modality:  Oral  Glucerna Shake Cans or Servings Per Day:  1       Frequency:  Three Times a day  Entered: Feb 18 2022 11:06AM

## 2022-02-21 ENCOUNTER — INPATIENT (INPATIENT)
Facility: HOSPITAL | Age: 62
LOS: 9 days | Discharge: HOME HEALTH | DRG: 253 | End: 2022-03-03
Attending: SURGERY | Admitting: HOSPITALIST
Payer: COMMERCIAL

## 2022-02-21 VITALS
RESPIRATION RATE: 18 BRPM | WEIGHT: 166.01 LBS | TEMPERATURE: 98 F | DIASTOLIC BLOOD PRESSURE: 75 MMHG | SYSTOLIC BLOOD PRESSURE: 123 MMHG | HEART RATE: 75 BPM | OXYGEN SATURATION: 99 %

## 2022-02-21 VITALS
HEART RATE: 72 BPM | TEMPERATURE: 98 F | DIASTOLIC BLOOD PRESSURE: 64 MMHG | SYSTOLIC BLOOD PRESSURE: 110 MMHG | OXYGEN SATURATION: 98 % | RESPIRATION RATE: 18 BRPM

## 2022-02-21 DIAGNOSIS — I73.9 PERIPHERAL VASCULAR DISEASE, UNSPECIFIED: ICD-10-CM

## 2022-02-21 DIAGNOSIS — K92.0 HEMATEMESIS: ICD-10-CM

## 2022-02-21 DIAGNOSIS — I96 GANGRENE, NOT ELSEWHERE CLASSIFIED: ICD-10-CM

## 2022-02-21 DIAGNOSIS — I48.91 UNSPECIFIED ATRIAL FIBRILLATION: ICD-10-CM

## 2022-02-21 DIAGNOSIS — Z89.431 ACQUIRED ABSENCE OF RIGHT FOOT: Chronic | ICD-10-CM

## 2022-02-21 DIAGNOSIS — Z29.9 ENCOUNTER FOR PROPHYLACTIC MEASURES, UNSPECIFIED: ICD-10-CM

## 2022-02-21 DIAGNOSIS — E11.9 TYPE 2 DIABETES MELLITUS WITHOUT COMPLICATIONS: ICD-10-CM

## 2022-02-21 LAB
ANION GAP SERPL CALC-SCNC: 5 MMOL/L — SIGNIFICANT CHANGE UP (ref 5–17)
APTT BLD: 30.7 SEC — SIGNIFICANT CHANGE UP (ref 27.5–35.5)
BUN SERPL-MCNC: 8 MG/DL — SIGNIFICANT CHANGE UP (ref 7–23)
CALCIUM SERPL-MCNC: 8.1 MG/DL — LOW (ref 8.5–10.1)
CHLORIDE SERPL-SCNC: 102 MMOL/L — SIGNIFICANT CHANGE UP (ref 96–108)
CO2 SERPL-SCNC: 29 MMOL/L — SIGNIFICANT CHANGE UP (ref 22–31)
CREAT SERPL-MCNC: 0.6 MG/DL — SIGNIFICANT CHANGE UP (ref 0.5–1.3)
CULTURE RESULTS: SIGNIFICANT CHANGE UP
GLUCOSE BLDC GLUCOMTR-MCNC: 194 MG/DL — HIGH (ref 70–99)
GLUCOSE BLDC GLUCOMTR-MCNC: 258 MG/DL — HIGH (ref 70–99)
GLUCOSE SERPL-MCNC: 204 MG/DL — HIGH (ref 70–99)
HCT VFR BLD CALC: 40.8 % — SIGNIFICANT CHANGE UP (ref 39–50)
HGB BLD-MCNC: 13.1 G/DL — SIGNIFICANT CHANGE UP (ref 13–17)
INR BLD: 1.28 RATIO — HIGH (ref 0.88–1.16)
MAGNESIUM SERPL-MCNC: 1.7 MG/DL — SIGNIFICANT CHANGE UP (ref 1.6–2.6)
MCHC RBC-ENTMCNC: 29.3 PG — SIGNIFICANT CHANGE UP (ref 27–34)
MCHC RBC-ENTMCNC: 32.1 G/DL — SIGNIFICANT CHANGE UP (ref 32–36)
MCV RBC AUTO: 91.3 FL — SIGNIFICANT CHANGE UP (ref 80–100)
NRBC # BLD: 0 /100 WBCS — SIGNIFICANT CHANGE UP (ref 0–0)
ORGANISM # SPEC MICROSCOPIC CNT: SIGNIFICANT CHANGE UP
PHOSPHATE SERPL-MCNC: 2.2 MG/DL — LOW (ref 2.5–4.5)
PLATELET # BLD AUTO: 366 K/UL — SIGNIFICANT CHANGE UP (ref 150–400)
POTASSIUM SERPL-MCNC: 4.2 MMOL/L — SIGNIFICANT CHANGE UP (ref 3.5–5.3)
POTASSIUM SERPL-SCNC: 4.2 MMOL/L — SIGNIFICANT CHANGE UP (ref 3.5–5.3)
PROTHROM AB SERPL-ACNC: 14.7 SEC — HIGH (ref 10.6–13.6)
RBC # BLD: 4.47 M/UL — SIGNIFICANT CHANGE UP (ref 4.2–5.8)
RBC # FLD: 12.8 % — SIGNIFICANT CHANGE UP (ref 10.3–14.5)
SODIUM SERPL-SCNC: 136 MMOL/L — SIGNIFICANT CHANGE UP (ref 135–145)
SPECIMEN SOURCE: SIGNIFICANT CHANGE UP
WBC # BLD: 9.35 K/UL — SIGNIFICANT CHANGE UP (ref 3.8–10.5)
WBC # FLD AUTO: 9.35 K/UL — SIGNIFICANT CHANGE UP (ref 3.8–10.5)

## 2022-02-21 PROCEDURE — 99239 HOSP IP/OBS DSCHRG MGMT >30: CPT

## 2022-02-21 PROCEDURE — 99223 1ST HOSP IP/OBS HIGH 75: CPT | Mod: GC

## 2022-02-21 RX ORDER — ATORVASTATIN CALCIUM 80 MG/1
10 TABLET, FILM COATED ORAL AT BEDTIME
Refills: 0 | Status: DISCONTINUED | OUTPATIENT
Start: 2022-02-21 | End: 2022-02-26

## 2022-02-21 RX ORDER — GLUCAGON INJECTION, SOLUTION 0.5 MG/.1ML
1 INJECTION, SOLUTION SUBCUTANEOUS ONCE
Refills: 0 | Status: DISCONTINUED | OUTPATIENT
Start: 2022-02-21 | End: 2022-02-27

## 2022-02-21 RX ORDER — INSULIN LISPRO 100/ML
2 VIAL (ML) SUBCUTANEOUS
Refills: 0 | Status: DISCONTINUED | OUTPATIENT
Start: 2022-02-21 | End: 2022-02-25

## 2022-02-21 RX ORDER — ASPIRIN/CALCIUM CARB/MAGNESIUM 324 MG
81 TABLET ORAL DAILY
Refills: 0 | Status: DISCONTINUED | OUTPATIENT
Start: 2022-02-21 | End: 2022-02-27

## 2022-02-21 RX ORDER — DEXTROSE 50 % IN WATER 50 %
12.5 SYRINGE (ML) INTRAVENOUS ONCE
Refills: 0 | Status: DISCONTINUED | OUTPATIENT
Start: 2022-02-21 | End: 2022-02-27

## 2022-02-21 RX ORDER — MORPHINE SULFATE 50 MG/1
2 CAPSULE, EXTENDED RELEASE ORAL ONCE
Refills: 0 | Status: DISCONTINUED | OUTPATIENT
Start: 2022-02-21 | End: 2022-02-21

## 2022-02-21 RX ORDER — NICOTINE POLACRILEX 2 MG
1 GUM BUCCAL DAILY
Refills: 0 | Status: DISCONTINUED | OUTPATIENT
Start: 2022-02-21 | End: 2022-02-27

## 2022-02-21 RX ORDER — CEFEPIME 1 G/1
1000 INJECTION, POWDER, FOR SOLUTION INTRAMUSCULAR; INTRAVENOUS EVERY 8 HOURS
Refills: 0 | Status: DISCONTINUED | OUTPATIENT
Start: 2022-02-22 | End: 2022-02-27

## 2022-02-21 RX ORDER — CEFEPIME 1 G/1
1000 INJECTION, POWDER, FOR SOLUTION INTRAMUSCULAR; INTRAVENOUS ONCE
Refills: 0 | Status: COMPLETED | OUTPATIENT
Start: 2022-02-21 | End: 2022-02-21

## 2022-02-21 RX ORDER — DEXTROSE 50 % IN WATER 50 %
25 SYRINGE (ML) INTRAVENOUS ONCE
Refills: 0 | Status: DISCONTINUED | OUTPATIENT
Start: 2022-02-21 | End: 2022-02-27

## 2022-02-21 RX ORDER — DEXTROSE 50 % IN WATER 50 %
15 SYRINGE (ML) INTRAVENOUS ONCE
Refills: 0 | Status: DISCONTINUED | OUTPATIENT
Start: 2022-02-21 | End: 2022-02-27

## 2022-02-21 RX ORDER — INFLUENZA VIRUS VACCINE 15; 15; 15; 15 UG/.5ML; UG/.5ML; UG/.5ML; UG/.5ML
0.5 SUSPENSION INTRAMUSCULAR ONCE
Refills: 0 | Status: COMPLETED | OUTPATIENT
Start: 2022-02-21 | End: 2022-02-21

## 2022-02-21 RX ORDER — SODIUM CHLORIDE 9 MG/ML
1000 INJECTION, SOLUTION INTRAVENOUS
Refills: 0 | Status: DISCONTINUED | OUTPATIENT
Start: 2022-02-21 | End: 2022-02-27

## 2022-02-21 RX ORDER — PANTOPRAZOLE SODIUM 20 MG/1
40 TABLET, DELAYED RELEASE ORAL
Refills: 0 | Status: DISCONTINUED | OUTPATIENT
Start: 2022-02-21 | End: 2022-02-21

## 2022-02-21 RX ORDER — ACETAMINOPHEN 500 MG
650 TABLET ORAL EVERY 6 HOURS
Refills: 0 | Status: DISCONTINUED | OUTPATIENT
Start: 2022-02-21 | End: 2022-02-27

## 2022-02-21 RX ORDER — METRONIDAZOLE 500 MG
500 TABLET ORAL THREE TIMES A DAY
Refills: 0 | Status: DISCONTINUED | OUTPATIENT
Start: 2022-02-21 | End: 2022-02-27

## 2022-02-21 RX ORDER — CEFEPIME 1 G/1
INJECTION, POWDER, FOR SOLUTION INTRAMUSCULAR; INTRAVENOUS
Refills: 0 | Status: DISCONTINUED | OUTPATIENT
Start: 2022-02-21 | End: 2022-02-27

## 2022-02-21 RX ORDER — SUCRALFATE 1 G
1 TABLET ORAL
Refills: 0 | Status: DISCONTINUED | OUTPATIENT
Start: 2022-02-21 | End: 2022-02-27

## 2022-02-21 RX ORDER — SIMETHICONE 80 MG/1
80 TABLET, CHEWABLE ORAL EVERY 6 HOURS
Refills: 0 | Status: DISCONTINUED | OUTPATIENT
Start: 2022-02-21 | End: 2022-02-27

## 2022-02-21 RX ORDER — PANTOPRAZOLE SODIUM 20 MG/1
40 TABLET, DELAYED RELEASE ORAL EVERY 12 HOURS
Refills: 0 | Status: DISCONTINUED | OUTPATIENT
Start: 2022-02-21 | End: 2022-02-27

## 2022-02-21 RX ORDER — INSULIN LISPRO 100/ML
VIAL (ML) SUBCUTANEOUS
Refills: 0 | Status: DISCONTINUED | OUTPATIENT
Start: 2022-02-21 | End: 2022-02-27

## 2022-02-21 RX ORDER — INSULIN LISPRO 100/ML
VIAL (ML) SUBCUTANEOUS
Refills: 0 | Status: DISCONTINUED | OUTPATIENT
Start: 2022-02-21 | End: 2022-02-23

## 2022-02-21 RX ORDER — METOPROLOL TARTRATE 50 MG
12.5 TABLET ORAL
Refills: 0 | Status: DISCONTINUED | OUTPATIENT
Start: 2022-02-21 | End: 2022-02-27

## 2022-02-21 RX ORDER — INSULIN GLARGINE 100 [IU]/ML
8 INJECTION, SOLUTION SUBCUTANEOUS AT BEDTIME
Refills: 0 | Status: DISCONTINUED | OUTPATIENT
Start: 2022-02-21 | End: 2022-02-25

## 2022-02-21 RX ADMIN — Medication 1 PATCH: at 11:29

## 2022-02-21 RX ADMIN — Medication 12.5 MILLIGRAM(S): at 05:20

## 2022-02-21 RX ADMIN — Medication 1 PATCH: at 07:11

## 2022-02-21 RX ADMIN — Medication 500 MILLIGRAM(S): at 13:28

## 2022-02-21 RX ADMIN — MORPHINE SULFATE 2 MILLIGRAM(S): 50 CAPSULE, EXTENDED RELEASE ORAL at 15:16

## 2022-02-21 RX ADMIN — MORPHINE SULFATE 2 MILLIGRAM(S): 50 CAPSULE, EXTENDED RELEASE ORAL at 21:49

## 2022-02-21 RX ADMIN — CEFEPIME 100 MILLIGRAM(S): 1 INJECTION, POWDER, FOR SOLUTION INTRAMUSCULAR; INTRAVENOUS at 18:56

## 2022-02-21 RX ADMIN — MORPHINE SULFATE 2 MILLIGRAM(S): 50 CAPSULE, EXTENDED RELEASE ORAL at 04:48

## 2022-02-21 RX ADMIN — Medication 500 MILLIGRAM(S): at 05:20

## 2022-02-21 RX ADMIN — Medication 1 GRAM(S): at 21:48

## 2022-02-21 RX ADMIN — Medication 3: at 19:04

## 2022-02-21 RX ADMIN — SODIUM CHLORIDE 100 MILLILITER(S): 9 INJECTION INTRAMUSCULAR; INTRAVENOUS; SUBCUTANEOUS at 12:43

## 2022-02-21 RX ADMIN — PANTOPRAZOLE SODIUM 40 MILLIGRAM(S): 20 TABLET, DELAYED RELEASE ORAL at 05:20

## 2022-02-21 RX ADMIN — MORPHINE SULFATE 2 MILLIGRAM(S): 50 CAPSULE, EXTENDED RELEASE ORAL at 09:59

## 2022-02-21 RX ADMIN — MORPHINE SULFATE 2 MILLIGRAM(S): 50 CAPSULE, EXTENDED RELEASE ORAL at 14:58

## 2022-02-21 RX ADMIN — MORPHINE SULFATE 2 MILLIGRAM(S): 50 CAPSULE, EXTENDED RELEASE ORAL at 11:10

## 2022-02-21 RX ADMIN — CEFEPIME 100 MILLIGRAM(S): 1 INJECTION, POWDER, FOR SOLUTION INTRAMUSCULAR; INTRAVENOUS at 13:29

## 2022-02-21 RX ADMIN — Medication 1 PATCH: at 12:20

## 2022-02-21 RX ADMIN — MORPHINE SULFATE 2 MILLIGRAM(S): 50 CAPSULE, EXTENDED RELEASE ORAL at 22:22

## 2022-02-21 RX ADMIN — GABAPENTIN 300 MILLIGRAM(S): 400 CAPSULE ORAL at 13:28

## 2022-02-21 RX ADMIN — GABAPENTIN 300 MILLIGRAM(S): 400 CAPSULE ORAL at 05:20

## 2022-02-21 RX ADMIN — Medication 3: at 11:29

## 2022-02-21 RX ADMIN — Medication 1: at 08:10

## 2022-02-21 RX ADMIN — MORPHINE SULFATE 2 MILLIGRAM(S): 50 CAPSULE, EXTENDED RELEASE ORAL at 06:42

## 2022-02-21 RX ADMIN — Medication 81 MILLIGRAM(S): at 11:29

## 2022-02-21 RX ADMIN — Medication 1 GRAM(S): at 11:29

## 2022-02-21 RX ADMIN — Medication 500 MILLIGRAM(S): at 21:49

## 2022-02-21 RX ADMIN — INSULIN GLARGINE 8 UNIT(S): 100 INJECTION, SOLUTION SUBCUTANEOUS at 22:06

## 2022-02-21 RX ADMIN — CEFEPIME 100 MILLIGRAM(S): 1 INJECTION, POWDER, FOR SOLUTION INTRAMUSCULAR; INTRAVENOUS at 05:20

## 2022-02-21 RX ADMIN — SODIUM CHLORIDE 100 MILLILITER(S): 9 INJECTION INTRAMUSCULAR; INTRAVENOUS; SUBCUTANEOUS at 05:20

## 2022-02-21 NOTE — PRE PROCEDURE NOTE - PRE PROCEDURE EVALUATION
PRE-INTERVENTIONAL RADIOLOGY PROCEDURE NOTE  ============================  Collin Pruett, PGY1  Internal Medicine Resident  ============================  Patient Name: BIA RAYMOND    Patient Age: 61y    Patient Gender: Male    Procedure: LLE IR thrombectomy    Diagnosis/Indication: PAD    Interventional Radiology Attending Physician: Dr. Oh    Ordering Attending Physician: Dr. Umanzor    Pertinent Medical History: PAD and Right     Pertinent labs:                      13.1   9.35  )-----------( 366      ( 21 Feb 2022 06:59 )             40.8       02-21    136  |  102  |  8   ----------------------------<  204<H>  4.2   |  29  |  0.60    Ca    8.1<L>      21 Feb 2022 06:59  Phos  2.2     02-21  Mg     1.7     02-21        PT/INR - ( 21 Feb 2022 06:59 )   PT: 14.7 sec;   INR: 1.28 ratio         PTT - ( 21 Feb 2022 06:59 )  PTT:30.7 sec        Patient and Family Aware ? Yes

## 2022-02-21 NOTE — H&P ADULT - PROBLEM SELECTOR PLAN 4
Patient with history of DM  - c/w ISS  - Hgb A1C is 8.6 Patient with history of DM  - basal bolus based on weight with low ISS  - Hgb A1C is 8.6

## 2022-02-21 NOTE — H&P ADULT - NSHPPHYSICALEXAM_GEN_ALL_CORE
.  VITAL SIGNS:  T(C): 36.6 (02-21-22 @ 16:03), Max: 37.1 (02-20-22 @ 23:36)  T(F): 97.8 (02-21-22 @ 16:03), Max: 98.8 (02-20-22 @ 23:36)  HR: 75 (02-21-22 @ 16:03) (64 - 75)  BP: 123/75 (02-21-22 @ 16:03) (110/64 - 169/78)  BP(mean): --  RR: 18 (02-21-22 @ 16:03) (16 - 18)  SpO2: 99% (02-21-22 @ 16:03) (98% - 99%)  Wt(kg): --    PHYSICAL EXAM:    Constitutional: WDWN resting comfortably in bed; NAD  Head: NC/AT  Eyes: PERRL, EOMI, anicteric sclera  ENT: no nasal discharge; uvula midline, no oropharyngeal erythema or exudates; MMM  Neck: supple; no JVD or thyromegaly  Respiratory: CTA B/L; no W/R/R, no retractions  Cardiac: +S1/S2; RRR; no M/R/G; PMI non-displaced  Gastrointestinal: soft, NT/ND; no rebound or guarding; +BSx4  Genitourinary: normal external genitalia  Back: spine midline, no bony tenderness or step-offs; no CVAT B/L  Extremities: WWP, no clubbing or cyanosis; no peripheral edema. Capillary refill <2 sec  Musculoskeletal: NROM x4; no joint swelling, tenderness or erythema  Vascular: 2+ radial, femoral, DP/PT pulses B/L  Dermatologic: skin warm, dry and intact; no rashes, wounds, or scars  Lymphatic: no submandibular or cervical LAD  Neurologic: AAOx3; CNII-XII grossly intact; no focal deficits  Psychiatric: affect and characteristics of appearance, verbalizations, behaviors are appropriate VITAL SIGNS:  T(C): 36.6 (02-21-22 @ 16:03), Max: 37.1 (02-20-22 @ 23:36)  T(F): 97.8 (02-21-22 @ 16:03), Max: 98.8 (02-20-22 @ 23:36)  HR: 75 (02-21-22 @ 16:03) (64 - 75)  BP: 123/75 (02-21-22 @ 16:03) (110/64 - 169/78)  BP(mean): --  RR: 18 (02-21-22 @ 16:03) (16 - 18)  SpO2: 99% (02-21-22 @ 16:03) (98% - 99%)  Wt(kg): --    PHYSICAL EXAM:    Constitutional: WDWN resting comfortably in bed; NAD  Head: NC/AT  Eyes: PERRL, EOMI, anicteric sclera  ENT: no nasal discharge; uvula midline, no oropharyngeal erythema or exudates; MMM  Neck: supple; no JVD or thyromegaly  Respiratory: CTA B/L; no W/R/R, no retractions  Cardiac: +S1/S2; RRR; no M/R/G; PMI non-displaced  Gastrointestinal: soft, NT/ND; no rebound or guarding; +BSx4  Genitourinary: normal external genitalia  Back: spine midline, no bony tenderness or step-offs; no CVAT B/L  Extremities: WWP, no clubbing or cyanosis; no peripheral edema. Capillary refill <2 sec  Musculoskeletal: NROM x4; no joint swelling, tenderness or erythema  Vascular: 2+ radial, femoral, DP/PT pulses B/L  Dermatologic: skin warm, dry and intact; no rashes, wounds, or scars  Lymphatic: no submandibular or cervical LAD  Neurologic: AAOx3; CNII-XII grossly intact; no focal deficits  Psychiatric: affect and characteristics of appearance, verbalizations, behaviors are appropriate VITAL SIGNS:  T(C): 36.6 (02-21-22 @ 16:03), Max: 37.1 (02-20-22 @ 23:36)  T(F): 97.8 (02-21-22 @ 16:03), Max: 98.8 (02-20-22 @ 23:36)  HR: 75 (02-21-22 @ 16:03) (64 - 75)  BP: 123/75 (02-21-22 @ 16:03) (110/64 - 169/78)  RR: 18 (02-21-22 @ 16:03) (16 - 18)  SpO2: 99% (02-21-22 @ 16:03) (98% - 99%)    PHYSICAL EXAM:    Constitutional: WDWN resting comfortably in bed; NAD  Head: NC/AT  Eyes: PERRL, EOMI, anicteric sclera  ENT: MMM  Neck: supple; no JVD  Respiratory: CTA B/L; no W/R/R, no retractions  Cardiac: +S1/S2; RRR; no M/R/G; PMI non-displaced  Gastrointestinal: soft, NT/ND; no rebound or guarding; +BSx4  Extremities: R foot TMA. Area covered in dressing. 1+ edema in Left leg up to mid shin. small wound noted in left mid shin  Musculoskeletal: NROM x4; no joint swelling, tenderness or erythema  Vascular: Poor pulses in LLE  Dermatologic: skin warm, dry  Lymphatic: no submandibular or cervical LAD  Neurologic: AAOx3; CNII-XII grossly intact; no focal deficits  Psychiatric: affect and characteristics of appearance, verbalizations, behaviors are appropriate VITAL SIGNS:  T(C): 36.6 (02-21-22 @ 16:03), Max: 37.1 (02-20-22 @ 23:36)  T(F): 97.8 (02-21-22 @ 16:03), Max: 98.8 (02-20-22 @ 23:36)  HR: 75 (02-21-22 @ 16:03) (64 - 75)  BP: 123/75 (02-21-22 @ 16:03) (110/64 - 169/78)  RR: 18 (02-21-22 @ 16:03) (16 - 18)  SpO2: 99% (02-21-22 @ 16:03) (98% - 99%)    PHYSICAL EXAM:    Constitutional: WDWN resting comfortably in bed; NAD  Head: NC/AT  Eyes: PERRL, EOMI, anicteric sclera  ENT: MMM  Neck: supple; no JVD  Respiratory: CTA B/L; no W/R/R, no retractions  Cardiac: +S1/S2; RRR; no M/R/G; PMI non-displaced  Gastrointestinal: soft, NT/ND; no rebound or guarding; +BSx4  Extremities: r  foot TMA. Area covered in dressing. 1+ edema in Left leg up to mid shin. small wound noted in left mid shin  Musculoskeletal: NROM x4; no joint swelling, tenderness or erythema  Vascular: Poor pulses in LLE  Dermatologic: skin warm, dry  Lymphatic: no submandibular or cervical LAD  Neurologic: AAOx3; CNII-XII grossly intact; no focal deficits  Psychiatric: affect and characteristics of appearance, verbalizations, behaviors are appropriate

## 2022-02-21 NOTE — H&P ADULT - PROBLEM SELECTOR PLAN 5
A. fib on post TMA EKG per anaesthesia   - no more episodes of A. fib on tele today  - Echo showed EF 55-60% & moderate aortic stenosis  - as per cardio, would ideally start AC for A. fib but in light of recent GIB/coffee grounds emesis will hold off  - started metoprolol   - ASA resumed as per GI A. fib on post TMA EKG per anaesthesia   - no more episodes of A. fib today at [facility]  - Echo showed EF 55-60% & moderate aortic stenosis  - as per cardio at , would ideally start AC for A. fib but in light of recent GIB/coffee grounds emesis will hold off  - Patient on metoprolol, can continue at this time  - ASA okay to resumed as per GI at [facility] A. fib on post TMA EKG per anaesthesia   - no more episodes of A. fib today at [facility]  - Echo showed EF 55-60% & moderate aortic stenosis  - as per cardio at , would ideally start AC for A. fib but in light of recent GIB/coffee grounds emesis will hold off  - Patient on metoprolol 12.5 BID, can continue at this time  - ASA okay to resumed as per GI at [facility]

## 2022-02-21 NOTE — H&P ADULT - PROBLEM SELECTOR PLAN 6
DVT: subcutaneous Heparin on hold post TMA and due to possible GI bleed  GI: Protonix  Diet: Carb consistent diet  Dispo: Pending clinical improvement, PT consult DVT: subcutaneous Heparin on hold post TMA and due to possible GI bleed  GI: Protonix, simethicone for dyspepsia  Diet: Carb consistent diet  Dispo: Pending clinical improvement, PT consult

## 2022-02-21 NOTE — PATIENT PROFILE ADULT - NSPROPOAURINARYCATHETER_GEN_A_NUR
Training, Wheelchair Mobility Training, Gait Training, Neuromuscular Re-education, Cognitive Reorientation, Pain Management, Home Exercise Program, Safety Education & Training, Patient/Caregiver Education & Training, Equipment Evaluation, Education, & procurement, Positioning  Safety Devices  Type of devices: Call light within reach, Chair alarm in place, Gait belt, Patient at risk for falls, Left in chair, Nurse notified  Restraints  Initially in place: No     Therapy Time   Individual Concurrent Group Co-treatment   Time In 1107         Time Out 1135         Minutes 28                 [place], [place] no

## 2022-02-21 NOTE — PROGRESS NOTE ADULT - SUBJECTIVE AND OBJECTIVE BOX
Patient is a 61y old  Male who presents with a chief complaint of gangrene foot (20 Feb 2022 21:19)      HPI:    Patient is a 61y old  Male   h/o  DM  2  on   oral meds,   who presents with a chief complaint of right foot gangrene .  4/5  th toes  for past 3  to 4  months   denies  cp/sob/ abd  pain/ fevers  seen by podiatry in er/  vascular called       (11 Feb 2022 17:26)      INTERVAL HPI/OVERNIGHT EVENTS:  Patient feeling much better. Hiccups resolved. Appetite great. The patient denies melena, hematochezia, hematemesis, nausea, vomiting, abdominal pain, constipation, diarrhea, or change in bowel movements Tolerating regular diet    MEDICATIONS  (STANDING):  aspirin  chewable 81 milliGRAM(s) Oral daily  atorvastatin 10 milliGRAM(s) Oral at bedtime  cefepime   IVPB 1000 milliGRAM(s) IV Intermittent every 8 hours  dextrose 5%. 1000 milliLiter(s) (50 mL/Hr) IV Continuous <Continuous>  dextrose 5%. 1000 milliLiter(s) (100 mL/Hr) IV Continuous <Continuous>  dextrose 50% Injectable 25 Gram(s) IV Push once  dextrose 50% Injectable 12.5 Gram(s) IV Push once  dextrose 50% Injectable 25 Gram(s) IV Push once  gabapentin 300 milliGRAM(s) Oral three times a day  glucagon  Injectable 1 milliGRAM(s) IntraMuscular once  insulin lispro (ADMELOG) corrective regimen sliding scale   SubCutaneous at bedtime  insulin lispro (ADMELOG) corrective regimen sliding scale   SubCutaneous three times a day before meals  LORazepam   Injectable 0.5 milliGRAM(s) IV Push once  metoprolol tartrate 12.5 milliGRAM(s) Oral two times a day  metroNIDAZOLE    Tablet 500 milliGRAM(s) Oral three times a day  nicotine - 21 mG/24Hr(s) Patch 1 patch Transdermal daily  pantoprazole  Injectable 40 milliGRAM(s) IV Push every 12 hours  senna 2 Tablet(s) Oral at bedtime  sodium chloride 0.9%. 1000 milliLiter(s) (100 mL/Hr) IV Continuous <Continuous>  sucralfate 1 Gram(s) Oral four times a day    MEDICATIONS  (PRN):  acetaminophen     Tablet .. 650 milliGRAM(s) Oral every 4 hours PRN Mild Pain (1 - 3)  aluminum hydroxide/magnesium hydroxide/simethicone Suspension 30 milliLiter(s) Oral every 6 hours PRN Dyspepsia  HYDROmorphone  Injectable 0.5 milliGRAM(s) IV Push every 10 minutes PRN Severe Pain (7 - 10)  morphine  - Injectable 2 milliGRAM(s) IV Push every 4 hours PRN Severe Pain (7 - 10)  ondansetron Injectable 8 milliGRAM(s) IV Push every 6 hours PRN Nausea and/or Vomiting  oxycodone    5 mG/acetaminophen 325 mG 1 Tablet(s) Oral every 4 hours PRN Moderate Pain (4 - 6)  polyethylene glycol 3350 17 Gram(s) Oral daily PRN Constipation  simethicone 80 milliGRAM(s) Chew every 6 hours PRN Dyspepsia      FAMILY HISTORY:      Allergies    No Known Allergies    Intolerances        PMH/PSH:  DM (diabetes mellitus)    No significant past surgical history          REVIEW OF SYSTEMS:  CONSTITUTIONAL: No fever, weight loss,   EYES: No eye pain, visual disturbances, or discharge  ENMT:  No difficulty hearing, tinnitus, vertigo; No sinus or throat pain  NECK: No pain or stiffness  BREASTS: No pain, masses, or nipple discharge  RESPIRATORY: No cough, wheezing, chills or hemoptysis; No shortness of breath  CARDIOVASCULAR: No chest pain, palpitations, dizziness, or leg swelling  GASTROINTESTINAL: See above   GENITOURINARY: No dysuria, frequency, hematuria, or incontinence  NEUROLOGICAL: No headaches, memory loss, loss of strength, numbness, or tremors  SKIN: No itching, burning, rashes, or lesions   LYMPH NODES: No enlarged glands  ENDOCRINE: No heat or cold intolerance; No hair loss  MUSCULOSKELETAL: No joint pain or swelling; No muscle, back, or extremity pain  PSYCHIATRIC: No depression, anxiety, mood swings, or difficulty sleeping  HEME/LYMPH: No easy bruising, or bleeding gums  ALLERGY AND IMMUNOLOGIC: No hives or eczema    Vital Signs Last 24 Hrs  T(C): 36.9 (21 Feb 2022 04:38), Max: 37.1 (20 Feb 2022 23:36)  T(F): 98.5 (21 Feb 2022 04:38), Max: 98.8 (20 Feb 2022 23:36)  HR: 64 (21 Feb 2022 04:38) (64 - 75)  BP: 169/78 (21 Feb 2022 04:38) (138/81 - 169/78)  BP(mean): --  RR: 18 (21 Feb 2022 04:38) (16 - 18)  SpO2: 98% (21 Feb 2022 04:38) (96% - 98%)    PHYSICAL EXAM:  GENERAL: NAD, well-groomed, well-developed  HEAD:  Atraumatic, Normocephalic  EYES: EOMI, PERRLA, conjunctiva and sclera clear  NECK: Supple, No JVD, Normal thyroid  NERVOUS SYSTEM:  Alert & Oriented X3, Good concentration; Motor Strength 5/5 B/L upper and lower extremities;   CHEST/LUNG: Clear to percussion bilaterally; No rales, rhonchi, wheezing, or rubs  HEART: Regular rate and rhythm; No murmurs, rubs, or gallops  ABDOMEN: Soft, Nontender, Nondistended; Bowel sounds present  EXTREMITIES:  2+ Peripheral Pulses, No clubbing, cyanosis, or edema  LYMPH: No lymphadenopathy noted  SKIN: No rashes or lesions    LAB                          13.1   9.35  )-----------( 366      ( 21 Feb 2022 06:59 )             40.8       CBC:  02-21 @ 06:59  WBC 9.35   Hgb 13.1   Hct 40.8   Plts 366  MCV 91.3  02-20 @ 08:11  WBC 10.14   Hgb 13.3   Hct 42.4   Plts 367  MCV 92.4  02-19 @ 07:16  WBC 12.22   Hgb 13.0   Hct 40.2   Plts 360  MCV 91.8  02-18 @ 07:35  WBC 13.06   Hgb 14.1   Hct 44.7   Plts 404  MCV 93.7  02-17 @ 08:32  WBC 13.07   Hgb 14.5   Hct 46.3   Plts 442  MCV 93.0  02-16 @ 03:27  WBC 15.58   Hgb 14.7   Hct 45.7   Plts 494  MCV 91.0  02-15 @ 11:45  WBC 18.63   Hgb 15.7   Hct 47.8   Plts 499  MCV 89.7  02-15 @ 01:15  WBC 21.30   Hgb 16.3   Hct 50.5   Plts 563  MCV 90.3      Chemistry:  02-21 @ 06:59  Na+ 136  K+ 4.2  Cl- 102  CO2 29  BUN 8  Cr 0.60     02-20 @ 08:11  Na+ 137  K+ 3.9  Cl- 104  CO2 28  BUN 8  Cr 0.79     02-19 @ 07:16  Na+ 137  K+ 3.6  Cl- 103  CO2 28  BUN 11  Cr 0.72     02-18 @ 07:35  Na+ 139  K+ 4.2  Cl- 105  CO2 24  BUN 14  Cr 0.65     02-17 @ 08:32  Na+ 136  K+ 4.3  Cl- 101  CO2 26  BUN 25  Cr 0.76     02-16 @ 03:27  Na+ 135  K+ 4.9  Cl- 97  CO2 29  BUN 33  Cr 1.12     02-15 @ 11:45  Na+ 137  K+ 4.7  Cl- 97  CO2 29  BUN 39  Cr 1.31     02-15 @ 01:19  Na+ 136  K+ 4.6  Cl- 95  CO2 29  BUN 38  Cr 1.28         Glucose, Serum: 204 mg/dL (02-21 @ 06:59)  Glucose, Serum: 196 mg/dL (02-20 @ 08:11)  Glucose, Serum: 253 mg/dL (02-19 @ 07:16)  Glucose, Serum: 168 mg/dL (02-18 @ 07:35)  Glucose, Serum: 203 mg/dL (02-17 @ 08:32)  Glucose, Serum: 332 mg/dL (02-16 @ 03:27)  Glucose, Serum: 288 mg/dL (02-15 @ 11:45)  Glucose, Serum: 262 mg/dL (02-15 @ 01:19)      21 Feb 2022 06:59    136    |  102    |  8      ----------------------------<  204    4.2     |  29     |  0.60   20 Feb 2022 08:11    137    |  104    |  8      ----------------------------<  196    3.9     |  28     |  0.79   19 Feb 2022 07:16    137    |  103    |  11     ----------------------------<  253    3.6     |  28     |  0.72   18 Feb 2022 07:35    139    |  105    |  14     ----------------------------<  168    4.2     |  24     |  0.65   17 Feb 2022 08:32    136    |  101    |  25     ----------------------------<  203    4.3     |  26     |  0.76   16 Feb 2022 03:27    135    |  97     |  33     ----------------------------<  332    4.9     |  29     |  1.12   15 Feb 2022 11:45    137    |  97     |  39     ----------------------------<  288    4.7     |  29     |  1.31     Ca    8.1        21 Feb 2022 06:59  Ca    7.9        20 Feb 2022 08:11  Ca    8.1        19 Feb 2022 07:16  Ca    8.2        18 Feb 2022 07:35  Ca    8.8        17 Feb 2022 08:32  Ca    9.4        16 Feb 2022 03:27  Ca    9.4        15 Feb 2022 11:45  Phos  2.2       21 Feb 2022 06:59  Phos  2.2       20 Feb 2022 08:11  Phos  2.3       19 Feb 2022 07:16  Phos  2.4       18 Feb 2022 07:35  Phos  3.2       17 Feb 2022 08:32  Mg     1.7       21 Feb 2022 06:59  Mg     2.6       20 Feb 2022 08:11  Mg     2.5       19 Feb 2022 07:16  Mg     2.1       18 Feb 2022 07:35  Mg     2.2       17 Feb 2022 08:32    TPro  7.8    /  Alb  2.5    /  TBili  0.5    /  DBili  0.1    /  AST  12     /  ALT  17     /  AlkPhos  88     15 Feb 2022 01:19      PT/INR - ( 21 Feb 2022 06:59 )   PT: 14.7 sec;   INR: 1.28 ratio         PTT - ( 21 Feb 2022 06:59 )  PTT:30.7 sec        CAPILLARY BLOOD GLUCOSE      POCT Blood Glucose.: 194 mg/dL (21 Feb 2022 07:31)  POCT Blood Glucose.: 283 mg/dL (20 Feb 2022 21:31)  POCT Blood Glucose.: 281 mg/dL (20 Feb 2022 16:24)  POCT Blood Glucose.: 293 mg/dL (20 Feb 2022 12:50)  POCT Blood Glucose.: 165 mg/dL (20 Feb 2022 09:15)      C-Reactive Protein, Serum: 150 mg/L (02-14 @ 09:52)      RADIOLOGY & ADDITIONAL TESTS:    Imaging Personally Reviewed:  [ ] YES  [ ] NO    Consultant(s) Notes Reviewed:  [ ] YES  [ ] NO    Care Discussed with Consultants/Other Providers [ ] YES  [ ] NO

## 2022-02-21 NOTE — H&P ADULT - NSHPSOCIALHISTORY_GEN_ALL_CORE
Well nourished Current smoker <1 ppd for past 43 years  Denies alcohol  Lives in [place] with wife. Retired. Used to work in the [employer]

## 2022-02-21 NOTE — PROGRESS NOTE ADULT - ASSESSMENT
HPI:    Patient is a 61y old  Male   h/o  DM  2  on   oral meds,   who presents with a chief complaint of right foot gangrene .  4/5  th toes  for past 3  to 4  months   denies  cp/sob/ abd  pain/ fevers  seen by podiatry in er/  vascular called       (11 Feb 2022 17:26)  ----- As Above ------- His  Called to see patient because of coffee ground emesis. It happened [id number] at night time x 2. It was not associated with abdominal pain. Patient denies  melena, hematochezia, hematemesis, nausea, vomiting, abdominal pain, constipation, diarrhea, or change in bowel movements since then. Patient on ASA and a PPI BID. Only c/o is hiccups.   Prior to this admission, the patient / wife deny melena, hematochezia, hematemesis, nausea, vomiting, abdominal pain, constipation, diarrhea, or change in bowel movements Patient never had a GI bleed before nor had any history of PUD  Patient today denies all GI symptoms except hiccups.     A) Coffee ground emesis - Patient on ASA - See KUB, See EGD, ASA restarted  1) Check histology 2) F/U labs 3) PPI PO BID 4) Continue Carafate   B) Hiccups  - Presently on Gabapentin and a PPI  1) Slower decrease Gabapentin 2) Change PPI to PO      C) colon cancer screening  - Refer as out patient   D) Esophagitis  - Check histology

## 2022-02-21 NOTE — PATIENT PROFILE ADULT - FUNCTIONAL SCREEN CURRENT LEVEL: COMMUNICATION, MLM
Pt requesting rx for oxycodone.   Last ordered 11/1/21  Also needs appt, transferred to TC to schedule        0 = understands/communicates without difficulty

## 2022-02-21 NOTE — H&P ADULT - PROBLEM SELECTOR PLAN 2
Initially at [facility] for R foot infection s/p R TMA 2/15  - vascular study at  showed an unobtainable ankle brachial index w/ diffusely calcified noncompressible vessels limiting evaluation and diminished flow below the right knee  - US showed occluded right superficial femoral and popliteal arteries with multiple stents   - CTA showed occluded right superficial femoral and popliteal arteries containing multiple overlapping stents with reconstitution in the mid popliteal artery, three-vessel runoff into b/l feet, occluded left superficial femoral artery with reconstitution distally, moderate narrowing in the mid left popliteal artery and moderate narrowing in the proximal left external iliac artery with associated areas of ulcerated plaque  - abscess cultures grew Serratia, GBS and MSSA  - Patient being treated with Cefepime & Flagyl, c/w abx at this time  - c/w morphine and percocet for pain Initially at [facility] for R foot infection  - MRI not suggestive of acute osteomyelitis  - vascular study at  showed an unobtainable ankle brachial index w/ diffusely calcified noncompressible vessels limiting evaluation and diminished flow below the right knee  - US and CTA as above  - Patient s/p R TMA 2/15/22 at [facility]  - abscess cultures grew Serratia, GBS and MSSA  - Patient being treated with Cefepime & Flagyl, c/w abx at this time  - c/w morphine and percocet for pain  - Plan for podiatry to perform surgical revision pending IR thrombectomy Initially at [place] for R foot infection  - MRI not suggestive of acute osteomyelitis  - vascular study at  showed an unobtainable ankle brachial index w/ diffusely calcified noncompressible vessels limiting evaluation and diminished flow below the right knee  - US and CTA as above  - Patient s/p R TMA 2/15/22 at [facility]  - abscess cultures grew Serratia, GBS and MSSA  - Patient being treated with Cefepime & Flagyl, c/w abx at this time  - c/w morphine and percocet for pain  - Plan for podiatry to perform surgical revision pending IR thrombectomy. Will contact podiatry in AM. Will also ask if podiatry needs vascular involved

## 2022-02-21 NOTE — PATIENT PROFILE ADULT - FALL HARM RISK - RISK INTERVENTIONS
Assistance OOB with selected safe patient handling equipment/Assistance with ambulation/Communicate Fall Risk and Risk Factors to all staff, patient, and family/Discuss with provider need for PT consult/Monitor gait and stability/Reinforce activity limits and safety measures with patient and family/Visual Cue: Yellow wristband/Bed in lowest position, wheels locked, appropriate side rails in place/Call bell, personal items and telephone in reach/Instruct patient to call for assistance before getting out of bed or chair/Non-slip footwear when patient is out of bed/[place] to call system/Physically safe environment - no spills, clutter or unnecessary equipment/Purposeful Proactive Rounding/Room/bathroom lighting operational, light cord in reach

## 2022-02-21 NOTE — H&P ADULT - ASSESSMENT
60 y/o M w/ PMHx DM, PAD s/p R TMA 2/15 transferred from [facility] for IR thrombectomy for R superficial femoral and popliteal artery occlusion and likely surgical revision with podiatry pending IR procedure

## 2022-02-21 NOTE — PROGRESS NOTE ADULT - PROVIDER SPECIALTY LIST ADULT
Gastroenterology
Podiatry
Podiatry
Hospitalist
Hospitalist
Infectious Disease
Podiatry
Cardiology
Cardiology
Hospitalist
Infectious Disease
Podiatry
Cardiology
Hospitalist
Infectious Disease
Podiatry
Gastroenterology

## 2022-02-21 NOTE — PROGRESS NOTE ADULT - REASON FOR ADMISSION
gangrene foot

## 2022-02-21 NOTE — PROGRESS NOTE ADULT - SUBJECTIVE AND OBJECTIVE BOX
Patient is a 61y old  Male who presents with a chief complaint of gangrene foot (21 Feb 2022 08:38)       INTERVAL HPI/OVERNIGHT EVENTS:  Patient seen and evaluated at bedside.  Pt is resting comfortable in NAD. Denies N/V/F/C.      Allergies    No Known Allergies    Intolerances        Vital Signs Last 24 Hrs  T(C): 36.8 (21 Feb 2022 10:51), Max: 37.1 (20 Feb 2022 23:36)  T(F): 98.2 (21 Feb 2022 10:51), Max: 98.8 (20 Feb 2022 23:36)  HR: 72 (21 Feb 2022 10:51) (64 - 72)  BP: 110/64 (21 Feb 2022 10:51) (110/64 - 169/78)  BP(mean): --  RR: 18 (21 Feb 2022 10:51) (16 - 18)  SpO2: 98% (21 Feb 2022 10:51) (96% - 98%)    LABS:                        13.1   9.35  )-----------( 366      ( 21 Feb 2022 06:59 )             40.8     02-21    136  |  102  |  8   ----------------------------<  204<H>  4.2   |  29  |  0.60    Ca    8.1<L>      21 Feb 2022 06:59  Phos  2.2     02-21  Mg     1.7     02-21      PT/INR - ( 21 Feb 2022 06:59 )   PT: 14.7 sec;   INR: 1.28 ratio         PTT - ( 21 Feb 2022 06:59 )  PTT:30.7 sec    CAPILLARY BLOOD GLUCOSE      POCT Blood Glucose.: 258 mg/dL (21 Feb 2022 11:14)  POCT Blood Glucose.: 194 mg/dL (21 Feb 2022 07:31)  POCT Blood Glucose.: 283 mg/dL (20 Feb 2022 21:31)  POCT Blood Glucose.: 281 mg/dL (20 Feb 2022 16:24)  POCT Blood Glucose.: 293 mg/dL (20 Feb 2022 12:50)      Lower Extremity Physical Exam:    Vascular: DP/PT 0/4 B/L, CFT >3 seconds B/L, Temperature gradient warm to cool B/L  Neuro: Epicritic sensation intact to the level of digits B/L  Musculoskeletal/Ortho: unremarkable   Skin: s/p 2/15/22 right foot TMA open: strikethrough noted on dressing, flaps cool to touch but still viable, plantar incision and flap with no signs of necrosis, sanguinous drainage, no purulence, no malodor, left foot no open wounds or lesions  RADIOLOGY & ADDITIONAL TESTS:

## 2022-02-21 NOTE — H&P ADULT - PROBLEM SELECTOR PLAN 3
Coffee ground emesis on 2/14  - c/w Protonix & carafate  - FOBT positive  - EGD on 2/18 showed distal moderate esophagitis s/p biopsy, proximal-mid esophageal whitish exudate s/p biopsy, mild antral gastritis s/p biopsy & mild duodenitis s/p biopsy   - H&H is stable Patient with episode of coffee ground emesis on 2/14  - On Protonix & carafate at [facility]/ at this time  - EGD on 2/18 showed distal moderate esophagitis s/p biopsy, proximal-mid esophageal whitish exudate s/p biopsy, mild antral gastritis s/p biopsy & mild duodenitis s/p biopsy   - H&H is stable, if downtrending will transfuse <7.   - CTM Patient with episode of coffee ground emesis on 2/14  - On Protonix & carafate at [facility]/ at this time  - EGD on 2/18 showed distal moderate esophagitis s/p biopsy, proximal-mid esophageal whitish exudate s/p biopsy, mild antral gastritis s/p biopsy & mild duodenitis s/p biopsy   - H&H is stable, if downtrending will transfuse <7.   - CTM

## 2022-02-21 NOTE — H&P ADULT - PROBLEM SELECTOR PLAN 1
- US showed occluded right superficial femoral and popliteal arteries with multiple stents   - CTA showed occluded right superficial femoral and popliteal arteries containing multiple overlapping stents with reconstitution in the mid popliteal artery, three-vessel runoff into b/l feet, occluded left superficial femoral artery with reconstitution distally, moderate narrowing in the mid left popliteal artery and moderate narrowing in the proximal left external iliac artery with associated areas of ulcerated plaque  - as per Dr. Garcia, patient to be transferred to [facility] 2/16 for IR thrombectomy 2/17  - c/w ASA & Lipitor Patient with history of severe PAD. US showed occluded right superficial femoral and popliteal arteries with multiple stents   - CTA showed occluded right superficial femoral and popliteal arteries containing multiple overlapping stents with reconstitution in the mid popliteal artery, three-vessel runoff into b/l feet, occluded left superficial femoral artery with reconstitution distally, moderate narrowing in the mid left popliteal artery and moderate narrowing in the proximal left external iliac artery with associated areas of ulcerated plaque  - Pt transferred to [facility] for IR thrombectomy  - c/w ASA & Lipitor  - Will place IR consult for thrombectomy Patient with history of severe PAD. US showed occluded right superficial femoral and popliteal arteries with multiple stents   - CTA showed occluded right superficial femoral and popliteal arteries containing multiple overlapping stents with reconstitution in the mid popliteal artery, three-vessel runoff into b/l feet, occluded left superficial femoral artery with reconstitution distally, moderate narrowing in the mid left popliteal artery and moderate narrowing in the proximal left external iliac artery with associated areas of ulcerated plaque  - Pt transferred to [facility] for IR thrombectomy  - c/w ASA & Lipitor  - IR aware of the patient. Plan to thrombectomy tomorrow. NPO at midnight

## 2022-02-21 NOTE — PROGRESS NOTE ADULT - ASSESSMENT
62yo M w/ right foot gangrene   - pt seen and evaluated  - afebrile, WBC down to 9  -s/p 2/15/22 right foot TMA open: mild strikethrough dressing, flaps cool to touch but still viable, plantar incision and flap with no signs of necrosis, sanguinous drainage, no purulence, no malodor, left foot no open wounds or lesions  -per intraop findings high concern for residual infection and viability  - continue IV cefepime, PO flagyl per ID recs, appreciated  - salvageability of right foot is guarded  - Intra op culture growing serratia and group B strep prelim  - IR plan to transfer pt to [facility] for further vascular work up/ IR thrombectomy 2/21 or 2/22  - podiatry plan pending IR/vascular intervention  - will follow  - discussed w/ attending

## 2022-02-21 NOTE — ED CLERICAL - NSCLERICAL TASK_GEN_ALL_ED
-Monitor blood pressures, on higher side  -Resume PTA losartan and Coreg   Pre-Hospital Care Report (PCR)

## 2022-02-21 NOTE — H&P ADULT - HISTORY OF PRESENT ILLNESS
62 y/o M w/ PMHx DM, PAD, presented initially to [facility] for R foot 4th/5th digit dry gangrene w/ non healing R medial malleolar ulcer. Patient found to have no palpable pulses distal to femoral b/l. MRI was done which was not suggestive of acute osteomyelitis; fluid collection was suspected. Patient underwent LE Arterial Doppler, which showed occluded R superficial femoral and popliteal arteries with multiple stents and Occluded proximal and mid left superficial femoral artery with reconstitution distally. Patient was initially treated with  foot wound with zosyn 2/11-2/15, and then switched over to cefepime and flagyl. Patient underwent R foot TMA on 2/15. Wound culture of R foot abscess grew numerous staph aureus, serratia marcescens, and strep agalactase. Intra-operative there was a concern for viability. Therefore, Per vascular plan is for patient to transfer to [facility] for IR thrombectomy for R superficial femoral and popliteal artery occlusion with multiple stents and Occluded proximal and mid left superficial femoral artery with reconstitution distally seen on LE arterial doppler. 62 y/o M w/ PMHx DM, PAD, presented initially to [facility] for R foot 4th/5th digit dry gangrene w/ non healing R medial malleolar ulcer. Patient found to have no palpable pulses distal to femoral b/l. MRI was done which was not suggestive of acute osteomyelitis; fluid collection was suspected. Patient underwent LE Arterial Doppler, which showed occluded R superficial femoral and popliteal arteries with multiple stents and Occluded proximal and mid left superficial femoral artery with reconstitution distally. Patient was initially treated with  foot wound with zosyn 2/11-2/15, and then switched over to cefepime and flagyl. Patient underwent R foot TMA on 2/15. Wound culture of R foot abscess grew numerous staph aureus, serratia marcescens, and strep agalactase. Intra-operative there was a concern for viability. Therefore, plan was to transfer patient to [facility] for IR thrombectomy for R superficial femoral and popliteal artery occlusion with multiple stents and Occluded proximal and mid left superficial femoral artery with reconstitution distally seen on LE arterial doppler. 60 y/o M w/ PMHx DM, PAD, presented initially to [facility] for R foot 4th/5th digit dry gangrene w/ non healing R medial malleolar ulcer. Patient found to have no palpable pulses distal to femoral b/l. MRI was done which was not suggestive of acute osteomyelitis; fluid collection was suspected. Patient underwent LE Arterial Doppler, which showed occluded R superficial femoral and popliteal arteries with multiple stents and Occluded proximal and mid left superficial femoral artery with reconstitution distally. Patient was initially treated with  foot wound with zosyn 2/11-2/15, and then switched over to cefepime and flagyl. Patient underwent R foot TMA on 2/15. Wound culture of R foot abscess grew numerous staph aureus, serratia marcescens, and strep agalactase. Intra-operative there was a concern for viability. Therefore, plan was to transfer patient to [facility] for IR thrombectomy for R superficial femoral and popliteal artery occlusion with multiple stents and Occluded proximal and mid left superficial femoral artery with reconstitution distally seen on LE arterial doppler.    At this time, patient reports  62 y/o M w/ PMHx DM, PAD, presented initially to [facility] for R foot 4th/5th digit dry gangrene w/ non healing R medial malleolar ulcer. Patient found to have no palpable pulses distal to femoral b/l. MRI was done which was not suggestive of acute osteomyelitis; fluid collection was suspected. Patient underwent LE Arterial Doppler, which showed occluded R superficial femoral and popliteal arteries with multiple stents and Occluded proximal and mid left superficial femoral artery with reconstitution distally. Patient was initially treated with  foot wound with zosyn 2/11-2/15, and then switched over to cefepime and flagyl. Patient underwent R foot TMA on 2/15. Wound culture of R foot abscess grew numerous staph aureus, serratia marcescens, and strep agalactase. Intra-operative there was a concern for viability. Therefore, plan was to transfer patient to [facility] for IR thrombectomy for R superficial femoral and popliteal artery occlusion with multiple stents and Occluded proximal and mid left superficial femoral artery with reconstitution distally seen on LE arterial doppler.    At this time, patient reports he is doing well and looking forward to IR procedure. Denies any complaints at this time

## 2022-02-21 NOTE — H&P ADULT - ATTENDING COMMENTS
Agree with above. Laurie presented to OSH for gangrene s/p TMA complicated by poor blood flow with subsequent testing showing occluded arterial flow to foot. c/b gIB s/p endoscopy with gastritis, new-onset a-fib, not on A/c. Patient transferred to [facility] for IR procedure. Patient without complaints at this time.     #PAD  -for IR on 2/22. Will discuss with transfer center whether to stay for rest of care at [facility] or to return to [place] for rest of care.  -npo p mn.  -on ASA, will need to weigh r/b/a of A/c given pad and a-fib but in setting of recent GIB  -podiatry to follow for further debridement after IR procedure    #a-fib  CHADVASC at least 3, will need to weigh r/b/a of A/c in setting of GIB    #gastritis  -PPI    #DM type 2  -start basal regimen, will be npo so dose .2mg/kg basal regimen.;  -start bolus once starts eating.    rest as above

## 2022-02-22 DIAGNOSIS — Z01.818 ENCOUNTER FOR OTHER PREPROCEDURAL EXAMINATION: ICD-10-CM

## 2022-02-22 LAB
ALBUMIN SERPL ELPH-MCNC: 2.7 G/DL — LOW (ref 3.3–5)
ALP SERPL-CCNC: 72 U/L — SIGNIFICANT CHANGE UP (ref 40–120)
ALT FLD-CCNC: 24 U/L — SIGNIFICANT CHANGE UP (ref 10–45)
ANION GAP SERPL CALC-SCNC: 10 MMOL/L — SIGNIFICANT CHANGE UP (ref 5–17)
APTT BLD: 29.1 SEC — SIGNIFICANT CHANGE UP (ref 27.5–35.5)
AST SERPL-CCNC: 28 U/L — SIGNIFICANT CHANGE UP (ref 10–40)
BASOPHILS # BLD AUTO: 0.08 K/UL — SIGNIFICANT CHANGE UP (ref 0–0.2)
BASOPHILS NFR BLD AUTO: 0.8 % — SIGNIFICANT CHANGE UP (ref 0–2)
BILIRUB SERPL-MCNC: 0.2 MG/DL — SIGNIFICANT CHANGE UP (ref 0.2–1.2)
BLD GP AB SCN SERPL QL: NEGATIVE — SIGNIFICANT CHANGE UP
BUN SERPL-MCNC: 9 MG/DL — SIGNIFICANT CHANGE UP (ref 7–23)
CALCIUM SERPL-MCNC: 8.4 MG/DL — SIGNIFICANT CHANGE UP (ref 8.4–10.5)
CHLORIDE SERPL-SCNC: 99 MMOL/L — SIGNIFICANT CHANGE UP (ref 96–108)
CO2 SERPL-SCNC: 23 MMOL/L — SIGNIFICANT CHANGE UP (ref 22–31)
CREAT SERPL-MCNC: 0.56 MG/DL — SIGNIFICANT CHANGE UP (ref 0.5–1.3)
EOSINOPHIL # BLD AUTO: 0.16 K/UL — SIGNIFICANT CHANGE UP (ref 0–0.5)
EOSINOPHIL NFR BLD AUTO: 1.6 % — SIGNIFICANT CHANGE UP (ref 0–6)
GLUCOSE BLDC GLUCOMTR-MCNC: 203 MG/DL — HIGH (ref 70–99)
GLUCOSE BLDC GLUCOMTR-MCNC: 233 MG/DL — HIGH (ref 70–99)
GLUCOSE BLDC GLUCOMTR-MCNC: 237 MG/DL — HIGH (ref 70–99)
GLUCOSE SERPL-MCNC: 216 MG/DL — HIGH (ref 70–99)
HCT VFR BLD CALC: 40.3 % — SIGNIFICANT CHANGE UP (ref 39–50)
HGB BLD-MCNC: 13.1 G/DL — SIGNIFICANT CHANGE UP (ref 13–17)
IMM GRANULOCYTES NFR BLD AUTO: 1 % — SIGNIFICANT CHANGE UP (ref 0–1.5)
INR BLD: 1.16 RATIO — SIGNIFICANT CHANGE UP (ref 0.88–1.16)
LYMPHOCYTES # BLD AUTO: 1.63 K/UL — SIGNIFICANT CHANGE UP (ref 1–3.3)
LYMPHOCYTES # BLD AUTO: 16.3 % — SIGNIFICANT CHANGE UP (ref 13–44)
MAGNESIUM SERPL-MCNC: 1.7 MG/DL — SIGNIFICANT CHANGE UP (ref 1.6–2.6)
MCHC RBC-ENTMCNC: 29.8 PG — SIGNIFICANT CHANGE UP (ref 27–34)
MCHC RBC-ENTMCNC: 32.5 GM/DL — SIGNIFICANT CHANGE UP (ref 32–36)
MCV RBC AUTO: 91.6 FL — SIGNIFICANT CHANGE UP (ref 80–100)
MONOCYTES # BLD AUTO: 1.05 K/UL — HIGH (ref 0–0.9)
MONOCYTES NFR BLD AUTO: 10.5 % — SIGNIFICANT CHANGE UP (ref 2–14)
NEUTROPHILS # BLD AUTO: 6.98 K/UL — SIGNIFICANT CHANGE UP (ref 1.8–7.4)
NEUTROPHILS NFR BLD AUTO: 69.8 % — SIGNIFICANT CHANGE UP (ref 43–77)
NRBC # BLD: 0 /100 WBCS — SIGNIFICANT CHANGE UP (ref 0–0)
PHOSPHATE SERPL-MCNC: 2.5 MG/DL — SIGNIFICANT CHANGE UP (ref 2.5–4.5)
PLATELET # BLD AUTO: 365 K/UL — SIGNIFICANT CHANGE UP (ref 150–400)
POTASSIUM SERPL-MCNC: 4.7 MMOL/L — SIGNIFICANT CHANGE UP (ref 3.5–5.3)
POTASSIUM SERPL-SCNC: 4.7 MMOL/L — SIGNIFICANT CHANGE UP (ref 3.5–5.3)
PROT SERPL-MCNC: 5.2 G/DL — LOW (ref 6–8.3)
PROTHROM AB SERPL-ACNC: 13.8 SEC — HIGH (ref 10.6–13.6)
RBC # BLD: 4.4 M/UL — SIGNIFICANT CHANGE UP (ref 4.2–5.8)
RBC # FLD: 12.7 % — SIGNIFICANT CHANGE UP (ref 10.3–14.5)
RH IG SCN BLD-IMP: POSITIVE — SIGNIFICANT CHANGE UP
SODIUM SERPL-SCNC: 132 MMOL/L — LOW (ref 135–145)
WBC # BLD: 10 K/UL — SIGNIFICANT CHANGE UP (ref 3.8–10.5)
WBC # FLD AUTO: 10 K/UL — SIGNIFICANT CHANGE UP (ref 3.8–10.5)

## 2022-02-22 PROCEDURE — 37226: CPT | Mod: RT

## 2022-02-22 PROCEDURE — 99233 SBSQ HOSP IP/OBS HIGH 50: CPT | Mod: GC

## 2022-02-22 RX ORDER — ONDANSETRON 8 MG/1
8 TABLET, FILM COATED ORAL ONCE
Refills: 0 | Status: DISCONTINUED | OUTPATIENT
Start: 2022-02-22 | End: 2022-02-24

## 2022-02-22 RX ORDER — ASPIRIN/CALCIUM CARB/MAGNESIUM 324 MG
1 TABLET ORAL
Qty: 0 | Refills: 0 | DISCHARGE

## 2022-02-22 RX ORDER — MORPHINE SULFATE 50 MG/1
2 CAPSULE, EXTENDED RELEASE ORAL ONCE
Refills: 0 | Status: DISCONTINUED | OUTPATIENT
Start: 2022-02-22 | End: 2022-02-22

## 2022-02-22 RX ORDER — FENTANYL CITRATE 50 UG/ML
25 INJECTION INTRAVENOUS
Refills: 0 | Status: DISCONTINUED | OUTPATIENT
Start: 2022-02-22 | End: 2022-02-24

## 2022-02-22 RX ORDER — SODIUM CHLORIDE 9 MG/ML
1000 INJECTION, SOLUTION INTRAVENOUS
Refills: 0 | Status: DISCONTINUED | OUTPATIENT
Start: 2022-02-22 | End: 2022-02-24

## 2022-02-22 RX ORDER — MORPHINE SULFATE 50 MG/1
2 CAPSULE, EXTENDED RELEASE ORAL EVERY 6 HOURS
Refills: 0 | Status: DISCONTINUED | OUTPATIENT
Start: 2022-02-22 | End: 2022-02-24

## 2022-02-22 RX ADMIN — Medication 1 GRAM(S): at 23:17

## 2022-02-22 RX ADMIN — MORPHINE SULFATE 2 MILLIGRAM(S): 50 CAPSULE, EXTENDED RELEASE ORAL at 01:55

## 2022-02-22 RX ADMIN — ATORVASTATIN CALCIUM 10 MILLIGRAM(S): 80 TABLET, FILM COATED ORAL at 21:39

## 2022-02-22 RX ADMIN — CEFEPIME 100 MILLIGRAM(S): 1 INJECTION, POWDER, FOR SOLUTION INTRAMUSCULAR; INTRAVENOUS at 05:51

## 2022-02-22 RX ADMIN — PANTOPRAZOLE SODIUM 40 MILLIGRAM(S): 20 TABLET, DELAYED RELEASE ORAL at 17:48

## 2022-02-22 RX ADMIN — CEFEPIME 100 MILLIGRAM(S): 1 INJECTION, POWDER, FOR SOLUTION INTRAMUSCULAR; INTRAVENOUS at 21:41

## 2022-02-22 RX ADMIN — Medication 1 PATCH: at 11:06

## 2022-02-22 RX ADMIN — MORPHINE SULFATE 2 MILLIGRAM(S): 50 CAPSULE, EXTENDED RELEASE ORAL at 01:23

## 2022-02-22 RX ADMIN — SODIUM CHLORIDE 75 MILLILITER(S): 9 INJECTION, SOLUTION INTRAVENOUS at 21:40

## 2022-02-22 RX ADMIN — Medication 1 GRAM(S): at 17:48

## 2022-02-22 RX ADMIN — PANTOPRAZOLE SODIUM 40 MILLIGRAM(S): 20 TABLET, DELAYED RELEASE ORAL at 05:45

## 2022-02-22 RX ADMIN — Medication 12.5 MILLIGRAM(S): at 05:46

## 2022-02-22 RX ADMIN — Medication 12.5 MILLIGRAM(S): at 17:48

## 2022-02-22 RX ADMIN — Medication 1 GRAM(S): at 05:46

## 2022-02-22 RX ADMIN — Medication 500 MILLIGRAM(S): at 21:40

## 2022-02-22 RX ADMIN — Medication 1 PATCH: at 20:00

## 2022-02-22 RX ADMIN — MORPHINE SULFATE 2 MILLIGRAM(S): 50 CAPSULE, EXTENDED RELEASE ORAL at 10:19

## 2022-02-22 RX ADMIN — MORPHINE SULFATE 2 MILLIGRAM(S): 50 CAPSULE, EXTENDED RELEASE ORAL at 22:06

## 2022-02-22 RX ADMIN — Medication 500 MILLIGRAM(S): at 05:46

## 2022-02-22 RX ADMIN — INSULIN GLARGINE 8 UNIT(S): 100 INJECTION, SOLUTION SUBCUTANEOUS at 22:30

## 2022-02-22 RX ADMIN — MORPHINE SULFATE 2 MILLIGRAM(S): 50 CAPSULE, EXTENDED RELEASE ORAL at 21:51

## 2022-02-22 NOTE — CONSULT NOTE ADULT - SUBJECTIVE AND OBJECTIVE BOX
SURGERY CONSULT NOTE    HPI:  60 y/o M w/ PMHx DM, PAD, presented initially to [facility] for R foot 4th/5th digit dry gangrene w/ non healing R medial malleolar ulcer. Patient found to have no palpable pulses distal to femoral b/l. MRI was done which was not suggestive of acute osteomyelitis; fluid collection was suspected. Patient underwent LE Arterial Doppler, which showed occluded R superficial femoral and popliteal arteries with multiple stents and Occluded proximal and mid left superficial femoral artery with reconstitution distally. Patient was initially treated with  foot wound with zosyn 2/11-2/15, and then switched over to cefepime and flagyl. Patient underwent R foot TMA on 2/15. Wound culture of R foot abscess grew numerous staph aureus, serratia marcescens, and strep agalactase. Intra-operative there was a concern for viability. Therefore, plan was to transfer patient to [facility] for IR thrombectomy for R superficial femoral and popliteal artery occlusion with multiple stents and Occluded proximal and mid left superficial femoral artery with reconstitution distally seen on LE arterial doppler.    Patient underwent IR  procedure on 2/22 for RLE angiogram and angioplasty - SFA was stented with poor flow and the case was aborted. At time of encounter patient. ----    PAST MEDICAL & SURGICAL HISTORY:  DM (diabetes mellitus)    Peripheral artery disease    S/P transmetatarsal amputation of foot, right  2/15/2022        MEDICATIONS  (STANDING):  aspirin enteric coated 81 milliGRAM(s) Oral daily  atorvastatin 10 milliGRAM(s) Oral at bedtime  cefepime   IVPB      cefepime   IVPB 1000 milliGRAM(s) IV Intermittent every 8 hours  dextrose 40% Gel 15 Gram(s) Oral once  dextrose 5%. 1000 milliLiter(s) (50 mL/Hr) IV Continuous <Continuous>  dextrose 5%. 1000 milliLiter(s) (100 mL/Hr) IV Continuous <Continuous>  dextrose 50% Injectable 25 Gram(s) IV Push once  dextrose 50% Injectable 12.5 Gram(s) IV Push once  dextrose 50% Injectable 25 Gram(s) IV Push once  glucagon  Injectable 1 milliGRAM(s) IntraMuscular once  insulin glargine Injectable (LANTUS) 8 Unit(s) SubCutaneous at bedtime  insulin lispro (ADMELOG) corrective regimen sliding scale   SubCutaneous three times a day before meals  insulin lispro (ADMELOG) corrective regimen sliding scale   SubCutaneous three times a day before meals  insulin lispro Injectable (ADMELOG) 2 Unit(s) SubCutaneous three times a day before meals  lactated ringers. 1000 milliLiter(s) (75 mL/Hr) IV Continuous <Continuous>  metoprolol tartrate 12.5 milliGRAM(s) Oral two times a day  metroNIDAZOLE    Tablet 500 milliGRAM(s) Oral three times a day  nicotine - 21 mG/24Hr(s) Patch 1 patch Transdermal daily  pantoprazole  Injectable 40 milliGRAM(s) IV Push every 12 hours  sucralfate 1 Gram(s) Oral four times a day    MEDICATIONS  (PRN):  acetaminophen     Tablet .. 650 milliGRAM(s) Oral every 6 hours PRN Mild Pain (1 - 3), Moderate Pain (4 - 6)  aluminum hydroxide/magnesium hydroxide/simethicone Suspension 30 milliLiter(s) Oral every 6 hours PRN Dyspepsia  fentaNYL    Injectable 25 MICROGram(s) IV Push every 5 minutes PRN Moderate Pain (4 - 6)  morphine  - Injectable 2 milliGRAM(s) IV Push every 6 hours PRN Severe Pain (7 - 10)  ondansetron Injectable 8 milliGRAM(s) IV Push once PRN Nausea and/or Vomiting  simethicone 80 milliGRAM(s) Chew every 6 hours PRN Dyspepsia      Allergies    No Known Allergies    Intolerances        SOCIAL HISTORY:    FAMILY HISTORY:  No pertinent family history in first degree relatives        Physical Exam:  General: NAD, resting comfortably  HEENT: NC/AT, EOMI, normal hearing, no oral lesions, no LAD, neck supple  Pulmonary: normal resp effort, CTA-B  Cardiovascular: NSR, no murmurs  Abdominal: soft, ND/NT, no organomegaly  Extremities: WWP, normal strength, no clubbing/cyanosis/edema  Neuro: A/O x 3, CNs II-XII grossly intact, normal sensation, no focal deficits  Pulses: palpable distal pulses    Vital Signs Last 24 Hrs  T(C): 36.6 (22 Feb 2022 17:30), Max: 36.9 (21 Feb 2022 20:03)  T(F): 97.8 (22 Feb 2022 17:30), Max: 98.4 (21 Feb 2022 20:03)  HR: 66 (22 Feb 2022 17:30) (63 - 98)  BP: 137/66 (22 Feb 2022 17:30) (103/57 - 167/77)  BP(mean): 70 (22 Feb 2022 16:35) (70 - 103)  RR: 16 (22 Feb 2022 17:30) (16 - 23)  SpO2: 95% (22 Feb 2022 17:30) (74% - 98%)    I&O's Summary    21 Feb 2022 07:01  -  22 Feb 2022 07:00  --------------------------------------------------------  IN: 360 mL / OUT: 1800 mL / NET: -1440 mL    22 Feb 2022 07:01  -  22 Feb 2022 18:25  --------------------------------------------------------  IN: 0 mL / OUT: 2350 mL / NET: -2350 mL            LABS:                        13.1   10.00 )-----------( 365      ( 22 Feb 2022 05:42 )             40.3     02-22    132<L>  |  99  |  9   ----------------------------<  216<H>  4.7   |  23  |  0.56    Ca    8.4      22 Feb 2022 05:42  Phos  2.5     02-22  Mg     1.7     02-22    TPro  5.2<L>  /  Alb  2.7<L>  /  TBili  0.2  /  DBili  x   /  AST  28  /  ALT  24  /  AlkPhos  72  02-22    PT/INR - ( 22 Feb 2022 05:42 )   PT: 13.8 sec;   INR: 1.16 ratio         PTT - ( 22 Feb 2022 05:42 )  PTT:29.1 sec    CAPILLARY BLOOD GLUCOSE      POCT Blood Glucose.: 203 mg/dL (22 Feb 2022 17:06)  POCT Blood Glucose.: 237 mg/dL (22 Feb 2022 11:44)  POCT Blood Glucose.: 193 mg/dL (22 Feb 2022 07:32)  POCT Blood Glucose.: 292 mg/dL (21 Feb 2022 21:18)  POCT Blood Glucose.: 257 mg/dL (21 Feb 2022 19:00)    LIVER FUNCTIONS - ( 22 Feb 2022 05:42 )  Alb: 2.7 g/dL / Pro: 5.2 g/dL / ALK PHOS: 72 U/L / ALT: 24 U/L / AST: 28 U/L / GGT: x             Cultures:      RADIOLOGY & ADDITIONAL STUDIES:      Plan:           SURGERY CONSULT NOTE    HPI:  62 y/o M w/ PMHx DM, PAD, presented initially to [facility] for R foot 4th/5th digit dry gangrene w/ non healing R medial malleolar ulcer. Patient found to have no palpable pulses distal to femoral b/l. MRI was done which was not suggestive of acute osteomyelitis; fluid collection was suspected. Patient underwent LE Arterial Doppler, which showed occluded R superficial femoral and popliteal arteries with multiple stents and Occluded proximal and mid left superficial femoral artery with reconstitution distally. Patient was initially treated with  foot wound with zosyn 2/11-2/15, and then switched over to cefepime and flagyl. Patient underwent R foot TMA on 2/15. Wound culture of R foot abscess grew numerous staph aureus, serratia marcescens, and strep agalactase. Intra-operative there was a concern for viability. Therefore, plan was to transfer patient to [facility] for IR thrombectomy for R superficial femoral and popliteal artery occlusion with multiple stents and Occluded proximal and mid left superficial femoral artery with reconstitution distally seen on LE arterial doppler.    Patient underwent IR  procedure on 2/22 for RLE angiogram and angioplasty - SFA was stented with poor flow and the case was aborted. At time of encounter patient denies any pain and is still groggy from the procedure. TISH/PVR from 2/12 show that b/l index was unattainable based on compressibility of vessels. CTA from 2/14 shows occluded right superficial femoral and popliteal arteries containing multiple overlapping stents with reconstitution of mid popliteal.       PAST MEDICAL & SURGICAL HISTORY:  DM (diabetes mellitus)    Peripheral artery disease    S/P transmetatarsal amputation of foot, right  2/15/2022        MEDICATIONS  (STANDING):  aspirin enteric coated 81 milliGRAM(s) Oral daily  atorvastatin 10 milliGRAM(s) Oral at bedtime  cefepime   IVPB      cefepime   IVPB 1000 milliGRAM(s) IV Intermittent every 8 hours  dextrose 40% Gel 15 Gram(s) Oral once  dextrose 5%. 1000 milliLiter(s) (50 mL/Hr) IV Continuous <Continuous>  dextrose 5%. 1000 milliLiter(s) (100 mL/Hr) IV Continuous <Continuous>  dextrose 50% Injectable 25 Gram(s) IV Push once  dextrose 50% Injectable 12.5 Gram(s) IV Push once  dextrose 50% Injectable 25 Gram(s) IV Push once  glucagon  Injectable 1 milliGRAM(s) IntraMuscular once  insulin glargine Injectable (LANTUS) 8 Unit(s) SubCutaneous at bedtime  insulin lispro (ADMELOG) corrective regimen sliding scale   SubCutaneous three times a day before meals  insulin lispro (ADMELOG) corrective regimen sliding scale   SubCutaneous three times a day before meals  insulin lispro Injectable (ADMELOG) 2 Unit(s) SubCutaneous three times a day before meals  lactated ringers. 1000 milliLiter(s) (75 mL/Hr) IV Continuous <Continuous>  metoprolol tartrate 12.5 milliGRAM(s) Oral two times a day  metroNIDAZOLE    Tablet 500 milliGRAM(s) Oral three times a day  nicotine - 21 mG/24Hr(s) Patch 1 patch Transdermal daily  pantoprazole  Injectable 40 milliGRAM(s) IV Push every 12 hours  sucralfate 1 Gram(s) Oral four times a day    MEDICATIONS  (PRN):  acetaminophen     Tablet .. 650 milliGRAM(s) Oral every 6 hours PRN Mild Pain (1 - 3), Moderate Pain (4 - 6)  aluminum hydroxide/magnesium hydroxide/simethicone Suspension 30 milliLiter(s) Oral every 6 hours PRN Dyspepsia  fentaNYL    Injectable 25 MICROGram(s) IV Push every 5 minutes PRN Moderate Pain (4 - 6)  morphine  - Injectable 2 milliGRAM(s) IV Push every 6 hours PRN Severe Pain (7 - 10)  ondansetron Injectable 8 milliGRAM(s) IV Push once PRN Nausea and/or Vomiting  simethicone 80 milliGRAM(s) Chew every 6 hours PRN Dyspepsia      Allergies    No Known Allergies    Intolerances        SOCIAL HISTORY:    FAMILY HISTORY:  No pertinent family history in first degree relatives        Physical Exam:  General: NAD, resting comfortably  Pulmonary: normal resp effort, CTA-B  Cardiovascular: NSR, no murmurs  Abdominal: soft, ND/NT, no organomegaly  Extremities: WWP, normal strength, no clubbing/cyanosis/edema  Neuro: A/O x 3, CNs II-XII grossly intact, normal sensation, no focal deficits  Pulses: palpable femoral pulses b/l, DP and PT signal on LLE, RLE PT signal, unable to obtain AT or DP  LLE TMA site with exposed bone clean; no exudate or purulence noted    Vital Signs Last 24 Hrs  T(C): 36.6 (22 Feb 2022 17:30), Max: 36.9 (21 Feb 2022 20:03)  T(F): 97.8 (22 Feb 2022 17:30), Max: 98.4 (21 Feb 2022 20:03)  HR: 66 (22 Feb 2022 17:30) (63 - 98)  BP: 137/66 (22 Feb 2022 17:30) (103/57 - 167/77)  BP(mean): 70 (22 Feb 2022 16:35) (70 - 103)  RR: 16 (22 Feb 2022 17:30) (16 - 23)  SpO2: 95% (22 Feb 2022 17:30) (74% - 98%)    I&O's Summary    21 Feb 2022 07:01  -  22 Feb 2022 07:00  --------------------------------------------------------  IN: 360 mL / OUT: 1800 mL / NET: -1440 mL    22 Feb 2022 07:01  -  22 Feb 2022 18:25  --------------------------------------------------------  IN: 0 mL / OUT: 2350 mL / NET: -2350 mL            LABS:                        13.1   10.00 )-----------( 365      ( 22 Feb 2022 05:42 )             40.3     02-22    132<L>  |  99  |  9   ----------------------------<  216<H>  4.7   |  23  |  0.56    Ca    8.4      22 Feb 2022 05:42  Phos  2.5     02-22  Mg     1.7     02-22    TPro  5.2<L>  /  Alb  2.7<L>  /  TBili  0.2  /  DBili  x   /  AST  28  /  ALT  24  /  AlkPhos  72  02-22    PT/INR - ( 22 Feb 2022 05:42 )   PT: 13.8 sec;   INR: 1.16 ratio         PTT - ( 22 Feb 2022 05:42 )  PTT:29.1 sec    CAPILLARY BLOOD GLUCOSE      POCT Blood Glucose.: 203 mg/dL (22 Feb 2022 17:06)  POCT Blood Glucose.: 237 mg/dL (22 Feb 2022 11:44)  POCT Blood Glucose.: 193 mg/dL (22 Feb 2022 07:32)  POCT Blood Glucose.: 292 mg/dL (21 Feb 2022 21:18)  POCT Blood Glucose.: 257 mg/dL (21 Feb 2022 19:00)    LIVER FUNCTIONS - ( 22 Feb 2022 05:42 )  Alb: 2.7 g/dL / Pro: 5.2 g/dL / ALK PHOS: 72 U/L / ALT: 24 U/L / AST: 28 U/L / GGT: x             Cultures:      RADIOLOGY & ADDITIONAL STUDIES:    < from: VA Physiol Extremity Lower 3+ Level, BI (02.12.22 @ 09:52) >  Findings/  Impression:  Right lower extremity: The ankle brachial index is unobtainable. The   pulse waveforms are reduced in the calf and ankle.    Left lower extremity: The ankle brachial index is unobtainable. The pulse   waveforms are monophasic with normal to near-normal amplitude.    Diffusely calcified noncompressible vessels limiting evaluation.    Diminished flow below the right knee.    --- End of Report ---    < end of copied text >        < from: CT Angio Abd Aorta w/run-off w/ IV Cont (02.14.22 @ 15:37) >    IMPRESSION:  Occluded right superficial femoral and popliteal arteries containing   multiple overlapping stents with reconstitution in the mid popliteal   artery.    Three-vessel runoff into the right foot.    Occluded left superficial femoral artery with reconstitution distally.    Moderate narrowing in the mid left popliteal artery.    Three-vessel runoff into the left foot.    Moderate narrowing in the proximal left external iliac artery with   associated areas of ulcerated plaque.    < end of copied text >          Plan:  62 YO M with right foot gangrene, TMA on 2/15, s/p failed aborted angiogram and stent placement in SFA with IR on 2/22.     - to be discussed with vascular fellow      Vascular, [id number]         SURGERY CONSULT NOTE    HPI:  62 y/o M w/ PMHx DM, PAD, presented initially to [facility] for R foot 4th/5th digit dry gangrene w/ non healing R medial malleolar ulcer. Patient found to have no palpable pulses distal to femoral b/l. MRI was done which was not suggestive of acute osteomyelitis; fluid collection was suspected. Patient underwent LE Arterial Doppler, which showed occluded R superficial femoral and popliteal arteries with multiple stents and Occluded proximal and mid left superficial femoral artery with reconstitution distally. Patient was initially treated with  foot wound with zosyn 2/11-2/15, and then switched over to cefepime and flagyl. Patient underwent R foot TMA on 2/15. Wound culture of R foot abscess grew numerous staph aureus, serratia marcescens, and strep agalactase. Intra-operative there was a concern for viability. Therefore, plan was to transfer patient to [facility] for IR thrombectomy for R superficial femoral and popliteal artery occlusion with multiple stents and Occluded proximal and mid left superficial femoral artery with reconstitution distally seen on LE arterial doppler.    Patient underwent IR  procedure on 2/22 for RLE angiogram and angioplasty - SFA was stented with poor flow and the case was aborted. At time of encounter patient denies any pain and is still groggy from the procedure. TISH/PVR from 2/12 show that b/l index was unattainable based on compressibility of vessels. CTA from 2/14 shows occluded right superficial femoral and popliteal arteries containing multiple overlapping stents with reconstitution of mid popliteal. Vascular Surgery consulted for revascularization potential.       PAST MEDICAL & SURGICAL HISTORY:  DM (diabetes mellitus)    Peripheral artery disease    S/P transmetatarsal amputation of foot, right  2/15/2022        MEDICATIONS  (STANDING):  aspirin enteric coated 81 milliGRAM(s) Oral daily  atorvastatin 10 milliGRAM(s) Oral at bedtime  cefepime   IVPB      cefepime   IVPB 1000 milliGRAM(s) IV Intermittent every 8 hours  dextrose 40% Gel 15 Gram(s) Oral once  dextrose 5%. 1000 milliLiter(s) (50 mL/Hr) IV Continuous <Continuous>  dextrose 5%. 1000 milliLiter(s) (100 mL/Hr) IV Continuous <Continuous>  dextrose 50% Injectable 25 Gram(s) IV Push once  dextrose 50% Injectable 12.5 Gram(s) IV Push once  dextrose 50% Injectable 25 Gram(s) IV Push once  glucagon  Injectable 1 milliGRAM(s) IntraMuscular once  insulin glargine Injectable (LANTUS) 8 Unit(s) SubCutaneous at bedtime  insulin lispro (ADMELOG) corrective regimen sliding scale   SubCutaneous three times a day before meals  insulin lispro (ADMELOG) corrective regimen sliding scale   SubCutaneous three times a day before meals  insulin lispro Injectable (ADMELOG) 2 Unit(s) SubCutaneous three times a day before meals  lactated ringers. 1000 milliLiter(s) (75 mL/Hr) IV Continuous <Continuous>  metoprolol tartrate 12.5 milliGRAM(s) Oral two times a day  metroNIDAZOLE    Tablet 500 milliGRAM(s) Oral three times a day  nicotine - 21 mG/24Hr(s) Patch 1 patch Transdermal daily  pantoprazole  Injectable 40 milliGRAM(s) IV Push every 12 hours  sucralfate 1 Gram(s) Oral four times a day    MEDICATIONS  (PRN):  acetaminophen     Tablet .. 650 milliGRAM(s) Oral every 6 hours PRN Mild Pain (1 - 3), Moderate Pain (4 - 6)  aluminum hydroxide/magnesium hydroxide/simethicone Suspension 30 milliLiter(s) Oral every 6 hours PRN Dyspepsia  fentaNYL    Injectable 25 MICROGram(s) IV Push every 5 minutes PRN Moderate Pain (4 - 6)  morphine  - Injectable 2 milliGRAM(s) IV Push every 6 hours PRN Severe Pain (7 - 10)  ondansetron Injectable 8 milliGRAM(s) IV Push once PRN Nausea and/or Vomiting  simethicone 80 milliGRAM(s) Chew every 6 hours PRN Dyspepsia      Allergies    No Known Allergies    Intolerances        SOCIAL HISTORY:    FAMILY HISTORY:  No pertinent family history in first degree relatives        Physical Exam:  General: NAD, resting comfortably  Pulmonary: normal resp effort, CTA-B  Cardiovascular: NSR, no murmurs  Abdominal: soft, ND/NT, no organomegaly  Extremities: WWP, normal strength, no clubbing/cyanosis/edema  Neuro: A/O x 3, CNs II-XII grossly intact, normal sensation, no focal deficits  Pulses: palpable femoral pulses b/l, DP and PT signal on LLE, RLE PT signal, unable to obtain AT or DP  LLE TMA site with exposed bone clean; no exudate or purulence noted    Vital Signs Last 24 Hrs  T(C): 36.6 (22 Feb 2022 17:30), Max: 36.9 (21 Feb 2022 20:03)  T(F): 97.8 (22 Feb 2022 17:30), Max: 98.4 (21 Feb 2022 20:03)  HR: 66 (22 Feb 2022 17:30) (63 - 98)  BP: 137/66 (22 Feb 2022 17:30) (103/57 - 167/77)  BP(mean): 70 (22 Feb 2022 16:35) (70 - 103)  RR: 16 (22 Feb 2022 17:30) (16 - 23)  SpO2: 95% (22 Feb 2022 17:30) (74% - 98%)    I&O's Summary    21 Feb 2022 07:01  -  22 Feb 2022 07:00  --------------------------------------------------------  IN: 360 mL / OUT: 1800 mL / NET: -1440 mL    22 Feb 2022 07:01  -  22 Feb 2022 18:25  --------------------------------------------------------  IN: 0 mL / OUT: 2350 mL / NET: -2350 mL            LABS:                        13.1   10.00 )-----------( 365      ( 22 Feb 2022 05:42 )             40.3     02-22    132<L>  |  99  |  9   ----------------------------<  216<H>  4.7   |  23  |  0.56    Ca    8.4      22 Feb 2022 05:42  Phos  2.5     02-22  Mg     1.7     02-22    TPro  5.2<L>  /  Alb  2.7<L>  /  TBili  0.2  /  DBili  x   /  AST  28  /  ALT  24  /  AlkPhos  72  02-22    PT/INR - ( 22 Feb 2022 05:42 )   PT: 13.8 sec;   INR: 1.16 ratio         PTT - ( 22 Feb 2022 05:42 )  PTT:29.1 sec    CAPILLARY BLOOD GLUCOSE      POCT Blood Glucose.: 203 mg/dL (22 Feb 2022 17:06)  POCT Blood Glucose.: 237 mg/dL (22 Feb 2022 11:44)  POCT Blood Glucose.: 193 mg/dL (22 Feb 2022 07:32)  POCT Blood Glucose.: 292 mg/dL (21 Feb 2022 21:18)  POCT Blood Glucose.: 257 mg/dL (21 Feb 2022 19:00)    LIVER FUNCTIONS - ( 22 Feb 2022 05:42 )  Alb: 2.7 g/dL / Pro: 5.2 g/dL / ALK PHOS: 72 U/L / ALT: 24 U/L / AST: 28 U/L / GGT: x             Cultures:      RADIOLOGY & ADDITIONAL STUDIES:    < from: [facility] Physiol Extremity Lower 3+ Level, BI (02.12.22 @ 09:52) >  Findings/  Impression:  Right lower extremity: The ankle brachial index is unobtainable. The   pulse waveforms are reduced in the calf and ankle.    Left lower extremity: The ankle brachial index is unobtainable. The pulse   waveforms are monophasic with normal to near-normal amplitude.    Diffusely calcified noncompressible vessels limiting evaluation.    Diminished flow below the right knee.    --- End of Report ---    < end of copied text >        < from: CT Angio Abd Aorta w/run-off w/ IV Cont (02.14.22 @ 15:37) >    IMPRESSION:  Occluded right superficial femoral and popliteal arteries containing   multiple overlapping stents with reconstitution in the mid popliteal   artery.    Three-vessel runoff into the right foot.    Occluded left superficial femoral artery with reconstitution distally.    Moderate narrowing in the mid left popliteal artery.    Three-vessel runoff into the left foot.    Moderate narrowing in the proximal left external iliac artery with   associated areas of ulcerated plaque.    < end of copied text >          Plan:  62 YO M with right foot gangrene, TMA on 2/15, s/p failed aborted angiogram and stent placement in SFA with IR on 2/22.     - to be discussed with vascular fellow      Vascular, [facility]         SURGERY CONSULT NOTE    HPI:  62 y/o M w/ PMHx DM, PAD, presented initially to [facility] for R foot 4th/5th digit dry gangrene w/ non healing R medial malleolar ulcer. Patient found to have no palpable pulses distal to femoral b/l. MRI was done which was not suggestive of acute osteomyelitis; fluid collection was suspected. Patient underwent LE Arterial Doppler, which showed occluded R superficial femoral and popliteal arteries with multiple stents and Occluded proximal and mid left superficial femoral artery with reconstitution distally. Patient was initially treated with  foot wound with zosyn 2/11-2/15, and then switched over to cefepime and flagyl. Patient underwent R foot TMA on 2/15. Wound culture of R foot abscess grew numerous staph aureus, serratia marcescens, and strep agalactase. Intra-operative there was a concern for viability. Therefore, plan was to transfer patient to [facility] for IR thrombectomy for R superficial femoral and popliteal artery occlusion with multiple stents and Occluded proximal and mid left superficial femoral artery with reconstitution distally seen on LE arterial doppler.    Patient underwent IR  procedure on 2/22 for RLE angiogram and angioplasty - SFA was stented with poor flow and the case was aborted. At time of encounter patient denies any pain and is still groggy from the procedure. TISH/PVR from 2/12 show that b/l index was unattainable based on compressibility of vessels. CTA from 2/14 shows occluded right superficial femoral and popliteal arteries containing multiple overlapping stents with reconstitution of mid popliteal. Vascular Surgery consulted for revascularization potential.       PAST MEDICAL & SURGICAL HISTORY:  DM (diabetes mellitus)    Peripheral artery disease    S/P transmetatarsal amputation of foot, right  2/15/2022        MEDICATIONS  (STANDING):  aspirin enteric coated 81 milliGRAM(s) Oral daily  atorvastatin 10 milliGRAM(s) Oral at bedtime  cefepime   IVPB      cefepime   IVPB 1000 milliGRAM(s) IV Intermittent every 8 hours  dextrose 40% Gel 15 Gram(s) Oral once  dextrose 5%. 1000 milliLiter(s) (50 mL/Hr) IV Continuous <Continuous>  dextrose 5%. 1000 milliLiter(s) (100 mL/Hr) IV Continuous <Continuous>  dextrose 50% Injectable 25 Gram(s) IV Push once  dextrose 50% Injectable 12.5 Gram(s) IV Push once  dextrose 50% Injectable 25 Gram(s) IV Push once  glucagon  Injectable 1 milliGRAM(s) IntraMuscular once  insulin glargine Injectable (LANTUS) 8 Unit(s) SubCutaneous at bedtime  insulin lispro (ADMELOG) corrective regimen sliding scale   SubCutaneous three times a day before meals  insulin lispro (ADMELOG) corrective regimen sliding scale   SubCutaneous three times a day before meals  insulin lispro Injectable (ADMELOG) 2 Unit(s) SubCutaneous three times a day before meals  lactated ringers. 1000 milliLiter(s) (75 mL/Hr) IV Continuous <Continuous>  metoprolol tartrate 12.5 milliGRAM(s) Oral two times a day  metroNIDAZOLE    Tablet 500 milliGRAM(s) Oral three times a day  nicotine - 21 mG/24Hr(s) Patch 1 patch Transdermal daily  pantoprazole  Injectable 40 milliGRAM(s) IV Push every 12 hours  sucralfate 1 Gram(s) Oral four times a day    MEDICATIONS  (PRN):  acetaminophen     Tablet .. 650 milliGRAM(s) Oral every 6 hours PRN Mild Pain (1 - 3), Moderate Pain (4 - 6)  aluminum hydroxide/magnesium hydroxide/simethicone Suspension 30 milliLiter(s) Oral every 6 hours PRN Dyspepsia  fentaNYL    Injectable 25 MICROGram(s) IV Push every 5 minutes PRN Moderate Pain (4 - 6)  morphine  - Injectable 2 milliGRAM(s) IV Push every 6 hours PRN Severe Pain (7 - 10)  ondansetron Injectable 8 milliGRAM(s) IV Push once PRN Nausea and/or Vomiting  simethicone 80 milliGRAM(s) Chew every 6 hours PRN Dyspepsia      Allergies    No Known Allergies    Intolerances        SOCIAL HISTORY:    FAMILY HISTORY:  No pertinent family history in first degree relatives        Physical Exam:  General: NAD, resting comfortably  Pulmonary: normal resp effort, CTA-B  Cardiovascular: NSR, no murmurs  Abdominal: soft, ND/NT, no organomegaly  Extremities: WWP, normal strength, no clubbing/cyanosis/edema  Neuro: A/O x 3, CNs II-XII grossly intact, normal sensation, no focal deficits  Pulses: palpable femoral pulses b/l, DP and PT signal on LLE, RLE PT signal, unable to obtain AT or DP  RLE TMA site with exposed bone clean; no exudate or purulence noted    Vital Signs Last 24 Hrs  T(C): 36.6 (22 Feb 2022 17:30), Max: 36.9 (21 Feb 2022 20:03)  T(F): 97.8 (22 Feb 2022 17:30), Max: 98.4 (21 Feb 2022 20:03)  HR: 66 (22 Feb 2022 17:30) (63 - 98)  BP: 137/66 (22 Feb 2022 17:30) (103/57 - 167/77)  BP(mean): 70 (22 Feb 2022 16:35) (70 - 103)  RR: 16 (22 Feb 2022 17:30) (16 - 23)  SpO2: 95% (22 Feb 2022 17:30) (74% - 98%)    I&O's Summary    21 Feb 2022 07:01  -  22 Feb 2022 07:00  --------------------------------------------------------  IN: 360 mL / OUT: 1800 mL / NET: -1440 mL    22 Feb 2022 07:01  -  22 Feb 2022 18:25  --------------------------------------------------------  IN: 0 mL / OUT: 2350 mL / NET: -2350 mL            LABS:                        13.1   10.00 )-----------( 365      ( 22 Feb 2022 05:42 )             40.3     02-22    132<L>  |  99  |  9   ----------------------------<  216<H>  4.7   |  23  |  0.56    Ca    8.4      22 Feb 2022 05:42  Phos  2.5     02-22  Mg     1.7     02-22    TPro  5.2<L>  /  Alb  2.7<L>  /  TBili  0.2  /  DBili  x   /  AST  28  /  ALT  24  /  AlkPhos  72  02-22    PT/INR - ( 22 Feb 2022 05:42 )   PT: 13.8 sec;   INR: 1.16 ratio         PTT - ( 22 Feb 2022 05:42 )  PTT:29.1 sec    CAPILLARY BLOOD GLUCOSE      POCT Blood Glucose.: 203 mg/dL (22 Feb 2022 17:06)  POCT Blood Glucose.: 237 mg/dL (22 Feb 2022 11:44)  POCT Blood Glucose.: 193 mg/dL (22 Feb 2022 07:32)  POCT Blood Glucose.: 292 mg/dL (21 Feb 2022 21:18)  POCT Blood Glucose.: 257 mg/dL (21 Feb 2022 19:00)    LIVER FUNCTIONS - ( 22 Feb 2022 05:42 )  Alb: 2.7 g/dL / Pro: 5.2 g/dL / ALK PHOS: 72 U/L / ALT: 24 U/L / AST: 28 U/L / GGT: x             Cultures:      RADIOLOGY & ADDITIONAL STUDIES:    < from: [facility] Physiol Extremity Lower 3+ Level, BI (02.12.22 @ 09:52) >  Findings/  Impression:  Right lower extremity: The ankle brachial index is unobtainable. The   pulse waveforms are reduced in the calf and ankle.    Left lower extremity: The ankle brachial index is unobtainable. The pulse   waveforms are monophasic with normal to near-normal amplitude.    Diffusely calcified noncompressible vessels limiting evaluation.    Diminished flow below the right knee.    --- End of Report ---    < end of copied text >        < from: CT Angio Abd Aorta w/run-off w/ IV Cont (02.14.22 @ 15:37) >    IMPRESSION:  Occluded right superficial femoral and popliteal arteries containing   multiple overlapping stents with reconstitution in the mid popliteal   artery.    Three-vessel runoff into the right foot.    Occluded left superficial femoral artery with reconstitution distally.    Moderate narrowing in the mid left popliteal artery.    Three-vessel runoff into the left foot.    Moderate narrowing in the proximal left external iliac artery with   associated areas of ulcerated plaque.    < end of copied text >          Plan:  60 YO M with right foot gangrene, TMA on 2/15, s/p failed aborted angiogram and stent placement in SFA with IR on 2/22.     - to be discussed with vascular fellow      Vascular, [facility]         SURGERY CONSULT NOTE    HPI:  62 y/o M w/ PMHx DM, PAD, presented initially to [facility] for R foot 4th/5th digit dry gangrene w/ non healing R medial malleolar ulcer. Patient found to have no palpable pulses distal to femoral b/l. MRI was done which was not suggestive of acute osteomyelitis; fluid collection was suspected. Patient underwent LE Arterial Doppler, which showed occluded R superficial femoral and popliteal arteries with multiple stents and Occluded proximal and mid left superficial femoral artery with reconstitution distally. Patient was initially treated with  foot wound with zosyn 2/11-2/15, and then switched over to cefepime and flagyl. Patient underwent R foot TMA on 2/15. Wound culture of R foot abscess grew numerous staph aureus, serratia marcescens, and strep agalactase. Intra-operative there was a concern for viability. Therefore, plan was to transfer patient to [facility] for IR thrombectomy for R superficial femoral and popliteal artery occlusion with multiple stents and Occluded proximal and mid left superficial femoral artery with reconstitution distally seen on LE arterial doppler.    Patient underwent IR  procedure on 2/22 for RLE angiogram and angioplasty - SFA was stented with poor flow and the case was aborted. At time of encounter patient denies any pain and is still groggy from the procedure. TSIH/PVR from 2/12 show that b/l index was unattainable based on compressibility of vessels. CTA from 2/14 shows occluded right superficial femoral and popliteal arteries containing multiple overlapping stents with reconstitution of mid popliteal. Vascular Surgery consulted for revascularization potential.       PAST MEDICAL & SURGICAL HISTORY:  DM (diabetes mellitus)    Peripheral artery disease    S/P transmetatarsal amputation of foot, right  2/15/2022        MEDICATIONS  (STANDING):  aspirin enteric coated 81 milliGRAM(s) Oral daily  atorvastatin 10 milliGRAM(s) Oral at bedtime  cefepime   IVPB      cefepime   IVPB 1000 milliGRAM(s) IV Intermittent every 8 hours  dextrose 40% Gel 15 Gram(s) Oral once  dextrose 5%. 1000 milliLiter(s) (50 mL/Hr) IV Continuous <Continuous>  dextrose 5%. 1000 milliLiter(s) (100 mL/Hr) IV Continuous <Continuous>  dextrose 50% Injectable 25 Gram(s) IV Push once  dextrose 50% Injectable 12.5 Gram(s) IV Push once  dextrose 50% Injectable 25 Gram(s) IV Push once  glucagon  Injectable 1 milliGRAM(s) IntraMuscular once  insulin glargine Injectable (LANTUS) 8 Unit(s) SubCutaneous at bedtime  insulin lispro (ADMELOG) corrective regimen sliding scale   SubCutaneous three times a day before meals  insulin lispro (ADMELOG) corrective regimen sliding scale   SubCutaneous three times a day before meals  insulin lispro Injectable (ADMELOG) 2 Unit(s) SubCutaneous three times a day before meals  lactated ringers. 1000 milliLiter(s) (75 mL/Hr) IV Continuous <Continuous>  metoprolol tartrate 12.5 milliGRAM(s) Oral two times a day  metroNIDAZOLE    Tablet 500 milliGRAM(s) Oral three times a day  nicotine - 21 mG/24Hr(s) Patch 1 patch Transdermal daily  pantoprazole  Injectable 40 milliGRAM(s) IV Push every 12 hours  sucralfate 1 Gram(s) Oral four times a day    MEDICATIONS  (PRN):  acetaminophen     Tablet .. 650 milliGRAM(s) Oral every 6 hours PRN Mild Pain (1 - 3), Moderate Pain (4 - 6)  aluminum hydroxide/magnesium hydroxide/simethicone Suspension 30 milliLiter(s) Oral every 6 hours PRN Dyspepsia  fentaNYL    Injectable 25 MICROGram(s) IV Push every 5 minutes PRN Moderate Pain (4 - 6)  morphine  - Injectable 2 milliGRAM(s) IV Push every 6 hours PRN Severe Pain (7 - 10)  ondansetron Injectable 8 milliGRAM(s) IV Push once PRN Nausea and/or Vomiting  simethicone 80 milliGRAM(s) Chew every 6 hours PRN Dyspepsia      Allergies    No Known Allergies    Intolerances        SOCIAL HISTORY:    FAMILY HISTORY:  No pertinent family history in first degree relatives        Physical Exam:  General: NAD, resting comfortably  Pulmonary: normal resp effort, CTA-B  Cardiovascular: NSR, no murmurs  Abdominal: soft, ND/NT, no organomegaly  Extremities: WWP, normal strength, no clubbing/cyanosis/edema  Neuro: A/O x 3, CNs II-XII grossly intact, normal sensation, no focal deficits  Pulses: palpable femoral pulses b/l, DP and PT signal on LLE, RLE PT signal, unable to obtain AT or DP  RLE TMA site with exposed bone clean; no exudate or purulence noted    Vital Signs Last 24 Hrs  T(C): 36.6 (22 Feb 2022 17:30), Max: 36.9 (21 Feb 2022 20:03)  T(F): 97.8 (22 Feb 2022 17:30), Max: 98.4 (21 Feb 2022 20:03)  HR: 66 (22 Feb 2022 17:30) (63 - 98)  BP: 137/66 (22 Feb 2022 17:30) (103/57 - 167/77)  BP(mean): 70 (22 Feb 2022 16:35) (70 - 103)  RR: 16 (22 Feb 2022 17:30) (16 - 23)  SpO2: 95% (22 Feb 2022 17:30) (74% - 98%)    I&O's Summary    21 Feb 2022 07:01  -  22 Feb 2022 07:00  --------------------------------------------------------  IN: 360 mL / OUT: 1800 mL / NET: -1440 mL    22 Feb 2022 07:01  -  22 Feb 2022 18:25  --------------------------------------------------------  IN: 0 mL / OUT: 2350 mL / NET: -2350 mL            LABS:                        13.1   10.00 )-----------( 365      ( 22 Feb 2022 05:42 )             40.3     02-22    132<L>  |  99  |  9   ----------------------------<  216<H>  4.7   |  23  |  0.56    Ca    8.4      22 Feb 2022 05:42  Phos  2.5     02-22  Mg     1.7     02-22    TPro  5.2<L>  /  Alb  2.7<L>  /  TBili  0.2  /  DBili  x   /  AST  28  /  ALT  24  /  AlkPhos  72  02-22    PT/INR - ( 22 Feb 2022 05:42 )   PT: 13.8 sec;   INR: 1.16 ratio         PTT - ( 22 Feb 2022 05:42 )  PTT:29.1 sec    CAPILLARY BLOOD GLUCOSE      POCT Blood Glucose.: 203 mg/dL (22 Feb 2022 17:06)  POCT Blood Glucose.: 237 mg/dL (22 Feb 2022 11:44)  POCT Blood Glucose.: 193 mg/dL (22 Feb 2022 07:32)  POCT Blood Glucose.: 292 mg/dL (21 Feb 2022 21:18)  POCT Blood Glucose.: 257 mg/dL (21 Feb 2022 19:00)    LIVER FUNCTIONS - ( 22 Feb 2022 05:42 )  Alb: 2.7 g/dL / Pro: 5.2 g/dL / ALK PHOS: 72 U/L / ALT: 24 U/L / AST: 28 U/L / GGT: x             Cultures:      RADIOLOGY & ADDITIONAL STUDIES:    < from: [facility] Physiol Extremity Lower 3+ Level, BI (02.12.22 @ 09:52) >  Findings/  Impression:  Right lower extremity: The ankle brachial index is unobtainable. The   pulse waveforms are reduced in the calf and ankle.    Left lower extremity: The ankle brachial index is unobtainable. The pulse   waveforms are monophasic with normal to near-normal amplitude.    Diffusely calcified noncompressible vessels limiting evaluation.    Diminished flow below the right knee.    --- End of Report ---    < end of copied text >        < from: CT Angio Abd Aorta w/run-off w/ IV Cont (02.14.22 @ 15:37) >    IMPRESSION:  Occluded right superficial femoral and popliteal arteries containing   multiple overlapping stents with reconstitution in the mid popliteal   artery.    Three-vessel runoff into the right foot.    Occluded left superficial femoral artery with reconstitution distally.    Moderate narrowing in the mid left popliteal artery.    Three-vessel runoff into the left foot.    Moderate narrowing in the proximal left external iliac artery with   associated areas of ulcerated plaque.    < end of copied text >          Plan:  62 YO M with right foot gangrene, TMA on 2/15, s/p failed aborted angiogram and stent placement in SFA with IR on 2/22. Vascular surgery consulted for potential revascularization options.    - bypass planning  - please document medical/cardiac clearance  - please obtain bilateral GSV vein mapping  - please obtain pre operative Echo  - OR [id number] for next week     d/w Dr. Bailey, vascular fellow    Vascular Surgery, [id number]    Vascular, [id number]         SURGERY CONSULT NOTE    HPI:  62 y/o M w/ PMHx DM, PAD, presented initially to [facility] for R foot 4th/5th digit dry gangrene w/ non healing R medial malleolar ulcer. Patient found to have no palpable pulses distal to femoral b/l. MRI was done which was not suggestive of acute osteomyelitis; fluid collection was suspected. Patient underwent LE Arterial Doppler, which showed occluded R superficial femoral and popliteal arteries with multiple stents and Occluded proximal and mid left superficial femoral artery with reconstitution distally. Patient was initially treated with  foot wound with zosyn 2/11-2/15, and then switched over to cefepime and flagyl. Patient underwent R foot TMA on 2/15. Wound culture of R foot abscess grew numerous staph aureus, serratia marcescens, and strep agalactase. Intra-operative there was a concern for viability. Therefore, plan was to transfer patient to [facility] for IR thrombectomy for R superficial femoral and popliteal artery occlusion with multiple stents and Occluded proximal and mid left superficial femoral artery with reconstitution distally seen on LE arterial doppler.    Patient underwent IR  procedure on 2/22 for RLE angiogram and angioplasty - SFA was stented with poor flow and the case was aborted. At time of encounter patient denies any pain and is still groggy from the procedure. TISH/PVR from 2/12 show that b/l index was unattainable based on compressibility of vessels. CTA from 2/14 shows occluded right superficial femoral and popliteal arteries containing multiple overlapping stents with reconstitution of mid popliteal. Vascular Surgery consulted for revascularization potential.       PAST MEDICAL & SURGICAL HISTORY:  DM (diabetes mellitus)    Peripheral artery disease    S/P transmetatarsal amputation of foot, right  2/15/2022        MEDICATIONS  (STANDING):  aspirin enteric coated 81 milliGRAM(s) Oral daily  atorvastatin 10 milliGRAM(s) Oral at bedtime  cefepime   IVPB      cefepime   IVPB 1000 milliGRAM(s) IV Intermittent every 8 hours  dextrose 40% Gel 15 Gram(s) Oral once  dextrose 5%. 1000 milliLiter(s) (50 mL/Hr) IV Continuous <Continuous>  dextrose 5%. 1000 milliLiter(s) (100 mL/Hr) IV Continuous <Continuous>  dextrose 50% Injectable 25 Gram(s) IV Push once  dextrose 50% Injectable 12.5 Gram(s) IV Push once  dextrose 50% Injectable 25 Gram(s) IV Push once  glucagon  Injectable 1 milliGRAM(s) IntraMuscular once  insulin glargine Injectable (LANTUS) 8 Unit(s) SubCutaneous at bedtime  insulin lispro (ADMELOG) corrective regimen sliding scale   SubCutaneous three times a day before meals  insulin lispro (ADMELOG) corrective regimen sliding scale   SubCutaneous three times a day before meals  insulin lispro Injectable (ADMELOG) 2 Unit(s) SubCutaneous three times a day before meals  lactated ringers. 1000 milliLiter(s) (75 mL/Hr) IV Continuous <Continuous>  metoprolol tartrate 12.5 milliGRAM(s) Oral two times a day  metroNIDAZOLE    Tablet 500 milliGRAM(s) Oral three times a day  nicotine - 21 mG/24Hr(s) Patch 1 patch Transdermal daily  pantoprazole  Injectable 40 milliGRAM(s) IV Push every 12 hours  sucralfate 1 Gram(s) Oral four times a day    MEDICATIONS  (PRN):  acetaminophen     Tablet .. 650 milliGRAM(s) Oral every 6 hours PRN Mild Pain (1 - 3), Moderate Pain (4 - 6)  aluminum hydroxide/magnesium hydroxide/simethicone Suspension 30 milliLiter(s) Oral every 6 hours PRN Dyspepsia  fentaNYL    Injectable 25 MICROGram(s) IV Push every 5 minutes PRN Moderate Pain (4 - 6)  morphine  - Injectable 2 milliGRAM(s) IV Push every 6 hours PRN Severe Pain (7 - 10)  ondansetron Injectable 8 milliGRAM(s) IV Push once PRN Nausea and/or Vomiting  simethicone 80 milliGRAM(s) Chew every 6 hours PRN Dyspepsia      Allergies    No Known Allergies    Intolerances        SOCIAL HISTORY:    FAMILY HISTORY:  No pertinent family history in first degree relatives        Physical Exam:  General: NAD, resting comfortably  Pulmonary: normal resp effort, CTA-B  Cardiovascular: NSR, no murmurs  Abdominal: soft, ND/NT, no organomegaly  Extremities: WWP, normal strength, no clubbing/cyanosis/edema  Neuro: A/O x 3, CNs II-XII grossly intact, normal sensation, no focal deficits  Pulses: palpable femoral pulses b/l, DP and PT signal on LLE, RLE PT signal, unable to obtain AT or DP  RLE TMA site with exposed bone clean; no exudate or purulence noted    Vital Signs Last 24 Hrs  T(C): 36.6 (22 Feb 2022 17:30), Max: 36.9 (21 Feb 2022 20:03)  T(F): 97.8 (22 Feb 2022 17:30), Max: 98.4 (21 Feb 2022 20:03)  HR: 66 (22 Feb 2022 17:30) (63 - 98)  BP: 137/66 (22 Feb 2022 17:30) (103/57 - 167/77)  BP(mean): 70 (22 Feb 2022 16:35) (70 - 103)  RR: 16 (22 Feb 2022 17:30) (16 - 23)  SpO2: 95% (22 Feb 2022 17:30) (74% - 98%)    I&O's Summary    21 Feb 2022 07:01  -  22 Feb 2022 07:00  --------------------------------------------------------  IN: 360 mL / OUT: 1800 mL / NET: -1440 mL    22 Feb 2022 07:01  -  22 Feb 2022 18:25  --------------------------------------------------------  IN: 0 mL / OUT: 2350 mL / NET: -2350 mL            LABS:                        13.1   10.00 )-----------( 365      ( 22 Feb 2022 05:42 )             40.3     02-22    132<L>  |  99  |  9   ----------------------------<  216<H>  4.7   |  23  |  0.56    Ca    8.4      22 Feb 2022 05:42  Phos  2.5     02-22  Mg     1.7     02-22    TPro  5.2<L>  /  Alb  2.7<L>  /  TBili  0.2  /  DBili  x   /  AST  28  /  ALT  24  /  AlkPhos  72  02-22    PT/INR - ( 22 Feb 2022 05:42 )   PT: 13.8 sec;   INR: 1.16 ratio         PTT - ( 22 Feb 2022 05:42 )  PTT:29.1 sec    CAPILLARY BLOOD GLUCOSE      POCT Blood Glucose.: 203 mg/dL (22 Feb 2022 17:06)  POCT Blood Glucose.: 237 mg/dL (22 Feb 2022 11:44)  POCT Blood Glucose.: 193 mg/dL (22 Feb 2022 07:32)  POCT Blood Glucose.: 292 mg/dL (21 Feb 2022 21:18)  POCT Blood Glucose.: 257 mg/dL (21 Feb 2022 19:00)    LIVER FUNCTIONS - ( 22 Feb 2022 05:42 )  Alb: 2.7 g/dL / Pro: 5.2 g/dL / ALK PHOS: 72 U/L / ALT: 24 U/L / AST: 28 U/L / GGT: x             Cultures:      RADIOLOGY & ADDITIONAL STUDIES:    < from: VA Physiol Extremity Lower 3+ Level, BI (02.12.22 @ 09:52) >  Findings/  Impression:  Right lower extremity: The ankle brachial index is unobtainable. The   pulse waveforms are reduced in the calf and ankle.    Left lower extremity: The ankle brachial index is unobtainable. The pulse   waveforms are monophasic with normal to near-normal amplitude.    Diffusely calcified noncompressible vessels limiting evaluation.    Diminished flow below the right knee.    --- End of Report ---    < end of copied text >        < from: CT Angio Abd Aorta w/run-off w/ IV Cont (02.14.22 @ 15:37) >    IMPRESSION:  Occluded right superficial femoral and popliteal arteries containing   multiple overlapping stents with reconstitution in the mid popliteal   artery.    Three-vessel runoff into the right foot.    Occluded left superficial femoral artery with reconstitution distally.    Moderate narrowing in the mid left popliteal artery.    Three-vessel runoff into the left foot.    Moderate narrowing in the proximal left external iliac artery with   associated areas of ulcerated plaque.    < end of copied text >          Plan:  60 YO M with right foot gangrene, TMA on 2/15, s/p failed aborted angiogram and stent placement in SFA with IR on 2/22. Vascular surgery consulted for potential revascularization options.    - bypass planning  - please document medical/cardiac clearance  - please obtain bilateral GSV vein mapping  - please obtain pre-operative Echo  - OR tentatively for next week    d/w Dr. Bailey, vascular fellow    Vascular, [id number]

## 2022-02-22 NOTE — PHARMACOTHERAPY INTERVENTION NOTE - COMMENTS
Performed medication reconciliation and home medication list updated in outpatient medication review. Medications verified with patient's wife Richa, patient, and [facility]. Pt has hx of DM, active smoker, and stent in his legs (was on DAPT, now on Plavix).    Home Medications:  enalapril 5 mg oral tablet: 1 tab(s) orally once a day   glipiZIDE 10 mg oral tablet: 1 tab(s) orally once a day   Jardiance 25 mg oral tablet: 1 tab(s) orally once a day (in the morning)   metFORMIN 1000 mg oral tablet: 1 tab(s) orally 2 times a day   Plavix 75 mg oral tablet: 1 tab(s) orally once a day  gabapentin 400 mg oral capsule: 1 cap(s) orally 3 times a day   Lyrica 300mg daily - Per pt, he takes it PRN. However, per wife pt has severe neuropathy and takes Lyrica, Gabapentin, Percocet (old supply), and ibuprofen     iSTOP:   Patient Name: Juarez Escobar Date: 1960  Address: [address] [place] [address]  [place], [place] [address]Pfl: Male  Rx Written	Rx Dispensed	Drug	Quantity	Days Supply	Prescriber Name	Prescriber Maribel #	Payment Method	Dispenser  12/21/2021	01/19/2022	pregabalin 300 mg capsule	60	30	Emma Owens	[id number]	Insurance	[facility] #[id number]  12/13/2021	12/16/2021	pregabalin 300 mg capsule	60	30	Kenroy Quick	[id number]	Insurance	[facility] #[id number]      Please refer to specifics in home medication list (outpatient medication review).    Recommendations: Communicated med rec with Team to reconcile any medications if indicated. Wife is requesting nutrition/ dietary consult for DM management.     Shannon Roberto, PharmD, [facility]  Clinical Pharmacy Specialist  [phone] or Teams Performed medication reconciliation and home medication list updated in outpatient medication review. Medications verified with patient's wife Richa, patient, and [facility]. Pt has hx of DM, active smoker, and stent in his legs (was on DAPT, now on Plavix).     Home Medications:  enalapril 5 mg oral tablet: 1 tab(s) orally once a day   glipiZIDE 10 mg oral tablet: 1 tab(s) orally once a day   Jardiance 25 mg oral tablet: 1 tab(s) orally once a day (in the morning)   metFORMIN 1000 mg oral tablet: 1 tab(s) orally 2 times a day   Plavix 75 mg oral tablet: 1 tab(s) orally once a day  gabapentin 400 mg oral capsule: 1 cap(s) orally 3 times a day   Lyrica 300mg daily - Per pt, he takes it PRN. However, per wife pt has severe neuropathy and takes Lyrica, Gabapentin, Percocet (old supply), and ibuprofen     iSTOP:   Patient Name: Juarez Escobar Date: 1960  Address: [address] [place] [address]  [place], [place] [address]Rmg: Male  Rx Written	Rx Dispensed	Drug	Quantity	Days Supply	Prescriber Name	Prescriber Maribel #	Payment Method	Dispenser  12/21/2021	01/19/2022	pregabalin 300 mg capsule	60	30	Emma Owens	[id number]	Insurance	[facility] #[id number]  12/13/2021	12/16/2021	pregabalin 300 mg capsule	60	30	Kenroy Quick	[id number]	Insurance	[facility] #[id number]      Please refer to specifics in home medication list (outpatient medication review).    Recommendations: Communicated med rec with Team to reconcile any medications if indicated. Wife is requesting nutrition/ dietary consult for DM management. Pt would benefit from medication teaching/ counseling upon discharge.     Shannon Roberto, LinwoodD, [facility]  Clinical Pharmacy Specialist  [phone] or Teams

## 2022-02-22 NOTE — PROGRESS NOTE ADULT - SUBJECTIVE AND OBJECTIVE BOX
**************************************  Collin Pruett, PGY-1  Senior Resident: Tika Nuno  After 7PM, please contact night float at #[phone] or [phone]  **************************************    INTERVAL HPI/OVERNIGHT EVENTS:  Patient was seen and examined at bedside. As per nurse and patient, no o/n events, patient resting comfortably. Reported some pain overnight in leg, managed with morphine. Denies any complaints at this time    VITAL SIGNS:  T(F): 98 (02-22-22 @ 04:12)  HR: 76 (02-22-22 @ 04:12)  BP: 122/72 (02-22-22 @ 04:12)  RR: 18 (02-22-22 @ 04:12)  SpO2: 98% (02-22-22 @ 04:12)    PHYSICAL EXAM:    Constitutional: WDWN, NAD  HEENT: PERRL, EOMI, sclera non-icteric, neck supple, trachea midline, no masses, no JVD, MMM, good dentition  Respiratory: CTA b/l, good air entry b/l, no wheezing, no rhonchi, no rales, without accessory muscle use and no intercostal retractions  Cardiovascular: RRR, normal S1S2, no M/R/G  Gastrointestinal: soft, NTND, no masses palpable, BS normal  Extremities: Warm, well perfused, pulses equal bilateral upper and lower extremities, no edema, no clubbing. Capillary refill <2 sec  Neurological: AAOx3, CN Grossly intact  Skin: Normal temperature, warm, dry    MEDICATIONS  (STANDING):  aspirin enteric coated 81 milliGRAM(s) Oral daily  atorvastatin 10 milliGRAM(s) Oral at bedtime  cefepime   IVPB      cefepime   IVPB 1000 milliGRAM(s) IV Intermittent every 8 hours  dextrose 40% Gel 15 Gram(s) Oral once  dextrose 5%. 1000 milliLiter(s) (50 mL/Hr) IV Continuous <Continuous>  dextrose 5%. 1000 milliLiter(s) (100 mL/Hr) IV Continuous <Continuous>  dextrose 50% Injectable 25 Gram(s) IV Push once  dextrose 50% Injectable 12.5 Gram(s) IV Push once  dextrose 50% Injectable 25 Gram(s) IV Push once  glucagon  Injectable 1 milliGRAM(s) IntraMuscular once  insulin glargine Injectable (LANTUS) 8 Unit(s) SubCutaneous at bedtime  insulin lispro (ADMELOG) corrective regimen sliding scale   SubCutaneous three times a day before meals  insulin lispro (ADMELOG) corrective regimen sliding scale   SubCutaneous three times a day before meals  insulin lispro Injectable (ADMELOG) 2 Unit(s) SubCutaneous three times a day before meals  metoprolol tartrate 12.5 milliGRAM(s) Oral two times a day  metroNIDAZOLE    Tablet 500 milliGRAM(s) Oral three times a day  nicotine - 21 mG/24Hr(s) Patch 1 patch Transdermal daily  pantoprazole  Injectable 40 milliGRAM(s) IV Push every 12 hours  sucralfate 1 Gram(s) Oral four times a day    MEDICATIONS  (PRN):  acetaminophen     Tablet .. 650 milliGRAM(s) Oral every 6 hours PRN Mild Pain (1 - 3), Moderate Pain (4 - 6)  aluminum hydroxide/magnesium hydroxide/simethicone Suspension 30 milliLiter(s) Oral every 6 hours PRN Dyspepsia  morphine  - Injectable 2 milliGRAM(s) IV Push every 6 hours PRN Severe Pain (7 - 10)  simethicone 80 milliGRAM(s) Chew every 6 hours PRN Dyspepsia      Allergies    No Known Allergies    Intolerances        LABS:                        13.1   10.00 )-----------( 365      ( 22 Feb 2022 05:42 )             40.3     02-22    132<L>  |  99  |  9   ----------------------------<  216<H>  4.7   |  23  |  0.56    Ca    8.4      22 Feb 2022 05:42  Phos  2.5     02-22  Mg     1.7     02-22    TPro  5.2<L>  /  Alb  2.7<L>  /  TBili  0.2  /  DBili  x   /  AST  28  /  ALT  24  /  AlkPhos  72  02-22    PT/INR - ( 22 Feb 2022 05:42 )   PT: 13.8 sec;   INR: 1.16 ratio         PTT - ( 22 Feb 2022 05:42 )  PTT:29.1 sec      RADIOLOGY & ADDITIONAL TESTS:  Reviewed **************************************  Collin Pruett, PGY-1  Senior Resident: Tika Nuon  After 7PM, please contact night float at #[phone] or [phone]  **************************************    INTERVAL HPI/OVERNIGHT EVENTS:  Patient was seen and examined at bedside. As per nurse and patient, no o/n events, patient resting comfortably. Reported some pain overnight in leg, managed with morphine. Denies any complaints at this time    VITAL SIGNS:  T(F): 98 (02-22-22 @ 04:12)  HR: 76 (02-22-22 @ 04:12)  BP: 122/72 (02-22-22 @ 04:12)  RR: 18 (02-22-22 @ 04:12)  SpO2: 98% (02-22-22 @ 04:12)    PHYSICAL EXAM:    Constitutional: WDWN, NAD  HEENT: PERRL, EOMI, sclera non-icteric, MMM  Respiratory: CTA b/l, good air entry b/l,  Cardiovascular: RRR, normal S1S2, no M/R/G  Gastrointestinal: soft, NTND, no masses palpable, BS normal  Extremities: R  foot TMA. Area covered in dressing. 1+ edema in Left leg up to mid shin. small wound noted in left mid shin  Neurological: AAOx3, CN Grossly intact  Skin: Normal temperature, warm, dry    MEDICATIONS  (STANDING):  aspirin enteric coated 81 milliGRAM(s) Oral daily  atorvastatin 10 milliGRAM(s) Oral at bedtime  cefepime   IVPB      cefepime   IVPB 1000 milliGRAM(s) IV Intermittent every 8 hours  dextrose 40% Gel 15 Gram(s) Oral once  dextrose 5%. 1000 milliLiter(s) (50 mL/Hr) IV Continuous <Continuous>  dextrose 5%. 1000 milliLiter(s) (100 mL/Hr) IV Continuous <Continuous>  dextrose 50% Injectable 25 Gram(s) IV Push once  dextrose 50% Injectable 12.5 Gram(s) IV Push once  dextrose 50% Injectable 25 Gram(s) IV Push once  glucagon  Injectable 1 milliGRAM(s) IntraMuscular once  insulin glargine Injectable (LANTUS) 8 Unit(s) SubCutaneous at bedtime  insulin lispro (ADMELOG) corrective regimen sliding scale   SubCutaneous three times a day before meals  insulin lispro (ADMELOG) corrective regimen sliding scale   SubCutaneous three times a day before meals  insulin lispro Injectable (ADMELOG) 2 Unit(s) SubCutaneous three times a day before meals  metoprolol tartrate 12.5 milliGRAM(s) Oral two times a day  metroNIDAZOLE    Tablet 500 milliGRAM(s) Oral three times a day  nicotine - 21 mG/24Hr(s) Patch 1 patch Transdermal daily  pantoprazole  Injectable 40 milliGRAM(s) IV Push every 12 hours  sucralfate 1 Gram(s) Oral four times a day    MEDICATIONS  (PRN):  acetaminophen     Tablet .. 650 milliGRAM(s) Oral every 6 hours PRN Mild Pain (1 - 3), Moderate Pain (4 - 6)  aluminum hydroxide/magnesium hydroxide/simethicone Suspension 30 milliLiter(s) Oral every 6 hours PRN Dyspepsia  morphine  - Injectable 2 milliGRAM(s) IV Push every 6 hours PRN Severe Pain (7 - 10)  simethicone 80 milliGRAM(s) Chew every 6 hours PRN Dyspepsia      Allergies    No Known Allergies    Intolerances        LABS:                        13.1   10.00 )-----------( 365      ( 22 Feb 2022 05:42 )             40.3     02-22    132<L>  |  99  |  9   ----------------------------<  216<H>  4.7   |  23  |  0.56    Ca    8.4      22 Feb 2022 05:42  Phos  2.5     02-22  Mg     1.7     02-22    TPro  5.2<L>  /  Alb  2.7<L>  /  TBili  0.2  /  DBili  x   /  AST  28  /  ALT  24  /  AlkPhos  72  02-22    PT/INR - ( 22 Feb 2022 05:42 )   PT: 13.8 sec;   INR: 1.16 ratio         PTT - ( 22 Feb 2022 05:42 )  PTT:29.1 sec      RADIOLOGY & ADDITIONAL TESTS:  Reviewed

## 2022-02-22 NOTE — PROGRESS NOTE ADULT - PROBLEM SELECTOR PLAN 1
Patient with history of severe PAD. US showed occluded right superficial femoral and popliteal arteries with multiple stents   - CTA showed occluded right superficial femoral and popliteal arteries containing multiple overlapping stents with reconstitution in the mid popliteal artery, three-vessel runoff into b/l feet, occluded left superficial femoral artery with reconstitution distally, moderate narrowing in the mid left popliteal artery and moderate narrowing in the proximal left external iliac artery with associated areas of ulcerated plaque  - Pt transferred to [facility] for IR thrombectomy  - c/w ASA & Lipitor  - Plan for IR thrombectomy today

## 2022-02-22 NOTE — PROGRESS NOTE ADULT - PROBLEM SELECTOR PLAN 2
Initially at [place] for R foot infection  - MRI not suggestive of acute osteomyelitis  - vascular study at  showed an unobtainable ankle brachial index w/ diffusely calcified noncompressible vessels limiting evaluation and diminished flow below the right knee  - US and CTA as above  - Patient s/p R TMA 2/15/22 at [facility]  - abscess cultures grew Serratia, GBS and MSSA  - Patient being treated with Cefepime & Flagyl, c/w abx at this time  - c/w morphine and percocet for pain  - Plan for podiatry to perform surgical revision pending IR thrombectomy. Will contact podiatry in AM. Will also ask if podiatry needs vascular involved Initially at [place] for R foot infection  - MRI not suggestive of acute osteomyelitis  - vascular study at  showed an unobtainable ankle brachial index w/ diffusely calcified noncompressible vessels limiting evaluation and diminished flow below the right knee  - US and CTA as above  - Patient s/p R TMA 2/15/22 at [facility]  - abscess cultures grew Serratia, GBS and MSSA  - Patient being treated with Cefepime & Flagyl, c/w abx at this time  - c/w morphine and percocet for pain  - Plan for podiatry to perform surgical revision pending IR thrombectomy. Per podiatry, consult vascular as well  - Pain control: Tylenol for mild pain, Morphine 1mg for moderate, Morphine 2mg for severe pain Initially at [place] for R foot infection  - MRI not suggestive of acute osteomyelitis  - vascular study at  showed an unobtainable ankle brachial index w/ diffusely calcified noncompressible vessels limiting evaluation and diminished flow below the right knee  - US and CTA as above  - Patient s/p R TMA 2/15/22 at [facility]  - abscess cultures grew Serratia, GBS and MSSA  - Patient being treated with Cefepime & Flagyl, c/w abx at this time  - c/w morphine for pain  - Plan for podiatry to perform surgical revision pending IR thrombectomy. Possibly can also be done at [place]  - Pain control: Tylenol for mild pain, Morphine 1mg for moderate, Morphine 2mg for severe pain

## 2022-02-22 NOTE — PROGRESS NOTE ADULT - PROBLEM SELECTOR PLAN 3
Patient with episode of coffee ground emesis on 2/14  - On Protonix & carafate at [facility] c/ at this time  - EGD on 2/18 showed distal moderate esophagitis s/p biopsy, proximal-mid esophageal whitish exudate s/p biopsy, mild antral gastritis s/p biopsy & mild duodenitis s/p biopsy   - H&H is stable, if downtrending will transfuse <7.   - CTM

## 2022-02-22 NOTE — CHART NOTE - NSCHARTNOTEFT_GEN_A_CORE
Patient w/ worsening foot pain unresolved w/ Tylenol. Administered 2 mg IV morphine as per AM note from primary team for tx of acute foot pain secondary to R foot gangrene. Will administer additional morphine as needed for pain relief, given acute pain.

## 2022-02-22 NOTE — CONSULT NOTE ADULT - SUBJECTIVE AND OBJECTIVE BOX
Podiatry pager #: [phone]    Patient is a 61y old  Male who presents with a chief complaint of IR thrombectomy (22 Feb 2022 07:48)      HPI:  62 y/o M w/ PMHx DM, PAD, presented initially to [facility] for R foot 4th/5th digit dry gangrene w/ non healing R medial malleolar ulcer. Patient found to have no palpable pulses distal to femoral b/l. MRI was done which was not suggestive of acute osteomyelitis; fluid collection was suspected. Patient underwent LE Arterial Doppler, which showed occluded R superficial femoral and popliteal arteries with multiple stents and Occluded proximal and mid left superficial femoral artery with reconstitution distally. Patient was initially treated with  foot wound with zosyn 2/11-2/15, and then switched over to cefepime and flagyl. Patient underwent R foot TMA on 2/15. Wound culture of R foot abscess grew numerous staph aureus, serratia marcescens, and strep agalactase. Intra-operative there was a concern for viability. Therefore, plan was to transfer patient to [facility] for IR thrombectomy for R superficial femoral and popliteal artery occlusion with multiple stents and Occluded proximal and mid left superficial femoral artery with reconstitution distally seen on LE arterial doppler.    At this time, patient reports he is doing well and looking forward to IR procedure. Denies any complaints at this time (21 Feb 2022 17:46)      PAST MEDICAL & SURGICAL HISTORY:  DM (diabetes mellitus)    Peripheral artery disease    S/P transmetatarsal amputation of foot, right  2/15/2022        MEDICATIONS  (STANDING):  aspirin enteric coated 81 milliGRAM(s) Oral daily  atorvastatin 10 milliGRAM(s) Oral at bedtime  cefepime   IVPB      cefepime   IVPB 1000 milliGRAM(s) IV Intermittent every 8 hours  dextrose 40% Gel 15 Gram(s) Oral once  dextrose 5%. 1000 milliLiter(s) (50 mL/Hr) IV Continuous <Continuous>  dextrose 5%. 1000 milliLiter(s) (100 mL/Hr) IV Continuous <Continuous>  dextrose 50% Injectable 25 Gram(s) IV Push once  dextrose 50% Injectable 12.5 Gram(s) IV Push once  dextrose 50% Injectable 25 Gram(s) IV Push once  glucagon  Injectable 1 milliGRAM(s) IntraMuscular once  insulin glargine Injectable (LANTUS) 8 Unit(s) SubCutaneous at bedtime  insulin lispro (ADMELOG) corrective regimen sliding scale   SubCutaneous three times a day before meals  insulin lispro (ADMELOG) corrective regimen sliding scale   SubCutaneous three times a day before meals  insulin lispro Injectable (ADMELOG) 2 Unit(s) SubCutaneous three times a day before meals  metoprolol tartrate 12.5 milliGRAM(s) Oral two times a day  metroNIDAZOLE    Tablet 500 milliGRAM(s) Oral three times a day  nicotine - 21 mG/24Hr(s) Patch 1 patch Transdermal daily  pantoprazole  Injectable 40 milliGRAM(s) IV Push every 12 hours  sucralfate 1 Gram(s) Oral four times a day    MEDICATIONS  (PRN):  acetaminophen     Tablet .. 650 milliGRAM(s) Oral every 6 hours PRN Mild Pain (1 - 3), Moderate Pain (4 - 6)  aluminum hydroxide/magnesium hydroxide/simethicone Suspension 30 milliLiter(s) Oral every 6 hours PRN Dyspepsia  morphine  - Injectable 2 milliGRAM(s) IV Push every 6 hours PRN Severe Pain (7 - 10)  simethicone 80 milliGRAM(s) Chew every 6 hours PRN Dyspepsia      Allergies    No Known Allergies    Intolerances        VITALS:    Vital Signs Last 24 Hrs  T(C): 36.7 (22 Feb 2022 11:00), Max: 36.9 (21 Feb 2022 20:03)  T(F): 98 (22 Feb 2022 11:00), Max: 98.4 (21 Feb 2022 20:03)  HR: 63 (22 Feb 2022 11:00) (63 - 76)  BP: 142/80 (22 Feb 2022 11:00) (121/69 - 142/80)  BP(mean): --  RR: 18 (22 Feb 2022 11:00) (18 - 18)  SpO2: 96% (22 Feb 2022 11:00) (95% - 99%)    LABS:                          13.1   10.00 )-----------( 365      ( 22 Feb 2022 05:42 )             40.3       02-22    132<L>  |  99  |  9   ----------------------------<  216<H>  4.7   |  23  |  0.56    Ca    8.4      22 Feb 2022 05:42  Phos  2.5     02-22  Mg     1.7     02-22    TPro  5.2<L>  /  Alb  2.7<L>  /  TBili  0.2  /  DBili  x   /  AST  28  /  ALT  24  /  AlkPhos  72  02-22      CAPILLARY BLOOD GLUCOSE      POCT Blood Glucose.: 193 mg/dL (22 Feb 2022 07:32)  POCT Blood Glucose.: 292 mg/dL (21 Feb 2022 21:18)  POCT Blood Glucose.: 257 mg/dL (21 Feb 2022 19:00)  POCT Blood Glucose.: 237 mg/dL (21 Feb 2022 16:41)      PT/INR - ( 22 Feb 2022 05:42 )   PT: 13.8 sec;   INR: 1.16 ratio         PTT - ( 22 Feb 2022 05:42 )  PTT:29.1 sec    LOWER EXTREMITY PHYSICAL EXAM:  Vascular: DP/PT 0/4 B/L, CFT >3 seconds B/L, Temperature gradient warm to cool B/L  Neuro: Epicritic sensation intact to the level of digits B/L  Musculoskeletal/Ortho: unremarkable   Skin: s/p 2/15/22 right foot TMA open: strikethrough noted on dressing, flaps cool to touch but still viable, plantar incision and flap with no signs of necrosis, sanguinous drainage, no purulence, no malodor, left foot no open wounds or lesions      RADIOLOGY & ADDITIONAL STUDIES:

## 2022-02-22 NOTE — PROGRESS NOTE ADULT - PROBLEM SELECTOR PLAN 6
DVT: subcutaneous Heparin on hold post TMA and due to possible GI bleed  GI: Protonix, simethicone for dyspepsia  Diet: Carb consistent diet  Dispo: Pending clinical improvement, PT consult

## 2022-02-22 NOTE — PROGRESS NOTE ADULT - PROBLEM SELECTOR PLAN 5
A. fib on post TMA EKG per anaesthesia   - no more episodes of A. fib today at [facility]  - Echo showed EF 55-60% & moderate aortic stenosis  - as per cardio at , would ideally start AC for A. fib but in light of recent GIB/coffee grounds emesis will hold off  - Patient on metoprolol 12.5 BID, can continue at this time  - ASA okay to resumed as per GI at [facility]

## 2022-02-22 NOTE — PROCEDURE NOTE - PROCEDURE FINDINGS AND DETAILS
L CFA puncture. RLE angiogram w/ patent R DESHAUN, IIA, EIA, and CFA. The SFA, including stent, was completely occluded. The profunda femoris was patent. There was reconstitution of the popliteal artery and single vessel runoff to the foot via the posterior tibial artery. Wire recannulization of the SFA/popliteal stent was performed followed by angioplasty with an 8 mm balloon. There was persistent poor flow through the stent. The profunda femoris artery remained patent with reconstitution of the popliteal artery. The case was aborted. The L groin was closed w/ a Celt device.

## 2022-02-22 NOTE — PROGRESS NOTE ADULT - ASSESSMENT
62 y/o M w/ PMHx DM, PAD s/p R TMA 2/15 transferred from [facility] for IR thrombectomy for R superficial femoral and popliteal artery occlusion and likely surgical revision with podiatry pending IR procedure

## 2022-02-22 NOTE — PROCEDURE NOTE - PLAN
- Okay for discharge after 1 hour recovery.  - Vascular surgery consult for possible RLE bypass.  - Resume home Plavix.

## 2022-02-22 NOTE — PRE-ANESTHESIA EVALUATION ADULT - WEIGHT IN LBS
[address] [facility] December 19, 2018       RE: Saba Haji      To Whom It May Concern,    This is to certify that Saba Haji may may return to work on 12/19/18. Please feel free to contact my office if you have any questions or concerns. Thank you for your assistance in this matter.       Sincerely,  Soha Yu RN 167.9

## 2022-02-22 NOTE — PROGRESS NOTE ADULT - PROBLEM SELECTOR PLAN 7
- RCRI 1, 6% 30-day risk of death, MI, or cardiac arrest.    ---Functional status METs <4  ------No absolute contraindications including active chest pain, decompensated heart failure, and/or life threatening arrythmia.    No additional cardiac testing indicated prior to procedure. Patient is medically optimized. - RCRI 1, 6% 30-day risk of death, MI, or cardiac arrest.    No additional cardiac testing indicated prior to procedure. Patient is medically optimized.

## 2022-02-23 LAB
ANION GAP SERPL CALC-SCNC: 11 MMOL/L — SIGNIFICANT CHANGE UP (ref 5–17)
BUN SERPL-MCNC: 6 MG/DL — LOW (ref 7–23)
CALCIUM SERPL-MCNC: 8.5 MG/DL — SIGNIFICANT CHANGE UP (ref 8.4–10.5)
CHLORIDE SERPL-SCNC: 101 MMOL/L — SIGNIFICANT CHANGE UP (ref 96–108)
CO2 SERPL-SCNC: 24 MMOL/L — SIGNIFICANT CHANGE UP (ref 22–31)
CREAT SERPL-MCNC: 0.53 MG/DL — SIGNIFICANT CHANGE UP (ref 0.5–1.3)
GLUCOSE BLDC GLUCOMTR-MCNC: 157 MG/DL — HIGH (ref 70–99)
GLUCOSE BLDC GLUCOMTR-MCNC: 169 MG/DL — HIGH (ref 70–99)
GLUCOSE BLDC GLUCOMTR-MCNC: 183 MG/DL — HIGH (ref 70–99)
GLUCOSE BLDC GLUCOMTR-MCNC: 227 MG/DL — HIGH (ref 70–99)
GLUCOSE BLDC GLUCOMTR-MCNC: 236 MG/DL — HIGH (ref 70–99)
GLUCOSE SERPL-MCNC: 186 MG/DL — HIGH (ref 70–99)
HCT VFR BLD CALC: 39.5 % — SIGNIFICANT CHANGE UP (ref 39–50)
HGB BLD-MCNC: 12.9 G/DL — LOW (ref 13–17)
MAGNESIUM SERPL-MCNC: 1.6 MG/DL — SIGNIFICANT CHANGE UP (ref 1.6–2.6)
MCHC RBC-ENTMCNC: 29.3 PG — SIGNIFICANT CHANGE UP (ref 27–34)
MCHC RBC-ENTMCNC: 32.7 GM/DL — SIGNIFICANT CHANGE UP (ref 32–36)
MCV RBC AUTO: 89.6 FL — SIGNIFICANT CHANGE UP (ref 80–100)
NRBC # BLD: 0 /100 WBCS — SIGNIFICANT CHANGE UP (ref 0–0)
PHOSPHATE SERPL-MCNC: 2.9 MG/DL — SIGNIFICANT CHANGE UP (ref 2.5–4.5)
PLATELET # BLD AUTO: 372 K/UL — SIGNIFICANT CHANGE UP (ref 150–400)
POTASSIUM SERPL-MCNC: 3.9 MMOL/L — SIGNIFICANT CHANGE UP (ref 3.5–5.3)
POTASSIUM SERPL-SCNC: 3.9 MMOL/L — SIGNIFICANT CHANGE UP (ref 3.5–5.3)
RBC # BLD: 4.41 M/UL — SIGNIFICANT CHANGE UP (ref 4.2–5.8)
RBC # FLD: 12.9 % — SIGNIFICANT CHANGE UP (ref 10.3–14.5)
SODIUM SERPL-SCNC: 136 MMOL/L — SIGNIFICANT CHANGE UP (ref 135–145)
SURGICAL PATHOLOGY STUDY: SIGNIFICANT CHANGE UP
WBC # BLD: 11.26 K/UL — HIGH (ref 3.8–10.5)
WBC # FLD AUTO: 11.26 K/UL — HIGH (ref 3.8–10.5)

## 2022-02-23 PROCEDURE — 99223 1ST HOSP IP/OBS HIGH 75: CPT

## 2022-02-23 PROCEDURE — 99231 SBSQ HOSP IP/OBS SF/LOW 25: CPT

## 2022-02-23 PROCEDURE — 93970 EXTREMITY STUDY: CPT | Mod: 26

## 2022-02-23 PROCEDURE — 99233 SBSQ HOSP IP/OBS HIGH 50: CPT | Mod: GC

## 2022-02-23 PROCEDURE — 93306 TTE W/DOPPLER COMPLETE: CPT | Mod: 26

## 2022-02-23 RX ORDER — MAGNESIUM SULFATE 500 MG/ML
1 VIAL (ML) INJECTION ONCE
Refills: 0 | Status: DISCONTINUED | OUTPATIENT
Start: 2022-02-23 | End: 2022-02-23

## 2022-02-23 RX ORDER — MAGNESIUM SULFATE 500 MG/ML
2 VIAL (ML) INJECTION ONCE
Refills: 0 | Status: COMPLETED | OUTPATIENT
Start: 2022-02-23 | End: 2022-02-23

## 2022-02-23 RX ADMIN — FENTANYL CITRATE 25 MICROGRAM(S): 50 INJECTION INTRAVENOUS at 21:55

## 2022-02-23 RX ADMIN — PANTOPRAZOLE SODIUM 40 MILLIGRAM(S): 20 TABLET, DELAYED RELEASE ORAL at 05:02

## 2022-02-23 RX ADMIN — CEFEPIME 100 MILLIGRAM(S): 1 INJECTION, POWDER, FOR SOLUTION INTRAMUSCULAR; INTRAVENOUS at 13:06

## 2022-02-23 RX ADMIN — MORPHINE SULFATE 2 MILLIGRAM(S): 50 CAPSULE, EXTENDED RELEASE ORAL at 05:17

## 2022-02-23 RX ADMIN — Medication 500 MILLIGRAM(S): at 13:12

## 2022-02-23 RX ADMIN — Medication 12.5 MILLIGRAM(S): at 05:00

## 2022-02-23 RX ADMIN — Medication 1 PATCH: at 11:34

## 2022-02-23 RX ADMIN — Medication 500 MILLIGRAM(S): at 21:46

## 2022-02-23 RX ADMIN — Medication 1: at 13:05

## 2022-02-23 RX ADMIN — Medication 2: at 17:23

## 2022-02-23 RX ADMIN — CEFEPIME 100 MILLIGRAM(S): 1 INJECTION, POWDER, FOR SOLUTION INTRAMUSCULAR; INTRAVENOUS at 05:02

## 2022-02-23 RX ADMIN — Medication 81 MILLIGRAM(S): at 11:13

## 2022-02-23 RX ADMIN — Medication 2 UNIT(S): at 08:23

## 2022-02-23 RX ADMIN — MORPHINE SULFATE 2 MILLIGRAM(S): 50 CAPSULE, EXTENDED RELEASE ORAL at 21:31

## 2022-02-23 RX ADMIN — Medication 1 GRAM(S): at 17:29

## 2022-02-23 RX ADMIN — CEFEPIME 100 MILLIGRAM(S): 1 INJECTION, POWDER, FOR SOLUTION INTRAMUSCULAR; INTRAVENOUS at 21:45

## 2022-02-23 RX ADMIN — MORPHINE SULFATE 2 MILLIGRAM(S): 50 CAPSULE, EXTENDED RELEASE ORAL at 20:34

## 2022-02-23 RX ADMIN — Medication 25 GRAM(S): at 11:14

## 2022-02-23 RX ADMIN — INSULIN GLARGINE 8 UNIT(S): 100 INJECTION, SOLUTION SUBCUTANEOUS at 21:50

## 2022-02-23 RX ADMIN — Medication 1 GRAM(S): at 13:05

## 2022-02-23 RX ADMIN — ATORVASTATIN CALCIUM 10 MILLIGRAM(S): 80 TABLET, FILM COATED ORAL at 21:46

## 2022-02-23 RX ADMIN — Medication 500 MILLIGRAM(S): at 05:01

## 2022-02-23 RX ADMIN — SODIUM CHLORIDE 75 MILLILITER(S): 9 INJECTION, SOLUTION INTRAVENOUS at 11:14

## 2022-02-23 RX ADMIN — Medication 2 UNIT(S): at 17:22

## 2022-02-23 RX ADMIN — Medication 12.5 MILLIGRAM(S): at 21:46

## 2022-02-23 RX ADMIN — Medication 1 PATCH: at 13:34

## 2022-02-23 RX ADMIN — Medication 1 PATCH: at 19:30

## 2022-02-23 RX ADMIN — PANTOPRAZOLE SODIUM 40 MILLIGRAM(S): 20 TABLET, DELAYED RELEASE ORAL at 17:29

## 2022-02-23 RX ADMIN — MORPHINE SULFATE 2 MILLIGRAM(S): 50 CAPSULE, EXTENDED RELEASE ORAL at 05:02

## 2022-02-23 RX ADMIN — Medication 1 PATCH: at 11:35

## 2022-02-23 RX ADMIN — FENTANYL CITRATE 25 MICROGRAM(S): 50 INJECTION INTRAVENOUS at 21:43

## 2022-02-23 RX ADMIN — Medication 1 GRAM(S): at 05:03

## 2022-02-23 RX ADMIN — Medication 2 UNIT(S): at 13:06

## 2022-02-23 RX ADMIN — Medication 1: at 08:21

## 2022-02-23 NOTE — PROGRESS NOTE ADULT - ASSESSMENT
Assessment/Plan: This is a 61 year old Male s/p attempted RLE angiogram and angioplasty on 2/22/22 in Interventional Radiology with Dr. Troncoso     - Pt s/p RLE angiogram and angioplasty. Left CFA puncture. RLE angiogram with patent Right DESHAUN, IIA, EIA, and CFA. The SFA, including stent, was completely occluded. The profunda femoris was patent. There was reconstitution of the popliteal artery and single vessel runoff to the foot via the posterior tibial artery. Wire recannulization of the SFA/popliteal stent was performed followed by angioplasty with an 8 mm balloon. There was persistent poor flow through the stent. The profunda femoris artery remained patent with reconstitution of the popliteal artery. The case was aborted. The Left groin was closed with a Celt device.    -Vascular surgery consulted for potential revascularization, IR will sign off  -Case discussed with Dr. Troncoso     Please call IR at [phone] with any questions, concerns, or issues regarding above.      Also available on TEAMS

## 2022-02-23 NOTE — CONSULT NOTE ADULT - ASSESSMENT
61M DM, PAD.   Right foot gangrene s/p TMA 2/15 for emergent source control growing MSSA, Serratia, GBS, Finegoldia.   However, still concern for ongoing infection and viability, liquefactive necrosis tracked along the tarsal tunnel.   Failed angiogram and SFA stenting 2/22.   Planned for possible bypass next week.     Tay Roberto MD   Infectious Disease   Available on TEAMS. After 5PM and on weekends please page fellow on call or call [phone] 61M DM, PAD.   Right foot gangrene s/p emergent TMA 2/15 for wet conversion.   Wound grew MSSA, Serratia, GBS, Finegoldia.   High concern for ongoing infection and viability. Liquefactive necrosis tracked along the tarsal tunnel.   Planned for possible bypass next week after failed angiogram and SFA stenting 2/22.   Cefepime and Flagyl seem appropriate.     Tay Roberto MD   Infectious Disease   Available on TEAMS. After 5PM and on weekends please page fellow on call or call [phone] 61M DM, PAD.   Right foot gangrene s/p emergent TMA 2/15 for wet conversion.   High concern for ongoing infection and viability. Liquefactive necrosis tracked along the tarsal tunnel.   Wound grew MSSA, Serratia, GBS, Finegoldia.   Not systemically ill.   Planned for vascular bypass next week after failed angiogram and SFA stenting 2/22.     Suggest  -intervention per vascular and podiatry   -agree with Cefepime and Flagyl, final duration/route depends on what happens with the foot     Discussed with medicine     Tay Roberto MD   Infectious Disease   Available on TEAMS. After 5PM and on weekends please page fellow on call or call [phone]

## 2022-02-23 NOTE — PROGRESS NOTE ADULT - ASSESSMENT
62 y/o M w/ PMHx DM, PAD s/p R TMA 2/15 transferred from [facility] for IR thrombectomy for R superficial femoral and popliteal artery occlusion and likely surgical revision with podiatry pending IR procedure 62 y/o M w/ PMHx DM, PAD s/p R TMA 2/15 transferred from [facility] for IR thrombectomy for R superficial femoral and popliteal artery occlusion and likely surgical revision with podiatry with continued poor flow s/p IR procedure, now for vascular bypass surgery

## 2022-02-23 NOTE — PROGRESS NOTE ADULT - SUBJECTIVE AND OBJECTIVE BOX
Interventional Radiology Follow-Up Note.    This is a 61 year old Male s/p attempted RLE angiogram and angioplasty on 2/22/22 in Interventional Radiology with Dr. Troncoso     No complaint offered.    Medication:  aspirin  chewable: (02-21)  cefepime   IVPB: (02-21)  cefepime   IVPB: (02-23)  cefepime   IVPB: (02-21)  metoprolol tartrate: (02-23)  metroNIDAZOLE    Tablet: (02-23)  metroNIDAZOLE    Tablet: (02-21)    Vitals:  T(F): 98.5, Max: 98.5 (03:59)  HR: 76  BP: 146/63  RR: 18  SpO2: 97%    Physical Exam:  General: Nontoxic, in NAD.  Abdomen: soft, NTND.   Extremities:  Left groin clean, dry and intact, soft with no evidence of hematoma, + femoral/dp/pt pulses + No pedal edema or calf tenderness noted.      LABS:  Na: 136 (02-23 @ 05:51), 132 (02-22 @ 05:42), 136 (02-21 @ 06:59)  K: 3.9 (02-23 @ 05:51), 4.7 (02-22 @ 05:42), 4.2 (02-21 @ 06:59)  Cl: 101 (02-23 @ 05:51), 99 (02-22 @ 05:42), 102 (02-21 @ 06:59)  CO2: 24 (02-23 @ 05:51), 23 (02-22 @ 05:42), 29 (02-21 @ 06:59)  BUN: 6 (02-23 @ 05:51), 9 (02-22 @ 05:42), 8 (02-21 @ 06:59)  Cr: 0.53 (02-23 @ 05:51), 0.56 (02-22 @ 05:42), 0.60 (02-21 @ 06:59)  Glu: 186(02-23 @ 05:51), 216(02-22 @ 05:42), 204(02-21 @ 06:59)    Hgb: 12.9 (02-23 @ 05:51), 13.1 (02-22 @ 05:42), 13.1 (02-21 @ 06:59)  Hct: 39.5 (02-23 @ 05:51), 40.3 (02-22 @ 05:42), 40.8 (02-21 @ 06:59)  WBC: 11.26 (02-23 @ 05:51), 10.00 (02-22 @ 05:42), 9.35 (02-21 @ 06:59)  Plt: 372 (02-23 @ 05:51), 365 (02-22 @ 05:42), 366 (02-21 @ 06:59)    INR: 1.16 02-22-22 @ 05:42, 1.28 02-21-22 @ 06:59  PTT: 29.1 02-22-22 @ 05:42, 30.7 02-21-22 @ 06:59          LIVER FUNCTIONS - ( 22 Feb 2022 05:42 )  Alb: 2.7 g/dL / Pro: 5.2 g/dL / ALK PHOS: 72 U/L / ALT: 24 U/L / AST: 28 U/L / GGT: x         Aspartate Aminotransferase (AST/SGOT): 28 U/L (02-22-22 @ 05:42)  Alanine Aminotransferase (ALT/SGPT): 24 U/L (02-22-22 @ 05:42)  Bilirubin Total, Serum: 0.2 mg/dL (02-22-22 @ 05:42)

## 2022-02-23 NOTE — CONSULT NOTE ADULT - SUBJECTIVE AND OBJECTIVE BOX
HPI:  62 y/o M w/ PMHx DM, PAD, presented initially to [facility] for R foot 4th/5th digit dry gangrene w/ non healing R medial malleolar ulcer. Patient found to have no palpable pulses distal to femoral b/l. MRI was done which was not suggestive of acute osteomyelitis; fluid collection was suspected. Patient underwent LE Arterial Doppler, which showed occluded R superficial femoral and popliteal arteries with multiple stents and Occluded proximal and mid left superficial femoral artery with reconstitution distally. Patient was initially treated with  foot wound with zosyn 2/11-2/15, and then switched over to cefepime and flagyl. Patient underwent R foot TMA on 2/15. Wound culture of R foot abscess grew numerous staph aureus, serratia marcescens, and strep agalactase. Intra-operative there was a concern for viability. Therefore, plan was to transfer patient to [facility] for IR thrombectomy for R superficial femoral and popliteal artery occlusion with multiple stents and Occluded proximal and mid left superficial femoral artery with reconstitution distally seen on LE arterial doppler.    At this time, patient reports he is doing well and looking forward to IR procedure. Denies any complaints at this time (21 Feb 2022 17:46)      PAST MEDICAL & SURGICAL HISTORY:  DM (diabetes mellitus)    Peripheral artery disease    S/P transmetatarsal amputation of foot, right  2/15/2022        Allergies    No Known Allergies    Intolerances        ANTIMICROBIALS:  cefepime   IVPB    cefepime   IVPB 1000 every 8 hours  metroNIDAZOLE    Tablet 500 three times a day      OTHER MEDS:  acetaminophen     Tablet .. 650 milliGRAM(s) Oral every 6 hours PRN  aluminum hydroxide/magnesium hydroxide/simethicone Suspension 30 milliLiter(s) Oral every 6 hours PRN  aspirin enteric coated 81 milliGRAM(s) Oral daily  atorvastatin 10 milliGRAM(s) Oral at bedtime  dextrose 40% Gel 15 Gram(s) Oral once  dextrose 5%. 1000 milliLiter(s) IV Continuous <Continuous>  dextrose 5%. 1000 milliLiter(s) IV Continuous <Continuous>  dextrose 50% Injectable 25 Gram(s) IV Push once  dextrose 50% Injectable 12.5 Gram(s) IV Push once  dextrose 50% Injectable 25 Gram(s) IV Push once  fentaNYL    Injectable 25 MICROGram(s) IV Push every 5 minutes PRN  glucagon  Injectable 1 milliGRAM(s) IntraMuscular once  insulin glargine Injectable (LANTUS) 8 Unit(s) SubCutaneous at bedtime  insulin lispro (ADMELOG) corrective regimen sliding scale   SubCutaneous three times a day before meals  insulin lispro (ADMELOG) corrective regimen sliding scale   SubCutaneous three times a day before meals  insulin lispro Injectable (ADMELOG) 2 Unit(s) SubCutaneous three times a day before meals  lactated ringers. 1000 milliLiter(s) IV Continuous <Continuous>  metoprolol tartrate 12.5 milliGRAM(s) Oral two times a day  morphine  - Injectable 2 milliGRAM(s) IV Push every 6 hours PRN  nicotine - 21 mG/24Hr(s) Patch 1 patch Transdermal daily  ondansetron Injectable 8 milliGRAM(s) IV Push once PRN  pantoprazole  Injectable 40 milliGRAM(s) IV Push every 12 hours  simethicone 80 milliGRAM(s) Chew every 6 hours PRN  sucralfate 1 Gram(s) Oral four times a day      SOCIAL HISTORY:    FAMILY HISTORY:  No pertinent family history in first degree relatives        ROS:  Unobtainable because:   All other systems negative   Constitutional: no fever, no chills  Eye: no vision changes  ENT: no sore throat, no rhinorrhea  Cardiovascular: no chest pain, no palpitation  Respiratory: no SOB, no cough  GI:  no abd pain, no vomiting, no diarrhea  urinary: no dysuria, no hematuria, no flank pain  musculoskeletal: no joint pain, no joint swelling  skin: no rash  neurology: no headache, no change in mental status  psych: no anxiety    Physical Exam:  General: awake, alert, non toxic  Head: atraumatic, normocephalic  Eyes: normal sclera and conjunctiva  ENT: no oropharyngeal lesions, no LAD, neck supple  Cardio: regular rate and rhythm   Respiratory: nonlabored on room air, clear bilaterally, no wheezing  abd: soft, bowel sounds present, not tender  : no suprapubic tenderness, no diaz  Musculoskeletal: no joint swelling, no edema  Skin: no rash  vascular: no phlebitis  Neurologic: no focal deficits  psych: normal affect       Drug Dosing Weight  Height (cm): 188 (22 Feb 2022 12:50)  Weight (kg): 76.2 (22 Feb 2022 12:50)  BMI (kg/m2): 21.6 (22 Feb 2022 12:50)  BSA (m2): 2.02 (22 Feb 2022 12:50)    Vital Signs Last 24 Hrs  T(F): 98.5 (02-23-22 @ 03:59), Max: 98.8 (02-20-22 @ 23:36)    Vital Signs Last 24 Hrs  HR: 76 (02-23-22 @ 03:59) (63 - 98)  BP: 146/63 (02-23-22 @ 03:59) (103/57 - 167/77)  RR: 18 (02-23-22 @ 03:59)  SpO2: 97% (02-23-22 @ 03:59) (74% - 98%)  Wt(kg): --                          12.9   11.26 )-----------( 372      ( 23 Feb 2022 05:51 )             39.5       02-23    136  |  101  |  6<L>  ----------------------------<  186<H>  3.9   |  24  |  0.53    Ca    8.5      23 Feb 2022 05:51  Phos  2.9     02-23  Mg     1.6     02-23    TPro  5.2<L>  /  Alb  2.7<L>  /  TBili  0.2  /  DBili  x   /  AST  28  /  ALT  24  /  AlkPhos  72  02-22          MICROBIOLOGY:  v  .Surgical Swab RIGHT FOOT DEEP WOUND  02-16-22   Few Serratia marcescens  Rare Staphylococcus aureus  Few Streptococcus agalactiae (Group B) isolated  Group B streptococci are susceptible to ampicillin,  penicillin and cefazolin, but may be resistant to  erythromycin and clindamycin.  Recommendations for intrapartum prophylaxis for Group B  streptococci are penicillin or ampicillin.  --  Serratia marcescens  Staphylococcus aureus      .Abscess right foot  02-12-22   Numerous Staphylococcus aureus  Numerous Serratia marcescens  Numerous Streptococcus agalactiae (Group B) isolated  Group B streptococci are susceptible to ampicillin,  penicillin and cefazolin, but may be resistant to  erythromycin and clindamycin.  Recommendations for intrapartum prophylaxis for Group B  streptococci are penicillin or ampicillin.  Few Finegoldia magna "Susceptibilities not performed"  --  Staphylococcus aureus  Serratia marcescens                RADIOLOGY:  Images below reviewed personally HPI:  60 y/o M w/ PMHx DM, PAD, presented initially to [facility] for R foot 4th/5th digit dry gangrene w/ non healing R medial malleolar ulcer. Patient found to have no palpable pulses distal to femoral b/l. MRI was done which was not suggestive of acute osteomyelitis; fluid collection was suspected. Patient underwent LE Arterial Doppler, which showed occluded R superficial femoral and popliteal arteries with multiple stents and Occluded proximal and mid left superficial femoral artery with reconstitution distally. Patient was initially treated with  foot wound with zosyn 2/11-2/15, and then switched over to cefepime and flagyl. Patient underwent R foot TMA on 2/15. Wound culture of R foot abscess grew numerous staph aureus, serratia marcescens, and strep agalactase. Intra-operative there was a concern for viability. Therefore, plan was to transfer patient to [facility] for IR thrombectomy for R superficial femoral and popliteal artery occlusion with multiple stents and Occluded proximal and mid left superficial femoral artery with reconstitution distally seen on LE arterial doppler.    At this time, patient reports he is doing well and looking forward to IR procedure. Denies any complaints at this time (21 Feb 2022 17:46)      PAST MEDICAL & SURGICAL HISTORY:  DM (diabetes mellitus)    Peripheral artery disease    S/P transmetatarsal amputation of foot, right  2/15/2022        Allergies    No Known Allergies    Intolerances        ANTIMICROBIALS:  cefepime   IVPB    cefepime   IVPB 1000 every 8 hours  metroNIDAZOLE    Tablet 500 three times a day      OTHER MEDS:  acetaminophen     Tablet .. 650 milliGRAM(s) Oral every 6 hours PRN  aluminum hydroxide/magnesium hydroxide/simethicone Suspension 30 milliLiter(s) Oral every 6 hours PRN  aspirin enteric coated 81 milliGRAM(s) Oral daily  atorvastatin 10 milliGRAM(s) Oral at bedtime  dextrose 40% Gel 15 Gram(s) Oral once  dextrose 5%. 1000 milliLiter(s) IV Continuous <Continuous>  dextrose 5%. 1000 milliLiter(s) IV Continuous <Continuous>  dextrose 50% Injectable 25 Gram(s) IV Push once  dextrose 50% Injectable 12.5 Gram(s) IV Push once  dextrose 50% Injectable 25 Gram(s) IV Push once  fentaNYL    Injectable 25 MICROGram(s) IV Push every 5 minutes PRN  glucagon  Injectable 1 milliGRAM(s) IntraMuscular once  insulin glargine Injectable (LANTUS) 8 Unit(s) SubCutaneous at bedtime  insulin lispro (ADMELOG) corrective regimen sliding scale   SubCutaneous three times a day before meals  insulin lispro (ADMELOG) corrective regimen sliding scale   SubCutaneous three times a day before meals  insulin lispro Injectable (ADMELOG) 2 Unit(s) SubCutaneous three times a day before meals  lactated ringers. 1000 milliLiter(s) IV Continuous <Continuous>  metoprolol tartrate 12.5 milliGRAM(s) Oral two times a day  morphine  - Injectable 2 milliGRAM(s) IV Push every 6 hours PRN  nicotine - 21 mG/24Hr(s) Patch 1 patch Transdermal daily  ondansetron Injectable 8 milliGRAM(s) IV Push once PRN  pantoprazole  Injectable 40 milliGRAM(s) IV Push every 12 hours  simethicone 80 milliGRAM(s) Chew every 6 hours PRN  sucralfate 1 Gram(s) Oral four times a day      SOCIAL HISTORY:    FAMILY HISTORY:  No pertinent family history in first degree relatives        ROS:  Unobtainable because:   All other systems negative   Constitutional: no fever, no chills  Eye: no vision changes  ENT: no sore throat, no rhinorrhea  Cardiovascular: no chest pain, no palpitation  Respiratory: no SOB, no cough  GI:  no abd pain, no vomiting, no diarrhea  urinary: no dysuria, no hematuria, no flank pain  musculoskeletal: no joint pain, no joint swelling  skin: no rash  neurology: no headache, no change in mental status  psych: no anxiety    Physical Exam:  General: awake, alert, non toxic  Head: atraumatic, normocephalic  Eyes: normal sclera and conjunctiva  ENT: no oropharyngeal lesions, no LAD, neck supple  Cardio: regular rate and rhythm   Respiratory: nonlabored on room air, clear bilaterally, no wheezing  abd: soft, bowel sounds present, not tender  : no suprapubic tenderness, no diaz  Musculoskeletal: no joint swelling, no edema  Skin: no rash  vascular: no phlebitis  Neurologic: no focal deficits  psych: normal affect       Drug Dosing Weight  Height (cm): 188 (22 Feb 2022 12:50)  Weight (kg): 76.2 (22 Feb 2022 12:50)  BMI (kg/m2): 21.6 (22 Feb 2022 12:50)  BSA (m2): 2.02 (22 Feb 2022 12:50)    Vital Signs Last 24 Hrs  T(F): 98.5 (02-23-22 @ 03:59), Max: 98.8 (02-20-22 @ 23:36)    Vital Signs Last 24 Hrs  HR: 76 (02-23-22 @ 03:59) (63 - 98)  BP: 146/63 (02-23-22 @ 03:59) (103/57 - 167/77)  RR: 18 (02-23-22 @ 03:59)  SpO2: 97% (02-23-22 @ 03:59) (74% - 98%)  Wt(kg): --                          12.9   11.26 )-----------( 372      ( 23 Feb 2022 05:51 )             39.5       02-23    136  |  101  |  6<L>  ----------------------------<  186<H>  3.9   |  24  |  0.53    Ca    8.5      23 Feb 2022 05:51  Phos  2.9     02-23  Mg     1.6     02-23    TPro  5.2<L>  /  Alb  2.7<L>  /  TBili  0.2  /  DBili  x   /  AST  28  /  ALT  24  /  AlkPhos  72  02-22          MICROBIOLOGY:  .Surgical Swab RIGHT FOOT DEEP WOUND  02-16-22   Few Serratia marcescens  Rare Staphylococcus aureus  Few Streptococcus agalactiae (Group B) isolated  Group B streptococci are susceptible to ampicillin,  penicillin and cefazolin, but may be resistant to  erythromycin and clindamycin.  Recommendations for intrapartum prophylaxis for Group B  streptococci are penicillin or ampicillin.  --  Serratia marcescens  Staphylococcus aureus    .Abscess right foot  02-12-22   Numerous Staphylococcus aureus  Numerous Serratia marcescens  Numerous Streptococcus agalactiae (Group B) isolated  Group B streptococci are susceptible to ampicillin,  penicillin and cefazolin, but may be resistant to  erythromycin and clindamycin.  Recommendations for intrapartum prophylaxis for Group B  streptococci are penicillin or ampicillin.  Few Joeia magna "Susceptibilities not performed"  --  Staphylococcus aureus  Serratia marcescens    RADIOLOGY:  Images below reviewed personally  Xray Foot AP + Lateral + Oblique, Right (02.16.22 @ 15:16)   Status post first through fifth transmetatarsal amputations   HPI:  61M with DM, PAD, admitted to [place] 2/11 with Right foot dry gangrene but progressed to wet conversion requiring emergent TMA 2/15.   LE Arterial Doppler, which showed occluded R superficial femoral and popliteal arteries with multiple stents and Occluded proximal and mid left superficial femoral artery with reconstitution distally.   Transferred to [facility] 2/21 for vascular intervention.   Angiogram and stenting didn't work 2/22, now planned for bypass next week.   His pain waxes and wanes, currently waxing.   No fevers or chills. No diarrhea. No cough. No dysuria.   Wife at bedside.     PAST MEDICAL & SURGICAL HISTORY:  DM (diabetes mellitus)    Peripheral artery disease    S/P transmetatarsal amputation of foot, right  2/15/2022        Allergies    No Known Allergies    Intolerances        ANTIMICROBIALS:  cefepime   IVPB    cefepime   IVPB 1000 every 8 hours  metroNIDAZOLE    Tablet 500 three times a day      OTHER MEDS:  acetaminophen     Tablet .. 650 milliGRAM(s) Oral every 6 hours PRN  aluminum hydroxide/magnesium hydroxide/simethicone Suspension 30 milliLiter(s) Oral every 6 hours PRN  aspirin enteric coated 81 milliGRAM(s) Oral daily  atorvastatin 10 milliGRAM(s) Oral at bedtime  dextrose 40% Gel 15 Gram(s) Oral once  dextrose 5%. 1000 milliLiter(s) IV Continuous <Continuous>  dextrose 5%. 1000 milliLiter(s) IV Continuous <Continuous>  dextrose 50% Injectable 25 Gram(s) IV Push once  dextrose 50% Injectable 12.5 Gram(s) IV Push once  dextrose 50% Injectable 25 Gram(s) IV Push once  fentaNYL    Injectable 25 MICROGram(s) IV Push every 5 minutes PRN  glucagon  Injectable 1 milliGRAM(s) IntraMuscular once  insulin glargine Injectable (LANTUS) 8 Unit(s) SubCutaneous at bedtime  insulin lispro (ADMELOG) corrective regimen sliding scale   SubCutaneous three times a day before meals  insulin lispro (ADMELOG) corrective regimen sliding scale   SubCutaneous three times a day before meals  insulin lispro Injectable (ADMELOG) 2 Unit(s) SubCutaneous three times a day before meals  lactated ringers. 1000 milliLiter(s) IV Continuous <Continuous>  metoprolol tartrate 12.5 milliGRAM(s) Oral two times a day  morphine  - Injectable 2 milliGRAM(s) IV Push every 6 hours PRN  nicotine - 21 mG/24Hr(s) Patch 1 patch Transdermal daily  ondansetron Injectable 8 milliGRAM(s) IV Push once PRN  pantoprazole  Injectable 40 milliGRAM(s) IV Push every 12 hours  simethicone 80 milliGRAM(s) Chew every 6 hours PRN  sucralfate 1 Gram(s) Oral four times a day      SOCIAL HISTORY: . Was smoking up until hospitalization.     FAMILY HISTORY:  DM       ROS:  All other systems negative   Constitutional: no fever, no chills  Eye: no vision changes  ENT: no rhinorrhea  Cardiovascular: no chest pain, no palpitation  Respiratory: no SOB, no cough  GI:  no abd pain, no diarrhea  urinary: no dysuria   musculoskeletal: per HPI   skin: no rash  neurology: no headache   psych: no anxiety    Physical Exam:  General: awake, alert, non toxic  Head: atraumatic, normocephalic  Eyes: normal sclera and conjunctiva  ENT: neck supple  Cardio: regular rate and rhythm   Respiratory: nonlabored on room air  abd: nondistended   : no diaz  Musculoskeletal: right foot dressed   Skin: no rash  Neurologic: no focal deficits  psych: normal affect       Drug Dosing Weight  Height (cm): 188 (22 Feb 2022 12:50)  Weight (kg): 76.2 (22 Feb 2022 12:50)  BMI (kg/m2): 21.6 (22 Feb 2022 12:50)  BSA (m2): 2.02 (22 Feb 2022 12:50)    Vital Signs Last 24 Hrs  T(F): 98.5 (02-23-22 @ 03:59), Max: 98.8 (02-20-22 @ 23:36)    Vital Signs Last 24 Hrs  HR: 76 (02-23-22 @ 03:59) (63 - 98)  BP: 146/63 (02-23-22 @ 03:59) (103/57 - 167/77)  RR: 18 (02-23-22 @ 03:59)  SpO2: 97% (02-23-22 @ 03:59) (74% - 98%)  Wt(kg): --                          12.9   11.26 )-----------( 372      ( 23 Feb 2022 05:51 )             39.5       02-23    136  |  101  |  6<L>  ----------------------------<  186<H>  3.9   |  24  |  0.53    Ca    8.5      23 Feb 2022 05:51  Phos  2.9     02-23  Mg     1.6     02-23    TPro  5.2<L>  /  Alb  2.7<L>  /  TBili  0.2  /  DBili  x   /  AST  28  /  ALT  24  /  AlkPhos  72  02-22          MICROBIOLOGY:  .Surgical Swab RIGHT FOOT DEEP WOUND  02-16-22   Few Serratia marcescens  Rare Staphylococcus aureus  Few Streptococcus agalactiae (Group B) isolated  Group B streptococci are susceptible to ampicillin,  penicillin and cefazolin, but may be resistant to  erythromycin and clindamycin.  Recommendations for intrapartum prophylaxis for Group B  streptococci are penicillin or ampicillin.  --  Serratia marcescens  Staphylococcus aureus    .Abscess right foot  02-12-22   Numerous Staphylococcus aureus  Numerous Serratia marcescens  Numerous Streptococcus agalactiae (Group B) isolated  Group B streptococci are susceptible to ampicillin,  penicillin and cefazolin, but may be resistant to  erythromycin and clindamycin.  Recommendations for intrapartum prophylaxis for Group B  streptococci are penicillin or ampicillin.  Few Finegoldia magna "Susceptibilities not performed"  --  Staphylococcus aureus  Serratia marcescens    RADIOLOGY:  Images below reviewed personally  Xray Foot AP + Lateral + Oblique, Right (02.16.22 @ 15:16)   Status post first through fifth transmetatarsal amputations

## 2022-02-23 NOTE — PROGRESS NOTE ADULT - PROBLEM SELECTOR PLAN 4
Patient with history of DM  - basal bolus based on weight with low ISS  - Hgb A1C is 8.6 Patient with history of DM  - basal bolus based on weight with low ISS  -- will modify based on insulin requirements tomorrow  - Hgb A1C is 8.6

## 2022-02-23 NOTE — PROGRESS NOTE ADULT - SUBJECTIVE AND OBJECTIVE BOX
**************************************  Collin Pruett, PGY-1  Senior Resident: Tika Nuno  After 7PM, please contact night float at #[phone] or [phone]  **************************************    INTERVAL HPI/OVERNIGHT EVENTS:  Patient was seen and examined at bedside. As per nurse and patient, no o/n events, patient resting comfortably. Pain being managed. Denies any complaints at this time    VITAL SIGNS:  T(F): 98.5 (02-23-22 @ 03:59)  HR: 76 (02-23-22 @ 03:59)  BP: 146/63 (02-23-22 @ 03:59)  RR: 18 (02-23-22 @ 03:59)  SpO2: 97% (02-23-22 @ 03:59)    PHYSICAL EXAM:    Constitutional: WDWN, NAD  HEENT: PERRL, EOMI, sclera non-icteric, MMM  Respiratory: CTA b/l, good air entry b/l,  Cardiovascular: RRR, normal S1S2, no M/R/G  Gastrointestinal: soft, NTND, no masses palpable, BS normal  Extremities: R  foot TMA. Area covered in dressing. 1+ edema in Left leg up to mid shin. small wound noted in left mid shin  Neurological: AAOx3, CN Grossly intact  Skin: Normal temperature, warm, dry    MEDICATIONS  (STANDING):  aspirin enteric coated 81 milliGRAM(s) Oral daily  atorvastatin 10 milliGRAM(s) Oral at bedtime  cefepime   IVPB      cefepime   IVPB 1000 milliGRAM(s) IV Intermittent every 8 hours  dextrose 40% Gel 15 Gram(s) Oral once  dextrose 5%. 1000 milliLiter(s) (50 mL/Hr) IV Continuous <Continuous>  dextrose 5%. 1000 milliLiter(s) (100 mL/Hr) IV Continuous <Continuous>  dextrose 50% Injectable 25 Gram(s) IV Push once  dextrose 50% Injectable 12.5 Gram(s) IV Push once  dextrose 50% Injectable 25 Gram(s) IV Push once  glucagon  Injectable 1 milliGRAM(s) IntraMuscular once  insulin glargine Injectable (LANTUS) 8 Unit(s) SubCutaneous at bedtime  insulin lispro (ADMELOG) corrective regimen sliding scale   SubCutaneous three times a day before meals  insulin lispro (ADMELOG) corrective regimen sliding scale   SubCutaneous three times a day before meals  insulin lispro Injectable (ADMELOG) 2 Unit(s) SubCutaneous three times a day before meals  lactated ringers. 1000 milliLiter(s) (75 mL/Hr) IV Continuous <Continuous>  metoprolol tartrate 12.5 milliGRAM(s) Oral two times a day  metroNIDAZOLE    Tablet 500 milliGRAM(s) Oral three times a day  nicotine - 21 mG/24Hr(s) Patch 1 patch Transdermal daily  pantoprazole  Injectable 40 milliGRAM(s) IV Push every 12 hours  sucralfate 1 Gram(s) Oral four times a day    MEDICATIONS  (PRN):  acetaminophen     Tablet .. 650 milliGRAM(s) Oral every 6 hours PRN Mild Pain (1 - 3), Moderate Pain (4 - 6)  aluminum hydroxide/magnesium hydroxide/simethicone Suspension 30 milliLiter(s) Oral every 6 hours PRN Dyspepsia  fentaNYL    Injectable 25 MICROGram(s) IV Push every 5 minutes PRN Moderate Pain (4 - 6)  morphine  - Injectable 2 milliGRAM(s) IV Push every 6 hours PRN Severe Pain (7 - 10)  ondansetron Injectable 8 milliGRAM(s) IV Push once PRN Nausea and/or Vomiting  simethicone 80 milliGRAM(s) Chew every 6 hours PRN Dyspepsia      Allergies    No Known Allergies    Intolerances        LABS:                        12.9   11.26 )-----------( 372      ( 23 Feb 2022 05:51 )             39.5     02-23    136  |  101  |  6<L>  ----------------------------<  186<H>  3.9   |  24  |  0.53    Ca    8.5      23 Feb 2022 05:51  Phos  2.9     02-23  Mg     1.6     02-23    TPro  5.2<L>  /  Alb  2.7<L>  /  TBili  0.2  /  DBili  x   /  AST  28  /  ALT  24  /  AlkPhos  72  02-22    PT/INR - ( 22 Feb 2022 05:42 )   PT: 13.8 sec;   INR: 1.16 ratio         PTT - ( 22 Feb 2022 05:42 )  PTT:29.1 sec      RADIOLOGY & ADDITIONAL TESTS:  Reviewed

## 2022-02-23 NOTE — CONSULT NOTE ADULT - SUBJECTIVE AND OBJECTIVE BOX
DATE OF SERVICE: 02-23-22     CHIEF COMPLAINT:Patient is a 61y old  Male who presents with a chief complaint of wet gangrene, transferred for vascular interventions. (23 Feb 2022 07:15)      HISTORY OF PRESENT ILLNESS:HPI:  60 y/o M w/ PMHx DM, PAD, presented initially to [facility] for R foot 4th/5th digit dry gangrene w/ non healing R medial malleolar ulcer. Patient found to have no palpable pulses distal to femoral b/l. MRI was done which was not suggestive of acute osteomyelitis; fluid collection was suspected. Patient underwent LE Arterial Doppler, which showed occluded R superficial femoral and popliteal arteries with multiple stents and Occluded proximal and mid left superficial femoral artery with reconstitution distally. Patient was initially treated with  foot wound with zosyn 2/11-2/15, and then switched over to cefepime and flagyl. Patient underwent R foot TMA on 2/15. Wound culture of R foot abscess grew numerous staph aureus, serratia marcescens, and strep agalactase. Intra-operative there was a concern for viability. Therefore, plan was to transfer patient to [facility] for IR thrombectomy for R superficial femoral and popliteal artery occlusion with multiple stents and Occluded proximal and mid left superficial femoral artery with reconstitution distally seen on LE arterial doppler.    At this time, patient reports he is doing well and looking forward to IR procedure. Denies any complaints at this time (21 Feb 2022 17:46)      PAST MEDICAL & SURGICAL HISTORY:  DM (diabetes mellitus)    Peripheral artery disease    S/P transmetatarsal amputation of foot, right  2/15/2022            MEDICATIONS:  aspirin enteric coated 81 milliGRAM(s) Oral daily  metoprolol tartrate 12.5 milliGRAM(s) Oral two times a day    cefepime   IVPB      cefepime   IVPB 1000 milliGRAM(s) IV Intermittent every 8 hours  metroNIDAZOLE    Tablet 500 milliGRAM(s) Oral three times a day      acetaminophen     Tablet .. 650 milliGRAM(s) Oral every 6 hours PRN  fentaNYL    Injectable 25 MICROGram(s) IV Push every 5 minutes PRN  morphine  - Injectable 2 milliGRAM(s) IV Push every 6 hours PRN  ondansetron Injectable 8 milliGRAM(s) IV Push once PRN    aluminum hydroxide/magnesium hydroxide/simethicone Suspension 30 milliLiter(s) Oral every 6 hours PRN  pantoprazole  Injectable 40 milliGRAM(s) IV Push every 12 hours  simethicone 80 milliGRAM(s) Chew every 6 hours PRN  sucralfate 1 Gram(s) Oral four times a day    atorvastatin 10 milliGRAM(s) Oral at bedtime  dextrose 40% Gel 15 Gram(s) Oral once  dextrose 50% Injectable 25 Gram(s) IV Push once  dextrose 50% Injectable 12.5 Gram(s) IV Push once  dextrose 50% Injectable 25 Gram(s) IV Push once  glucagon  Injectable 1 milliGRAM(s) IntraMuscular once  insulin glargine Injectable (LANTUS) 8 Unit(s) SubCutaneous at bedtime  insulin lispro (ADMELOG) corrective regimen sliding scale   SubCutaneous three times a day before meals  insulin lispro Injectable (ADMELOG) 2 Unit(s) SubCutaneous three times a day before meals    dextrose 5%. 1000 milliLiter(s) IV Continuous <Continuous>  dextrose 5%. 1000 milliLiter(s) IV Continuous <Continuous>  lactated ringers. 1000 milliLiter(s) IV Continuous <Continuous>      FAMILY HISTORY:  No pertinent family history in first degree relatives        Non-contributory    SOCIAL HISTORY:    [ ] not a smoker    Allergies    No Known Allergies    Intolerances    	    REVIEW OF SYSTEMS:  CONSTITUTIONAL: No fever  EYES: No eye pain, visual disturbances, or discharge  ENMT:  No difficulty hearing, tinnitus  NECK: No pain or stiffness  RESPIRATORY: No cough, wheezing,  CARDIOVASCULAR: No chest pain, palpitations, passing out, dizziness, or leg swelling  GASTROINTESTINAL:  No nausea, vomiting, diarrhea or constipation. No melena.  GENITOURINARY: No dysuria, hematuria  NEUROLOGICAL: No stroke like symptoms  SKIN: No burning or lesions   ENDOCRINE: No heat or cold intolerance  MUSCULOSKELETAL: +leg/foot pain  PSYCHIATRIC: No  anxiety, mood swings  HEME/LYMPH: No bleeding gums  ALLERGY AND IMMUNOLOGIC: No hives or eczema	    All other ROS negative    PHYSICAL EXAM:  T(C): 37.6 (02-23-22 @ 20:07), Max: 37.6 (02-23-22 @ 20:07)  HR: 77 (02-23-22 @ 20:07) (68 - 96)  BP: 151/65 (02-23-22 @ 20:07) (104/62 - 151/65)  RR: 18 (02-23-22 @ 20:07) (18 - 18)  SpO2: 95% (02-23-22 @ 20:07) (93% - 97%)  Wt(kg): --  I&O's Summary    22 Feb 2022 07:01  -  23 Feb 2022 07:00  --------------------------------------------------------  IN: 440 mL / OUT: 3050 mL / NET: -2610 mL    23 Feb 2022 07:01  -  23 Feb 2022 22:48  --------------------------------------------------------  IN: 600 mL / OUT: 1100 mL / NET: -500 mL        Appearance: Normal	  HEENT:   Normal oral mucosa, EOMI	  Cardiovascular:  S1 S2, No JVD,    Respiratory: Lungs clear to auscultation	  Psychiatry: Alert  Gastrointestinal:  Soft, Non-tender, + BS	  Skin: No rashes   Neurologic: Non-focal  Extremities:  No edema  Vascular: Peripheral pulses palpable    	    	  	  CARDIAC MARKERS:  Labs personally reviewed by me                                  12.9   11.26 )-----------( 372      ( 23 Feb 2022 05:51 )             39.5     02-23    136  |  101  |  6<L>  ----------------------------<  186<H>  3.9   |  24  |  0.53    Ca    8.5      23 Feb 2022 05:51  Phos  2.9     02-23  Mg     1.6     02-23    TPro  5.2<L>  /  Alb  2.7<L>  /  TBili  0.2  /  DBili  x   /  AST  28  /  ALT  24  /  AlkPhos  72  02-22          EKG: Personally reviewed by me - AFl @ 120bpm with variable blocl  Radiology: Personally reviewed by me - CXR clear lungs      Assessment and Plan:   · Assessment	  60 y/o M w/ PMHx DM, PAD s/p R TMA 2/15 transferred from [facility] for IR thrombectomy for R superficial femoral and popliteal artery occlusion and likely surgical revision with podiatry with continued poor flow s/p IR procedure, now for vascular bypass surgery    Problem/Plan - 1:  ·  Problem: Peripheral artery disease.   ·  Plan: Pt transferred to [facility] for IR angiogram and angioplasty. Wire recannulization of the SFA/popliteal stent was performed followed by angioplasty on 2/22. There was persistent poor flow through the stent.   - Vascular consulted for RLE bypass  -- Plan for OR tentatively next week  - c/w ASA & Lipitor.    Problem/Plan - 2:  ·  Problem: Paroxysmal Atrial fibrillation/flutter  ·  Plan: CHADSVASc of 2-3  - H/H stable with EGD showing esophagitis  - rec AC with Eliquis 5mg BID or Lovenox alberto-proceudred  - Echo showed EF 55-60% & moderate aortic stenosis       Problem/Plan - 3:  ·  Problem: Pre-operative risk stratification  ·  Plan: Pre-operative clearance for open vascular LE Bypass  - unable to assess functional capacity given limited by PAD  - heavy smoker and diabetic going to high risk surgery  - will obtain preop NM stress test to assess for ischemic burden   - TTE with preserved EF and Mod AS    Problem/Plan - 4:  ·  Problem: DM (diabetes mellitus).   ·  Plan: Patient with history of DM  - basal bolus based on weight with low ISS  -- will modify based on insulin requirements tomorrow  - Hgb A1C is 8.6.    Differential diagnosis and plan of care discussed with patient after the evaluation. Counseling on diet, nutritional counseling, weight management, exercise and medication compliance was done.   Advanced care planning/advanced directives discussed with patient/family. DNR status including forceful chest compressions to attempt to restart the heart, ventilator support/artificial breathing, electric shock, artificial nutrition, health care proxy, Molst form all discussed with pt. Pt wishes to consider. More than fifteen minutes spent on discussing advanced directives. OMT on six regions for acute somatic dysfunctions done at the bedside. One hundred ten minutes spent on encounter, of which more than fifty percent of the encounter was spent counseling and/or coordinating care by the attending physician        Pierre Burger DO [facility]  Cardiovascular Medicine  [address] [place] [address], [address]  Office [phone]  Cell [phone]

## 2022-02-23 NOTE — PROGRESS NOTE ADULT - PROBLEM SELECTOR PLAN 7
Pre-operative clearance for Bypass  - RCRI 1, 6% 30-day risk of death, MI, or cardiac arrest at this time  - Pre-operative echo pending to further optimize the patient Pre-operative clearance for Bypass  - RCRI 1, 6% 30-day risk of death, MI, or cardiac arrest at this time  - Pre-operative echo pending to further optimize the patient  - Formal cardiac evaluation pending

## 2022-02-23 NOTE — PROGRESS NOTE ADULT - PROBLEM SELECTOR PLAN 5
A. fib on post TMA EKG per anaesthesia   - no more episodes of A. fib today at [facility]  - Echo showed EF 55-60% & moderate aortic stenosis  - as per cardio at , would ideally start AC for A. fib but in light of recent GIB/coffee grounds emesis will hold off  - Patient on metoprolol 12.5 BID, can continue at this time  - ASA okay to resumed as per GI at [facility]

## 2022-02-23 NOTE — PROGRESS NOTE ADULT - PROBLEM SELECTOR PLAN 2
DISPLAY PLAN FREE TEXT Initially at [place] for R foot infection  - MRI not suggestive of acute osteomyelitis  - Vascular study at  showed an unobtainable ankle brachial index w/ diffusely calcified noncompressible vessels limiting evaluation and diminished flow below the right knee  - US and CTA as above  - Patient s/p R TMA 2/15/22 at [facility]  - Abscess cultures grew Serratia, GBS and MSSA  - Patient being treated with Cefepime & Flagyl, c/w abx at this time  - c/w morphine for pain  - Plan for podiatry to perform surgical revision pending vascular work-up  - Pain control: Tylenol for mild pain, Morphine 1mg for moderate, Morphine 2mg for severe pain Initially at [place] for R foot infection  - MRI not suggestive of acute osteomyelitis  - Vascular study at  showed an unobtainable ankle brachial index w/ diffusely calcified noncompressible vessels limiting evaluation and diminished flow below the right knee  - US and CTA as above  - Patient s/p R TMA 2/15/22 at [facility]  - Abscess cultures grew Serratia, GBS and MSSA  - Patient being treated with Cefepime & Flagyl, c/w abx at this time  - ID following, appreciate recs  - c/w morphine for pain  - Plan for podiatry to perform surgical revision pending vascular work-up  - Pain control: Tylenol for mild pain, Morphine 1mg for moderate, Morphine 2mg for severe pain

## 2022-02-24 DIAGNOSIS — I70.261 ATHEROSCLEROSIS OF NATIVE ARTERIES OF EXTREMITIES WITH GANGRENE, RIGHT LEG: ICD-10-CM

## 2022-02-24 DIAGNOSIS — Z95.828 PRESENCE OF OTHER VASCULAR IMPLANTS AND GRAFTS: ICD-10-CM

## 2022-02-24 DIAGNOSIS — E11.52 TYPE 2 DIABETES MELLITUS WITH DIABETIC PERIPHERAL ANGIOPATHY WITH GANGRENE: ICD-10-CM

## 2022-02-24 DIAGNOSIS — Z79.84 LONG TERM (CURRENT) USE OF ORAL HYPOGLYCEMIC DRUGS: ICD-10-CM

## 2022-02-24 DIAGNOSIS — I97.89 OTHER POSTPROCEDURAL COMPLICATIONS AND DISORDERS OF THE CIRCULATORY SYSTEM, NOT ELSEWHERE CLASSIFIED: ICD-10-CM

## 2022-02-24 DIAGNOSIS — E43 UNSPECIFIED SEVERE PROTEIN-CALORIE MALNUTRITION: ICD-10-CM

## 2022-02-24 DIAGNOSIS — F17.200 NICOTINE DEPENDENCE, UNSPECIFIED, UNCOMPLICATED: ICD-10-CM

## 2022-02-24 DIAGNOSIS — I48.91 UNSPECIFIED ATRIAL FIBRILLATION: ICD-10-CM

## 2022-02-24 DIAGNOSIS — K20.91 ESOPHAGITIS, UNSPECIFIED WITH BLEEDING: ICD-10-CM

## 2022-02-24 DIAGNOSIS — K29.71 GASTRITIS, UNSPECIFIED, WITH BLEEDING: ICD-10-CM

## 2022-02-24 DIAGNOSIS — B96.89 OTHER SPECIFIED BACTERIAL AGENTS AS THE CAUSE OF DISEASES CLASSIFIED ELSEWHERE: ICD-10-CM

## 2022-02-24 DIAGNOSIS — B95.61 METHICILLIN SUSCEPTIBLE STAPHYLOCOCCUS AUREUS INFECTION AS THE CAUSE OF DISEASES CLASSIFIED ELSEWHERE: ICD-10-CM

## 2022-02-24 DIAGNOSIS — K59.00 CONSTIPATION, UNSPECIFIED: ICD-10-CM

## 2022-02-24 DIAGNOSIS — K29.81 DUODENITIS WITH BLEEDING: ICD-10-CM

## 2022-02-24 DIAGNOSIS — E11.65 TYPE 2 DIABETES MELLITUS WITH HYPERGLYCEMIA: ICD-10-CM

## 2022-02-24 DIAGNOSIS — L97.519 NON-PRESSURE CHRONIC ULCER OF OTHER PART OF RIGHT FOOT WITH UNSPECIFIED SEVERITY: ICD-10-CM

## 2022-02-24 LAB
BASOPHILS # BLD AUTO: 0 K/UL — SIGNIFICANT CHANGE UP (ref 0–0.2)
BASOPHILS NFR BLD AUTO: 0 % — SIGNIFICANT CHANGE UP (ref 0–2)
EOSINOPHIL # BLD AUTO: 0.1 K/UL — SIGNIFICANT CHANGE UP (ref 0–0.5)
EOSINOPHIL NFR BLD AUTO: 0.9 % — SIGNIFICANT CHANGE UP (ref 0–6)
GIANT PLATELETS BLD QL SMEAR: PRESENT — SIGNIFICANT CHANGE UP
GLUCOSE BLDC GLUCOMTR-MCNC: 178 MG/DL — HIGH (ref 70–99)
GLUCOSE BLDC GLUCOMTR-MCNC: 188 MG/DL — HIGH (ref 70–99)
GLUCOSE BLDC GLUCOMTR-MCNC: 200 MG/DL — HIGH (ref 70–99)
GLUCOSE BLDC GLUCOMTR-MCNC: 228 MG/DL — HIGH (ref 70–99)
HCT VFR BLD CALC: 37.1 % — LOW (ref 39–50)
HGB BLD-MCNC: 12 G/DL — LOW (ref 13–17)
LYMPHOCYTES # BLD AUTO: 0.46 K/UL — LOW (ref 1–3.3)
LYMPHOCYTES # BLD AUTO: 4.3 % — LOW (ref 13–44)
MANUAL SMEAR VERIFICATION: SIGNIFICANT CHANGE UP
MCHC RBC-ENTMCNC: 29.8 PG — SIGNIFICANT CHANGE UP (ref 27–34)
MCHC RBC-ENTMCNC: 32.3 GM/DL — SIGNIFICANT CHANGE UP (ref 32–36)
MCV RBC AUTO: 92.1 FL — SIGNIFICANT CHANGE UP (ref 80–100)
MONOCYTES # BLD AUTO: 0.94 K/UL — HIGH (ref 0–0.9)
MONOCYTES NFR BLD AUTO: 8.7 % — SIGNIFICANT CHANGE UP (ref 2–14)
NEUTROPHILS # BLD AUTO: 9.01 K/UL — HIGH (ref 1.8–7.4)
NEUTROPHILS NFR BLD AUTO: 83.5 % — HIGH (ref 43–77)
PLAT MORPH BLD: ABNORMAL
PLATELET # BLD AUTO: 327 K/UL — SIGNIFICANT CHANGE UP (ref 150–400)
RBC # BLD: 4.03 M/UL — LOW (ref 4.2–5.8)
RBC # FLD: 13.2 % — SIGNIFICANT CHANGE UP (ref 10.3–14.5)
RBC BLD AUTO: SIGNIFICANT CHANGE UP
VARIANT LYMPHS # BLD: 2.6 % — SIGNIFICANT CHANGE UP (ref 0–6)
WBC # BLD: 10.79 K/UL — HIGH (ref 3.8–10.5)
WBC # FLD AUTO: 10.79 K/UL — HIGH (ref 3.8–10.5)

## 2022-02-24 PROCEDURE — 99233 SBSQ HOSP IP/OBS HIGH 50: CPT | Mod: GC

## 2022-02-24 PROCEDURE — 78452 HT MUSCLE IMAGE SPECT MULT: CPT | Mod: 26

## 2022-02-24 PROCEDURE — 93018 CV STRESS TEST I&R ONLY: CPT

## 2022-02-24 PROCEDURE — 93016 CV STRESS TEST SUPVJ ONLY: CPT

## 2022-02-24 RX ORDER — MORPHINE SULFATE 50 MG/1
4 CAPSULE, EXTENDED RELEASE ORAL EVERY 8 HOURS
Refills: 0 | Status: DISCONTINUED | OUTPATIENT
Start: 2022-02-24 | End: 2022-02-24

## 2022-02-24 RX ORDER — MORPHINE SULFATE 50 MG/1
2 CAPSULE, EXTENDED RELEASE ORAL ONCE
Refills: 0 | Status: DISCONTINUED | OUTPATIENT
Start: 2022-02-24 | End: 2022-02-24

## 2022-02-24 RX ORDER — MORPHINE SULFATE 50 MG/1
4 CAPSULE, EXTENDED RELEASE ORAL EVERY 8 HOURS
Refills: 0 | Status: DISCONTINUED | OUTPATIENT
Start: 2022-02-24 | End: 2022-02-25

## 2022-02-24 RX ORDER — GABAPENTIN 400 MG/1
300 CAPSULE ORAL THREE TIMES A DAY
Refills: 0 | Status: DISCONTINUED | OUTPATIENT
Start: 2022-02-24 | End: 2022-02-24

## 2022-02-24 RX ADMIN — CEFEPIME 100 MILLIGRAM(S): 1 INJECTION, POWDER, FOR SOLUTION INTRAMUSCULAR; INTRAVENOUS at 16:30

## 2022-02-24 RX ADMIN — Medication 500 MILLIGRAM(S): at 21:44

## 2022-02-24 RX ADMIN — Medication 2 UNIT(S): at 13:37

## 2022-02-24 RX ADMIN — FENTANYL CITRATE 25 MICROGRAM(S): 50 INJECTION INTRAVENOUS at 05:03

## 2022-02-24 RX ADMIN — Medication 1: at 17:13

## 2022-02-24 RX ADMIN — MORPHINE SULFATE 2 MILLIGRAM(S): 50 CAPSULE, EXTENDED RELEASE ORAL at 02:39

## 2022-02-24 RX ADMIN — MORPHINE SULFATE 4 MILLIGRAM(S): 50 CAPSULE, EXTENDED RELEASE ORAL at 20:28

## 2022-02-24 RX ADMIN — MORPHINE SULFATE 2 MILLIGRAM(S): 50 CAPSULE, EXTENDED RELEASE ORAL at 22:04

## 2022-02-24 RX ADMIN — FENTANYL CITRATE 25 MICROGRAM(S): 50 INJECTION INTRAVENOUS at 02:55

## 2022-02-24 RX ADMIN — Medication 1 GRAM(S): at 00:00

## 2022-02-24 RX ADMIN — Medication 1 GRAM(S): at 05:35

## 2022-02-24 RX ADMIN — Medication 1 PATCH: at 20:03

## 2022-02-24 RX ADMIN — Medication 1 PATCH: at 13:37

## 2022-02-24 RX ADMIN — Medication 12.5 MILLIGRAM(S): at 17:08

## 2022-02-24 RX ADMIN — Medication 81 MILLIGRAM(S): at 13:36

## 2022-02-24 RX ADMIN — INSULIN GLARGINE 8 UNIT(S): 100 INJECTION, SOLUTION SUBCUTANEOUS at 21:45

## 2022-02-24 RX ADMIN — MORPHINE SULFATE 4 MILLIGRAM(S): 50 CAPSULE, EXTENDED RELEASE ORAL at 20:53

## 2022-02-24 RX ADMIN — PANTOPRAZOLE SODIUM 40 MILLIGRAM(S): 20 TABLET, DELAYED RELEASE ORAL at 17:04

## 2022-02-24 RX ADMIN — CEFEPIME 100 MILLIGRAM(S): 1 INJECTION, POWDER, FOR SOLUTION INTRAMUSCULAR; INTRAVENOUS at 05:35

## 2022-02-24 RX ADMIN — Medication 2 UNIT(S): at 08:30

## 2022-02-24 RX ADMIN — Medication 1 PATCH: at 14:11

## 2022-02-24 RX ADMIN — Medication 2: at 13:38

## 2022-02-24 RX ADMIN — Medication 2 UNIT(S): at 17:02

## 2022-02-24 RX ADMIN — Medication 1: at 08:30

## 2022-02-24 RX ADMIN — Medication 500 MILLIGRAM(S): at 05:35

## 2022-02-24 RX ADMIN — CEFEPIME 100 MILLIGRAM(S): 1 INJECTION, POWDER, FOR SOLUTION INTRAMUSCULAR; INTRAVENOUS at 21:45

## 2022-02-24 RX ADMIN — MORPHINE SULFATE 2 MILLIGRAM(S): 50 CAPSULE, EXTENDED RELEASE ORAL at 21:43

## 2022-02-24 RX ADMIN — MORPHINE SULFATE 2 MILLIGRAM(S): 50 CAPSULE, EXTENDED RELEASE ORAL at 03:20

## 2022-02-24 RX ADMIN — Medication 12.5 MILLIGRAM(S): at 05:35

## 2022-02-24 RX ADMIN — Medication 1 GRAM(S): at 17:04

## 2022-02-24 RX ADMIN — ATORVASTATIN CALCIUM 10 MILLIGRAM(S): 80 TABLET, FILM COATED ORAL at 21:44

## 2022-02-24 RX ADMIN — Medication 1 GRAM(S): at 13:44

## 2022-02-24 RX ADMIN — Medication 500 MILLIGRAM(S): at 13:39

## 2022-02-24 RX ADMIN — PANTOPRAZOLE SODIUM 40 MILLIGRAM(S): 20 TABLET, DELAYED RELEASE ORAL at 05:35

## 2022-02-24 RX ADMIN — FENTANYL CITRATE 25 MICROGRAM(S): 50 INJECTION INTRAVENOUS at 05:15

## 2022-02-24 NOTE — PROGRESS NOTE ADULT - SUBJECTIVE AND OBJECTIVE BOX
Date of Service   02-24-22 @ 12:58    Patient is a 61y old  Male who presents with a chief complaint of PAD (23 Feb 2022 16:47)      INTERVAL HISTORY: pt feels ok   TELEMETRY Personally reviewed:       REVIEW OF SYSTEMS:   CONSTITUTIONAL: No weakness  EYES/ENT: No visual changes; No throat pain  Neck: No pain or stiffness  Respiratory: No cough, wheezing, No shortness of breath  CARDIOVASCULAR: no chest pain or palpitations  GASTROINTESTINAL: No abdominal pain, no nausea, vomiting or hematemesis  GENITOURINARY: No dysuria, frequency or hematuria  NEUROLOGICAL: No stroke like symptoms  SKIN: No rashes    	  MEDICATIONS:  metoprolol tartrate 12.5 milliGRAM(s) Oral two times a day        PHYSICAL EXAM:  T(C): 36.9 (02-24-22 @ 05:14), Max: 37.6 (02-23-22 @ 20:07)  HR: 77 (02-24-22 @ 05:14) (77 - 96)  BP: 144/66 (02-24-22 @ 05:14) (104/62 - 151/65)  RR: 18 (02-24-22 @ 05:14) (18 - 18)  SpO2: 95% (02-24-22 @ 05:14) (95% - 95%)  Wt(kg): --  I&O's Summary    23 Feb 2022 07:01  -  24 Feb 2022 07:00  --------------------------------------------------------  IN: 600 mL / OUT: 1770 mL / NET: -1170 mL    24 Feb 2022 07:01  -  24 Feb 2022 12:58  --------------------------------------------------------  IN: 360 mL / OUT: 600 mL / NET: -240 mL          Appearance: In no distress	  HEENT:    PERRL, EOMI	  Cardiovascular:  S1 S2, No JVD  Respiratory: Lungs clear to auscultation	  Gastrointestinal:  Soft, Non-tender, + BS	  Vascularature:  No edema of LE  Psychiatric: Appropriate affect   Neuro: no acute focal deficits                               12.0   10.79 )-----------( 327      ( 24 Feb 2022 06:58 )             37.1     02-23    136  |  101  |  6<L>  ----------------------------<  186<H>  3.9   |  24  |  0.53    Ca    8.5      23 Feb 2022 05:51  Phos  2.9     02-23  Mg     1.6     02-23          Labs personally reviewed      ASSESSMENT/PLAN: 	  62 y/o M w/ PMHx DM, PAD s/p R TMA 2/15 transferred from [facility] for IR thrombectomy for R superficial femoral and popliteal artery occlusion and likely surgical revision with podiatry with continued poor flow s/p IR procedure, now for vascular bypass surgery    Problem/Plan - 1:  ·  Problem: Peripheral artery disease.   ·  Plan: Pt transferred to [facility] for IR angiogram and angioplasty. Wire recannulization of the SFA/popliteal stent was performed followed by angioplasty on 2/22. There was persistent poor flow through the stent.   - Vascular consulted for RLE bypass  - Plan for OR tentatively next week  - c/w ASA & Lipitor.    Problem/Plan - 2:  ·  Problem: Paroxysmal Atrial fibrillation/flutter  ·  Plan: CHADSVASc of 2-3  - H/H stable with EGD showing esophagitis  - rec AC with Eliquis 5mg BID or Lovenox alberto-procedure  - Echo showed EF 55-60% & moderate aortic stenosis     Problem/Plan - 3:  ·  Problem: Pre-operative risk stratification  ·  Plan: Pre-operative clearance for open vascular LE Bypass  - unable to assess functional capacity given limited by PAD  - heavy smoker and diabetic going to high risk surgery  - TTE with preserved EF and Mod AS  - will obtain preop NM stress test to assess for ischemic burden     Problem/Plan - 4:  ·  Problem: DM   - basal bolus based on weight with low ISS  - Hgb A1C is 8.6.        Yessy Hawkins FNP-BC   Pierre Burger DO [facility]  Cardiovascular Medicine  [address], [address]  Office: [phone]  Cell: [phone] Date of Service   02-24-22 @ 12:58    Patient is a 61y old  Male who presents with a chief complaint of PAD (23 Feb 2022 16:47)      INTERVAL HISTORY: pt feels ok   TELEMETRY Personally reviewed:       REVIEW OF SYSTEMS:   CONSTITUTIONAL: No weakness  EYES/ENT: No visual changes; No throat pain  Neck: No pain or stiffness  Respiratory: No cough, wheezing, No shortness of breath  CARDIOVASCULAR: no chest pain or palpitations  GASTROINTESTINAL: No abdominal pain, no nausea, vomiting or hematemesis  GENITOURINARY: No dysuria, frequency or hematuria  NEUROLOGICAL: No stroke like symptoms  SKIN: No rashes    	  MEDICATIONS:  metoprolol tartrate 12.5 milliGRAM(s) Oral two times a day        PHYSICAL EXAM:  T(C): 36.9 (02-24-22 @ 05:14), Max: 37.6 (02-23-22 @ 20:07)  HR: 77 (02-24-22 @ 05:14) (77 - 96)  BP: 144/66 (02-24-22 @ 05:14) (104/62 - 151/65)  RR: 18 (02-24-22 @ 05:14) (18 - 18)  SpO2: 95% (02-24-22 @ 05:14) (95% - 95%)  Wt(kg): --  I&O's Summary    23 Feb 2022 07:01  -  24 Feb 2022 07:00  --------------------------------------------------------  IN: 600 mL / OUT: 1770 mL / NET: -1170 mL    24 Feb 2022 07:01  -  24 Feb 2022 12:58  --------------------------------------------------------  IN: 360 mL / OUT: 600 mL / NET: -240 mL          Appearance: In no distress	  HEENT:    PERRL, EOMI	  Cardiovascular:  S1 S2, No JVD  Respiratory: Lungs clear to auscultation	  Gastrointestinal:  Soft, Non-tender, + BS	  Vascularature:  No edema of LE  Psychiatric: Appropriate affect   Neuro: no acute focal deficits                               12.0   10.79 )-----------( 327      ( 24 Feb 2022 06:58 )             37.1     02-23    136  |  101  |  6<L>  ----------------------------<  186<H>  3.9   |  24  |  0.53    Ca    8.5      23 Feb 2022 05:51  Phos  2.9     02-23  Mg     1.6     02-23          Labs personally reviewed      ASSESSMENT/PLAN: 	  62 y/o M w/ PMHx DM, PAD s/p R TMA 2/15 transferred from [facility] for IR thrombectomy for R superficial femoral and popliteal artery occlusion and likely surgical revision with podiatry with continued poor flow s/p IR procedure, now for vascular bypass surgery    Problem/Plan - 1:  ·  Problem: Peripheral artery disease.   ·  Plan: Pt transferred to [facility] for IR angiogram and angioplasty. Wire recannulization of the SFA/popliteal stent was performed followed by angioplasty on 2/22. There was persistent poor flow through the stent.   - Vascular consulted for RLE bypass  - Plan for OR tentatively next week  - c/w ASA & Lipitor.    Problem/Plan - 2:  ·  Problem: Paroxysmal Atrial fibrillation/flutter  ·  Plan: CHADSVASc of 2-3  - H/H stable with EGD showing esophagitis  - rec AC with Eliquis 5mg BID or Lovenox alberto-procedure  - Echo showed EF 55-60% & moderate aortic stenosis     Problem/Plan - 3:  ·  Problem: Pre-operative risk stratification  ·  Plan: Pre-operative clearance for open vascular LE Bypass  - unable to assess functional capacity given limited by PAD  - heavy smoker and diabetic going to high risk surgery  - TTE with preserved EF and Mod AS  -NM stress test with areas of infarct and ischemia, r/b of cath discussed with pt, agreeable to proceed   - will discuss with vascular surgery     Problem/Plan - 4:  ·  Problem: DM   - basal bolus based on weight with low ISS  - Hgb A1C is 8.6.        Yessy Hawkins [facility]-BC   Pierre Burger DO [facility]  Cardiovascular Medicine  [address], [address]  Office: [phone]  Cell: [phone]

## 2022-02-24 NOTE — PROGRESS NOTE ADULT - ASSESSMENT
62 YO M with right foot gangrene, TMA on 2/15, s/p failed aborted angiogram and stent placement in SFA with IR on 2/22. Vascular surgery consulted for potential revascularization options. Cardiology following, reviewed echo, recommended stress test.    Plan:  - FU podiatry  - FU cardiac workup, stress test  - bypass planning  - please document medical/cardiac clearance  - GSV vein mapping obtained  - OR tentatively for next week    Vascular, [id number]

## 2022-02-24 NOTE — PROGRESS NOTE ADULT - PROBLEM SELECTOR PLAN 2
Initially at [place] for R foot infection  - MRI not suggestive of acute osteomyelitis  - Vascular study at  showed an unobtainable ankle brachial index w/ diffusely calcified noncompressible vessels limiting evaluation and diminished flow below the right knee  - US and CTA as above  - Patient s/p R TMA 2/15/22 at [facility]  - Abscess cultures grew Serratia, GBS and MSSA  - Patient being treated with Cefepime & Flagyl, c/w abx at this time  - ID following, appreciate recs  - c/w morphine for pain  - Plan for podiatry to perform surgical revision pending vascular work-up  - Pain control: Tylenol for mild pain, Morphine 1mg for moderate, Morphine 2mg for severe pain

## 2022-02-24 NOTE — PROGRESS NOTE ADULT - PROBLEM SELECTOR PLAN 5
A. fib on post TMA EKG per anaesthesia   - no more episodes of A. fib today at [facility]  - Echo showed EF 55-60% & moderate aortic stenosis  - as per cardio at , would ideally start AC for A. fib but in light of recent GIB/coffee grounds emesis will hold off  - Patient on metoprolol 12.5 BID, can continue at this time  - ASA okay to resumed as per GI at [facility] A. fib on post TMA EKG per anaesthesia   - Echo showed EF 55-60% & moderate aortic stenosis  - CHADVASc 3, which correlates to 3.2% per year in >90,000 patients (the [place] Atrial Fibrillation Cohort Study) and 4.6% risk of stroke/TIA/systemic embolism.  - HAS-bled score 3, which correlates to bleeding risk of 5.8% in one validation study (Lip 2011) and 3.72 bleeds per 100 patient-years in another validation study (Pisters 2010).  -- Given higher risk of bleeding, will hold AC at this time as patient with recent GIB/coffee grounds emesis will hold off  - Patient on metoprolol 12.5 BID, can continue at this time  - ASA okay to resumed as per GI at [facility]

## 2022-02-24 NOTE — PROGRESS NOTE ADULT - SUBJECTIVE AND OBJECTIVE BOX
**************************************  Collin Pruett, PGY-1  Senior Resident: Tika Nuno  After 7PM, please contact night float at #[phone] or #[phone]  **************************************    INTERVAL HPI/OVERNIGHT EVENTS:  Patient was seen and examined at bedside. As per nurse and patient, no o/n events, patient resting comfortably. No complaints at this time. Patient denies: fever, chills, dizziness, weakness, HA, Changes in vision, CP, palpitations, SOB, cough, N/V/D/C, dysuria, changes in bowel movements, LE edema. ROS otherwise negative.    VITAL SIGNS:  T(F): 98.4 (02-24-22 @ 05:14)  HR: 77 (02-24-22 @ 05:14)  BP: 144/66 (02-24-22 @ 05:14)  RR: 18 (02-24-22 @ 05:14)  SpO2: 95% (02-24-22 @ 05:14)  Wt(kg): --    PHYSICAL EXAM:    Constitutional: WDWN, NAD  HEENT: PERRL, EOMI, sclera non-icteric, neck supple, trachea midline, no masses, no JVD, MMM, good dentition  Respiratory: CTA b/l, good air entry b/l, no wheezing, no rhonchi, no rales, without accessory muscle use and no intercostal retractions  Cardiovascular: RRR, normal S1S2, no M/R/G  Gastrointestinal: soft, NTND, no masses palpable, BS normal  Extremities: Warm, well perfused, pulses equal bilateral upper and lower extremities, no edema, no clubbing. Capillary refill <2 sec  Neurological: AAOx3, CN Grossly intact  Skin: Normal temperature, warm, dry    MEDICATIONS  (STANDING):  aspirin enteric coated 81 milliGRAM(s) Oral daily  atorvastatin 10 milliGRAM(s) Oral at bedtime  cefepime   IVPB      cefepime   IVPB 1000 milliGRAM(s) IV Intermittent every 8 hours  dextrose 40% Gel 15 Gram(s) Oral once  dextrose 5%. 1000 milliLiter(s) (50 mL/Hr) IV Continuous <Continuous>  dextrose 5%. 1000 milliLiter(s) (100 mL/Hr) IV Continuous <Continuous>  dextrose 50% Injectable 25 Gram(s) IV Push once  dextrose 50% Injectable 12.5 Gram(s) IV Push once  dextrose 50% Injectable 25 Gram(s) IV Push once  glucagon  Injectable 1 milliGRAM(s) IntraMuscular once  insulin glargine Injectable (LANTUS) 8 Unit(s) SubCutaneous at bedtime  insulin lispro (ADMELOG) corrective regimen sliding scale   SubCutaneous three times a day before meals  insulin lispro Injectable (ADMELOG) 2 Unit(s) SubCutaneous three times a day before meals  lactated ringers. 1000 milliLiter(s) (75 mL/Hr) IV Continuous <Continuous>  metoprolol tartrate 12.5 milliGRAM(s) Oral two times a day  metroNIDAZOLE    Tablet 500 milliGRAM(s) Oral three times a day  nicotine - 21 mG/24Hr(s) Patch 1 patch Transdermal daily  pantoprazole  Injectable 40 milliGRAM(s) IV Push every 12 hours  sucralfate 1 Gram(s) Oral four times a day    MEDICATIONS  (PRN):  acetaminophen     Tablet .. 650 milliGRAM(s) Oral every 6 hours PRN Mild Pain (1 - 3), Moderate Pain (4 - 6)  aluminum hydroxide/magnesium hydroxide/simethicone Suspension 30 milliLiter(s) Oral every 6 hours PRN Dyspepsia  fentaNYL    Injectable 25 MICROGram(s) IV Push every 5 minutes PRN Moderate Pain (4 - 6)  morphine  - Injectable 2 milliGRAM(s) IV Push every 6 hours PRN Severe Pain (7 - 10)  ondansetron Injectable 8 milliGRAM(s) IV Push once PRN Nausea and/or Vomiting  simethicone 80 milliGRAM(s) Chew every 6 hours PRN Dyspepsia      Allergies    No Known Allergies    Intolerances        LABS:                        12.0   10.79 )-----------( 327      ( 24 Feb 2022 06:58 )             37.1     02-23    136  |  101  |  6<L>  ----------------------------<  186<H>  3.9   |  24  |  0.53    Ca    8.5      23 Feb 2022 05:51  Phos  2.9     02-23  Mg     1.6     02-23            RADIOLOGY & ADDITIONAL TESTS:  Reviewed

## 2022-02-24 NOTE — PROGRESS NOTE ADULT - PROBLEM SELECTOR PLAN 7
Pre-operative clearance for open vascular LE Bypass  - RCRI 1, 6% 30-day risk of death, MI, or cardiac arrest at this time  - unable to assess functional capacity given limited by PAD  - heavy smoker and diabetic going to high risk surgery  - will obtain preop NM stress test to assess for ischemic burden   - TTE with preserved EF and Mod AS Pre-operative clearance for open vascular LE Bypass, per cardiology  - unable to assess functional capacity given limited by PAD  - heavy smoker and diabetic going to high risk surgery  - will obtain preop NM stress test to assess for ischemic burden   - TTE with preserved EF and Mod AS

## 2022-02-24 NOTE — PROGRESS NOTE ADULT - ASSESSMENT
62 y/o M w/ PMHx DM, PAD s/p R TMA 2/15 transferred from [facility] for IR thrombectomy for R superficial femoral and popliteal artery occlusion and likely surgical revision with podiatry with continued poor flow s/p IR procedure, now for vascular bypass surgery

## 2022-02-24 NOTE — PROGRESS NOTE ADULT - PROBLEM SELECTOR PLAN 1
Patient with history of severe PAD. US showed occluded right superficial femoral and popliteal arteries with multiple stents   - CTA showed occluded right superficial femoral and popliteal arteries containing multiple overlapping stents with reconstitution in the mid popliteal artery, three-vessel runoff into b/l feet, occluded left superficial femoral artery with reconstitution distally, moderate narrowing in the mid left popliteal artery and moderate narrowing in the proximal left external iliac artery with associated areas of ulcerated plaque  - Pt transferred to [facility] for IR angiogram and angioplasty. Wire recannulization of the SFA/popliteal stent was performed followed by angioplasty on 2/22. There was persistent poor flow through the stent.   - Vascular consulted for RLE bypass  -- will obtain bilateral GSV vein mapping  -- will obtain pre-operative Echo  -- Plan for OR tentatively next week  - c/w ASA & Lipitor Patient with history of severe PAD. US showed occluded right superficial femoral and popliteal arteries with multiple stents   - CTA showed occluded right superficial femoral and popliteal arteries containing multiple overlapping stents with reconstitution in the mid popliteal artery, three-vessel runoff into b/l feet, occluded left superficial femoral artery with reconstitution distally, moderate narrowing in the mid left popliteal artery and moderate narrowing in the proximal left external iliac artery with associated areas of ulcerated plaque  - Pt transferred to [facility] for IR angiogram and angioplasty. Wire recannulization of the SFA/popliteal stent was performed followed by angioplasty on 2/22. There was persistent poor flow through the stent.   - Vascular consulted for RLE bypass  -- Plan for OR tentatively next week  -- Stress stress as below  - c/w ASA & Lipitor

## 2022-02-24 NOTE — PROGRESS NOTE ADULT - PROBLEM SELECTOR PLAN 4
Patient with history of DM  - basal bolus based on weight with low ISS  -- will modify based on insulin requirements tomorrow  - Hgb A1C is 8.6

## 2022-02-24 NOTE — PROGRESS NOTE ADULT - SUBJECTIVE AND OBJECTIVE BOX
Vascular Surgery Progress Note    SUBJECTIVE:  Patient feeling okay this am  No fevers, chest pain, or shortness of breath  No change in foot symptoms    OBJECTIVE:  Vital Signs Last 24 Hrs  T(C): 36.9 (24 Feb 2022 05:14), Max: 37.6 (23 Feb 2022 20:07)  T(F): 98.4 (24 Feb 2022 05:14), Max: 99.6 (23 Feb 2022 20:07)  HR: 77 (24 Feb 2022 05:14) (68 - 96)  BP: 144/66 (24 Feb 2022 05:14) (104/62 - 151/65)  BP(mean): --  RR: 18 (24 Feb 2022 05:14) (18 - 18)  SpO2: 95% (24 Feb 2022 05:14) (93% - 95%)    Physical Examination:  GEN: NAD, resting quietly  NEURO: AAOx3, CN II-XII grossly intact, no focal deficits  PULM: symmetric chest rise bilaterally, no increased WOB  EXTR: no LE erythema, moving all extremities  VASC: bilateral femoral pulses palpable, r foot wrapped by podiatry, warm to touch, motor function intact    I&O's Detail    23 Feb 2022 07:01  -  24 Feb 2022 07:00  --------------------------------------------------------  IN:    Oral Fluid: 600 mL  Total IN: 600 mL    OUT:    Voided (mL): 1770 mL  Total OUT: 1770 mL    Total NET: -1170 mL            LABS:                        12.0   10.79 )-----------( 327      ( 24 Feb 2022 06:58 )             37.1       02-23    136  |  101  |  6<L>  ----------------------------<  186<H>  3.9   |  24  |  0.53    Ca    8.5      23 Feb 2022 05:51  Phos  2.9     02-23  Mg     1.6     02-23          IMAGING:  []

## 2022-02-25 LAB
ANION GAP SERPL CALC-SCNC: 9 MMOL/L — SIGNIFICANT CHANGE UP (ref 5–17)
BLD GP AB SCN SERPL QL: NEGATIVE — SIGNIFICANT CHANGE UP
BUN SERPL-MCNC: 5 MG/DL — LOW (ref 7–23)
CALCIUM SERPL-MCNC: 8.7 MG/DL — SIGNIFICANT CHANGE UP (ref 8.4–10.5)
CHLORIDE SERPL-SCNC: 101 MMOL/L — SIGNIFICANT CHANGE UP (ref 96–108)
CO2 SERPL-SCNC: 26 MMOL/L — SIGNIFICANT CHANGE UP (ref 22–31)
CREAT SERPL-MCNC: 0.51 MG/DL — SIGNIFICANT CHANGE UP (ref 0.5–1.3)
GLUCOSE BLDC GLUCOMTR-MCNC: 157 MG/DL — HIGH (ref 70–99)
GLUCOSE BLDC GLUCOMTR-MCNC: 157 MG/DL — HIGH (ref 70–99)
GLUCOSE BLDC GLUCOMTR-MCNC: 178 MG/DL — HIGH (ref 70–99)
GLUCOSE BLDC GLUCOMTR-MCNC: 247 MG/DL — HIGH (ref 70–99)
GLUCOSE BLDC GLUCOMTR-MCNC: 297 MG/DL — HIGH (ref 70–99)
GLUCOSE SERPL-MCNC: 255 MG/DL — HIGH (ref 70–99)
HCT VFR BLD CALC: 40.6 % — SIGNIFICANT CHANGE UP (ref 39–50)
HGB BLD-MCNC: 13.1 G/DL — SIGNIFICANT CHANGE UP (ref 13–17)
MAGNESIUM SERPL-MCNC: 1.6 MG/DL — SIGNIFICANT CHANGE UP (ref 1.6–2.6)
MCHC RBC-ENTMCNC: 29.5 PG — SIGNIFICANT CHANGE UP (ref 27–34)
MCHC RBC-ENTMCNC: 32.3 GM/DL — SIGNIFICANT CHANGE UP (ref 32–36)
MCV RBC AUTO: 91.4 FL — SIGNIFICANT CHANGE UP (ref 80–100)
NRBC # BLD: 0 /100 WBCS — SIGNIFICANT CHANGE UP (ref 0–0)
PHOSPHATE SERPL-MCNC: 2.9 MG/DL — SIGNIFICANT CHANGE UP (ref 2.5–4.5)
PLATELET # BLD AUTO: 359 K/UL — SIGNIFICANT CHANGE UP (ref 150–400)
POTASSIUM SERPL-MCNC: 3.8 MMOL/L — SIGNIFICANT CHANGE UP (ref 3.5–5.3)
POTASSIUM SERPL-SCNC: 3.8 MMOL/L — SIGNIFICANT CHANGE UP (ref 3.5–5.3)
RBC # BLD: 4.44 M/UL — SIGNIFICANT CHANGE UP (ref 4.2–5.8)
RBC # FLD: 13.2 % — SIGNIFICANT CHANGE UP (ref 10.3–14.5)
RH IG SCN BLD-IMP: POSITIVE — SIGNIFICANT CHANGE UP
SARS-COV-2 RNA SPEC QL NAA+PROBE: SIGNIFICANT CHANGE UP
SODIUM SERPL-SCNC: 136 MMOL/L — SIGNIFICANT CHANGE UP (ref 135–145)
WBC # BLD: 11.42 K/UL — HIGH (ref 3.8–10.5)
WBC # FLD AUTO: 11.42 K/UL — HIGH (ref 3.8–10.5)

## 2022-02-25 PROCEDURE — 99233 SBSQ HOSP IP/OBS HIGH 50: CPT | Mod: GC

## 2022-02-25 PROCEDURE — 93458 L HRT ARTERY/VENTRICLE ANGIO: CPT | Mod: 26

## 2022-02-25 PROCEDURE — 93010 ELECTROCARDIOGRAM REPORT: CPT

## 2022-02-25 PROCEDURE — 99152 MOD SED SAME PHYS/QHP 5/>YRS: CPT | Mod: 59

## 2022-02-25 RX ORDER — GABAPENTIN 400 MG/1
400 CAPSULE ORAL THREE TIMES A DAY
Refills: 0 | Status: DISCONTINUED | OUTPATIENT
Start: 2022-02-25 | End: 2022-02-27

## 2022-02-25 RX ORDER — INSULIN LISPRO 100/ML
3 VIAL (ML) SUBCUTANEOUS
Refills: 0 | Status: DISCONTINUED | OUTPATIENT
Start: 2022-02-25 | End: 2022-02-27

## 2022-02-25 RX ORDER — INSULIN GLARGINE 100 [IU]/ML
9 INJECTION, SOLUTION SUBCUTANEOUS AT BEDTIME
Refills: 0 | Status: DISCONTINUED | OUTPATIENT
Start: 2022-02-25 | End: 2022-02-27

## 2022-02-25 RX ORDER — LABETALOL HCL 100 MG
10 TABLET ORAL ONCE
Refills: 0 | Status: COMPLETED | OUTPATIENT
Start: 2022-02-25 | End: 2022-02-25

## 2022-02-25 RX ORDER — POLYETHYLENE GLYCOL 3350 17 G/17G
17 POWDER, FOR SOLUTION ORAL DAILY
Refills: 0 | Status: DISCONTINUED | OUTPATIENT
Start: 2022-02-25 | End: 2022-02-27

## 2022-02-25 RX ORDER — MORPHINE SULFATE 50 MG/1
4 CAPSULE, EXTENDED RELEASE ORAL EVERY 4 HOURS
Refills: 0 | Status: DISCONTINUED | OUTPATIENT
Start: 2022-02-25 | End: 2022-02-27

## 2022-02-25 RX ORDER — MORPHINE SULFATE 50 MG/1
2 CAPSULE, EXTENDED RELEASE ORAL ONCE
Refills: 0 | Status: DISCONTINUED | OUTPATIENT
Start: 2022-02-25 | End: 2022-02-25

## 2022-02-25 RX ORDER — ASCORBIC ACID 60 MG
500 TABLET,CHEWABLE ORAL DAILY
Refills: 0 | Status: DISCONTINUED | OUTPATIENT
Start: 2022-02-25 | End: 2022-02-27

## 2022-02-25 RX ORDER — SENNA PLUS 8.6 MG/1
2 TABLET ORAL AT BEDTIME
Refills: 0 | Status: DISCONTINUED | OUTPATIENT
Start: 2022-02-25 | End: 2022-02-27

## 2022-02-25 RX ADMIN — Medication 650 MILLIGRAM(S): at 00:40

## 2022-02-25 RX ADMIN — MORPHINE SULFATE 4 MILLIGRAM(S): 50 CAPSULE, EXTENDED RELEASE ORAL at 19:54

## 2022-02-25 RX ADMIN — Medication 1 PATCH: at 11:36

## 2022-02-25 RX ADMIN — Medication 1 PATCH: at 19:00

## 2022-02-25 RX ADMIN — Medication 1: at 08:10

## 2022-02-25 RX ADMIN — CEFEPIME 100 MILLIGRAM(S): 1 INJECTION, POWDER, FOR SOLUTION INTRAMUSCULAR; INTRAVENOUS at 21:35

## 2022-02-25 RX ADMIN — Medication 1 PATCH: at 09:10

## 2022-02-25 RX ADMIN — CEFEPIME 100 MILLIGRAM(S): 1 INJECTION, POWDER, FOR SOLUTION INTRAMUSCULAR; INTRAVENOUS at 13:18

## 2022-02-25 RX ADMIN — GABAPENTIN 400 MILLIGRAM(S): 400 CAPSULE ORAL at 13:11

## 2022-02-25 RX ADMIN — MORPHINE SULFATE 4 MILLIGRAM(S): 50 CAPSULE, EXTENDED RELEASE ORAL at 05:26

## 2022-02-25 RX ADMIN — Medication 3 UNIT(S): at 13:05

## 2022-02-25 RX ADMIN — Medication 1 GRAM(S): at 12:54

## 2022-02-25 RX ADMIN — Medication 12.5 MILLIGRAM(S): at 05:16

## 2022-02-25 RX ADMIN — MORPHINE SULFATE 2 MILLIGRAM(S): 50 CAPSULE, EXTENDED RELEASE ORAL at 10:15

## 2022-02-25 RX ADMIN — Medication 1: at 19:45

## 2022-02-25 RX ADMIN — Medication 500 MILLIGRAM(S): at 05:16

## 2022-02-25 RX ADMIN — Medication 12.5 MILLIGRAM(S): at 21:35

## 2022-02-25 RX ADMIN — INSULIN GLARGINE 9 UNIT(S): 100 INJECTION, SOLUTION SUBCUTANEOUS at 22:25

## 2022-02-25 RX ADMIN — PANTOPRAZOLE SODIUM 40 MILLIGRAM(S): 20 TABLET, DELAYED RELEASE ORAL at 05:16

## 2022-02-25 RX ADMIN — Medication 1 PATCH: at 18:34

## 2022-02-25 RX ADMIN — Medication 500 MILLIGRAM(S): at 13:12

## 2022-02-25 RX ADMIN — MORPHINE SULFATE 4 MILLIGRAM(S): 50 CAPSULE, EXTENDED RELEASE ORAL at 20:20

## 2022-02-25 RX ADMIN — MORPHINE SULFATE 4 MILLIGRAM(S): 50 CAPSULE, EXTENDED RELEASE ORAL at 06:15

## 2022-02-25 RX ADMIN — Medication 1 GRAM(S): at 05:16

## 2022-02-25 RX ADMIN — ATORVASTATIN CALCIUM 10 MILLIGRAM(S): 80 TABLET, FILM COATED ORAL at 21:35

## 2022-02-25 RX ADMIN — CEFEPIME 100 MILLIGRAM(S): 1 INJECTION, POWDER, FOR SOLUTION INTRAMUSCULAR; INTRAVENOUS at 05:16

## 2022-02-25 RX ADMIN — Medication 500 MILLIGRAM(S): at 21:35

## 2022-02-25 RX ADMIN — Medication 1 GRAM(S): at 00:37

## 2022-02-25 RX ADMIN — Medication 81 MILLIGRAM(S): at 11:36

## 2022-02-25 RX ADMIN — GABAPENTIN 400 MILLIGRAM(S): 400 CAPSULE ORAL at 21:36

## 2022-02-25 RX ADMIN — Medication 2: at 12:52

## 2022-02-25 RX ADMIN — Medication 10 MILLIGRAM(S): at 19:05

## 2022-02-25 RX ADMIN — MORPHINE SULFATE 2 MILLIGRAM(S): 50 CAPSULE, EXTENDED RELEASE ORAL at 09:34

## 2022-02-25 NOTE — PROGRESS NOTE ADULT - SUBJECTIVE AND OBJECTIVE BOX
Date of Service   02-25-22 @ 13:23    Patient is a 61y old  Male who presents with a chief complaint of PAD (23 Feb 2022 16:47)      INTERVAL HISTORY: pt feel ok   TELEMETRY Personally reviewed: SR 60-90's PAC- PVC's     REVIEW OF SYSTEMS:   CONSTITUTIONAL: No weakness  EYES/ENT: No visual changes; No throat pain  Neck: No pain or stiffness  Respiratory: No cough, wheezing, No shortness of breath  CARDIOVASCULAR: no chest pain or palpitations  GASTROINTESTINAL: No abdominal pain, no nausea, vomiting or hematemesis  GENITOURINARY: No dysuria, frequency or hematuria  NEUROLOGICAL: No stroke like symptoms  SKIN: No rashes    	  MEDICATIONS:  metoprolol tartrate 12.5 milliGRAM(s) Oral two times a day        PHYSICAL EXAM:  T(C): 37.2 (02-25-22 @ 13:09), Max: 37.2 (02-25-22 @ 13:09)  HR: 66 (02-25-22 @ 13:09) (64 - 93)  BP: 157/72 (02-25-22 @ 13:09) (129/70 - 157/72)  RR: 18 (02-25-22 @ 13:09) (18 - 18)  SpO2: 96% (02-25-22 @ 13:09) (95% - 97%)  Wt(kg): --  I&O's Summary    24 Feb 2022 07:01  -  25 Feb 2022 07:00  --------------------------------------------------------  IN: 1060 mL / OUT: 1800 mL / NET: -740 mL          Appearance: In no distress	  HEENT:    PERRL, EOMI	  Cardiovascular:  S1 S2, No JVD  Respiratory: Lungs clear to auscultation	  Gastrointestinal:  Soft, Non-tender, + BS	  Vascularature:  No edema of LE  Psychiatric: Appropriate affect   Neuro: no acute focal deficits                               13.1   11.42 )-----------( 359      ( 25 Feb 2022 09:12 )             40.6     02-25    136  |  101  |  5<L>  ----------------------------<  255<H>  3.8   |  26  |  0.51    Ca    8.7      25 Feb 2022 09:12  Phos  2.9     02-25  Mg     1.6     02-25          Labs personally reviewed      ASSESSMENT/PLAN: 	  62 y/o M w/ PMHx DM, PAD s/p R TMA 2/15 transferred from [facility] for IR thrombectomy for R superficial femoral and popliteal artery occlusion and likely surgical revision with podiatry with continued poor flow s/p IR procedure, now for vascular bypass surgery    Problem/Plan - 1:  ·  Problem: Peripheral artery disease.   ·  Plan: Pt transferred to [facility] for IR angiogram and angioplasty. Wire recannulization of the SFA/popliteal stent was performed followed by angioplasty on 2/22. There was persistent poor flow through the stent.   - Vascular consulted for RLE bypass  - Plan for OR tentatively next week  - c/w ASA & Lipitor.    Problem/Plan - 2:  ·  Problem: Paroxysmal Atrial fibrillation/flutter  ·  Plan: CHADSVASc of 2-3  - H/H stable with EGD showing esophagitis  - rec AC with Eliquis 5mg BID or Lovenox alberto-procedure  - Echo showed EF 55-60% & moderate aortic stenosis     Problem/Plan - 3:  ·  Problem: Pre-operative risk stratification  ·  Plan: Pre-operative clearance for open vascular LE Bypass  - unable to assess functional capacity given limited by PAD  - heavy smoker and diabetic going to high risk surgery  - TTE with preserved EF and Mod AS  -NM stress test with areas of infarct and ischemia, r/b of cath discussed with pt, agreeable to proceed   - will discuss with vascular surgery   - for diagnostic LHC today     Problem/Plan - 4:  ·  Problem: DM   - basal bolus based on weight with low ISS  - Hgb A1C is 8.6.          Yessy JARVIS-OUSMANE Burger DO [facility]  Cardiovascular Medicine  [address] [place] [address], [address]  Office: [phone]  Cell: [phone] Date of Service   02-25-22 @ 13:23    Patient is a 61y old  Male who presents with a chief complaint of PAD (23 Feb 2022 16:47)      INTERVAL HISTORY: pt feel ok   TELEMETRY Personally reviewed: SR 60-90's PAC- PVC's     REVIEW OF SYSTEMS:   CONSTITUTIONAL: No weakness  EYES/ENT: No visual changes; No throat pain  Neck: No pain or stiffness  Respiratory: No cough, wheezing, No shortness of breath  CARDIOVASCULAR: no chest pain or palpitations  GASTROINTESTINAL: No abdominal pain, no nausea, vomiting or hematemesis  GENITOURINARY: No dysuria, frequency or hematuria  NEUROLOGICAL: No stroke like symptoms  SKIN: No rashes    	  MEDICATIONS:  metoprolol tartrate 12.5 milliGRAM(s) Oral two times a day        PHYSICAL EXAM:  T(C): 37.2 (02-25-22 @ 13:09), Max: 37.2 (02-25-22 @ 13:09)  HR: 66 (02-25-22 @ 13:09) (64 - 93)  BP: 157/72 (02-25-22 @ 13:09) (129/70 - 157/72)  RR: 18 (02-25-22 @ 13:09) (18 - 18)  SpO2: 96% (02-25-22 @ 13:09) (95% - 97%)  Wt(kg): --  I&O's Summary    24 Feb 2022 07:01  -  25 Feb 2022 07:00  --------------------------------------------------------  IN: 1060 mL / OUT: 1800 mL / NET: -740 mL          Appearance: In no distress	  HEENT:    PERRL, EOMI	  Cardiovascular:  S1 S2, No JVD  Respiratory: Lungs clear to auscultation	  Gastrointestinal:  Soft, Non-tender, + BS	  Vascularature:  No edema of LE  Psychiatric: Appropriate affect   Neuro: no acute focal deficits                               13.1   11.42 )-----------( 359      ( 25 Feb 2022 09:12 )             40.6     02-25    136  |  101  |  5<L>  ----------------------------<  255<H>  3.8   |  26  |  0.51    Ca    8.7      25 Feb 2022 09:12  Phos  2.9     02-25  Mg     1.6     02-25          Labs personally reviewed      ASSESSMENT/PLAN: 	  60 y/o M w/ PMHx DM, PAD s/p R TMA 2/15 transferred from [facility] for IR thrombectomy for R superficial femoral and popliteal artery occlusion and likely surgical revision with podiatry with continued poor flow s/p IR procedure, now for vascular bypass surgery    Problem/Plan - 1:  ·  Problem: Peripheral artery disease.   ·  Plan: Pt transferred to [facility] for IR angiogram and angioplasty. Wire recannulization of the SFA/popliteal stent was performed followed by angioplasty on 2/22. There was persistent poor flow through the stent.   - Vascular consulted for RLE bypass  - Plan for OR tentatively next week  - c/w ASA & Lipitor.    Problem/Plan - 2:  ·  Problem: Paroxysmal Atrial fibrillation/flutter  ·  Plan: CHADSVASc of 2-3  - H/H stable with EGD showing esophagitis  - rec AC with Eliquis 5mg BID or Lovenox alberto-procedure  - Echo showed EF 55-60% & moderate aortic stenosis     Problem/Plan - 3:  ·  Problem: Pre-operative risk stratification  ·  Plan: Pre-operative clearance for open vascular LE Bypass  - unable to assess functional capacity given limited by PAD  - heavy smoker and diabetic going to high risk surgery  - TTE with preserved EF and Mod AS  -NM stress test with areas of infarct and ischemia, r/b of cath discussed with pt, agreeable to proceed   - s/p C with mild-mod LAD disease and severe RPL disease  -- given asymptomatic and no LM/LAD severe disease will manage CAD medically, ASA. statin, Metoprolol  - Discussed with vascular surg Dr Hawkins and IC Dr Milian, all in agreement with plan  - Elevated risk for high risk vascular surgery, no contraindication to proceed with his nonelective limb salvage surgery     Problem/Plan - 4:  ·  Problem: DM   - basal bolus based on weight with low ISS  - Hgb A1C is 8.6.         Yessy CRENSHAW-BC   Pierre Burger DO [facility]  Cardiovascular Medicine  [address], [address]  Office: [phone]  Cell: [phone]

## 2022-02-25 NOTE — DIETITIAN INITIAL EVALUATION ADULT. - PROBLEM SELECTOR PLAN 5
A. fib on post TMA EKG per anaesthesia   - no more episodes of A. fib today at [facility]  - Echo showed EF 55-60% & moderate aortic stenosis  - as per cardio at , would ideally start AC for A. fib but in light of recent GIB/coffee grounds emesis will hold off  - Patient on metoprolol 12.5 BID, can continue at this time  - ASA okay to resumed as per GI at [facility]

## 2022-02-25 NOTE — PROGRESS NOTE ADULT - PROBLEM SELECTOR PLAN 1
Patient with history of severe PAD. US showed occluded right superficial femoral and popliteal arteries with multiple stents   - CTA showed occluded right superficial femoral and popliteal arteries containing multiple overlapping stents with reconstitution in the mid popliteal artery, three-vessel runoff into b/l feet, occluded left superficial femoral artery with reconstitution distally, moderate narrowing in the mid left popliteal artery and moderate narrowing in the proximal left external iliac artery with associated areas of ulcerated plaque  - Pt transferred to [facility] for IR angiogram and angioplasty. Wire recannulization of the SFA/popliteal stent was performed followed by angioplasty on 2/22. There was persistent poor flow through the stent.   - Vascular consulted for RLE bypass  -- Plan for OR tentatively next week  -- Cardiac work-up as below  - c/w ASA & Lipitor

## 2022-02-25 NOTE — PROGRESS NOTE ADULT - SUBJECTIVE AND OBJECTIVE BOX
**************************************  Collin Pruett, PGY-1  Senior Resident: Tika Nuno  After 7PM, please contact night float at [phone] or [phone]  **************************************    INTERVAL HPI/OVERNIGHT EVENTS:  Patient was seen and examined at bedside. As per nurse and patient, no o/n events, patient resting comfortably. No complaints at this time. Eating appropriately and having BMs    VITAL SIGNS:  T(F): 98.4 (02-25-22 @ 04:17)  HR: 76 (02-25-22 @ 04:17)  BP: 129/70 (02-25-22 @ 04:17)  RR: 18 (02-25-22 @ 04:17)  SpO2: 97% (02-25-22 @ 04:17)    PHYSICAL EXAM:    Constitutional: WDWN, NAD  HEENT: PERRL, EOMI, sclera non-icteric, neck supple, trachea midline, no masses, no JVD, MMM, good dentition  Respiratory: CTA b/l, good air entry b/l, no wheezing, no rhonchi, no rales, without accessory muscle use and no intercostal retractions  Cardiovascular: RRR, normal S1S2, no M/R/G  Gastrointestinal: soft, NTND, no masses palpable, BS normal  Extremities: Warm, well perfused, pulses equal bilateral upper and lower extremities, no edema, no clubbing. Capillary refill <2 sec  Neurological: AAOx3, CN Grossly intact  Skin: Normal temperature, warm, dry    MEDICATIONS  (STANDING):  aspirin enteric coated 81 milliGRAM(s) Oral daily  atorvastatin 10 milliGRAM(s) Oral at bedtime  cefepime   IVPB      cefepime   IVPB 1000 milliGRAM(s) IV Intermittent every 8 hours  dextrose 40% Gel 15 Gram(s) Oral once  dextrose 5%. 1000 milliLiter(s) (50 mL/Hr) IV Continuous <Continuous>  dextrose 5%. 1000 milliLiter(s) (100 mL/Hr) IV Continuous <Continuous>  dextrose 50% Injectable 25 Gram(s) IV Push once  dextrose 50% Injectable 12.5 Gram(s) IV Push once  dextrose 50% Injectable 25 Gram(s) IV Push once  glucagon  Injectable 1 milliGRAM(s) IntraMuscular once  insulin glargine Injectable (LANTUS) 8 Unit(s) SubCutaneous at bedtime  insulin lispro (ADMELOG) corrective regimen sliding scale   SubCutaneous three times a day before meals  insulin lispro Injectable (ADMELOG) 2 Unit(s) SubCutaneous three times a day before meals  metoprolol tartrate 12.5 milliGRAM(s) Oral two times a day  metroNIDAZOLE    Tablet 500 milliGRAM(s) Oral three times a day  nicotine - 21 mG/24Hr(s) Patch 1 patch Transdermal daily  pantoprazole  Injectable 40 milliGRAM(s) IV Push every 12 hours  sucralfate 1 Gram(s) Oral four times a day    MEDICATIONS  (PRN):  acetaminophen     Tablet .. 650 milliGRAM(s) Oral every 6 hours PRN Mild Pain (1 - 3), Moderate Pain (4 - 6)  aluminum hydroxide/magnesium hydroxide/simethicone Suspension 30 milliLiter(s) Oral every 6 hours PRN Dyspepsia  morphine  - Injectable 4 milliGRAM(s) IV Push every 8 hours PRN Severe Pain (7 - 10)  simethicone 80 milliGRAM(s) Chew every 6 hours PRN Dyspepsia      Allergies    No Known Allergies    Intolerances        LABS:                        12.0   10.79 )-----------( 327      ( 24 Feb 2022 06:58 )             37.1                 RADIOLOGY & ADDITIONAL TESTS:  Reviewed **************************************  Collin Pruett, PGY-1  Senior Resident: Tika Nuno  After 7PM, please contact night float at [phone] or [phone]  **************************************    INTERVAL HPI/OVERNIGHT EVENTS:  Patient was seen and examined at bedside. As per nurse and patient, no o/n events, patient resting comfortably. No complaints at this time. Eating appropriately and having BMs    VITAL SIGNS:  T(F): 98.4 (02-25-22 @ 04:17)  HR: 76 (02-25-22 @ 04:17)  BP: 129/70 (02-25-22 @ 04:17)  RR: 18 (02-25-22 @ 04:17)  SpO2: 97% (02-25-22 @ 04:17)    PHYSICAL EXAM:    Constitutional: WDWN, NAD  HEENT: PERRL, EOMI, sclera non-icteric, neck supple, trachea midline, D, MMM,   Respiratory: CTA b/l, good air entry b/l, no wheezing, no rhonchi, no rales, without accessory muscle use and no intercostal retractions  Cardiovascular: RRR, normal S1S2, no M/R/G  Gastrointestinal: soft, NTND, no masses palpable, BS normal  Extremities: right foot bandaged. No swelling above. 1+ edema LLE.   Neurological: AAOx3, CN Grossly intact  Skin: Normal temperature, warm, dry    MEDICATIONS  (STANDING):  aspirin enteric coated 81 milliGRAM(s) Oral daily  atorvastatin 10 milliGRAM(s) Oral at bedtime  cefepime   IVPB      cefepime   IVPB 1000 milliGRAM(s) IV Intermittent every 8 hours  dextrose 40% Gel 15 Gram(s) Oral once  dextrose 5%. 1000 milliLiter(s) (50 mL/Hr) IV Continuous <Continuous>  dextrose 5%. 1000 milliLiter(s) (100 mL/Hr) IV Continuous <Continuous>  dextrose 50% Injectable 25 Gram(s) IV Push once  dextrose 50% Injectable 12.5 Gram(s) IV Push once  dextrose 50% Injectable 25 Gram(s) IV Push once  glucagon  Injectable 1 milliGRAM(s) IntraMuscular once  insulin glargine Injectable (LANTUS) 8 Unit(s) SubCutaneous at bedtime  insulin lispro (ADMELOG) corrective regimen sliding scale   SubCutaneous three times a day before meals  insulin lispro Injectable (ADMELOG) 2 Unit(s) SubCutaneous three times a day before meals  metoprolol tartrate 12.5 milliGRAM(s) Oral two times a day  metroNIDAZOLE    Tablet 500 milliGRAM(s) Oral three times a day  nicotine - 21 mG/24Hr(s) Patch 1 patch Transdermal daily  pantoprazole  Injectable 40 milliGRAM(s) IV Push every 12 hours  sucralfate 1 Gram(s) Oral four times a day    MEDICATIONS  (PRN):  acetaminophen     Tablet .. 650 milliGRAM(s) Oral every 6 hours PRN Mild Pain (1 - 3), Moderate Pain (4 - 6)  aluminum hydroxide/magnesium hydroxide/simethicone Suspension 30 milliLiter(s) Oral every 6 hours PRN Dyspepsia  morphine  - Injectable 4 milliGRAM(s) IV Push every 8 hours PRN Severe Pain (7 - 10)  simethicone 80 milliGRAM(s) Chew every 6 hours PRN Dyspepsia      Allergies    No Known Allergies    Intolerances        LABS:                        12.0   10.79 )-----------( 327      ( 24 Feb 2022 06:58 )             37.1                 RADIOLOGY & ADDITIONAL TESTS:  Reviewed

## 2022-02-25 NOTE — DIETITIAN INITIAL EVALUATION ADULT. - ORAL INTAKE PTA/DIET HISTORY
Reports good appetite and intake, consuming generally well-balanced meals including all food groups. Attempts to limit carbohydrate/sugar intake due to DM (e.g increasing fruits/vegetables intake, using Splenda). Confirms NKFA. States use of multivitamin. Denies chewing or swallowing problems. Reports good appetite and intake, consuming generally well-balanced meals including all food groups. Attempts to limit carbohydrate/sugar intake due to DM (e.g using Splenda, however, reports increasing fruits/vegetables intake). Confirms NKFA. States use of multivitamin. Denies chewing or swallowing problems.

## 2022-02-25 NOTE — PROGRESS NOTE ADULT - ASSESSMENT
60 YO M with right foot gangrene, TMA on 2/15, s/p failed aborted angiogram and stent placement in SFA with IR on 2/22. Vascular surgery consulted for potential revascularization options. Cardiology following, reviewed echo, recommended stress test.    Plan:  - FU podiatry  - FU cardiac workup, stress test, planning for cardiac cath  - bypass planning  - please document medical/cardiac clearance  - GSV vein mapping obtained  - OR tentatively for next week    Vascular, [id number]

## 2022-02-25 NOTE — DIETITIAN INITIAL EVALUATION ADULT. - PERTINENT MEDS FT
Ectorin   Maxipime   Flagyl   Lantus   Admelog (corrective regimen sliding scale)   Miralax   Senna   Magnesium hydroxide  Mylicon   Lipitor  Lopressor   Protonix   Carafate

## 2022-02-25 NOTE — PROGRESS NOTE ADULT - PROBLEM SELECTOR PLAN 2
Initially at [facility] for R foot infection  - MRI not suggestive of acute osteomyelitis  - Vascular study at  showed an unobtainable ankle brachial index w/ diffusely calcified noncompressible vessels limiting evaluation and diminished flow below the right knee  - US and CTA as above  - Patient s/p R TMA 2/15/22 at [facility]  - Abscess cultures grew Serratia, GBS and MSSA  - Patient being treated with Cefepime & Flagyl, c/w abx at this time  - ID following, appreciate recs  - c/w morphine for pain  - Plan for podiatry to perform surgical revision pending vascular work-up  - Pain control: Tylenol for mild pain, Morphine 2mg for moderate, Morphine 4mg for severe pain Initially at [facility] for R foot infection  - MRI not suggestive of acute osteomyelitis  - Vascular study at  showed an unobtainable ankle brachial index w/ diffusely calcified noncompressible vessels limiting evaluation and diminished flow below the right knee  - Patient s/p R TMA 2/15/22 at [facility]  - Abscess cultures grew Serratia, GBS and MSSA  - Patient being treated with Cefepime & Flagyl, c/w abx at this time  - c/w morphine for pain with bowel regimen  - Plan for podiatry to perform surgical revision pending vascular work-up  - Pain control: Tylenol for mild pain, Morphine 2mg for moderate, Morphine 4mg for severe pain

## 2022-02-25 NOTE — PROGRESS NOTE ADULT - PROBLEM SELECTOR PLAN 3
Patient with episode of coffee ground emesis on 2/14  - On Protonix & carafate at [facility] c/ at this time  - EGD on 2/18 showed distal moderate esophagitis s/p biopsy, proximal-mid esophageal whitish exudate s/p biopsy, mild antral gastritis s/p biopsy & mild duodenitis s/p biopsy   - H&H is stable, if downtrending will transfuse <7.   - CTM Patient with episode of coffee ground emesis on 2/14  - On Protonix & carafate at [facility] c/ at this time  - EGD on 2/18 showed distal moderate esophagitis s/p biopsy, proximal-mid esophageal whitish exudate s/p biopsy, mild antral gastritis s/p biopsy & mild duodenitis s/p biopsy   - H&H is stable, if downtrending will transfuse <7.   -will need DAPT, can trial after catheterization.

## 2022-02-25 NOTE — PROGRESS NOTE ADULT - SUBJECTIVE AND OBJECTIVE BOX
Vascular Surgery Progress Note    SUBJECTIVE:  Pain is well controlled, stable  No fevers, chest pain, or shortness of breath  Had trouble sleeping    OBJECTIVE:  Vital Signs Last 24 Hrs  T(C): 36.9 (25 Feb 2022 04:17), Max: 36.9 (24 Feb 2022 20:08)  T(F): 98.4 (25 Feb 2022 04:17), Max: 98.4 (24 Feb 2022 20:08)  HR: 76 (25 Feb 2022 04:17) (64 - 93)  BP: 129/70 (25 Feb 2022 04:17) (129/70 - 151/79)  BP(mean): --  RR: 18 (25 Feb 2022 04:17) (18 - 18)  SpO2: 97% (25 Feb 2022 04:17) (95% - 97%)    Physical Examination:  GEN: NAD, resting quietly  NEURO: AAOx3, CN II-XII grossly intact, no focal deficits  PULM: symmetric chest rise bilaterally, no increased WOB  EXTR: no LE erythema, moving all extremities  VASC: bilateral femoral pulses palpable, r foot wrapped by podiatry, warm to touch, motor function intact      I&O's Detail    24 Feb 2022 07:01  -  25 Feb 2022 07:00  --------------------------------------------------------  IN:    IV PiggyBack: 100 mL    Oral Fluid: 960 mL  Total IN: 1060 mL    OUT:    Voided (mL): 1800 mL  Total OUT: 1800 mL    Total NET: -740 mL            LABS:                        13.1   11.42 )-----------( 359      ( 25 Feb 2022 09:12 )             40.6       02-25    136  |  101  |  5<L>  ----------------------------<  255<H>  3.8   |  26  |  0.51    Ca    8.7      25 Feb 2022 09:12  Phos  2.9     02-25  Mg     1.6     02-25          IMAGING:  []

## 2022-02-25 NOTE — DIETITIAN INITIAL EVALUATION ADULT. - PROBLEM SELECTOR PLAN 2
Initially at [place] for R foot infection  - MRI not suggestive of acute osteomyelitis  - vascular study at  showed an unobtainable ankle brachial index w/ diffusely calcified noncompressible vessels limiting evaluation and diminished flow below the right knee  - US and CTA as above  - Patient s/p R TMA 2/15/22 at [facility]  - abscess cultures grew Serratia, GBS and MSSA  - Patient being treated with Cefepime & Flagyl, c/w abx at this time  - c/w morphine and percocet for pain  - Plan for podiatry to perform surgical revision pending IR thrombectomy. Will contact podiatry in AM. Will also ask if podiatry needs vascular involved

## 2022-02-25 NOTE — DIETITIAN INITIAL EVALUATION ADULT. - ADD RECOMMEND
1. Continue diet as ordered/tolerated 2. Add multivitamin and vitamin C for wound healing if not medically contraindicated 3. Provide education as needed/able 4. Continue to monitor labs, skin integrity, weight, GI distress, intake and tolerance 1. Continue diet as ordered/tolerated: consistent carbohydrate 2. Add multivitamin and vitamin C for wound healing if not medically contraindicated 3. Provide education as needed/able 4. Continue to monitor labs, skin integrity, weight, GI distress, intake and tolerance

## 2022-02-25 NOTE — DIETITIAN INITIAL EVALUATION ADULT. - NSPROEDALEARNPREF_GEN_A_NUR
Pt is requesting for the lactation consult to come down to educate her. PCL notified the RN and she will be down shortly.   written material

## 2022-02-25 NOTE — PROGRESS NOTE ADULT - PROBLEM SELECTOR PLAN 7
Pre-operative clearance for open vascular LE Bypass, per cardiology  - unable to assess functional capacity given limited by PAD  - heavy smoker and diabetic going to high risk surgery  - TTE with preserved EF and Mod AS  - Large and moderate areas of infarct seen in stress test (abnormal study). Plan for cath per cardiology

## 2022-02-25 NOTE — DIETITIAN INITIAL EVALUATION ADULT. - PROBLEM SELECTOR PLAN 1
Patient with history of severe PAD. US showed occluded right superficial femoral and popliteal arteries with multiple stents   - CTA showed occluded right superficial femoral and popliteal arteries containing multiple overlapping stents with reconstitution in the mid popliteal artery, three-vessel runoff into b/l feet, occluded left superficial femoral artery with reconstitution distally, moderate narrowing in the mid left popliteal artery and moderate narrowing in the proximal left external iliac artery with associated areas of ulcerated plaque  - Pt transferred to [facility] for IR thrombectomy  - c/w ASA & Lipitor  - IR aware of the patient. Plan to thrombectomy tomorrow. NPO at midnight

## 2022-02-25 NOTE — DIETITIAN INITIAL EVALUATION ADULT. - PHYSCIAL ASSESSMENT
BMI: 21.6 kg/m2 %IBW: 90%  Per flow sheet, no pressure injury   Nutrition-focused physical exam conducted with patient's verbal consent and preceptor presence. BMI: 21.7 kg/m2 %IBW: 90% (IBW calculated considering s/p MTA)   Per flow sheet, no pressure injury   Nutrition-focused physical exam conducted with patient's verbal consent and preceptor presence. %IBW: 90% (IBW calculated considering s/p MTA)   Per flow sheet, no pressure injury

## 2022-02-25 NOTE — DIETITIAN INITIAL EVALUATION ADULT. - PERTINENT LABORATORY DATA
2/25  BUN 5 <L>  Glucose 255 <H>  Finger sticks 178 <H>     2/24   Finger sticks 178, 200, 228, 188 <H>     2/12  HbA1c 8.6 <H>  non  <H>    <H>

## 2022-02-25 NOTE — DIETITIAN INITIAL EVALUATION ADULT. - OTHER INFO
Reports good appetite and intake (no change compared to PTA), consuming >75% of meals since admission.     GI: No N/V/C/D with last BM yesterday 2/24. Currently receiving Miralax, Senna and Mg hydroxide.     Reports UBW of 170-175 pounds (unable to specify time period) with dosing weight (2/22) 168 pounds. Per flow sheet (2/21): 166 pounds and per [facility] HIE:  179.52 pounds (2/11) and 169.4 pounds (2/18). Comparing dosing weight (2/22) and weight on 2/18, 1.4 pounds (0.8%) weight loss x 4 days (not clinically significant). Of note, patient s/p TMA 2/15 and with edema, likely masking weight changes. Will continue to monitor as able.     Labs reviewed: elevated finger sticks and glucose with HbA1c (2/12) 8.6%, suggesting poor blood glucose management. Patient states use of Glipizide and Metformin at home. Checks blood sugar occasionally with levels 160-170 mg/dL. RD attempted to provide diabetes nutrition therapy, patient declines. Handout provided. Reports good appetite and intake (no change compared to PTA), consuming >75% of meals since admission.     GI: No N/V/C/D with last BM yesterday 2/24. Currently receiving Miralax, Senna and Mg hydroxide.     Reports UBW of 170-175 pounds (unable to specify time period) with dosing weight (2/22) 168 pounds. Per flow sheet (2/21): 166 pounds and per [facility] HIE: 179.52 pounds (2/11) and 169.4 pounds (2/18). Comparing dosing weight (2/22) and weight on 2/18, 1.4 pounds (0.8%) weight loss x 4 days (not clinically significant). Of note, patient s/p TMA 2/15 and with edema, likely masking weight changes. Will continue to monitor as able.     Labs reviewed: elevated finger sticks and glucose with HbA1c (2/12) 8.6%, suggesting poor blood glucose management. Patient states use of Glipizide and Metformin at home. Checks blood sugar occasionally with levels 160-170 mg/dL. RD attempted to provide diabetes nutrition therapy, patient declines. Handout provided.

## 2022-02-25 NOTE — DIETITIAN INITIAL EVALUATION ADULT. - CHIEF COMPLAINT
61 years old male with PMH of "DM and PAD." Presented initially to [facility] for R foot 4th/5th digit dry gangrene w/ non healing R medial malleolar ulcer, s/p R foot TMA on 2/15. Found to have "occluded R superficial femoral and popliteal arteries with multiple stents and Occluded proximal and mid left superficial femoral artery with reconstitution distally." Transferred to [facility] for IR thrombectomy. Per infectious note (2/23), "angiogram and stenting didn't work 2/22, now planned for bypass next week." Per vascular surgery note (2/25), "f/u cardiac workup, stress test, planning for cardiac cath." 61 years old male with PMH of "DM and PAD. Presented initially to [facility] for R foot 4th/5th digit dry gangrene w/ non healing R medial malleolar ulcer," s/p R foot TMA on 2/15. Found to have "occluded R superficial femoral and popliteal arteries with multiple stents and Occluded proximal and mid left superficial femoral artery with reconstitution distally." Transferred to [facility] for IR thrombectomy. Per infectious note (2/23), "angiogram and stenting didn't work 2/22, now planned for bypass next week." Per vascular surgery note (2/25), "f/u cardiac workup, stress test, planning for cardiac cath."

## 2022-02-25 NOTE — PROGRESS NOTE ADULT - ASSESSMENT
60 y/o M w/ PMHx DM, PAD s/p R TMA 2/15 transferred from [facility] for IR thrombectomy for R superficial femoral and popliteal artery occlusion and likely surgical revision with podiatry with continued poor flow s/p IR procedure, now for vascular bypass surgery. Pending cardiac work-up

## 2022-02-25 NOTE — PROGRESS NOTE ADULT - PROBLEM SELECTOR PLAN 5
A. fib on post TMA EKG per anaesthesia   - Echo showed EF 55-60% & moderate aortic stenosis  - CHADVASc 3, which correlates to 3.2% per year in >90,000 patients (the [place] Atrial Fibrillation Cohort Study) and 4.6% risk of stroke/TIA/systemic embolism.  - HAS-bled score 3, which correlates to bleeding risk of 5.8% in one validation study (Lip 2011) and 3.72 bleeds per 100 patient-years in another validation study (Pisters 2010).  -- Given higher risk of bleeding, will hold AC at this time as patient with recent GIB/coffee grounds emesis will hold off  - Patient on metoprolol 12.5 BID, can continue at this time  - ASA okay to resume as per GI at [facility] VS A. fib on post TMA EKG per anaesthesia   - Echo showed EF 55-60% & moderate aortic stenosis  - CHADVASc 3, which correlates to 3.2% per year in >90,000 patients (the [place] Atrial Fibrillation Cohort Study) and 4.6% risk of stroke/TIA/systemic embolism.  - HAS-bled score 3, which correlates to bleeding risk of 5.8% in one validation study (Lip 2011) and 3.72 bleeds per 100 patient-years in another validation study (Pisters 2010).  -- depending on DAPT will need to determine r/b of triple therapy   - Patient on metoprolol 12.5 BID, can continue at this time  - ASA okay to resume as per GI at [facility] VS

## 2022-02-26 ENCOUNTER — TRANSCRIPTION ENCOUNTER (OUTPATIENT)
Age: 62
End: 2022-02-26

## 2022-02-26 LAB
ANION GAP SERPL CALC-SCNC: 11 MMOL/L — SIGNIFICANT CHANGE UP (ref 5–17)
APTT BLD: 32.8 SEC — SIGNIFICANT CHANGE UP (ref 27.5–35.5)
BUN SERPL-MCNC: 6 MG/DL — LOW (ref 7–23)
CALCIUM SERPL-MCNC: 8.7 MG/DL — SIGNIFICANT CHANGE UP (ref 8.4–10.5)
CHLORIDE SERPL-SCNC: 100 MMOL/L — SIGNIFICANT CHANGE UP (ref 96–108)
CO2 SERPL-SCNC: 24 MMOL/L — SIGNIFICANT CHANGE UP (ref 22–31)
CREAT SERPL-MCNC: 0.64 MG/DL — SIGNIFICANT CHANGE UP (ref 0.5–1.3)
GLUCOSE BLDC GLUCOMTR-MCNC: 266 MG/DL — HIGH (ref 70–99)
GLUCOSE BLDC GLUCOMTR-MCNC: 282 MG/DL — HIGH (ref 70–99)
GLUCOSE BLDC GLUCOMTR-MCNC: 283 MG/DL — HIGH (ref 70–99)
GLUCOSE BLDC GLUCOMTR-MCNC: 350 MG/DL — HIGH (ref 70–99)
GLUCOSE SERPL-MCNC: 340 MG/DL — HIGH (ref 70–99)
HCT VFR BLD CALC: 38.5 % — LOW (ref 39–50)
HGB BLD-MCNC: 12.4 G/DL — LOW (ref 13–17)
INR BLD: 1.28 RATIO — HIGH (ref 0.88–1.16)
MAGNESIUM SERPL-MCNC: 1.6 MG/DL — SIGNIFICANT CHANGE UP (ref 1.6–2.6)
MCHC RBC-ENTMCNC: 29.7 PG — SIGNIFICANT CHANGE UP (ref 27–34)
MCHC RBC-ENTMCNC: 32.2 GM/DL — SIGNIFICANT CHANGE UP (ref 32–36)
MCV RBC AUTO: 92.1 FL — SIGNIFICANT CHANGE UP (ref 80–100)
NRBC # BLD: 0 /100 WBCS — SIGNIFICANT CHANGE UP (ref 0–0)
PHOSPHATE SERPL-MCNC: 3.4 MG/DL — SIGNIFICANT CHANGE UP (ref 2.5–4.5)
PLATELET # BLD AUTO: 427 K/UL — HIGH (ref 150–400)
POTASSIUM SERPL-MCNC: 4 MMOL/L — SIGNIFICANT CHANGE UP (ref 3.5–5.3)
POTASSIUM SERPL-SCNC: 4 MMOL/L — SIGNIFICANT CHANGE UP (ref 3.5–5.3)
PROTHROM AB SERPL-ACNC: 15.4 SEC — HIGH (ref 10.5–13.4)
RBC # BLD: 4.18 M/UL — LOW (ref 4.2–5.8)
RBC # FLD: 13.4 % — SIGNIFICANT CHANGE UP (ref 10.3–14.5)
SODIUM SERPL-SCNC: 135 MMOL/L — SIGNIFICANT CHANGE UP (ref 135–145)
WBC # BLD: 10.36 K/UL — SIGNIFICANT CHANGE UP (ref 3.8–10.5)
WBC # FLD AUTO: 10.36 K/UL — SIGNIFICANT CHANGE UP (ref 3.8–10.5)

## 2022-02-26 PROCEDURE — 99233 SBSQ HOSP IP/OBS HIGH 50: CPT | Mod: GC

## 2022-02-26 RX ORDER — ATORVASTATIN CALCIUM 80 MG/1
40 TABLET, FILM COATED ORAL AT BEDTIME
Refills: 0 | Status: DISCONTINUED | OUTPATIENT
Start: 2022-02-26 | End: 2022-02-27

## 2022-02-26 RX ORDER — SODIUM CHLORIDE 9 MG/ML
1000 INJECTION INTRAMUSCULAR; INTRAVENOUS; SUBCUTANEOUS
Refills: 0 | Status: DISCONTINUED | OUTPATIENT
Start: 2022-02-26 | End: 2022-02-27

## 2022-02-26 RX ORDER — SODIUM CHLORIDE 9 MG/ML
1000 INJECTION, SOLUTION INTRAVENOUS
Refills: 0 | Status: DISCONTINUED | OUTPATIENT
Start: 2022-02-26 | End: 2022-02-27

## 2022-02-26 RX ADMIN — Medication 3: at 12:29

## 2022-02-26 RX ADMIN — Medication 500 MILLIGRAM(S): at 12:33

## 2022-02-26 RX ADMIN — GABAPENTIN 400 MILLIGRAM(S): 400 CAPSULE ORAL at 21:01

## 2022-02-26 RX ADMIN — Medication 500 MILLIGRAM(S): at 21:02

## 2022-02-26 RX ADMIN — Medication 3: at 16:53

## 2022-02-26 RX ADMIN — ATORVASTATIN CALCIUM 40 MILLIGRAM(S): 80 TABLET, FILM COATED ORAL at 21:06

## 2022-02-26 RX ADMIN — MORPHINE SULFATE 4 MILLIGRAM(S): 50 CAPSULE, EXTENDED RELEASE ORAL at 01:00

## 2022-02-26 RX ADMIN — Medication 1 PATCH: at 12:43

## 2022-02-26 RX ADMIN — Medication 1 GRAM(S): at 23:17

## 2022-02-26 RX ADMIN — MORPHINE SULFATE 4 MILLIGRAM(S): 50 CAPSULE, EXTENDED RELEASE ORAL at 06:30

## 2022-02-26 RX ADMIN — MORPHINE SULFATE 4 MILLIGRAM(S): 50 CAPSULE, EXTENDED RELEASE ORAL at 18:31

## 2022-02-26 RX ADMIN — Medication 1 GRAM(S): at 06:06

## 2022-02-26 RX ADMIN — MORPHINE SULFATE 4 MILLIGRAM(S): 50 CAPSULE, EXTENDED RELEASE ORAL at 14:25

## 2022-02-26 RX ADMIN — Medication 1 PATCH: at 19:00

## 2022-02-26 RX ADMIN — GABAPENTIN 400 MILLIGRAM(S): 400 CAPSULE ORAL at 06:07

## 2022-02-26 RX ADMIN — Medication 12.5 MILLIGRAM(S): at 06:07

## 2022-02-26 RX ADMIN — PANTOPRAZOLE SODIUM 40 MILLIGRAM(S): 20 TABLET, DELAYED RELEASE ORAL at 18:05

## 2022-02-26 RX ADMIN — MORPHINE SULFATE 4 MILLIGRAM(S): 50 CAPSULE, EXTENDED RELEASE ORAL at 18:07

## 2022-02-26 RX ADMIN — Medication 12.5 MILLIGRAM(S): at 18:08

## 2022-02-26 RX ADMIN — Medication 500 MILLIGRAM(S): at 06:07

## 2022-02-26 RX ADMIN — SODIUM CHLORIDE 75 MILLILITER(S): 9 INJECTION INTRAMUSCULAR; INTRAVENOUS; SUBCUTANEOUS at 13:13

## 2022-02-26 RX ADMIN — Medication 1 PATCH: at 07:54

## 2022-02-26 RX ADMIN — Medication 1 GRAM(S): at 12:32

## 2022-02-26 RX ADMIN — Medication 500 MILLIGRAM(S): at 14:58

## 2022-02-26 RX ADMIN — MORPHINE SULFATE 4 MILLIGRAM(S): 50 CAPSULE, EXTENDED RELEASE ORAL at 06:08

## 2022-02-26 RX ADMIN — MORPHINE SULFATE 4 MILLIGRAM(S): 50 CAPSULE, EXTENDED RELEASE ORAL at 23:10

## 2022-02-26 RX ADMIN — GABAPENTIN 400 MILLIGRAM(S): 400 CAPSULE ORAL at 14:58

## 2022-02-26 RX ADMIN — Medication 81 MILLIGRAM(S): at 12:31

## 2022-02-26 RX ADMIN — Medication 3 UNIT(S): at 08:19

## 2022-02-26 RX ADMIN — Medication 3 UNIT(S): at 16:53

## 2022-02-26 RX ADMIN — INSULIN GLARGINE 9 UNIT(S): 100 INJECTION, SOLUTION SUBCUTANEOUS at 22:14

## 2022-02-26 RX ADMIN — CEFEPIME 100 MILLIGRAM(S): 1 INJECTION, POWDER, FOR SOLUTION INTRAMUSCULAR; INTRAVENOUS at 05:04

## 2022-02-26 RX ADMIN — Medication 1 TABLET(S): at 12:31

## 2022-02-26 RX ADMIN — Medication 4: at 08:19

## 2022-02-26 RX ADMIN — CEFEPIME 100 MILLIGRAM(S): 1 INJECTION, POWDER, FOR SOLUTION INTRAMUSCULAR; INTRAVENOUS at 21:01

## 2022-02-26 RX ADMIN — CEFEPIME 100 MILLIGRAM(S): 1 INJECTION, POWDER, FOR SOLUTION INTRAMUSCULAR; INTRAVENOUS at 14:57

## 2022-02-26 RX ADMIN — MORPHINE SULFATE 4 MILLIGRAM(S): 50 CAPSULE, EXTENDED RELEASE ORAL at 00:35

## 2022-02-26 RX ADMIN — MORPHINE SULFATE 4 MILLIGRAM(S): 50 CAPSULE, EXTENDED RELEASE ORAL at 11:02

## 2022-02-26 RX ADMIN — Medication 1 PATCH: at 12:30

## 2022-02-26 RX ADMIN — Medication 3 UNIT(S): at 12:29

## 2022-02-26 RX ADMIN — Medication 1 GRAM(S): at 00:36

## 2022-02-26 RX ADMIN — PANTOPRAZOLE SODIUM 40 MILLIGRAM(S): 20 TABLET, DELAYED RELEASE ORAL at 06:07

## 2022-02-26 RX ADMIN — MORPHINE SULFATE 4 MILLIGRAM(S): 50 CAPSULE, EXTENDED RELEASE ORAL at 22:37

## 2022-02-26 RX ADMIN — Medication 1 GRAM(S): at 18:09

## 2022-02-26 NOTE — PROGRESS NOTE ADULT - ASSESSMENT
Assessment: 60 YO M with right foot gangrene, TMA on 2/15, s/p failed aborted angiogram and stent placement in SFA with IR on 2/22. Vascular surgery consulted for potential revascularization options. Cardiology following, reviewed echo, recommended stress test.    Plan:  - Plan for Bypass 2/27  - Will require consent, preop labs at 4 am  - Medicine: pending clearance  - Cardiology: - Elevated risk for high risk vascular surgery, no contraindication to proceed with his nonelective limb salvage surgery   - Pain control    Vascular  [id number]

## 2022-02-26 NOTE — PROGRESS NOTE ADULT - SUBJECTIVE AND OBJECTIVE BOX
Date of Service:  02-26-22 @ 14:05    Patient is a 61y old  Male who presents with a chief complaint of gangrene (25 Feb 2022 07:16)      INTERVAL HISTORY: feels ok    TELEMETRY Personally reviewed: SB/SR 50s-80s    REVIEW OF SYSTEMS:   CONSTITUTIONAL: No weakness  EYES/ENT: No visual changes; No throat pain  Neck: No pain or stiffness  Respiratory: No cough, wheezing, No shortness of breath  CARDIOVASCULAR: no chest pain or palpitations  GASTROINTESTINAL: No abdominal pain, no nausea, vomiting or hematemesis  GENITOURINARY: No dysuria, frequency or hematuria  NEUROLOGICAL: No stroke like symptoms  SKIN: right foot     	  MEDICATIONS:  metoprolol tartrate 12.5 milliGRAM(s) Oral two times a day        PHYSICAL EXAM:  T(C): 36.4 (02-26-22 @ 10:47), Max: 37.1 (02-25-22 @ 22:20)  HR: 64 (02-26-22 @ 13:13) (63 - 78)  BP: 146/86 (02-26-22 @ 13:13) (126/62 - 199/89)  RR: 18 (02-26-22 @ 10:47) (16 - 19)  SpO2: 96% (02-26-22 @ 10:47) (95% - 98%)  Wt(kg): --  I&O's Summary    25 Feb 2022 07:01  -  26 Feb 2022 07:00  --------------------------------------------------------  IN: 0 mL / OUT: 1300 mL / NET: -1300 mL    26 Feb 2022 07:01  -  26 Feb 2022 14:05  --------------------------------------------------------  IN: 480 mL / OUT: 300 mL / NET: 180 mL          Appearance: In no distress	  HEENT:    PERRL, EOMI	  Cardiovascular:  S1 S2, No JVD  Respiratory: Lungs clear to auscultation	  Gastrointestinal:  Soft, Non-tender, + BS	  Vascularature:  No edema of LE  Psychiatric: Appropriate affect   Neuro: no acute focal deficits                               12.4   10.36 )-----------( 427      ( 26 Feb 2022 07:01 )             38.5     02-26    135  |  100  |  6<L>  ----------------------------<  340<H>  4.0   |  24  |  0.64    Ca    8.7      26 Feb 2022 07:01  Phos  3.4     02-26  Mg     1.6     02-26          Labs personally reviewed      ASSESSMENT/PLAN: 	    60 y/o M w/ PMHx DM, PAD s/p R TMA 2/15 transferred from [facility] for IR thrombectomy for R superficial femoral and popliteal artery occlusion and likely surgical revision with podiatry with continued poor flow s/p IR procedure, now for vascular bypass surgery     Problem/Plan - 1:  ·  Problem: Peripheral artery disease.   ·  Plan: Pt transferred to [facility] for IR angiogram and angioplasty. Wire recannulization of the SFA/popliteal stent was performed followed by angioplasty on 2/22. There was persistent poor flow through the stent.   - Vascular consulted for RLE bypass  - Plan for bypass 2/27  - c/w ASA & Lipitor.     Problem/Plan - 2:  ·  Problem: Paroxysmal Atrial fibrillation/flutter  ·  Plan: CHADSVASc of 2-3  - H/H stable with EGD showing esophagitis  - rec AC with Eliquis 5mg BID or Lovenox alberto-procedure  - Echo showed EF 55-60% & moderate aortic stenosis      Problem/Plan - 3:  ·  Problem: Pre-operative risk stratification  ·  Plan: Pre-operative clearance for open vascular LE Bypass  - unable to assess functional capacity given limited by PAD  - heavy smoker and diabetic going to high risk surgery  - TTE with preserved EF and Mod AS  -NM stress test with areas of infarct and ischemia, r/b of cath discussed with pt, agreeable to proceed   - s/p LHC with mild-mod LAD disease and severe RPL disease  -- given asymptomatic and no LM/LAD severe disease will manage CAD medically, ASA. statin, Metoprolol  - Discussed with vascular surg Dr Hawkins and IC Dr Milian, all in agreement with plan  - Elevated risk for high risk vascular surgery, no contraindication to proceed with his nonelective limb salvage surgery      Problem/Plan - 4:  ·  Problem: DM   - basal bolus based on weight with low ISS  - Hgb A1C is 8.6.      Linette Bullock MSN, FNP-BC, AGACNP-BC, FRANCESCA  Pierre Burger,  [facility]  Cardiovascular Medicine  [address] [place] [address], [address]  Office: [phone]  Cell: [phone] Date of Service:  02-26-22 @ 14:05    Patient is a 61y old  Male who presents with a chief complaint of gangrene (25 Feb 2022 07:16)      INTERVAL HISTORY: feels ok    TELEMETRY Personally reviewed: SB/SR 50s-80s    REVIEW OF SYSTEMS:   CONSTITUTIONAL: No weakness  EYES/ENT: No visual changes; No throat pain  Neck: No pain or stiffness  Respiratory: No cough, wheezing, No shortness of breath  CARDIOVASCULAR: no chest pain or palpitations  GASTROINTESTINAL: No abdominal pain, no nausea, vomiting or hematemesis  GENITOURINARY: No dysuria, frequency or hematuria  NEUROLOGICAL: No stroke like symptoms  SKIN: right foot     	  MEDICATIONS:  metoprolol tartrate 12.5 milliGRAM(s) Oral two times a day        PHYSICAL EXAM:  T(C): 36.4 (02-26-22 @ 10:47), Max: 37.1 (02-25-22 @ 22:20)  HR: 64 (02-26-22 @ 13:13) (63 - 78)  BP: 146/86 (02-26-22 @ 13:13) (126/62 - 199/89)  RR: 18 (02-26-22 @ 10:47) (16 - 19)  SpO2: 96% (02-26-22 @ 10:47) (95% - 98%)  Wt(kg): --  I&O's Summary    25 Feb 2022 07:01  -  26 Feb 2022 07:00  --------------------------------------------------------  IN: 0 mL / OUT: 1300 mL / NET: -1300 mL    26 Feb 2022 07:01  -  26 Feb 2022 14:05  --------------------------------------------------------  IN: 480 mL / OUT: 300 mL / NET: 180 mL          Appearance: In no distress	  HEENT:    PERRL, EOMI	  Cardiovascular:  S1 S2, No JVD  Respiratory: Lungs clear to auscultation	  Gastrointestinal:  Soft, Non-tender, + BS	  Vascularature:  right foot wound; dressed  Psychiatric: Appropriate affect   Neuro: no acute focal deficits                               12.4   10.36 )-----------( 427      ( 26 Feb 2022 07:01 )             38.5     02-26    135  |  100  |  6<L>  ----------------------------<  340<H>  4.0   |  24  |  0.64    Ca    8.7      26 Feb 2022 07:01  Phos  3.4     02-26  Mg     1.6     02-26          Labs personally reviewed      ASSESSMENT/PLAN: 	    60 y/o M w/ PMHx DM, PAD s/p R TMA 2/15 transferred from [facility] for IR thrombectomy for R superficial femoral and popliteal artery occlusion and likely surgical revision with podiatry with continued poor flow s/p IR procedure, now for vascular bypass surgery     Problem/Plan - 1:  ·  Problem: Peripheral artery disease.   ·  Plan: Pt transferred to [facility] for IR angiogram and angioplasty. Wire recannulization of the SFA/popliteal stent was performed followed by angioplasty on 2/22. There was persistent poor flow through the stent.   - Vascular consulted for RLE bypass  - Plan for bypass 2/27  - c/w ASA & Lipitor.     Problem/Plan - 2:  ·  Problem: Paroxysmal Atrial fibrillation/flutter  ·  Plan: CHADSVASc of 2-3  - H/H stable with EGD showing esophagitis  - rec AC with Eliquis 5mg BID or Lovenox alberto-procedure  - Echo showed EF 55-60% & moderate aortic stenosis      Problem/Plan - 3:  ·  Problem: Pre-operative risk stratification  ·  Plan: Pre-operative clearance for open vascular LE Bypass  - unable to assess functional capacity given limited by PAD  - heavy smoker and diabetic going to high risk surgery  - TTE with preserved EF and Mod AS  -NM stress test with areas of infarct and ischemia, r/b of cath discussed with pt, agreeable to proceed   - s/p LHC with mild-mod LAD disease and severe RPL disease  -- given asymptomatic and no LM/LAD severe disease will manage CAD medically, ASA. statin, Metoprolol  - Discussed with vascular surg Dr Hawkins and IC Dr Milian, all in agreement with plan  - Elevated risk for high risk vascular surgery, no contraindication to proceed with his nonelective limb salvage surgery      Problem/Plan - 4:  ·  Problem: DM   - basal bolus based on weight with low ISS  - Hgb A1C is 8.6.      Linette Bullock MSN, FNP-BC, AGACNP-BC, FRANCESCA  Pierre Burger,  [facility]  Cardiovascular Medicine  [address] [place] [address], [address]  Office: [phone]  Cell: [phone]

## 2022-02-26 NOTE — PROGRESS NOTE ADULT - ASSESSMENT
62 y/o M w/ PMHx DM, PAD s/p R TMA 2/15 transferred from [facility] for IR thrombectomy for R superficial femoral and popliteal artery occlusion and likely surgical revision with podiatry with continued poor flow s/p IR procedure, now for vascular bypass surgery. Pending cardiac work-up

## 2022-02-26 NOTE — PHYSICAL THERAPY INITIAL EVALUATION ADULT - MODALITIES TREATMENT COMMENTS
PT Wound Care Team consulted by Vascular for VAC placement to R foot wound. Dressing removed, wound received C/D/I with no erythema, no purulence, no malodor. Wound measured: 13.0cm x 9.0cm x 2.0cm. Wound mainly granular, +dusky red appearance 90% with 10% slough. Wound cleansed with NS, pad dry with gauze, cavilon to periwound, medi-honey to wound base, adaptic non-adherent ontop, followed by black foam with good seal noted 125mmHg continuous, wrapped with krystin, secured with tape. Pt left as found in NAD, call bell/possessions in [facility] all needs met and lines intact, 5P's addressed, VSS, MALA Tolbert aware of VAC application. Vascular CAM Mejía notified regarding VAC application.

## 2022-02-26 NOTE — PROGRESS NOTE ADULT - SUBJECTIVE AND OBJECTIVE BOX
Vascular Surgery Progress Note    Overnight: No acute events overnight.    Subjective:   Patient seen at bedside this AM. Denies any new complaints.    Objective:  Vital Signs  T(C): 36.4 (02-26 @ 10:47), Max: 37.2 (02-25 @ 13:09)  HR: 67 (02-26 @ 10:47) (63 - 78)  BP: 172/79 (02-26 @ 10:47) (126/62 - 199/89)  RR: 18 (02-26 @ 10:47) (16 - 19)  SpO2: 96% (02-26 @ 10:47) (95% - 98%)  02-25-22 @ 07:01  -  02-26-22 @ 07:00  --------------------------------------------------------  IN:  Total IN: 0 mL    OUT:    Voided (mL): 1300 mL  Total OUT: 1300 mL    Total NET: -1300 mL      02-26-22 @ 07:01  -  02-26-22 @ 10:57  --------------------------------------------------------  IN:  Total IN: 0 mL    OUT:    Voided (mL): 300 mL  Total OUT: 300 mL    Total NET: -300 mL        Physical Examination:  GEN: NAD, resting quietly  NEURO: AAOx3, CN II-XII grossly intact, no focal deficits  PULM: symmetric chest rise bilaterally, no increased WOB  EXTR: no LE erythema, moving all extremities  VASC: bilateral femoral pulses palpable, r foot wrapped by podiatry, warm to touch, motor function intact    Labs:                        12.4   10.36 )-----------( 427      ( 26 Feb 2022 07:01 )             38.5   02-26    135  |  100  |  6<L>  ----------------------------<  340<H>  4.0   |  24  |  0.64    Ca    8.7      26 Feb 2022 07:01  Phos  3.4     02-26  Mg     1.6     02-26      CAPILLARY BLOOD GLUCOSE      POCT Blood Glucose.: 350 mg/dL (26 Feb 2022 07:47)  POCT Blood Glucose.: 297 mg/dL (25 Feb 2022 21:39)  POCT Blood Glucose.: 157 mg/dL (25 Feb 2022 19:22)  POCT Blood Glucose.: 157 mg/dL (25 Feb 2022 18:55)  POCT Blood Glucose.: 247 mg/dL (25 Feb 2022 12:42)      Medications:   MEDICATIONS  (STANDING):  ascorbic acid 500 milliGRAM(s) Oral daily  aspirin enteric coated 81 milliGRAM(s) Oral daily  atorvastatin 40 milliGRAM(s) Oral at bedtime  cefepime   IVPB      cefepime   IVPB 1000 milliGRAM(s) IV Intermittent every 8 hours  dextrose 40% Gel 15 Gram(s) Oral once  dextrose 5%. 1000 milliLiter(s) (50 mL/Hr) IV Continuous <Continuous>  dextrose 5%. 1000 milliLiter(s) (100 mL/Hr) IV Continuous <Continuous>  dextrose 50% Injectable 25 Gram(s) IV Push once  dextrose 50% Injectable 12.5 Gram(s) IV Push once  dextrose 50% Injectable 25 Gram(s) IV Push once  gabapentin 400 milliGRAM(s) Oral three times a day  glucagon  Injectable 1 milliGRAM(s) IntraMuscular once  insulin glargine Injectable (LANTUS) 9 Unit(s) SubCutaneous at bedtime  insulin lispro (ADMELOG) corrective regimen sliding scale   SubCutaneous three times a day before meals  insulin lispro Injectable (ADMELOG) 3 Unit(s) SubCutaneous three times a day before meals  metoprolol tartrate 12.5 milliGRAM(s) Oral two times a day  metroNIDAZOLE    Tablet 500 milliGRAM(s) Oral three times a day  multivitamin 1 Tablet(s) Oral daily  nicotine - 21 mG/24Hr(s) Patch 1 patch Transdermal daily  pantoprazole  Injectable 40 milliGRAM(s) IV Push every 12 hours  polyethylene glycol 3350 17 Gram(s) Oral daily  senna 2 Tablet(s) Oral at bedtime  sodium chloride 0.9%. 1000 milliLiter(s) (75 mL/Hr) IV Continuous <Continuous>  sucralfate 1 Gram(s) Oral four times a day    MEDICATIONS  (PRN):  acetaminophen     Tablet .. 650 milliGRAM(s) Oral every 6 hours PRN Mild Pain (1 - 3), Moderate Pain (4 - 6)  aluminum hydroxide/magnesium hydroxide/simethicone Suspension 30 milliLiter(s) Oral every 6 hours PRN Dyspepsia  morphine  - Injectable 4 milliGRAM(s) IV Push every 4 hours PRN Severe Pain (7 - 10)  simethicone 80 milliGRAM(s) Chew every 6 hours PRN Dyspepsia      Imaging:

## 2022-02-26 NOTE — PROGRESS NOTE ADULT - PROBLEM SELECTOR PLAN 7
Pre-operative clearance for open vascular LE Bypass, per cardiology  - unable to assess functional capacity given limited by PAD  - heavy smoker and diabetic going to high risk surgery  - TTE with preserved EF and Mod AS  - Large and moderate areas of infarct seen in stress test (abnormal study).   - See cardiology note for pre-op clearance note DVT: subcutaneous Heparin on hold post TMA and due to possible GI bleed  GI: Protonix, simethicone for dyspepsia  Diet: Carb consistent diet  Dispo: Pending clinical improvement, PT consult

## 2022-02-26 NOTE — PROGRESS NOTE ADULT - PROBLEM SELECTOR PLAN 6
DVT: subcutaneous Heparin on hold post TMA and due to possible GI bleed  GI: Protonix, simethicone for dyspepsia  Diet: Carb consistent diet  Dispo: Pending clinical improvement, PT consult Pre-operative clearance for open vascular LE Bypass, per cardiology  - unable to assess functional capacity given limited by PAD  - heavy smoker and diabetic going to high risk surgery  - TTE with preserved EF and Mod AS  - Large and moderate areas of infarct seen in stress test (abnormal study).   - s/p diagnostic cath, LM 30%, LAD 30%, Cx 30%, OM 60%, RPL 70-80% small, medical management.  - See cardiology note for pre-op clearance note

## 2022-02-26 NOTE — PHYSICAL THERAPY INITIAL EVALUATION ADULT - IMPAIRMENTS FOUND, PT EVAL
aerobic capacity/endurance/gait, locomotion, and balance aerobic capacity/endurance/gait, locomotion, and balance/integumentary integrity

## 2022-02-26 NOTE — PROGRESS NOTE ADULT - PROBLEM SELECTOR PLAN 1
Patient with history of severe PAD. US showed occluded right superficial femoral and popliteal arteries with multiple stents   - CTA showed occluded right superficial femoral and popliteal arteries containing multiple overlapping stents with reconstitution in the mid popliteal artery, three-vessel runoff into b/l feet, occluded left superficial femoral artery with reconstitution distally, moderate narrowing in the mid left popliteal artery and moderate narrowing in the proximal left external iliac artery with associated areas of ulcerated plaque  - Pt transferred to [facility] for IR angiogram and angioplasty. Wire recannulization of the SFA/popliteal stent was performed followed by angioplasty on 2/22. There was persistent poor flow through the stent.   - Vascular consulted for RLE bypass  -- Plan for OR tentatively next week  -- Cardiac work-up as below  - c/w ASA & Lipitor Patient with history of severe PAD. US showed occluded right superficial femoral and popliteal arteries with multiple stents   - CTA showed occluded right superficial femoral and popliteal arteries containing multiple overlapping stents with reconstitution in the mid popliteal artery, three-vessel runoff into b/l feet, occluded left superficial femoral artery with reconstitution distally, moderate narrowing in the mid left popliteal artery and moderate narrowing in the proximal left external iliac artery with associated areas of ulcerated plaque  - Pt transferred to [facility] for IR angiogram and angioplasty. Wire recannulization of the SFA/popliteal stent was performed followed by angioplasty on 2/22. There was persistent poor flow through the stent.   - Vascular consulted for RLE bypass  -- Plan for OR tentatively next week  -- Cardiac work-up as below  - c/w ASA & Lipitor (increased to 40mg) Patient with history of severe PAD. US showed occluded right superficial femoral and popliteal arteries with multiple stents   - CTA showed occluded right superficial femoral and popliteal arteries containing multiple overlapping stents with reconstitution in the mid popliteal artery, three-vessel runoff into b/l feet, occluded left superficial femoral artery with reconstitution distally, moderate narrowing in the mid left popliteal artery and moderate narrowing in the proximal left external iliac artery with associated areas of ulcerated plaque  - Pt transferred to [facility] for IR angiogram and angioplasty. Wire recannulization of the SFA/popliteal stent was performed followed by angioplasty on 2/22. There was persistent poor flow through the stent.   - Vascular consulted for RLE bypass  -- Plan for OR tomorrow 2/27  -- Cardiac work-up and clearance as below  -- NPO at midnight and pre-op labs  - c/w ASA & Lipitor (increased to 40mg)

## 2022-02-26 NOTE — PROGRESS NOTE ADULT - PROBLEM SELECTOR PLAN 2
Initially at [facility] for R foot infection  - MRI not suggestive of acute osteomyelitis  - Vascular study at  showed an unobtainable ankle brachial index w/ diffusely calcified noncompressible vessels limiting evaluation and diminished flow below the right knee  - Patient s/p R TMA 2/15/22 at [facility]  - Abscess cultures grew Serratia, GBS and MSSA  - Patient being treated with Cefepime & Flagyl, c/w abx at this time  - c/w morphine for pain with bowel regimen  - Plan for podiatry to perform surgical revision pending vascular work-up  - Pain control: Tylenol for mild pain, Morphine 2mg for moderate, Morphine 4mg for severe pain

## 2022-02-26 NOTE — PROGRESS NOTE ADULT - PROBLEM SELECTOR PLAN 3
Patient with episode of coffee ground emesis on 2/14  - On Protonix & carafate at [facility] VS. c/ at this time  - EGD on 2/18 showed distal moderate esophagitis s/p biopsy, proximal-mid esophageal whitish exudate s/p biopsy, mild antral gastritis s/p biopsy & mild duodenitis s/p biopsy   - H&H is stable, if downtrending will transfuse <7.   -will need DAPT, can trial after catheterization.

## 2022-02-26 NOTE — PROGRESS NOTE ADULT - SUBJECTIVE AND OBJECTIVE BOX
**************************************  Collin Pruett, PGY-1  Senior Resident: Tika Nuno  After 7PM, please contact night float at #[phone] or #[phone]  **************************************    INTERVAL HPI/OVERNIGHT EVENTS:  Patient was seen and examined at bedside. As per nurse and patient, no o/n events, patient resting comfortably. No complaints at this time. Eating appropriately and having BMs    VITAL SIGNS:  T(F): 98.2 (02-26-22 @ 04:23)  HR: 67 (02-26-22 @ 04:23)  BP: 146/69 (02-26-22 @ 04:23)  RR: 18 (02-26-22 @ 04:23)  SpO2: 98% (02-26-22 @ 04:23)      PHYSICAL EXAM:    Constitutional: WDWN, NAD  HEENT: PERRL, EOMI, sclera non-icteric, neck supple, trachea midline, D, MMM,   Respiratory: CTA b/l, good air entry b/l, no wheezing, no rhonchi, no rales, without accessory muscle use and no intercostal retractions  Cardiovascular: RRR, normal S1S2, no M/R/G  Gastrointestinal: soft, NTND, no masses palpable, BS normal  Extremities: right foot bandaged. No swelling above. 1+ edema LLE.   Neurological: AAOx3, CN Grossly intact  Skin: Normal temperature, warm, dry    MEDICATIONS  (STANDING):  ascorbic acid 500 milliGRAM(s) Oral daily  aspirin enteric coated 81 milliGRAM(s) Oral daily  atorvastatin 10 milliGRAM(s) Oral at bedtime  cefepime   IVPB      cefepime   IVPB 1000 milliGRAM(s) IV Intermittent every 8 hours  dextrose 40% Gel 15 Gram(s) Oral once  dextrose 5%. 1000 milliLiter(s) (50 mL/Hr) IV Continuous <Continuous>  dextrose 5%. 1000 milliLiter(s) (100 mL/Hr) IV Continuous <Continuous>  dextrose 50% Injectable 25 Gram(s) IV Push once  dextrose 50% Injectable 12.5 Gram(s) IV Push once  dextrose 50% Injectable 25 Gram(s) IV Push once  gabapentin 400 milliGRAM(s) Oral three times a day  glucagon  Injectable 1 milliGRAM(s) IntraMuscular once  insulin glargine Injectable (LANTUS) 9 Unit(s) SubCutaneous at bedtime  insulin lispro (ADMELOG) corrective regimen sliding scale   SubCutaneous three times a day before meals  insulin lispro Injectable (ADMELOG) 3 Unit(s) SubCutaneous three times a day before meals  metoprolol tartrate 12.5 milliGRAM(s) Oral two times a day  metroNIDAZOLE    Tablet 500 milliGRAM(s) Oral three times a day  multivitamin 1 Tablet(s) Oral daily  nicotine - 21 mG/24Hr(s) Patch 1 patch Transdermal daily  pantoprazole  Injectable 40 milliGRAM(s) IV Push every 12 hours  polyethylene glycol 3350 17 Gram(s) Oral daily  senna 2 Tablet(s) Oral at bedtime  sucralfate 1 Gram(s) Oral four times a day    MEDICATIONS  (PRN):  acetaminophen     Tablet .. 650 milliGRAM(s) Oral every 6 hours PRN Mild Pain (1 - 3), Moderate Pain (4 - 6)  aluminum hydroxide/magnesium hydroxide/simethicone Suspension 30 milliLiter(s) Oral every 6 hours PRN Dyspepsia  morphine  - Injectable 4 milliGRAM(s) IV Push every 4 hours PRN Severe Pain (7 - 10)  simethicone 80 milliGRAM(s) Chew every 6 hours PRN Dyspepsia      Allergies    No Known Allergies    Intolerances        LABS:                        13.1   11.42 )-----------( 359      ( 25 Feb 2022 09:12 )             40.6     02-25    136  |  101  |  5<L>  ----------------------------<  255<H>  3.8   |  26  |  0.51    Ca    8.7      25 Feb 2022 09:12  Phos  2.9     02-25  Mg     1.6     02-25            RADIOLOGY & ADDITIONAL TESTS:  Reviewed

## 2022-02-26 NOTE — PROGRESS NOTE ADULT - PROBLEM SELECTOR PLAN 5
A. fib on post TMA EKG per anaesthesia   - Echo showed EF 55-60% & moderate aortic stenosis  - CHADVASc 3, which correlates to 3.2% per year in >90,000 patients (the [place] Atrial Fibrillation Cohort Study) and 4.6% risk of stroke/TIA/systemic embolism.  - HAS-bled score 3, which correlates to bleeding risk of 5.8% in one validation study (Lip 2011) and 3.72 bleeds per 100 patient-years in another validation study (Pisters 2010).  -- depending on DAPT will need to determine r/b of triple therapy   - Patient on metoprolol 12.5 BID, can continue at this time  - ASA okay to resume as per GI at [facility] VS

## 2022-02-26 NOTE — PHYSICAL THERAPY INITIAL EVALUATION ADULT - LIVES WITH, PROFILE
Prior to admission pt lives in house with 3 stairs to entrance. Prior to admission pt independent with all functional mobility including ambulation without AD./spouse

## 2022-02-26 NOTE — CHART NOTE - NSCHARTNOTEFT_GEN_A_CORE
Preop Dx: RLE PAD  Surgeon: Shruthi  Procedure: RLE Bypass, Possible Vein [place]    Vital Signs Last 24 Hrs  T(C): 36.4 (2022 10:47), Max: 37.1 (2022 22:20)  T(F): 97.5 (2022 10:47), Max: 98.7 (2022 22:20)  HR: 64 (2022 13:13) (63 - 78)  BP: 146/86 (2022 13:13) (126/62 - 199/89)  BP(mean): --  RR: 18 (2022 10:47) (16 - 19)  SpO2: 96% (2022 10:47) (95% - 98%)                        12.4   10.36 )-----------( 427      ( 2022 07:01 )             38.5         135  |  100  |  6<L>  ----------------------------<  340<H>  4.0   |  24  |  0.64    Ca    8.7      2022 07:01  Phos  3.4       Mg     1.6           PT/INR - ( 2022 07:01 )   PT: 15.4 sec;   INR: 1.28 ratio         PTT - ( 2022 07:01 )  PTT:32.8 sec  Daily     Daily     EK/25  Cardiac Catheterization:   Type and Screen: Pending   COVID: pending        A/P: Assessment: 60 YO M with right foot gangrene, TMA on 2/15, s/p failed aborted angiogram and stent placement in SFA with IR on . Vascular surgery consulted for potential revascularization options.     - OR 22 for RLE Bypass, Possible Vein [place] with Dr. Hawkins.  - NPO past midnight, except medications  - IVF while NPO  - F/u 4 am labs  - Consent signed and in chart  - Medical clearance for OR pending  - Cardiac clearance for OR: Elevated risk for high risk vascular surgery, no contraindication to proceed with his nonelective limb salvage surgery Preop Dx: RLE PAD  Surgeon: Shruthi  Procedure: RLE Bypass, Possible Vein [place]    Vital Signs Last 24 Hrs  T(C): 36.4 (2022 10:47), Max: 37.1 (2022 22:20)  T(F): 97.5 (2022 10:47), Max: 98.7 (2022 22:20)  HR: 64 (2022 13:13) (63 - 78)  BP: 146/86 (2022 13:13) (126/62 - 199/89)  BP(mean): --  RR: 18 (2022 10:47) (16 - 19)  SpO2: 96% (2022 10:47) (95% - 98%)                        12.4   10.36 )-----------( 427      ( 2022 07:01 )             38.5         135  |  100  |  6<L>  ----------------------------<  340<H>  4.0   |  24  |  0.64    Ca    8.7      2022 07:01  Phos  3.4       Mg     1.6           PT/INR - ( 2022 07:01 )   PT: 15.4 sec;   INR: 1.28 ratio         PTT - ( 2022 07:01 )  PTT:32.8 sec  Daily     Daily     EK/25  Cardiac Catheterization:   Type and Screen: Pending   COVID: pending        A/P: Assessment: 60 YO M with right foot gangrene, TMA on 2/15, s/p failed aborted angiogram and stent placement in SFA with IR on . Vascular surgery consulted for potential revascularization options.     - OR 22 for RLE Bypass, Possible Vein [place] with Dr. Hawkins.  - NPO past midnight, except medications  - IVF while NPO  - F/u 12 am labs  - Consent signed and in chart  - Medical clearance for OR pending  - Cardiac clearance for OR: Elevated risk for high risk vascular surgery, no contraindication to proceed with his nonelective limb salvage surgery

## 2022-02-26 NOTE — PHYSICAL THERAPY INITIAL EVALUATION ADULT - PLANNED THERAPY INTERVENTIONS, PT EVAL
GOAL: Pt will negotiate 10 steps with 1 HR and step to pattern independently in 4 weeks./balance training/bed mobility training/gait training/transfer training GOAL: Pt will negotiate 10 steps with 1 HR and step to pattern independently in 4 weeks./GOAL: WC MANAGEMENT/balance training/bed mobility training/gait training/transfer training

## 2022-02-26 NOTE — PHYSICAL THERAPY INITIAL EVALUATION ADULT - ADDITIONAL COMMENTS
pt lives in private home with wife, 3 steps to enter +2 HRs. Prior to admission, pt was I with all functional mobility and ADLs without AD. (-)

## 2022-02-26 NOTE — PHYSICAL THERAPY INITIAL EVALUATION ADULT - PERTINENT HX OF CURRENT PROBLEM, REHAB EVAL
62 YO M with right foot gangrene, TMA on 2/15, transferred from [facility] for IR thrombectomy for R superficial femoral and popliteal artery occlusion and likely surgical revision with podiatry with continued poor flow s/p 2/22 IR procedure, now for vascular bypass surgery. Pending cardiac work-up

## 2022-02-27 ENCOUNTER — RESULT REVIEW (OUTPATIENT)
Age: 62
End: 2022-02-27

## 2022-02-27 LAB
ANION GAP SERPL CALC-SCNC: 10 MMOL/L — SIGNIFICANT CHANGE UP (ref 5–17)
ANION GAP SERPL CALC-SCNC: 11 MMOL/L — SIGNIFICANT CHANGE UP (ref 5–17)
APTT BLD: 30.2 SEC — SIGNIFICANT CHANGE UP (ref 27.5–35.5)
APTT BLD: 31.5 SEC — SIGNIFICANT CHANGE UP (ref 27.5–35.5)
BASOPHILS # BLD AUTO: 0.06 K/UL — SIGNIFICANT CHANGE UP (ref 0–0.2)
BASOPHILS NFR BLD AUTO: 0.4 % — SIGNIFICANT CHANGE UP (ref 0–2)
BLD GP AB SCN SERPL QL: NEGATIVE — SIGNIFICANT CHANGE UP
BLD GP AB SCN SERPL QL: NEGATIVE — SIGNIFICANT CHANGE UP
BUN SERPL-MCNC: 6 MG/DL — LOW (ref 7–23)
BUN SERPL-MCNC: <4 MG/DL — LOW (ref 7–23)
CALCIUM SERPL-MCNC: 8 MG/DL — LOW (ref 8.4–10.5)
CALCIUM SERPL-MCNC: 8.4 MG/DL — SIGNIFICANT CHANGE UP (ref 8.4–10.5)
CHLORIDE SERPL-SCNC: 102 MMOL/L — SIGNIFICANT CHANGE UP (ref 96–108)
CHLORIDE SERPL-SCNC: 104 MMOL/L — SIGNIFICANT CHANGE UP (ref 96–108)
CO2 SERPL-SCNC: 22 MMOL/L — SIGNIFICANT CHANGE UP (ref 22–31)
CO2 SERPL-SCNC: 24 MMOL/L — SIGNIFICANT CHANGE UP (ref 22–31)
CREAT SERPL-MCNC: 0.5 MG/DL — SIGNIFICANT CHANGE UP (ref 0.5–1.3)
CREAT SERPL-MCNC: 0.64 MG/DL — SIGNIFICANT CHANGE UP (ref 0.5–1.3)
EOSINOPHIL # BLD AUTO: 0.2 K/UL — SIGNIFICANT CHANGE UP (ref 0–0.5)
EOSINOPHIL NFR BLD AUTO: 1.2 % — SIGNIFICANT CHANGE UP (ref 0–6)
GAS PNL BLDA: SIGNIFICANT CHANGE UP
GLUCOSE BLDC GLUCOMTR-MCNC: 160 MG/DL — HIGH (ref 70–99)
GLUCOSE BLDC GLUCOMTR-MCNC: 161 MG/DL — HIGH (ref 70–99)
GLUCOSE BLDC GLUCOMTR-MCNC: 203 MG/DL — HIGH (ref 70–99)
GLUCOSE SERPL-MCNC: 191 MG/DL — HIGH (ref 70–99)
GLUCOSE SERPL-MCNC: 294 MG/DL — HIGH (ref 70–99)
HCT VFR BLD CALC: 31.9 % — LOW (ref 39–50)
HCT VFR BLD CALC: 39.8 % — SIGNIFICANT CHANGE UP (ref 39–50)
HGB BLD-MCNC: 10.4 G/DL — LOW (ref 13–17)
HGB BLD-MCNC: 12.9 G/DL — LOW (ref 13–17)
IMM GRANULOCYTES NFR BLD AUTO: 0.7 % — SIGNIFICANT CHANGE UP (ref 0–1.5)
INR BLD: 1.29 RATIO — HIGH (ref 0.88–1.16)
INR BLD: 1.4 RATIO — HIGH (ref 0.88–1.16)
LYMPHOCYTES # BLD AUTO: 1.36 K/UL — SIGNIFICANT CHANGE UP (ref 1–3.3)
LYMPHOCYTES # BLD AUTO: 8.5 % — LOW (ref 13–44)
MAGNESIUM SERPL-MCNC: 1.5 MG/DL — LOW (ref 1.6–2.6)
MAGNESIUM SERPL-MCNC: 1.5 MG/DL — LOW (ref 1.6–2.6)
MCHC RBC-ENTMCNC: 29.7 PG — SIGNIFICANT CHANGE UP (ref 27–34)
MCHC RBC-ENTMCNC: 29.8 PG — SIGNIFICANT CHANGE UP (ref 27–34)
MCHC RBC-ENTMCNC: 32.4 GM/DL — SIGNIFICANT CHANGE UP (ref 32–36)
MCHC RBC-ENTMCNC: 32.6 GM/DL — SIGNIFICANT CHANGE UP (ref 32–36)
MCV RBC AUTO: 91.4 FL — SIGNIFICANT CHANGE UP (ref 80–100)
MCV RBC AUTO: 91.5 FL — SIGNIFICANT CHANGE UP (ref 80–100)
MONOCYTES # BLD AUTO: 1.1 K/UL — HIGH (ref 0–0.9)
MONOCYTES NFR BLD AUTO: 6.8 % — SIGNIFICANT CHANGE UP (ref 2–14)
NEUTROPHILS # BLD AUTO: 13.24 K/UL — HIGH (ref 1.8–7.4)
NEUTROPHILS NFR BLD AUTO: 82.4 % — HIGH (ref 43–77)
NRBC # BLD: 0 /100 WBCS — SIGNIFICANT CHANGE UP (ref 0–0)
NRBC # BLD: 0 /100 WBCS — SIGNIFICANT CHANGE UP (ref 0–0)
PHOSPHATE SERPL-MCNC: 2.8 MG/DL — SIGNIFICANT CHANGE UP (ref 2.5–4.5)
PHOSPHATE SERPL-MCNC: 3.2 MG/DL — SIGNIFICANT CHANGE UP (ref 2.5–4.5)
PLATELET # BLD AUTO: 359 K/UL — SIGNIFICANT CHANGE UP (ref 150–400)
PLATELET # BLD AUTO: 414 K/UL — HIGH (ref 150–400)
POTASSIUM SERPL-MCNC: 3.8 MMOL/L — SIGNIFICANT CHANGE UP (ref 3.5–5.3)
POTASSIUM SERPL-MCNC: 4.2 MMOL/L — SIGNIFICANT CHANGE UP (ref 3.5–5.3)
POTASSIUM SERPL-SCNC: 3.8 MMOL/L — SIGNIFICANT CHANGE UP (ref 3.5–5.3)
POTASSIUM SERPL-SCNC: 4.2 MMOL/L — SIGNIFICANT CHANGE UP (ref 3.5–5.3)
PROTHROM AB SERPL-ACNC: 15.5 SEC — HIGH (ref 10.5–13.4)
PROTHROM AB SERPL-ACNC: 16.9 SEC — HIGH (ref 10.5–13.4)
RBC # BLD: 3.49 M/UL — LOW (ref 4.2–5.8)
RBC # BLD: 4.35 M/UL — SIGNIFICANT CHANGE UP (ref 4.2–5.8)
RBC # FLD: 13.6 % — SIGNIFICANT CHANGE UP (ref 10.3–14.5)
RBC # FLD: 13.7 % — SIGNIFICANT CHANGE UP (ref 10.3–14.5)
RH IG SCN BLD-IMP: POSITIVE — SIGNIFICANT CHANGE UP
RH IG SCN BLD-IMP: POSITIVE — SIGNIFICANT CHANGE UP
SARS-COV-2 RNA SPEC QL NAA+PROBE: SIGNIFICANT CHANGE UP
SODIUM SERPL-SCNC: 135 MMOL/L — SIGNIFICANT CHANGE UP (ref 135–145)
SODIUM SERPL-SCNC: 138 MMOL/L — SIGNIFICANT CHANGE UP (ref 135–145)
WBC # BLD: 10.91 K/UL — HIGH (ref 3.8–10.5)
WBC # BLD: 16.07 K/UL — HIGH (ref 3.8–10.5)
WBC # FLD AUTO: 10.91 K/UL — HIGH (ref 3.8–10.5)
WBC # FLD AUTO: 16.07 K/UL — HIGH (ref 3.8–10.5)

## 2022-02-27 PROCEDURE — 88305 TISSUE EXAM BY PATHOLOGIST: CPT | Mod: 26

## 2022-02-27 PROCEDURE — 88304 TISSUE EXAM BY PATHOLOGIST: CPT | Mod: 26

## 2022-02-27 PROCEDURE — 35372 RECHANNELING OF ARTERY: CPT | Mod: RT,59

## 2022-02-27 PROCEDURE — 35585 VEIN BYP FEM-TIBIAL PERONEAL: CPT | Mod: RT

## 2022-02-27 PROCEDURE — 28805 AMPUTATION THRU METATARSAL: CPT | Mod: RT,59

## 2022-02-27 PROCEDURE — 35682 COMPOSITE BYP GRFT 2 VEINS: CPT | Mod: RT,59

## 2022-02-27 PROCEDURE — 88311 DECALCIFY TISSUE: CPT | Mod: 26

## 2022-02-27 DEVICE — SURGIFOAM PAD 8CM X 12.5CM X 10MM (100): Type: IMPLANTABLE DEVICE | Site: GANGRENE RIGHT FOOT | Status: FUNCTIONAL

## 2022-02-27 DEVICE — CLIP APPLIER COVIDIEN SURGICLIP II 9.75" MEDIUM: Type: IMPLANTABLE DEVICE | Site: GANGRENE RIGHT FOOT | Status: FUNCTIONAL

## 2022-02-27 DEVICE — KIT A-LINE 1LUM 20G X 12CM SAFE KIT: Type: IMPLANTABLE DEVICE | Site: GANGRENE RIGHT FOOT | Status: FUNCTIONAL

## 2022-02-27 DEVICE — PATCH VASC PERIPHERAL 1X8CM: Type: IMPLANTABLE DEVICE | Site: GANGRENE RIGHT FOOT | Status: FUNCTIONAL

## 2022-02-27 DEVICE — SURGICEL POWDER 3 GRAMS: Type: IMPLANTABLE DEVICE | Site: GANGRENE RIGHT FOOT | Status: FUNCTIONAL

## 2022-02-27 DEVICE — CLIP APPLIER COVIDIEN SURGICLIP III 9" SM: Type: IMPLANTABLE DEVICE | Site: GANGRENE RIGHT FOOT | Status: FUNCTIONAL

## 2022-02-27 RX ORDER — INSULIN LISPRO 100/ML
VIAL (ML) SUBCUTANEOUS
Refills: 0 | Status: DISCONTINUED | OUTPATIENT
Start: 2022-02-27 | End: 2022-03-03

## 2022-02-27 RX ORDER — ACETAMINOPHEN 500 MG
650 TABLET ORAL EVERY 6 HOURS
Refills: 0 | Status: DISCONTINUED | OUTPATIENT
Start: 2022-02-27 | End: 2022-03-03

## 2022-02-27 RX ORDER — DEXTROSE 50 % IN WATER 50 %
25 SYRINGE (ML) INTRAVENOUS ONCE
Refills: 0 | Status: DISCONTINUED | OUTPATIENT
Start: 2022-02-27 | End: 2022-03-03

## 2022-02-27 RX ORDER — INSULIN LISPRO 100/ML
3 VIAL (ML) SUBCUTANEOUS
Refills: 0 | Status: DISCONTINUED | OUTPATIENT
Start: 2022-02-27 | End: 2022-03-03

## 2022-02-27 RX ORDER — PANTOPRAZOLE SODIUM 20 MG/1
40 TABLET, DELAYED RELEASE ORAL EVERY 12 HOURS
Refills: 0 | Status: DISCONTINUED | OUTPATIENT
Start: 2022-02-27 | End: 2022-03-03

## 2022-02-27 RX ORDER — MAGNESIUM SULFATE 500 MG/ML
1 VIAL (ML) INJECTION ONCE
Refills: 0 | Status: DISCONTINUED | OUTPATIENT
Start: 2022-02-27 | End: 2022-02-27

## 2022-02-27 RX ORDER — GLUCAGON INJECTION, SOLUTION 0.5 MG/.1ML
1 INJECTION, SOLUTION SUBCUTANEOUS ONCE
Refills: 0 | Status: DISCONTINUED | OUTPATIENT
Start: 2022-02-27 | End: 2022-03-03

## 2022-02-27 RX ORDER — NICOTINE POLACRILEX 2 MG
1 GUM BUCCAL DAILY
Refills: 0 | Status: DISCONTINUED | OUTPATIENT
Start: 2022-02-27 | End: 2022-03-03

## 2022-02-27 RX ORDER — ASPIRIN/CALCIUM CARB/MAGNESIUM 324 MG
81 TABLET ORAL DAILY
Refills: 0 | Status: DISCONTINUED | OUTPATIENT
Start: 2022-02-28 | End: 2022-03-03

## 2022-02-27 RX ORDER — ASPIRIN/CALCIUM CARB/MAGNESIUM 324 MG
325 TABLET ORAL DAILY
Refills: 0 | Status: DISCONTINUED | OUTPATIENT
Start: 2022-02-27 | End: 2022-02-27

## 2022-02-27 RX ORDER — ASCORBIC ACID 60 MG
500 TABLET,CHEWABLE ORAL DAILY
Refills: 0 | Status: DISCONTINUED | OUTPATIENT
Start: 2022-02-27 | End: 2022-03-03

## 2022-02-27 RX ORDER — METOPROLOL TARTRATE 50 MG
12.5 TABLET ORAL
Refills: 0 | Status: DISCONTINUED | OUTPATIENT
Start: 2022-02-27 | End: 2022-03-03

## 2022-02-27 RX ORDER — ASPIRIN/CALCIUM CARB/MAGNESIUM 324 MG
81 TABLET ORAL DAILY
Refills: 0 | Status: DISCONTINUED | OUTPATIENT
Start: 2022-02-27 | End: 2022-02-27

## 2022-02-27 RX ORDER — ASPIRIN/CALCIUM CARB/MAGNESIUM 324 MG
325 TABLET ORAL ONCE
Refills: 0 | Status: COMPLETED | OUTPATIENT
Start: 2022-02-27 | End: 2022-02-27

## 2022-02-27 RX ORDER — SIMETHICONE 80 MG/1
80 TABLET, CHEWABLE ORAL EVERY 6 HOURS
Refills: 0 | Status: DISCONTINUED | OUTPATIENT
Start: 2022-02-27 | End: 2022-03-03

## 2022-02-27 RX ORDER — HYDROMORPHONE HYDROCHLORIDE 2 MG/ML
0.5 INJECTION INTRAMUSCULAR; INTRAVENOUS; SUBCUTANEOUS
Refills: 0 | Status: DISCONTINUED | OUTPATIENT
Start: 2022-02-27 | End: 2022-02-27

## 2022-02-27 RX ORDER — ATORVASTATIN CALCIUM 80 MG/1
40 TABLET, FILM COATED ORAL AT BEDTIME
Refills: 0 | Status: DISCONTINUED | OUTPATIENT
Start: 2022-02-27 | End: 2022-03-03

## 2022-02-27 RX ORDER — POLYETHYLENE GLYCOL 3350 17 G/17G
17 POWDER, FOR SOLUTION ORAL DAILY
Refills: 0 | Status: DISCONTINUED | OUTPATIENT
Start: 2022-02-27 | End: 2022-03-03

## 2022-02-27 RX ORDER — MAGNESIUM SULFATE 500 MG/ML
2 VIAL (ML) INJECTION ONCE
Refills: 0 | Status: COMPLETED | OUTPATIENT
Start: 2022-02-27 | End: 2022-02-27

## 2022-02-27 RX ORDER — CEFEPIME 1 G/1
INJECTION, POWDER, FOR SOLUTION INTRAMUSCULAR; INTRAVENOUS
Refills: 0 | Status: DISCONTINUED | OUTPATIENT
Start: 2022-02-27 | End: 2022-03-01

## 2022-02-27 RX ORDER — DEXTROSE 50 % IN WATER 50 %
12.5 SYRINGE (ML) INTRAVENOUS ONCE
Refills: 0 | Status: DISCONTINUED | OUTPATIENT
Start: 2022-02-27 | End: 2022-03-03

## 2022-02-27 RX ORDER — SENNA PLUS 8.6 MG/1
2 TABLET ORAL AT BEDTIME
Refills: 0 | Status: DISCONTINUED | OUTPATIENT
Start: 2022-02-27 | End: 2022-03-03

## 2022-02-27 RX ORDER — INSULIN HUMAN 100 [IU]/ML
5 INJECTION, SOLUTION SUBCUTANEOUS ONCE
Refills: 0 | Status: DISCONTINUED | OUTPATIENT
Start: 2022-02-27 | End: 2022-03-03

## 2022-02-27 RX ORDER — GABAPENTIN 400 MG/1
400 CAPSULE ORAL THREE TIMES A DAY
Refills: 0 | Status: DISCONTINUED | OUTPATIENT
Start: 2022-02-27 | End: 2022-03-03

## 2022-02-27 RX ORDER — SUCRALFATE 1 G
1 TABLET ORAL
Refills: 0 | Status: DISCONTINUED | OUTPATIENT
Start: 2022-02-27 | End: 2022-03-03

## 2022-02-27 RX ORDER — CEFEPIME 1 G/1
1000 INJECTION, POWDER, FOR SOLUTION INTRAMUSCULAR; INTRAVENOUS ONCE
Refills: 0 | Status: COMPLETED | OUTPATIENT
Start: 2022-02-27 | End: 2022-02-27

## 2022-02-27 RX ORDER — INSULIN GLARGINE 100 [IU]/ML
9 INJECTION, SOLUTION SUBCUTANEOUS AT BEDTIME
Refills: 0 | Status: DISCONTINUED | OUTPATIENT
Start: 2022-02-27 | End: 2022-03-03

## 2022-02-27 RX ORDER — SODIUM CHLORIDE 9 MG/ML
1000 INJECTION, SOLUTION INTRAVENOUS
Refills: 0 | Status: DISCONTINUED | OUTPATIENT
Start: 2022-02-27 | End: 2022-02-28

## 2022-02-27 RX ORDER — DEXTROSE 50 % IN WATER 50 %
15 SYRINGE (ML) INTRAVENOUS ONCE
Refills: 0 | Status: DISCONTINUED | OUTPATIENT
Start: 2022-02-27 | End: 2022-03-03

## 2022-02-27 RX ORDER — OXYCODONE HYDROCHLORIDE 5 MG/1
5 TABLET ORAL EVERY 4 HOURS
Refills: 0 | Status: DISCONTINUED | OUTPATIENT
Start: 2022-02-27 | End: 2022-03-03

## 2022-02-27 RX ORDER — CEFEPIME 1 G/1
1000 INJECTION, POWDER, FOR SOLUTION INTRAMUSCULAR; INTRAVENOUS EVERY 8 HOURS
Refills: 0 | Status: DISCONTINUED | OUTPATIENT
Start: 2022-02-27 | End: 2022-03-01

## 2022-02-27 RX ORDER — ONDANSETRON 8 MG/1
4 TABLET, FILM COATED ORAL ONCE
Refills: 0 | Status: DISCONTINUED | OUTPATIENT
Start: 2022-02-27 | End: 2022-02-27

## 2022-02-27 RX ORDER — LANOLIN ALCOHOL/MO/W.PET/CERES
3 CREAM (GRAM) TOPICAL AT BEDTIME
Refills: 0 | Status: DISCONTINUED | OUTPATIENT
Start: 2022-02-27 | End: 2022-03-03

## 2022-02-27 RX ORDER — METRONIDAZOLE 500 MG
500 TABLET ORAL THREE TIMES A DAY
Refills: 0 | Status: DISCONTINUED | OUTPATIENT
Start: 2022-02-27 | End: 2022-03-01

## 2022-02-27 RX ADMIN — CEFEPIME 100 MILLIGRAM(S): 1 INJECTION, POWDER, FOR SOLUTION INTRAMUSCULAR; INTRAVENOUS at 22:14

## 2022-02-27 RX ADMIN — Medication 2: at 17:25

## 2022-02-27 RX ADMIN — Medication 500 MILLIGRAM(S): at 17:27

## 2022-02-27 RX ADMIN — HYDROMORPHONE HYDROCHLORIDE 0.5 MILLIGRAM(S): 2 INJECTION INTRAMUSCULAR; INTRAVENOUS; SUBCUTANEOUS at 16:15

## 2022-02-27 RX ADMIN — SODIUM CHLORIDE 75 MILLILITER(S): 9 INJECTION, SOLUTION INTRAVENOUS at 21:12

## 2022-02-27 RX ADMIN — MORPHINE SULFATE 4 MILLIGRAM(S): 50 CAPSULE, EXTENDED RELEASE ORAL at 04:20

## 2022-02-27 RX ADMIN — Medication 25 GRAM(S): at 15:42

## 2022-02-27 RX ADMIN — HYDROMORPHONE HYDROCHLORIDE 0.5 MILLIGRAM(S): 2 INJECTION INTRAMUSCULAR; INTRAVENOUS; SUBCUTANEOUS at 16:08

## 2022-02-27 RX ADMIN — SODIUM CHLORIDE 75 MILLILITER(S): 9 INJECTION, SOLUTION INTRAVENOUS at 18:32

## 2022-02-27 RX ADMIN — SODIUM CHLORIDE 75 MILLILITER(S): 9 INJECTION, SOLUTION INTRAVENOUS at 22:13

## 2022-02-27 RX ADMIN — Medication 325 MILLIGRAM(S): at 18:06

## 2022-02-27 RX ADMIN — Medication 1 GRAM(S): at 05:01

## 2022-02-27 RX ADMIN — PANTOPRAZOLE SODIUM 40 MILLIGRAM(S): 20 TABLET, DELAYED RELEASE ORAL at 17:26

## 2022-02-27 RX ADMIN — Medication 650 MILLIGRAM(S): at 22:51

## 2022-02-27 RX ADMIN — Medication 25 GRAM(S): at 03:47

## 2022-02-27 RX ADMIN — HYDROMORPHONE HYDROCHLORIDE 0.5 MILLIGRAM(S): 2 INJECTION INTRAMUSCULAR; INTRAVENOUS; SUBCUTANEOUS at 19:01

## 2022-02-27 RX ADMIN — GABAPENTIN 400 MILLIGRAM(S): 400 CAPSULE ORAL at 05:01

## 2022-02-27 RX ADMIN — ATORVASTATIN CALCIUM 40 MILLIGRAM(S): 80 TABLET, FILM COATED ORAL at 21:11

## 2022-02-27 RX ADMIN — MORPHINE SULFATE 4 MILLIGRAM(S): 50 CAPSULE, EXTENDED RELEASE ORAL at 03:43

## 2022-02-27 RX ADMIN — Medication 12.5 MILLIGRAM(S): at 17:27

## 2022-02-27 RX ADMIN — PANTOPRAZOLE SODIUM 40 MILLIGRAM(S): 20 TABLET, DELAYED RELEASE ORAL at 05:01

## 2022-02-27 RX ADMIN — HYDROMORPHONE HYDROCHLORIDE 0.5 MILLIGRAM(S): 2 INJECTION INTRAMUSCULAR; INTRAVENOUS; SUBCUTANEOUS at 15:09

## 2022-02-27 RX ADMIN — Medication 500 MILLIGRAM(S): at 21:15

## 2022-02-27 RX ADMIN — GABAPENTIN 400 MILLIGRAM(S): 400 CAPSULE ORAL at 21:11

## 2022-02-27 RX ADMIN — INSULIN GLARGINE 9 UNIT(S): 100 INJECTION, SOLUTION SUBCUTANEOUS at 21:11

## 2022-02-27 RX ADMIN — Medication 1 GRAM(S): at 23:51

## 2022-02-27 RX ADMIN — CEFEPIME 100 MILLIGRAM(S): 1 INJECTION, POWDER, FOR SOLUTION INTRAMUSCULAR; INTRAVENOUS at 05:01

## 2022-02-27 RX ADMIN — OXYCODONE HYDROCHLORIDE 5 MILLIGRAM(S): 5 TABLET ORAL at 23:32

## 2022-02-27 RX ADMIN — Medication 1 PATCH: at 07:31

## 2022-02-27 RX ADMIN — Medication 1: at 13:30

## 2022-02-27 RX ADMIN — CEFEPIME 100 MILLIGRAM(S): 1 INJECTION, POWDER, FOR SOLUTION INTRAMUSCULAR; INTRAVENOUS at 17:07

## 2022-02-27 RX ADMIN — GABAPENTIN 400 MILLIGRAM(S): 400 CAPSULE ORAL at 17:28

## 2022-02-27 RX ADMIN — Medication 500 MILLIGRAM(S): at 05:00

## 2022-02-27 RX ADMIN — HYDROMORPHONE HYDROCHLORIDE 0.5 MILLIGRAM(S): 2 INJECTION INTRAMUSCULAR; INTRAVENOUS; SUBCUTANEOUS at 15:39

## 2022-02-27 RX ADMIN — HYDROMORPHONE HYDROCHLORIDE 0.5 MILLIGRAM(S): 2 INJECTION INTRAMUSCULAR; INTRAVENOUS; SUBCUTANEOUS at 19:20

## 2022-02-27 RX ADMIN — Medication 1 GRAM(S): at 17:28

## 2022-02-27 RX ADMIN — OXYCODONE HYDROCHLORIDE 5 MILLIGRAM(S): 5 TABLET ORAL at 22:51

## 2022-02-27 RX ADMIN — Medication 650 MILLIGRAM(S): at 23:30

## 2022-02-27 RX ADMIN — Medication 12.5 MILLIGRAM(S): at 05:01

## 2022-02-27 NOTE — PROGRESS NOTE ADULT - PROBLEM SELECTOR PROBLEM 2
Gangrene of right foot

## 2022-02-27 NOTE — PROGRESS NOTE ADULT - SUBJECTIVE AND OBJECTIVE BOX
DATE OF SERVICE: 02-27-22 @ 20:53    Patient is a 61y old  Male who presents with a chief complaint of gangrene (25 Feb 2022 07:16)      INTERVAL HISTORY: POD 0    REVIEW OF SYSTEMS:  CONSTITUTIONAL: No weakness  EYES/ENT: No visual changes;  No throat pain   NECK: No pain or stiffness  RESPIRATORY: No cough, wheezing; No shortness of breath  CARDIOVASCULAR: No chest pain or palpitations  GASTROINTESTINAL: No abdominal  pain. No nausea, vomiting, or hematemesis  GENITOURINARY: No dysuria, frequency or hematuria  NEUROLOGICAL: No stroke like symptoms  SKIN: No rashes      	  MEDICATIONS:  metoprolol tartrate 12.5 milliGRAM(s) Oral two times a day        PHYSICAL EXAM:  T(C): 36.3 (02-27-22 @ 20:30), Max: 37.2 (02-27-22 @ 13:40)  HR: 67 (02-27-22 @ 20:30) (64 - 83)  BP: 151/76 (02-27-22 @ 20:30) (132/63 - 181/82)  RR: 16 (02-27-22 @ 20:30) (16 - 18)  SpO2: 98% (02-27-22 @ 20:30) (97% - 100%)  Wt(kg): --  I&O's Summary    26 Feb 2022 07:01  -  27 Feb 2022 07:00  --------------------------------------------------------  IN: 1080 mL / OUT: 2350 mL / NET: -1270 mL    27 Feb 2022 07:01  -  27 Feb 2022 20:53  --------------------------------------------------------  IN: 150 mL / OUT: 325 mL / NET: -175 mL      Height (cm): 188 (02-27 @ 07:30)  Weight (kg): 76.2 (02-27 @ 07:30)  BMI (kg/m2): 21.6 (02-27 @ 07:30)  BSA (m2): 2.02 (02-27 @ 07:30)    Appearance: In no distress	  HEENT:    PERRL, EOMI	  Cardiovascular:  S1 S2, No JVD  Respiratory: Lungs clear to auscultation	  Gastrointestinal:  Soft, Non-tender, + BS	  Vascularature:  No edema of LE  Psychiatric: Appropriate affect   Neuro: no acute focal deficits                               10.4   16.07 )-----------( 359      ( 27 Feb 2022 16:20 )             31.9     02-27    138  |  104  |  <4<L>  ----------------------------<  191<H>  3.8   |  24  |  0.50    Ca    8.0<L>      27 Feb 2022 14:18  Phos  3.2     02-27  Mg     1.5     02-27          Labs personally reviewed      ASSESSMENT/PLAN: 	    60 y/o M w/ PMHx DM, PAD s/p R TMA 2/15 transferred from [facility] for IR thrombectomy for R superficial femoral and popliteal artery occlusion and likely surgical revision with podiatry with continued poor flow s/p IR procedure, now for vascular bypass surgery     Problem/Plan - 1:  ·  Problem: Peripheral artery disease.   ·  Plan: Pt transferred to [facility] for IR angiogram and angioplasty. Wire recannulization of the SFA/popliteal stent was performed followed by angioplasty on 2/22. There was persistent poor flow through the stent.   - Vascular consulted for RLE bypass  - Plan for bypass 2/27  - c/w ASA & Lipitor.     Problem/Plan - 2:  ·  Problem: Paroxysmal Atrial fibrillation/flutter  ·  Plan: CHADSVASc of 2-3  - H/H stable with EGD showing esophagitis  - rec AC with Eliquis 5mg BID or Lovenox alberto-procedure  - Echo showed EF 55-60% & moderate aortic stenosis      Problem/Plan - 3:  ·  Problem: Pre-operative risk stratification  ·  Plan: Pre-operative clearance for open vascular LE Bypass  - unable to assess functional capacity given limited by PAD  - heavy smoker and diabetic going to high risk surgery  - TTE with preserved EF and Mod AS  -NM stress test with areas of infarct and ischemia, r/b of cath discussed with pt, agreeable to proceed   - s/p LHC with mild-mod LAD disease and severe RPL disease  -- given asymptomatic and no LM/LAD severe disease will manage CAD medically, ASA. statin, Metoprolol  - Discussed with vascular surg Dr Hawkins and IC Dr Milian, all in agreement with plan  - Elevated risk for high risk vascular surgery, no contraindication to proceed with his nonelective limb salvage surgery   - 2/27 - tolerated surgery well POD 0     Problem/Plan - 4:  ·  Problem: DM   - basal bolus based on weight with low ISS  - Hgb A1C is 8.6.        Pierre Burger DO [facility]  Cardiovascular Medicine  [address] [place] [address], [address]  Office: [phone]  Cell: [phone]

## 2022-02-27 NOTE — BRIEF OPERATIVE NOTE - OPERATION/FINDINGS
right common femoral endarterectomy with bovine patch angioplasty.  GSV harvest and spliced, superior half in situ, inferior half reversed.  PT bypass.  TMA revision

## 2022-02-27 NOTE — PROGRESS NOTE ADULT - PROBLEM SELECTOR PLAN 6
Pre-operative clearance for open vascular LE Bypass, per cardiology  - unable to assess functional capacity given limited by PAD  - heavy smoker and diabetic going to high risk surgery  - TTE with preserved EF and Mod AS  - Large and moderate areas of infarct seen in stress test (abnormal study).   - s/p diagnostic cath, LM 30%, LAD 30%, Cx 30%, OM 60%, RPL 70-80% small, medical management.  - See cardiology note for pre-op clearance note

## 2022-02-27 NOTE — PRE-ANESTHESIA EVALUATION ADULT - NSANTHPMHFT_GEN_ALL_CORE
61M w/ PMHx DM, PAD s/p R TMA 2/15 transferred from [facility] for IR thrombectomy for R superficial femoral and popliteal artery occlusion and likely surgical revision with podiatry with continued poor flow s/p IR procedure, now for vascular bypass surgery.    < from: TTE with Doppler (w/Cont) (02.23.22 @ 10:08) >    EF (Visual Estimate): 60-65 %    < end of copied text >    < from: TTE with Doppler (w/Cont) (02.23.22 @ 10:08) >    Conclusions:  1. Mitral annular calcification, otherwise normal mitral  valve. Minimal mitral regurgitation.  2. Calcified trileaflet aortic valve with decreased  opening. Peak transaortic valve gradient equals 34 mm Hg,  meantransaortic valve gradient equals 21 mm Hg, estimated  aortic valve area equals 1.2 sqcm (by continuity equation),  aortic valve velocity time integral equals 60 cm,  consistent with moderate aortic stenosis. Mild aortic  regurgitation.  3. Endocardial visualization enhanced with intravenous  injection of Ultrasonic Enhancing Agent (Definity).  Endocardium not well visualized despite the use of  Ultrasonic Enhancing Agent (Definity); grossly normal left  ventricular systolic function. No left ventricular  thrombus.  4. The right ventricle is not well visualized; grossly  normal right ventricular systolic function.    < end of copied text >      < from: Cardiac Catheterization (02.25.22 @ 17:52) >    Left main artery: There is a 20 % stenosis in the distal third portion  of the segment.    LAD   Proximal left anterior descending: There nereida 10 % stenosis. Mid left  anterior descending: There is a 25 % stenosis. Distal left  anterior descending: There is a 20 % stenosis.      CX   First obtuse marginal: There is a 20 % stenosis. Second obtuse  marginal: There is a 20 % stenosis.    RCA  Proximal right coronary artery: There is a 20 % stenosis. Mid right  coronary artery: There is a 45 % stenosis. Distal right  coronary artery: There is a 20 % stenosis. Second right  posterolateral: There is a 90 % stenosis. Right posterior descending  artery: The segment is visually normal in size and structure.      < end of copied text >

## 2022-02-27 NOTE — PROGRESS NOTE ADULT - PROBLEM SELECTOR PLAN 3
Patient with episode of coffee ground emesis on 2/14  - On Protonix & carafate at [facility] VS. c/ at this time  - EGD on 2/18 showed distal moderate esophagitis s/p biopsy, proximal-mid esophageal whitish exudate s/p biopsy, mild antral gastritis s/p biopsy & mild duodenitis s/p biopsy   - H&H is stable, if downtrending will transfuse <7.   -will need DAPT, can trial after catheterization.

## 2022-02-27 NOTE — CHART NOTE - NSCHARTNOTEFT_GEN_A_CORE
POST-OPERATIVE NOTE    Subjective:  Patient is s/p R femoral endarterectomy, fem-tib bypass with GSV conduit. Pt feels well, reports that pain is manageable. Denies numbness, tingling, or weakness.    Vital Signs Last 24 Hrs  T(C): 36.7 (27 Feb 2022 21:47), Max: 37.2 (27 Feb 2022 13:40)  T(F): 98.1 (27 Feb 2022 21:47), Max: 99 (27 Feb 2022 13:40)  HR: 70 (27 Feb 2022 21:47) (64 - 83)  BP: 122/70 (27 Feb 2022 21:47) (122/70 - 181/82)  BP(mean): 92 (27 Feb 2022 21:00) (91 - 121)  RR: 18 (27 Feb 2022 21:47) (16 - 18)  SpO2: 95% (27 Feb 2022 21:47) (95% - 100%)  I&O's Detail    26 Feb 2022 07:01  -  27 Feb 2022 07:00  --------------------------------------------------------  IN:    Oral Fluid: 1080 mL  Total IN: 1080 mL    OUT:    Voided (mL): 2350 mL  Total OUT: 2350 mL    Total NET: -1270 mL      27 Feb 2022 07:01  -  27 Feb 2022 22:25  --------------------------------------------------------  IN:    Lactated Ringers: 150 mL    Oral Fluid: 150 mL  Total IN: 300 mL    OUT:    Indwelling Catheter - Urethral (mL): 490 mL  Total OUT: 490 mL    Total NET: -190 mL          Physical Exam:   GEN: resting in bed comfortably in NAD  RESP: no increased WOB  EXTR: aquacel over R groin and R lower calf c/d/i, PT palpable. ACE wrap over R foot c/d/i  NEURO: awake, alert     LABS:                        10.4   16.07 )-----------( 359      ( 27 Feb 2022 16:20 )             31.9     02-27    138  |  104  |  <4<L>  ----------------------------<  191<H>  3.8   |  24  |  0.50    Ca    8.0<L>      27 Feb 2022 14:18  Phos  3.2     02-27  Mg     1.5     02-27      PT/INR - ( 27 Feb 2022 14:28 )   PT: 16.9 sec;   INR: 1.40 ratio         PTT - ( 27 Feb 2022 14:28 )  PTT:30.2 sec  CAPILLARY BLOOD GLUCOSE      POCT Blood Glucose.: 161 mg/dL (27 Feb 2022 21:06)  POCT Blood Glucose.: 203 mg/dL (27 Feb 2022 17:14)  POCT Blood Glucose.: 160 mg/dL (27 Feb 2022 13:57)      Radiology and Additional Studies:    Assessment:  The patient is a 61y Male who is now several hours post-op from a R femoral endarterectomy, fem-tib bypass with GSV conduit.    Plan:  - Daily dressing change by team  - C/w Darling  - ASA restarted  - OOB and ambulating as tolerated  - F/u AM labs    Vascular Surgery  [id number]

## 2022-02-27 NOTE — PROGRESS NOTE ADULT - SUBJECTIVE AND OBJECTIVE BOX
PROGRESS NOTE:   Authored by Dr. Tyler Lucero MD Pager [phone] [place], [facility] [address]    Patient is a 61y old  Male who presents with a chief complaint of gangrene (25 Feb 2022 07:16)      SUBJECTIVE / OVERNIGHT EVENTS: no overnight events, patient off floors in OR    ADDITIONAL REVIEW OF SYSTEMS: unable to assess     MEDICATIONS  (STANDING):  ascorbic acid 500 milliGRAM(s) Oral daily  aspirin enteric coated 81 milliGRAM(s) Oral daily  atorvastatin 40 milliGRAM(s) Oral at bedtime  cefepime   IVPB 1000 milliGRAM(s) IV Intermittent once  cefepime   IVPB 1000 milliGRAM(s) IV Intermittent every 8 hours  cefepime   IVPB      dextrose 40% Gel 15 Gram(s) Oral once  dextrose 50% Injectable 25 Gram(s) IV Push once  dextrose 50% Injectable 12.5 Gram(s) IV Push once  gabapentin 400 milliGRAM(s) Oral three times a day  glucagon  Injectable 1 milliGRAM(s) IntraMuscular once  insulin glargine Injectable (LANTUS) 9 Unit(s) SubCutaneous at bedtime  insulin lispro (ADMELOG) corrective regimen sliding scale   SubCutaneous three times a day before meals  insulin lispro Injectable (ADMELOG) 3 Unit(s) SubCutaneous three times a day before meals  insulin regular  human recombinant. 5 Unit(s) IV Push once  metoprolol tartrate 12.5 milliGRAM(s) Oral two times a day  metroNIDAZOLE    Tablet 500 milliGRAM(s) Oral three times a day  nicotine - 21 mG/24Hr(s) Patch 1 patch Transdermal daily  pantoprazole  Injectable 40 milliGRAM(s) IV Push every 12 hours  polyethylene glycol 3350 17 Gram(s) Oral daily  senna 2 Tablet(s) Oral at bedtime  sucralfate 1 Gram(s) Oral four times a day    MEDICATIONS  (PRN):  acetaminophen     Tablet .. 650 milliGRAM(s) Oral every 6 hours PRN Mild Pain (1 - 3), Moderate Pain (4 - 6)  aluminum hydroxide/magnesium hydroxide/simethicone Suspension 30 milliLiter(s) Oral every 6 hours PRN Dyspepsia  HYDROmorphone  Injectable 0.5 milliGRAM(s) IV Push every 10 minutes PRN Moderate Pain (4 - 6)  ondansetron Injectable 4 milliGRAM(s) IV Push once PRN Nausea and/or Vomiting  simethicone 80 milliGRAM(s) Chew every 6 hours PRN Dyspepsia      CAPILLARY BLOOD GLUCOSE      POCT Blood Glucose.: 160 mg/dL (27 Feb 2022 13:57)  POCT Blood Glucose.: 282 mg/dL (26 Feb 2022 21:28)    I&O's Summary    26 Feb 2022 07:01  -  27 Feb 2022 07:00  --------------------------------------------------------  IN: 1080 mL / OUT: 2350 mL / NET: -1270 mL    27 Feb 2022 07:01  -  27 Feb 2022 16:31  --------------------------------------------------------  IN: 0 mL / OUT: 250 mL / NET: -250 mL        PHYSICAL EXAM:  Vital Signs Last 24 Hrs  T(C): 37.2 (27 Feb 2022 13:40), Max: 37.2 (26 Feb 2022 20:19)  T(F): 99 (27 Feb 2022 13:40), Max: 99 (27 Feb 2022 13:40)  HR: 70 (27 Feb 2022 16:15) (65 - 83)  BP: 163/76 (27 Feb 2022 16:15) (132/63 - 179/76)  BP(mean): 109 (27 Feb 2022 16:15) (91 - 110)  RR: 16 (27 Feb 2022 16:15) (16 - 18)  SpO2: 97% (27 Feb 2022 16:15) (97% - 100%)  unable to assess    LABS:                        10.4   16.07 )-----------( 359      ( 27 Feb 2022 16:20 )             31.9     02-27    138  |  104  |  <4<L>  ----------------------------<  191<H>  3.8   |  24  |  0.50    Ca    8.0<L>      27 Feb 2022 14:18  Phos  3.2     02-27  Mg     1.5     02-27      PT/INR - ( 27 Feb 2022 14:28 )   PT: 16.9 sec;   INR: 1.40 ratio         PTT - ( 27 Feb 2022 14:28 )  PTT:30.2 sec            Tele Reviewed:    RADIOLOGY & ADDITIONAL TESTS:  Results Reviewed:   Imaging Personally Reviewed:  Electrocardiogram Personally Reviewed:    COORDINATION OF CARE:  Care Discussed with Consultants/Other Providers [Y/N]:  Prior or Outpatient Records Reviewed [Y/N]:

## 2022-02-27 NOTE — PROGRESS NOTE ADULT - PROBLEM SELECTOR PLAN 1
Patient with history of severe PAD. US showed occluded right superficial femoral and popliteal arteries with multiple stents   - CTA showed occluded right superficial femoral and popliteal arteries containing multiple overlapping stents with reconstitution in the mid popliteal artery, three-vessel runoff into b/l feet, occluded left superficial femoral artery with reconstitution distally, moderate narrowing in the mid left popliteal artery and moderate narrowing in the proximal left external iliac artery with associated areas of ulcerated plaque  - Pt transferred to [facility] for IR angiogram and angioplasty. Wire recannulization of the SFA/popliteal stent was performed followed by angioplasty on 2/22. There was persistent poor flow through the stent.   - Vascular consulted for RLE bypass  --  OR 2/27  -- Cardiac work-up and clearance as below  - c/w ASA & Lipitor (increased to 40mg)

## 2022-02-27 NOTE — PROGRESS NOTE ADULT - ASSESSMENT
62 y/o M w/ PMHx DM, PAD s/p R TMA 2/15 transferred from [facility] for IR thrombectomy for R superficial femoral and popliteal artery occlusion and likely surgical revision with podiatry with continued poor flow s/p IR procedure, now for vascular bypass surgery and transferred to vascular surgical team as primary care team.

## 2022-02-27 NOTE — PRE-ANESTHESIA EVALUATION ADULT - NSANTHOSAYNRD_GEN_A_CORE
No. CHICO screening performed.  STOP BANG Legend: 0-2 = LOW Risk; 3-4 = INTERMEDIATE Risk; 5-8 = HIGH Risk
No. CHICO screening performed.  STOP BANG Legend: 0-2 = LOW Risk; 3-4 = INTERMEDIATE Risk; 5-8 = HIGH Risk

## 2022-02-27 NOTE — PROGRESS NOTE ADULT - PROBLEM SELECTOR PLAN 5
A. fib on post TMA EKG per anaesthesia   - Echo showed EF 55-60% & moderate aortic stenosis  - CHADVASc 3, which correlates to 3.2% per year in >90,000 patients (the [place] Atrial Fibrillation Cohort Study) and 4.6% risk of stroke/TIA/systemic embolism.  - HAS-bled score 3, which correlates to bleeding risk of 5.8% in one validation study (Lip 2011) and 3.72 bleeds per 100 patient-years in another validation study (Pisters 2010).  -- depending on DAPT will need to determine r/b of triple therapy   - Patient on metoprolol 12.5 BID, can continue at this time  - ASA okay to resume as per GI at [facility]

## 2022-02-27 NOTE — BRIEF OPERATIVE NOTE - NSICDXBRIEFPREOP_GEN_ALL_CORE_FT
PRE-OP DIAGNOSIS:  PAD (peripheral artery disease) 27-Feb-2022 14:05:51  Fauzia Bailey  Wound of right foot 27-Feb-2022 14:06:19  Fauzia Bailey

## 2022-02-27 NOTE — PROGRESS NOTE ADULT - PROBLEM SELECTOR PROBLEM 3
Coffee ground emesis

## 2022-02-27 NOTE — PROGRESS NOTE ADULT - PROBLEM SELECTOR PLAN 2
Initially at [place] for R foot infection  - MRI not suggestive of acute osteomyelitis  - Vascular study at  showed an unobtainable ankle brachial index w/ diffusely calcified noncompressible vessels limiting evaluation and diminished flow below the right knee  - Patient s/p R TMA 2/15/22 at [facility]  - Abscess cultures grew Serratia, GBS and MSSA  - Patient being treated with Cefepime & Flagyl, c/w abx at this time  - c/w morphine for pain with bowel regimen  - Plan for podiatry to perform surgical revision pending vascular work-up  - Pain control: Tylenol for mild pain, Morphine 2mg for moderate, Morphine 4mg for severe pain

## 2022-02-27 NOTE — BRIEF OPERATIVE NOTE - NSICDXBRIEFPOSTOP_GEN_ALL_CORE_FT
POST-OP DIAGNOSIS:  PAD (peripheral artery disease) 27-Feb-2022 14:06:58  Fauzia Bailey  Wound of right foot 27-Feb-2022 14:07:11  Fauzia Bailey

## 2022-02-27 NOTE — BRIEF OPERATIVE NOTE - NSICDXBRIEFPROCEDURE_GEN_ALL_CORE_FT
PROCEDURES:  Femoral tibial bypass with vein 27-Feb-2022 14:00:26  Fauzia Bailey  Endarterectomy, common femoral 27-Feb-2022 14:01:10  Fauzia Bailey  Transmetatarsal amputation 27-Feb-2022 14:04:32  Fauzia Bailey

## 2022-02-28 LAB
ANION GAP SERPL CALC-SCNC: 8 MMOL/L — SIGNIFICANT CHANGE UP (ref 5–17)
BASOPHILS # BLD AUTO: 0.04 K/UL — SIGNIFICANT CHANGE UP (ref 0–0.2)
BASOPHILS NFR BLD AUTO: 0.3 % — SIGNIFICANT CHANGE UP (ref 0–2)
BUN SERPL-MCNC: 4 MG/DL — LOW (ref 7–23)
CALCIUM SERPL-MCNC: 8.1 MG/DL — LOW (ref 8.4–10.5)
CHLORIDE SERPL-SCNC: 103 MMOL/L — SIGNIFICANT CHANGE UP (ref 96–108)
CO2 SERPL-SCNC: 25 MMOL/L — SIGNIFICANT CHANGE UP (ref 22–31)
CREAT SERPL-MCNC: 0.56 MG/DL — SIGNIFICANT CHANGE UP (ref 0.5–1.3)
EOSINOPHIL # BLD AUTO: 0.21 K/UL — SIGNIFICANT CHANGE UP (ref 0–0.5)
EOSINOPHIL NFR BLD AUTO: 1.4 % — SIGNIFICANT CHANGE UP (ref 0–6)
GLUCOSE BLDC GLUCOMTR-MCNC: 225 MG/DL — HIGH (ref 70–99)
GLUCOSE BLDC GLUCOMTR-MCNC: 226 MG/DL — HIGH (ref 70–99)
GLUCOSE BLDC GLUCOMTR-MCNC: 238 MG/DL — HIGH (ref 70–99)
GLUCOSE BLDC GLUCOMTR-MCNC: 241 MG/DL — HIGH (ref 70–99)
GLUCOSE SERPL-MCNC: 218 MG/DL — HIGH (ref 70–99)
HCT VFR BLD CALC: 34.4 % — LOW (ref 39–50)
HGB BLD-MCNC: 11 G/DL — LOW (ref 13–17)
IMM GRANULOCYTES NFR BLD AUTO: 0.5 % — SIGNIFICANT CHANGE UP (ref 0–1.5)
LYMPHOCYTES # BLD AUTO: 0.9 K/UL — LOW (ref 1–3.3)
LYMPHOCYTES # BLD AUTO: 5.9 % — LOW (ref 13–44)
MAGNESIUM SERPL-MCNC: 1.7 MG/DL — SIGNIFICANT CHANGE UP (ref 1.6–2.6)
MCHC RBC-ENTMCNC: 29.6 PG — SIGNIFICANT CHANGE UP (ref 27–34)
MCHC RBC-ENTMCNC: 32 GM/DL — SIGNIFICANT CHANGE UP (ref 32–36)
MCV RBC AUTO: 92.5 FL — SIGNIFICANT CHANGE UP (ref 80–100)
MONOCYTES # BLD AUTO: 0.89 K/UL — SIGNIFICANT CHANGE UP (ref 0–0.9)
MONOCYTES NFR BLD AUTO: 5.8 % — SIGNIFICANT CHANGE UP (ref 2–14)
NEUTROPHILS # BLD AUTO: 13.19 K/UL — HIGH (ref 1.8–7.4)
NEUTROPHILS NFR BLD AUTO: 86.1 % — HIGH (ref 43–77)
NRBC # BLD: 0 /100 WBCS — SIGNIFICANT CHANGE UP (ref 0–0)
PHOSPHATE SERPL-MCNC: 2.4 MG/DL — LOW (ref 2.5–4.5)
PLATELET # BLD AUTO: 402 K/UL — HIGH (ref 150–400)
POTASSIUM SERPL-MCNC: 4.2 MMOL/L — SIGNIFICANT CHANGE UP (ref 3.5–5.3)
POTASSIUM SERPL-SCNC: 4.2 MMOL/L — SIGNIFICANT CHANGE UP (ref 3.5–5.3)
RBC # BLD: 3.72 M/UL — LOW (ref 4.2–5.8)
RBC # FLD: 13.7 % — SIGNIFICANT CHANGE UP (ref 10.3–14.5)
SODIUM SERPL-SCNC: 136 MMOL/L — SIGNIFICANT CHANGE UP (ref 135–145)
WBC # BLD: 15.3 K/UL — HIGH (ref 3.8–10.5)
WBC # FLD AUTO: 15.3 K/UL — HIGH (ref 3.8–10.5)

## 2022-02-28 PROCEDURE — 99232 SBSQ HOSP IP/OBS MODERATE 35: CPT

## 2022-02-28 RX ORDER — INSULIN LISPRO 100/ML
VIAL (ML) SUBCUTANEOUS AT BEDTIME
Refills: 0 | Status: DISCONTINUED | OUTPATIENT
Start: 2022-02-28 | End: 2022-03-03

## 2022-02-28 RX ORDER — RIVAROXABAN 15 MG-20MG
2.5 KIT ORAL
Refills: 0 | Status: DISCONTINUED | OUTPATIENT
Start: 2022-02-28 | End: 2022-03-03

## 2022-02-28 RX ORDER — POTASSIUM PHOSPHATE, MONOBASIC POTASSIUM PHOSPHATE, DIBASIC 236; 224 MG/ML; MG/ML
15 INJECTION, SOLUTION INTRAVENOUS ONCE
Refills: 0 | Status: COMPLETED | OUTPATIENT
Start: 2022-02-28 | End: 2022-02-28

## 2022-02-28 RX ORDER — MAGNESIUM SULFATE 500 MG/ML
2 VIAL (ML) INJECTION ONCE
Refills: 0 | Status: COMPLETED | OUTPATIENT
Start: 2022-02-28 | End: 2022-02-28

## 2022-02-28 RX ORDER — SODIUM CHLORIDE 9 MG/ML
1000 INJECTION, SOLUTION INTRAVENOUS
Refills: 0 | Status: DISCONTINUED | OUTPATIENT
Start: 2022-02-28 | End: 2022-03-03

## 2022-02-28 RX ORDER — GLUCAGON INJECTION, SOLUTION 0.5 MG/.1ML
1 INJECTION, SOLUTION SUBCUTANEOUS ONCE
Refills: 0 | Status: DISCONTINUED | OUTPATIENT
Start: 2022-02-28 | End: 2022-02-28

## 2022-02-28 RX ADMIN — CEFEPIME 100 MILLIGRAM(S): 1 INJECTION, POWDER, FOR SOLUTION INTRAMUSCULAR; INTRAVENOUS at 21:43

## 2022-02-28 RX ADMIN — OXYCODONE HYDROCHLORIDE 5 MILLIGRAM(S): 5 TABLET ORAL at 10:00

## 2022-02-28 RX ADMIN — Medication 500 MILLIGRAM(S): at 13:42

## 2022-02-28 RX ADMIN — Medication 650 MILLIGRAM(S): at 21:41

## 2022-02-28 RX ADMIN — OXYCODONE HYDROCHLORIDE 5 MILLIGRAM(S): 5 TABLET ORAL at 14:23

## 2022-02-28 RX ADMIN — Medication 1 PATCH: at 20:00

## 2022-02-28 RX ADMIN — Medication 650 MILLIGRAM(S): at 13:42

## 2022-02-28 RX ADMIN — Medication 12.5 MILLIGRAM(S): at 05:43

## 2022-02-28 RX ADMIN — Medication 3 UNIT(S): at 13:32

## 2022-02-28 RX ADMIN — Medication 3 UNIT(S): at 08:06

## 2022-02-28 RX ADMIN — Medication 2: at 08:07

## 2022-02-28 RX ADMIN — Medication 2: at 13:31

## 2022-02-28 RX ADMIN — GABAPENTIN 400 MILLIGRAM(S): 400 CAPSULE ORAL at 05:43

## 2022-02-28 RX ADMIN — INSULIN GLARGINE 9 UNIT(S): 100 INJECTION, SOLUTION SUBCUTANEOUS at 21:40

## 2022-02-28 RX ADMIN — Medication 25 GRAM(S): at 09:30

## 2022-02-28 RX ADMIN — CEFEPIME 100 MILLIGRAM(S): 1 INJECTION, POWDER, FOR SOLUTION INTRAMUSCULAR; INTRAVENOUS at 13:40

## 2022-02-28 RX ADMIN — OXYCODONE HYDROCHLORIDE 5 MILLIGRAM(S): 5 TABLET ORAL at 22:15

## 2022-02-28 RX ADMIN — OXYCODONE HYDROCHLORIDE 5 MILLIGRAM(S): 5 TABLET ORAL at 09:30

## 2022-02-28 RX ADMIN — OXYCODONE HYDROCHLORIDE 5 MILLIGRAM(S): 5 TABLET ORAL at 17:56

## 2022-02-28 RX ADMIN — PANTOPRAZOLE SODIUM 40 MILLIGRAM(S): 20 TABLET, DELAYED RELEASE ORAL at 17:48

## 2022-02-28 RX ADMIN — Medication 3 MILLIGRAM(S): at 21:39

## 2022-02-28 RX ADMIN — Medication 81 MILLIGRAM(S): at 13:35

## 2022-02-28 RX ADMIN — RIVAROXABAN 2.5 MILLIGRAM(S): KIT at 13:31

## 2022-02-28 RX ADMIN — Medication 500 MILLIGRAM(S): at 21:39

## 2022-02-28 RX ADMIN — CEFEPIME 100 MILLIGRAM(S): 1 INJECTION, POWDER, FOR SOLUTION INTRAMUSCULAR; INTRAVENOUS at 05:43

## 2022-02-28 RX ADMIN — Medication 1 GRAM(S): at 15:33

## 2022-02-28 RX ADMIN — Medication 500 MILLIGRAM(S): at 05:43

## 2022-02-28 RX ADMIN — GABAPENTIN 400 MILLIGRAM(S): 400 CAPSULE ORAL at 13:41

## 2022-02-28 RX ADMIN — OXYCODONE HYDROCHLORIDE 5 MILLIGRAM(S): 5 TABLET ORAL at 18:26

## 2022-02-28 RX ADMIN — Medication 1 PATCH: at 13:33

## 2022-02-28 RX ADMIN — Medication 3 UNIT(S): at 17:49

## 2022-02-28 RX ADMIN — Medication 500 MILLIGRAM(S): at 13:34

## 2022-02-28 RX ADMIN — OXYCODONE HYDROCHLORIDE 5 MILLIGRAM(S): 5 TABLET ORAL at 21:39

## 2022-02-28 RX ADMIN — Medication 1 GRAM(S): at 23:35

## 2022-02-28 RX ADMIN — Medication 1 GRAM(S): at 17:48

## 2022-02-28 RX ADMIN — Medication 1 GRAM(S): at 05:43

## 2022-02-28 RX ADMIN — GABAPENTIN 400 MILLIGRAM(S): 400 CAPSULE ORAL at 21:40

## 2022-02-28 RX ADMIN — RIVAROXABAN 2.5 MILLIGRAM(S): KIT at 17:50

## 2022-02-28 RX ADMIN — PANTOPRAZOLE SODIUM 40 MILLIGRAM(S): 20 TABLET, DELAYED RELEASE ORAL at 05:43

## 2022-02-28 RX ADMIN — POTASSIUM PHOSPHATE, MONOBASIC POTASSIUM PHOSPHATE, DIBASIC 62.5 MILLIMOLE(S): 236; 224 INJECTION, SOLUTION INTRAVENOUS at 09:30

## 2022-02-28 RX ADMIN — OXYCODONE HYDROCHLORIDE 5 MILLIGRAM(S): 5 TABLET ORAL at 13:53

## 2022-02-28 RX ADMIN — ATORVASTATIN CALCIUM 40 MILLIGRAM(S): 80 TABLET, FILM COATED ORAL at 21:39

## 2022-02-28 RX ADMIN — Medication 12.5 MILLIGRAM(S): at 17:55

## 2022-02-28 RX ADMIN — Medication 2: at 17:49

## 2022-02-28 RX ADMIN — Medication 650 MILLIGRAM(S): at 22:15

## 2022-02-28 NOTE — PROGRESS NOTE ADULT - SUBJECTIVE AND OBJECTIVE BOX
Date of Service   02-28-22 @ 14:34    Patient is a 61y old  Male who presents with a chief complaint of gangrene (25 Feb 2022 07:16)      INTERVAL HISTORY: pt feels ok     REVIEW OF SYSTEMS:   CONSTITUTIONAL: No weakness  EYES/ENT: No visual changes; No throat pain  Neck: No pain or stiffness  Respiratory: No cough, wheezing, No shortness of breath  CARDIOVASCULAR: no chest pain or palpitations  GASTROINTESTINAL: No abdominal pain, no nausea, vomiting or hematemesis  GENITOURINARY: No dysuria, frequency or hematuria  NEUROLOGICAL: No stroke like symptoms  SKIN: right leg drg CDI     	  MEDICATIONS:  metoprolol tartrate 12.5 milliGRAM(s) Oral two times a day        PHYSICAL EXAM:  T(C): 37.6 (02-28-22 @ 13:33), Max: 37.6 (02-28-22 @ 09:08)  HR: 75 (02-28-22 @ 13:33) (64 - 84)  BP: 160/72 (02-28-22 @ 13:33) (122/70 - 181/82)  RR: 18 (02-28-22 @ 13:33) (16 - 18)  SpO2: 97% (02-28-22 @ 13:33) (94% - 100%)  Wt(kg): --  I&O's Summary    27 Feb 2022 07:01  -  28 Feb 2022 07:00  --------------------------------------------------------  IN: 1495 mL / OUT: 1540 mL / NET: -45 mL    28 Feb 2022 07:01  -  28 Feb 2022 14:34  --------------------------------------------------------  IN: 780 mL / OUT: 550 mL / NET: 230 mL          Appearance: In no distress	  HEENT:    PERRL, EOMI	  Cardiovascular:  S1 S2, No JVD  Respiratory: Lungs clear to auscultation	  Gastrointestinal:  Soft, Non-tender, + BS	  Vascularature:  No edema of LE  Psychiatric: Appropriate affect   Neuro: no acute focal deficits                               11.0   15.30 )-----------( 402      ( 28 Feb 2022 07:14 )             34.4     02-28    136  |  103  |  4<L>  ----------------------------<  218<H>  4.2   |  25  |  0.56    Ca    8.1<L>      28 Feb 2022 07:22  Phos  2.4     02-28  Mg     1.7     02-28          Labs personally reviewed      ASSESSMENT/PLAN: 	    60 y/o M w/ PMHx DM, PAD s/p R TMA 2/15 transferred from [facility] for IR thrombectomy for R superficial femoral and popliteal artery occlusion and likely surgical revision with podiatry with continued poor flow s/p IR procedure, now for vascular bypass surgery     Problem/Plan - 1:  ·  Problem: Peripheral artery disease.   ·  Plan: Pt transferred to [facility] for IR angiogram and angioplasty. Wire recannulization of the SFA/popliteal stent was performed followed by angioplasty on 2/22. There was persistent poor flow through the stent.   - Vascular consulted for RLE bypass  - s/p bypass 2/27  - c/w ASA & Lipitor.     Problem/Plan - 2:  ·  Problem: Paroxysmal Atrial fibrillation/flutter  ·  Plan: CHADSVASc of 2-3  - H/H stable with EGD showing esophagitis  - Echo showed EF 55-60% & moderate aortic stenosis  - started on xarelto       Problem/Plan - 3:  ·  Problem: Pre-operative risk stratification  ·  Plan: Pre-operative clearance for open vascular LE Bypass  - unable to assess functional capacity given limited by PAD  - heavy smoker and diabetic going to high risk surgery  - TTE with preserved EF and Mod AS  -NM stress test with areas of infarct and ischemia, r/b of cath discussed with pt, agreeable to proceed   - s/p LHC with mild-mod LAD disease and severe RPL disease  -- given asymptomatic and no LM/LAD severe disease will manage CAD medically, ASA. statin, Metoprolol  - Discussed with vascular surg Dr Hawkins and IC Dr Milian, all in agreement with plan  - Elevated risk for high risk vascular surgery, no contraindication to proceed with his nonelective limb salvage surgery   - 2/27 - tolerated surgery well   - pt on metoprolol, ASA and statin      Problem/Plan - 4:  ·  Problem: DM   - basal bolus based on weight with low ISS  - Hgb A1C is 8.6.        Yessy Hawkins FN-BC   Pierre Burger DO [facility]  Cardiovascular Medicine  [address] [place] [address], [address]  Office: [phone]  Cell: [phone]

## 2022-02-28 NOTE — PROGRESS NOTE ADULT - SUBJECTIVE AND OBJECTIVE BOX
Vascular Surgery Progress Note    Subjective:   Patient seen at bedside this AM.    Objective:  Vital Signs  T(C): 36.3 (02-27 @ 23:50), Max: 37.2 (02-27 @ 13:40)  HR: 70 (02-27 @ 23:50) (64 - 83)  BP: 127/68 (02-27 @ 23:50) (122/70 - 181/82)  RR: 18 (02-27 @ 23:50) (16 - 18)  SpO2: 94% (02-27 @ 23:50) (94% - 100%)  02-26-22 @ 07:01  -  02-27-22 @ 07:00  --------------------------------------------------------  IN:  Total IN: 0 mL    OUT:    Voided (mL): 2350 mL  Total OUT: 2350 mL    Total NET: -2350 mL      02-27-22 @ 07:01  -  02-28-22 @ 00:20  --------------------------------------------------------  IN:  Total IN: 0 mL    OUT:    Indwelling Catheter - Urethral (mL): 490 mL  Total OUT: 490 mL    Total NET: -490 mL        Physical Exam:   GEN: resting in bed comfortably in NAD  RESP: no increased WOB  EXTR: aquacel over R groin and R lower calf c/d/i, PT palpable. ACE wrap over R foot c/d/i  NEURO: awake, alert     Labs:                        10.4   16.07 )-----------( 359      ( 27 Feb 2022 16:20 )             31.9   02-27    138  |  104  |  <4<L>  ----------------------------<  191<H>  3.8   |  24  |  0.50    Ca    8.0<L>      27 Feb 2022 14:18  Phos  3.2     02-27  Mg     1.5     02-27      CAPILLARY BLOOD GLUCOSE      POCT Blood Glucose.: 161 mg/dL (27 Feb 2022 21:06)  POCT Blood Glucose.: 203 mg/dL (27 Feb 2022 17:14)  POCT Blood Glucose.: 160 mg/dL (27 Feb 2022 13:57)      Imaging:     Vascular Surgery Progress Note    Subjective:   Patient seen at bedside this AM. Patient is comfortable. Pain is controlled.     Objective:  Vital Signs  T(C): 36.3 (02-27 @ 23:50), Max: 37.2 (02-27 @ 13:40)  HR: 70 (02-27 @ 23:50) (64 - 83)  BP: 127/68 (02-27 @ 23:50) (122/70 - 181/82)  RR: 18 (02-27 @ 23:50) (16 - 18)  SpO2: 94% (02-27 @ 23:50) (94% - 100%)  02-26-22 @ 07:01  -  02-27-22 @ 07:00  --------------------------------------------------------  IN:  Total IN: 0 mL    OUT:    Voided (mL): 2350 mL  Total OUT: 2350 mL    Total NET: -2350 mL      02-27-22 @ 07:01  -  02-28-22 @ 00:20  --------------------------------------------------------  IN:  Total IN: 0 mL    OUT:    Indwelling Catheter - Urethral (mL): 490 mL  Total OUT: 490 mL    Total NET: -490 mL        Physical Exam:   GEN: resting in bed comfortably in NAD  RESP: no increased WOB  EXTR: aquacel over R groin and R lower calf c/d/i, PT palpable. ACE wrap over R foot c/d/i  NEURO: awake, alert     Labs:                        10.4   16.07 )-----------( 359      ( 27 Feb 2022 16:20 )             31.9   02-27    138  |  104  |  <4<L>  ----------------------------<  191<H>  3.8   |  24  |  0.50    Ca    8.0<L>      27 Feb 2022 14:18  Phos  3.2     02-27  Mg     1.5     02-27      CAPILLARY BLOOD GLUCOSE      POCT Blood Glucose.: 161 mg/dL (27 Feb 2022 21:06)  POCT Blood Glucose.: 203 mg/dL (27 Feb 2022 17:14)  POCT Blood Glucose.: 160 mg/dL (27 Feb 2022 13:57)      Imaging:

## 2022-02-28 NOTE — PROGRESS NOTE ADULT - ASSESSMENT
60 YO M with right foot gangrene, TMA on 2/15, s/p failed aborted angiogram and stent placement in SFA with IR on 2/22. Vascular surgery consulted for potential revascularization options. Pt is s/p  R femoral endarterectomy, fem-tib bypass with GSV conduit 2/27    Plan:  - Daily dressing change by team  - C/w Darling  - ASA restarted  - OOB and ambulating as tolerated  - F/u AM labs    Vascular  9003   60 YO M with right foot gangrene, TMA on 2/15, s/p failed aborted angiogram and stent placement in SFA with IR on 2/22. Vascular surgery consulted for potential revascularization options. Pt is s/p  R femoral endarterectomy, fem-tib bypass with GSV conduit 2/27    Plan:  - Daily dressing change by team  - C/w Darling  - ASA restarted  - OOB and ambulating as tolerated  - F/u AM labs  - physical therapy     Vascular  1076

## 2022-02-28 NOTE — PROGRESS NOTE ADULT - SUBJECTIVE AND OBJECTIVE BOX
Podiatry pager #: [phone] ([place])/ [phone] ([facility]    Patient is a 61y old  Male who presents with a chief complaint of gangrene (25 Feb 2022 07:16)       INTERVAL HPI/OVERNIGHT EVENTS:  Patient seen and evaluated at bedside.  Pt is resting comfortable in NAD. Denies N/V/F/C.     Allergies    No Known Allergies    Intolerances        Vital Signs Last 24 Hrs  T(C): 36.7 (28 Feb 2022 05:37), Max: 37.2 (27 Feb 2022 13:40)  T(F): 98 (28 Feb 2022 05:37), Max: 99 (27 Feb 2022 13:40)  HR: 84 (28 Feb 2022 05:37) (64 - 84)  BP: 127/67 (28 Feb 2022 05:37) (122/70 - 181/82)  BP(mean): 92 (27 Feb 2022 21:00) (91 - 121)  RR: 18 (28 Feb 2022 05:37) (16 - 18)  SpO2: 98% (28 Feb 2022 05:37) (94% - 100%)    LABS:                        11.0   15.30 )-----------( 402      ( 28 Feb 2022 07:14 )             34.4     02-28    136  |  103  |  4<L>  ----------------------------<  218<H>  4.2   |  25  |  0.56    Ca    8.1<L>      28 Feb 2022 07:22  Phos  2.4     02-28  Mg     1.7     02-28      PT/INR - ( 27 Feb 2022 14:28 )   PT: 16.9 sec;   INR: 1.40 ratio         PTT - ( 27 Feb 2022 14:28 )  PTT:30.2 sec    CAPILLARY BLOOD GLUCOSE      POCT Blood Glucose.: 226 mg/dL (28 Feb 2022 08:01)  POCT Blood Glucose.: 161 mg/dL (27 Feb 2022 21:06)  POCT Blood Glucose.: 203 mg/dL (27 Feb 2022 17:14)  POCT Blood Glucose.: 160 mg/dL (27 Feb 2022 13:57)      Lower Extremity Physical Exam:  Vascular: DP/PT 0/4 B/L, CFT >3 seconds B/L, Temperature gradient warm to cool B/L  Neuro: Epicritic sensation intact to the level of digits B/L  Musculoskeletal/Ortho: unremarkable   Skin: s/p 2/15/22 right foot TMA open: flaps cool to touch but still viable, plantar incision and flap with no signs of necrosis, no drainage, no purulence, no malodor, left foot no open wounds or lesions    RADIOLOGY & ADDITIONAL TESTS:

## 2022-02-28 NOTE — PROGRESS NOTE ADULT - ASSESSMENT
60yo M w/ right foot gangrene   - pt seen and evaluated  - afebrile, WBC 15.3  -s/p 2/15/22 right foot TMA open:  flaps cool to touch but still viable, plantar incision and flap with no signs of necrosis, no drainage, no purulence, no malodor, left foot no open wounds or lesions  - s/p R femoral endarterectomy fem- tib bypass w/ SV conduit by vasc sx appreciated  - pod plan for revisional TMA w/ graft application Wednesday, time TBD  - please document medical optimization for surgery unde local with sedation  - will follow  - discussed w/ attending

## 2022-02-28 NOTE — PROGRESS NOTE ADULT - SUBJECTIVE AND OBJECTIVE BOX
Follow Up: Diabetic foot    Interval History/ROS: Afebrile. Had vascular bypass yesterday. Pain under control right now. No diarrhea. No cough. No dysuria. Planned for TMA revision Wed.     Allergies  No Known Allergies        ANTIMICROBIALS:  cefepime   IVPB    cefepime   IVPB 1000 every 8 hours  metroNIDAZOLE    Tablet 500 three times a day      OTHER MEDS:  acetaminophen     Tablet .. 650 milliGRAM(s) Oral every 6 hours PRN  aluminum hydroxide/magnesium hydroxide/simethicone Suspension 30 milliLiter(s) Oral every 6 hours PRN  ascorbic acid 500 milliGRAM(s) Oral daily  aspirin enteric coated 81 milliGRAM(s) Oral daily  atorvastatin 40 milliGRAM(s) Oral at bedtime  dextrose 40% Gel 15 Gram(s) Oral once  dextrose 50% Injectable 25 Gram(s) IV Push once  dextrose 50% Injectable 12.5 Gram(s) IV Push once  gabapentin 400 milliGRAM(s) Oral three times a day  glucagon  Injectable 1 milliGRAM(s) IntraMuscular once  insulin glargine Injectable (LANTUS) 9 Unit(s) SubCutaneous at bedtime  insulin lispro (ADMELOG) corrective regimen sliding scale   SubCutaneous three times a day before meals  insulin lispro Injectable (ADMELOG) 3 Unit(s) SubCutaneous three times a day before meals  insulin regular  human recombinant. 5 Unit(s) IV Push once  melatonin 3 milliGRAM(s) Oral at bedtime PRN  metoprolol tartrate 12.5 milliGRAM(s) Oral two times a day  nicotine - 21 mG/24Hr(s) Patch 1 patch Transdermal daily  oxyCODONE    IR 5 milliGRAM(s) Oral every 4 hours PRN  pantoprazole  Injectable 40 milliGRAM(s) IV Push every 12 hours  polyethylene glycol 3350 17 Gram(s) Oral daily  senna 2 Tablet(s) Oral at bedtime  simethicone 80 milliGRAM(s) Chew every 6 hours PRN  sucralfate 1 Gram(s) Oral four times a day      Vital Signs Last 24 Hrs  T(C): 37.6 (28 Feb 2022 09:08), Max: 37.6 (28 Feb 2022 09:08)  T(F): 99.6 (28 Feb 2022 09:08), Max: 99.6 (28 Feb 2022 09:08)  HR: 76 (28 Feb 2022 09:08) (64 - 84)  BP: 126/68 (28 Feb 2022 09:08) (122/70 - 181/82)  BP(mean): 92 (27 Feb 2022 21:00) (91 - 121)  RR: 18 (28 Feb 2022 09:08) (16 - 18)  SpO2: 97% (28 Feb 2022 09:08) (94% - 100%)    Physical Exam:  General: non toxic  Cardio: regular rate   Respiratory: nonlabored   abd: nondistended  Musculoskeletal: right TMA dressed   Skin: right medial leg bypass sites dressed   psych: normal affect                          11.0   15.30 )-----------( 402      ( 28 Feb 2022 07:14 )             34.4       02-28    136  |  103  |  4<L>  ----------------------------<  218<H>  4.2   |  25  |  0.56    Ca    8.1<L>      28 Feb 2022 07:22  Phos  2.4     02-28  Mg     1.7     02-28            MICROBIOLOGY:   Surgical Swab RIGHT FOOT DEEP WOUND  02-16-22   Few Serratia marcescens  Rare Staphylococcus aureus  Few Streptococcus agalactiae (Group B) isolated  Group B streptococci are susceptible to ampicillin,  penicillin and cefazolin, but may be resistant to  erythromycin and clindamycin.  Recommendations for intrapartum prophylaxis for Group B  streptococci are penicillin or ampicillin.  --  Serratia marcescens  Staphylococcus aureus    .Abscess right foot  02-12-22   Numerous Staphylococcus aureus  Numerous Serratia marcescens  Numerous Streptococcus agalactiae (Group B) isolated  Group B streptococci are susceptible to ampicillin,  penicillin and cefazolin, but may be resistant to  erythromycin and clindamycin.  Recommendations for intrapartum prophylaxis for Group B  streptococci are penicillin or ampicillin.  Few Finegoldia magna "Susceptibilities not performed"  --  Staphylococcus aureus  Serratia marcescens    RADIOLOGY:  Images below reviewed personally  Xray Foot AP + Lateral + Oblique, Right (02.16.22 @ 15:16)   Status post first through fifth transmetatarsal amputations.

## 2022-02-28 NOTE — PROGRESS NOTE ADULT - ASSESSMENT
61M DM, PAD.   Right foot gangrene s/p emergent TMA 2/15 for wet conversion.   Grew MSSA, Serratia, GBS, Finegoldia.   s/p femoral endarterectomy, bovine patch angioplasty, GSV PT bypass 2/17.   There was high concern for ongoing infection and viability but seems the wound has improved.   Not systemically ill.     Suggest  -continue Cefepime and Flagyl, final course depending on how TMA revision goes, planned for Wed 3/2    Tay Roberto MD   Infectious Disease   Available on TEAMS. After 5PM and on weekends please page fellow on call or call [phone] 61M DM, PAD.   Right foot gangrene s/p emergent TMA 2/15 for wet conversion.   Grew MSSA, Serratia, GBS, Finegoldia.   s/p femoral endarterectomy, bovine patch angioplasty, GSV PT bypass 2/27.   There was high concern for ongoing infection and viability but seems the wound has improved.   Not systemically ill.     Suggest  -continue Cefepime and Flagyl, final course depending on how TMA revision goes, planned for Wed 3/2    Tay Roberto MD   Infectious Disease   Available on TEAMS. After 5PM and on weekends please page fellow on call or call [phone]

## 2022-03-01 ENCOUNTER — TRANSCRIPTION ENCOUNTER (OUTPATIENT)
Age: 62
End: 2022-03-01

## 2022-03-01 LAB
ANION GAP SERPL CALC-SCNC: 8 MMOL/L — SIGNIFICANT CHANGE UP (ref 5–17)
BUN SERPL-MCNC: 8 MG/DL — SIGNIFICANT CHANGE UP (ref 7–23)
CALCIUM SERPL-MCNC: 8.1 MG/DL — LOW (ref 8.4–10.5)
CHLORIDE SERPL-SCNC: 99 MMOL/L — SIGNIFICANT CHANGE UP (ref 96–108)
CO2 SERPL-SCNC: 26 MMOL/L — SIGNIFICANT CHANGE UP (ref 22–31)
CREAT SERPL-MCNC: 0.67 MG/DL — SIGNIFICANT CHANGE UP (ref 0.5–1.3)
EGFR: 106 ML/MIN/1.73M2 — SIGNIFICANT CHANGE UP
GLUCOSE BLDC GLUCOMTR-MCNC: 225 MG/DL — HIGH (ref 70–99)
GLUCOSE BLDC GLUCOMTR-MCNC: 258 MG/DL — HIGH (ref 70–99)
GLUCOSE BLDC GLUCOMTR-MCNC: 259 MG/DL — HIGH (ref 70–99)
GLUCOSE BLDC GLUCOMTR-MCNC: 345 MG/DL — HIGH (ref 70–99)
GLUCOSE SERPL-MCNC: 241 MG/DL — HIGH (ref 70–99)
HCT VFR BLD CALC: 32.6 % — LOW (ref 39–50)
HGB BLD-MCNC: 10.2 G/DL — LOW (ref 13–17)
MAGNESIUM SERPL-MCNC: 1.6 MG/DL — SIGNIFICANT CHANGE UP (ref 1.6–2.6)
MCHC RBC-ENTMCNC: 29.5 PG — SIGNIFICANT CHANGE UP (ref 27–34)
MCHC RBC-ENTMCNC: 31.3 GM/DL — LOW (ref 32–36)
MCV RBC AUTO: 94.2 FL — SIGNIFICANT CHANGE UP (ref 80–100)
NRBC # BLD: 0 /100 WBCS — SIGNIFICANT CHANGE UP (ref 0–0)
PHOSPHATE SERPL-MCNC: 2.7 MG/DL — SIGNIFICANT CHANGE UP (ref 2.5–4.5)
PLATELET # BLD AUTO: 385 K/UL — SIGNIFICANT CHANGE UP (ref 150–400)
POTASSIUM SERPL-MCNC: 3.9 MMOL/L — SIGNIFICANT CHANGE UP (ref 3.5–5.3)
POTASSIUM SERPL-SCNC: 3.9 MMOL/L — SIGNIFICANT CHANGE UP (ref 3.5–5.3)
RBC # BLD: 3.46 M/UL — LOW (ref 4.2–5.8)
RBC # FLD: 14 % — SIGNIFICANT CHANGE UP (ref 10.3–14.5)
SODIUM SERPL-SCNC: 133 MMOL/L — LOW (ref 135–145)
WBC # BLD: 12.65 K/UL — HIGH (ref 3.8–10.5)
WBC # FLD AUTO: 12.65 K/UL — HIGH (ref 3.8–10.5)

## 2022-03-01 PROCEDURE — 99231 SBSQ HOSP IP/OBS SF/LOW 25: CPT

## 2022-03-01 RX ORDER — MAGNESIUM SULFATE 500 MG/ML
2 VIAL (ML) INJECTION ONCE
Refills: 0 | Status: COMPLETED | OUTPATIENT
Start: 2022-03-01 | End: 2022-03-01

## 2022-03-01 RX ORDER — SODIUM,POTASSIUM PHOSPHATES 278-250MG
1 POWDER IN PACKET (EA) ORAL ONCE
Refills: 0 | Status: COMPLETED | OUTPATIENT
Start: 2022-03-01 | End: 2022-03-01

## 2022-03-01 RX ADMIN — Medication 3 UNIT(S): at 17:05

## 2022-03-01 RX ADMIN — Medication 81 MILLIGRAM(S): at 12:11

## 2022-03-01 RX ADMIN — OXYCODONE HYDROCHLORIDE 5 MILLIGRAM(S): 5 TABLET ORAL at 02:30

## 2022-03-01 RX ADMIN — Medication 3 UNIT(S): at 12:12

## 2022-03-01 RX ADMIN — Medication 3: at 12:12

## 2022-03-01 RX ADMIN — Medication 1 GRAM(S): at 12:11

## 2022-03-01 RX ADMIN — OXYCODONE HYDROCHLORIDE 5 MILLIGRAM(S): 5 TABLET ORAL at 09:59

## 2022-03-01 RX ADMIN — Medication 2: at 08:06

## 2022-03-01 RX ADMIN — Medication 3 MILLIGRAM(S): at 20:35

## 2022-03-01 RX ADMIN — PANTOPRAZOLE SODIUM 40 MILLIGRAM(S): 20 TABLET, DELAYED RELEASE ORAL at 05:23

## 2022-03-01 RX ADMIN — Medication 1 GRAM(S): at 17:05

## 2022-03-01 RX ADMIN — OXYCODONE HYDROCHLORIDE 5 MILLIGRAM(S): 5 TABLET ORAL at 05:21

## 2022-03-01 RX ADMIN — ATORVASTATIN CALCIUM 40 MILLIGRAM(S): 80 TABLET, FILM COATED ORAL at 20:34

## 2022-03-01 RX ADMIN — Medication 12.5 MILLIGRAM(S): at 17:10

## 2022-03-01 RX ADMIN — Medication 500 MILLIGRAM(S): at 12:11

## 2022-03-01 RX ADMIN — Medication 650 MILLIGRAM(S): at 05:21

## 2022-03-01 RX ADMIN — Medication 1 PATCH: at 13:20

## 2022-03-01 RX ADMIN — Medication 1 PATCH: at 12:11

## 2022-03-01 RX ADMIN — Medication 3: at 17:06

## 2022-03-01 RX ADMIN — Medication 1 PACKET(S): at 09:29

## 2022-03-01 RX ADMIN — OXYCODONE HYDROCHLORIDE 5 MILLIGRAM(S): 5 TABLET ORAL at 09:29

## 2022-03-01 RX ADMIN — OXYCODONE HYDROCHLORIDE 5 MILLIGRAM(S): 5 TABLET ORAL at 20:34

## 2022-03-01 RX ADMIN — GABAPENTIN 400 MILLIGRAM(S): 400 CAPSULE ORAL at 05:22

## 2022-03-01 RX ADMIN — Medication 650 MILLIGRAM(S): at 20:35

## 2022-03-01 RX ADMIN — Medication 2: at 21:49

## 2022-03-01 RX ADMIN — OXYCODONE HYDROCHLORIDE 5 MILLIGRAM(S): 5 TABLET ORAL at 15:48

## 2022-03-01 RX ADMIN — PANTOPRAZOLE SODIUM 40 MILLIGRAM(S): 20 TABLET, DELAYED RELEASE ORAL at 17:04

## 2022-03-01 RX ADMIN — CEFEPIME 100 MILLIGRAM(S): 1 INJECTION, POWDER, FOR SOLUTION INTRAMUSCULAR; INTRAVENOUS at 13:34

## 2022-03-01 RX ADMIN — Medication 500 MILLIGRAM(S): at 13:34

## 2022-03-01 RX ADMIN — Medication 650 MILLIGRAM(S): at 06:52

## 2022-03-01 RX ADMIN — OXYCODONE HYDROCHLORIDE 5 MILLIGRAM(S): 5 TABLET ORAL at 15:18

## 2022-03-01 RX ADMIN — Medication 1 PATCH: at 07:35

## 2022-03-01 RX ADMIN — Medication 500 MILLIGRAM(S): at 05:22

## 2022-03-01 RX ADMIN — Medication 25 GRAM(S): at 09:29

## 2022-03-01 RX ADMIN — Medication 650 MILLIGRAM(S): at 21:30

## 2022-03-01 RX ADMIN — OXYCODONE HYDROCHLORIDE 5 MILLIGRAM(S): 5 TABLET ORAL at 21:30

## 2022-03-01 RX ADMIN — Medication 3 UNIT(S): at 08:05

## 2022-03-01 RX ADMIN — GABAPENTIN 400 MILLIGRAM(S): 400 CAPSULE ORAL at 20:35

## 2022-03-01 RX ADMIN — OXYCODONE HYDROCHLORIDE 5 MILLIGRAM(S): 5 TABLET ORAL at 06:53

## 2022-03-01 RX ADMIN — Medication 1 GRAM(S): at 23:51

## 2022-03-01 RX ADMIN — CEFEPIME 100 MILLIGRAM(S): 1 INJECTION, POWDER, FOR SOLUTION INTRAMUSCULAR; INTRAVENOUS at 05:23

## 2022-03-01 RX ADMIN — OXYCODONE HYDROCHLORIDE 5 MILLIGRAM(S): 5 TABLET ORAL at 01:47

## 2022-03-01 RX ADMIN — GABAPENTIN 400 MILLIGRAM(S): 400 CAPSULE ORAL at 13:34

## 2022-03-01 RX ADMIN — Medication 1 PATCH: at 20:00

## 2022-03-01 RX ADMIN — RIVAROXABAN 2.5 MILLIGRAM(S): KIT at 05:22

## 2022-03-01 RX ADMIN — INSULIN GLARGINE 9 UNIT(S): 100 INJECTION, SOLUTION SUBCUTANEOUS at 21:49

## 2022-03-01 RX ADMIN — RIVAROXABAN 2.5 MILLIGRAM(S): KIT at 17:05

## 2022-03-01 RX ADMIN — Medication 1 GRAM(S): at 05:21

## 2022-03-01 NOTE — PROGRESS NOTE ADULT - ASSESSMENT
60yo M w/ right foot gangrene   - pt seen and evaluated  - afebrile, WBC 12.6  -s/p 2/15/22 right foot TMA open:  flaps cool to touch but still viable, plantar incision and flap with no signs of necrosis, no drainage, no purulence, no malodor, left foot no open wounds or lesions  - s/p 2/27 R femoral endarterectomy fem- tib bypass w/ SV conduit and revisional TMA by vas sx appreciated  - discussed w/ vascular sx, revisional TMA performed by vas sx at same time as bypass, further revisional surgery not necessary at this time  - no further podiatry surgical intervention planned, deferring wound care of R TMA to vascular sx  - podiatry signing off  - discussed w/ attending Bedside report received, assumed care of pt, assessment completed, see flow sheet for details, pt just arrived back to the room from having MRI of the brain done. Pt sitting at the edge of the bed, she able to verbalize understanding of treatment and plan of care, she denies wants at this time, fall precautions are in place, call light is within reach. Will monitor throughout my shift.

## 2022-03-01 NOTE — PROGRESS NOTE ADULT - SUBJECTIVE AND OBJECTIVE BOX
Date of Service   03-01-22 @ 14:48    Patient is a 61y old  Male who presents with a chief complaint of gangrene (25 Feb 2022 07:16)      INTERVAL HISTORY: pt feels ok     REVIEW OF SYSTEMS:   CONSTITUTIONAL: No weakness  EYES/ENT: No visual changes; No throat pain  Neck: No pain or stiffness  Respiratory: No cough, wheezing, No shortness of breath  CARDIOVASCULAR: no chest pain or palpitations  GASTROINTESTINAL: No abdominal pain, no nausea, vomiting or hematemesis  GENITOURINARY: No dysuria, frequency or hematuria  NEUROLOGICAL: No stroke like symptoms  SKIN: No rashes    	  MEDICATIONS:  metoprolol tartrate 12.5 milliGRAM(s) Oral two times a day        PHYSICAL EXAM:  T(C): 36.9 (03-01-22 @ 13:41), Max: 37.7 (02-28-22 @ 21:41)  HR: 80 (03-01-22 @ 13:41) (70 - 88)  BP: 125/68 (03-01-22 @ 13:41) (106/62 - 150/69)  RR: 18 (03-01-22 @ 13:41) (18 - 18)  SpO2: 96% (03-01-22 @ 13:41) (94% - 98%)  Wt(kg): --  I&O's Summary    28 Feb 2022 07:01  -  01 Mar 2022 07:00  --------------------------------------------------------  IN: 880 mL / OUT: 925 mL / NET: -45 mL    01 Mar 2022 07:01  -  01 Mar 2022 14:48  --------------------------------------------------------  IN: 480 mL / OUT: 300 mL / NET: 180 mL          Appearance: In no distress	  HEENT:    PERRL, EOMI	  Cardiovascular:  S1 S2, No JVD  Respiratory: Lungs clear to auscultation	  Gastrointestinal:  Soft, Non-tender, + BS	  Vascularature:  No edema of LE  Psychiatric: Appropriate affect   Neuro: no acute focal deficits                               10.2   12.65 )-----------( 385      ( 01 Mar 2022 06:56 )             32.6     03-01    133<L>  |  99  |  8   ----------------------------<  241<H>  3.9   |  26  |  0.67    Ca    8.1<L>      01 Mar 2022 06:56  Phos  2.7     03-01  Mg     1.6     03-01          Labs personally reviewed      ASSESSMENT/PLAN: 	      62 y/o M w/ PMHx DM, PAD s/p R TMA 2/15 transferred from [facility] for IR thrombectomy for R superficial femoral and popliteal artery occlusion and likely surgical revision with podiatry with continued poor flow s/p IR procedure, now for vascular bypass surgery     Problem/Plan - 1:  ·  Problem: Peripheral artery disease.   ·  Plan: Pt transferred to [facility] for IR angiogram and angioplasty. Wire recannulization of the SFA/popliteal stent was performed followed by angioplasty on 2/22. There was persistent poor flow through the stent.   - Vascular consulted for RLE bypass  - s/p bypass 2/27  - c/w ASA & Lipitor.  - PT eval      Problem/Plan - 2:  ·  Problem: Paroxysmal Atrial fibrillation/flutter  ·  Plan: CHADSVASc of 2-3  - H/H stable with EGD showing esophagitis  - Echo showed EF 55-60% & moderate aortic stenosis  - started on xarelto       Problem/Plan - 3:  ·  Problem: Pre-operative risk stratification  ·  Plan: Pre-operative clearance for open vascular LE Bypass  - unable to assess functional capacity given limited by PAD  - heavy smoker and diabetic going to high risk surgery  - TTE with preserved EF and Mod AS  -NM stress test with areas of infarct and ischemia, r/b of cath discussed with pt, agreeable to proceed   - s/p LHC with mild-mod LAD disease and severe RPL disease  -- given asymptomatic and no LM/LAD severe disease will manage CAD medically, ASA. statin, Metoprolol  - Discussed with vascular surg Dr Hawkins and IC Dr Milian, all in agreement with plan  - Elevated risk for high risk vascular surgery, no contraindication to proceed with his nonelective limb salvage surgery   - 2/27 - tolerated surgery well   - pt on metoprolol, ASA and statin      Problem/Plan - 4:  ·  Problem: DM   - basal bolus based on weight with low ISS  - Hgb A1C is 8.6.        Yessy Hawkins FNP-BC   Pierre Burger DO [facility]  Cardiovascular Medicine  [address] [place] [address], [address]  Office: [phone]  Cell: [phone] Date of Service   03-01-22 @ 14:48    Patient is a 61y old  Male who presents with a chief complaint of gangrene (25 Feb 2022 07:16)      INTERVAL HISTORY: pt feels ok     REVIEW OF SYSTEMS:   CONSTITUTIONAL: No weakness  EYES/ENT: No visual changes; No throat pain  Neck: No pain or stiffness  Respiratory: No cough, wheezing, No shortness of breath  CARDIOVASCULAR: no chest pain or palpitations  GASTROINTESTINAL: No abdominal pain, no nausea, vomiting or hematemesis  GENITOURINARY: No dysuria, frequency or hematuria  NEUROLOGICAL: No stroke like symptoms  SKIN: No rashes    	  MEDICATIONS:  metoprolol tartrate 12.5 milliGRAM(s) Oral two times a day        PHYSICAL EXAM:  T(C): 36.9 (03-01-22 @ 13:41), Max: 37.7 (02-28-22 @ 21:41)  HR: 80 (03-01-22 @ 13:41) (70 - 88)  BP: 125/68 (03-01-22 @ 13:41) (106/62 - 150/69)  RR: 18 (03-01-22 @ 13:41) (18 - 18)  SpO2: 96% (03-01-22 @ 13:41) (94% - 98%)  Wt(kg): --  I&O's Summary    28 Feb 2022 07:01  -  01 Mar 2022 07:00  --------------------------------------------------------  IN: 880 mL / OUT: 925 mL / NET: -45 mL    01 Mar 2022 07:01  -  01 Mar 2022 14:48  --------------------------------------------------------  IN: 480 mL / OUT: 300 mL / NET: 180 mL          Appearance: In no distress	  HEENT:    PERRL, EOMI	  Cardiovascular:  S1 S2, No JVD  Respiratory: Lungs clear to auscultation	  Gastrointestinal:  Soft, Non-tender, + BS	  Vascularature:  No edema of LE  Psychiatric: Appropriate affect   Neuro: no acute focal deficits                               10.2   12.65 )-----------( 385      ( 01 Mar 2022 06:56 )             32.6     03-01    133<L>  |  99  |  8   ----------------------------<  241<H>  3.9   |  26  |  0.67    Ca    8.1<L>      01 Mar 2022 06:56  Phos  2.7     03-01  Mg     1.6     03-01          Labs personally reviewed      ASSESSMENT/PLAN: 	      60 y/o M w/ PMHx DM, PAD s/p R TMA 2/15 transferred from [facility] for IR thrombectomy for R superficial femoral and popliteal artery occlusion and likely surgical revision with podiatry with continued poor flow s/p IR procedure, now for vascular bypass surgery     Problem/Plan - 1:  ·  Problem: Peripheral artery disease.   ·  Plan: Pt transferred to [facility] for IR angiogram and angioplasty. Wire recannulization of the SFA/popliteal stent was performed followed by angioplasty on 2/22. There was persistent poor flow through the stent.   - Vascular consulted for RLE bypass  - s/p bypass 2/27  - c/w ASA & Lipitor.  - PT eval      Problem/Plan - 2:  ·  Problem: Paroxysmal Atrial fibrillation/flutter  ·  Plan: CHADSVASc of 2-3  - H/H stable with EGD showing esophagitis  - Echo showed EF 55-60% & moderate aortic stenosis  - started on xarelto       Problem/Plan - 3:  ·  Problem: Pre-operative risk stratification  ·  Plan: Pre-operative clearance for open vascular LE Bypass  - unable to assess functional capacity given limited by PAD  - heavy smoker and diabetic going to high risk surgery  - TTE with preserved EF and Mod AS  -NM stress test with areas of infarct and ischemia, r/b of cath discussed with pt, agreeable to proceed   - s/p LHC with mild-mod LAD disease and severe RPL disease  -- given asymptomatic and no LM/LAD severe disease will manage CAD medically, ASA. statin, Metoprolol  - Discussed with vascular surg Dr Hawkins and IC Dr Milian, all in agreement with plan  - Elevated risk for high risk vascular surgery, no contraindication to proceed with his nonelective limb salvage surgery   - 2/27 - tolerated surgery well   - pt on metoprolol, ASA and statin      Problem/Plan - 4:  ·  Problem: CAD  s/p C with mild-mod LAD disease and severe RPL disease  -- given asymptomatic and no LM/LAD severe disease will manage CAD medically, ASA. statin, Metoprolol  - needs outpt cardio follow up         Yessy Hawkins [facility]   Pierre Burger DO [facility]  Cardiovascular Medicine  [address] [place] [address], [address]  Office: [phone]  Cell: [phone]

## 2022-03-01 NOTE — DISCHARGE NOTE PROVIDER - NSDCMRMEDTOKEN_GEN_ALL_CORE_FT
enalapril 5 mg oral tablet: 1 tab(s) orally once a day  gabapentin 400 mg oral capsule: 1 cap(s) orally 3 times a day  glipiZIDE 10 mg oral tablet: 1 tab(s) orally once a day  Jardiance 25 mg oral tablet: 1 tab(s) orally once a day (in the morning)  metFORMIN 1000 mg oral tablet: 1 tab(s) orally 2 times a day  Plavix 75 mg oral tablet: 1 tab(s) orally once a day   enalapril 5 mg oral tablet: 1 tab(s) orally once a day  gabapentin 400 mg oral capsule: 1 cap(s) orally 3 times a day  glipiZIDE 10 mg oral tablet: 1 tab(s) orally once a day  Jardiance 25 mg oral tablet: 1 tab(s) orally once a day (in the morning)  metFORMIN 1000 mg oral tablet: 1 tab(s) orally 2 times a day  Plavix 75 mg oral tablet: 1 tab(s) orally once a day  Rollator:   Rolling walker :    enalapril 5 mg oral tablet: 1 tab(s) orally once a day  gabapentin 400 mg oral capsule: 1 cap(s) orally 3 times a day  glipiZIDE 10 mg oral tablet: 1 tab(s) orally once a day  Jardiance 25 mg oral tablet: 1 tab(s) orally once a day (in the morning)  metFORMIN 1000 mg oral tablet: 1 tab(s) orally 2 times a day  Outpatient Physical Therapy :   Plavix 75 mg oral tablet: 1 tab(s) orally once a day  Rollator:   Rolling walker :    acetaminophen 325 mg oral tablet: 2 tab(s) orally every 6 hours, As needed, Mild Pain (1 - 3), Moderate Pain (4 - 6)  aspirin 81 mg oral delayed release tablet: 1 tab(s) orally once a day  atorvastatin 40 mg oral tablet: 1 tab(s) orally once a day (at bedtime)  gabapentin 400 mg oral capsule: 1 cap(s) orally 3 times a day  glipiZIDE 10 mg oral tablet: 1 tab(s) orally once a day  Jardiance 25 mg oral tablet: 1 tab(s) orally once a day (in the morning)  metFORMIN 1000 mg oral tablet: 1 tab(s) orally 2 times a day  metoprolol succinate 25 mg oral tablet, extended release: 1 tab(s) orally once a day   oxyCODONE 5 mg oral tablet: 1 tab(s) orally every 6 hours, As Needed -Severe Pain (7 - 10) MDD:4 tablets  rivaroxaban 2.5 mg oral tablet: 1 tab(s) orally 2 times a day   acetaminophen 325 mg oral tablet: 2 tab(s) orally every 6 hours, As needed, Mild Pain (1 - 3), Moderate Pain (4 - 6)  aspirin 81 mg oral delayed release tablet: 1 tab(s) orally once a day  atorvastatin 40 mg oral tablet: 1 tab(s) orally once a day (at bedtime)  Diflucan 200 mg oral tablet: 1 tab(s) orally once a day   gabapentin 400 mg oral capsule: 1 cap(s) orally 3 times a day  glipiZIDE 10 mg oral tablet: 1 tab(s) orally once a day  Jardiance 25 mg oral tablet: 1 tab(s) orally once a day (in the morning)  metFORMIN 1000 mg oral tablet: 1 tab(s) orally 2 times a day  metoprolol succinate 25 mg oral tablet, extended release: 1 tab(s) orally once a day   oxyCODONE 5 mg oral tablet: 1 tab(s) orally every 6 hours, As Needed -Severe Pain (7 - 10) MDD:4 tablets  rivaroxaban 2.5 mg oral tablet: 1 tab(s) orally 2 times a day

## 2022-03-01 NOTE — PROGRESS NOTE ADULT - ASSESSMENT
60 YO M with right foot gangrene, TMA on 2/15, s/p failed aborted angiogram and stent placement in SFA with IR on 2/22. Vascular surgery consulted for potential revascularization options. Pt is s/p  R femoral endarterectomy, fem-tib bypass with GSV conduit 2/27    Plan:  - Daily dressing changes  - ASA restarted  - OOB and ambulating as tolerated  - F/u AM labs  - physical therapy   - dc planning  Vascular  [id number]

## 2022-03-01 NOTE — DISCHARGE NOTE PROVIDER - CARE PROVIDER_API CALL
Brent Hawkins (MD)  Vascular Surgery  [address], [address]  [place] [address]  Phone: [phone]  Fax: [phone]  Follow Up Time:     Pierre Burger (DO)  Cardiovascular Disease; Internal Medicine; Nuclear Cardiology  [address], [address]  [place] [address]  Phone: [phone]  Fax: [phone]  Follow Up Time:    Brent Hawkins (MD)  Vascular Surgery  [address] [facility], [address]  [place] [address]  Phone: [phone]  Fax: [phone]  Follow Up Time: 1 week    Pierre Burger (DO)  Cardiovascular Disease; Internal Medicine; Nuclear Cardiology  [address], [address]  [place] [address]  Phone: [phone]  Fax: [phone]  Follow Up Time: 1 week

## 2022-03-01 NOTE — PROGRESS NOTE ADULT - ASSESSMENT
61M DM, PAD.   Right foot gangrene s/p emergent TMA 2/15 for wet conversion.   Grew MSSA, Serratia, GBS, Finegoldia.   s/p femoral endarterectomy with bovine patch angioplasty, femoral to PT saphenous bypass and TMA revision 2/27.   Not systemically ill and there is low concern for residual infection per vascular.     Suggest  -stop antibiotics (Cefepime and Flagyl), it's been two days since surgery     Will sign off, call back if needed  Discussed with vascular     Tay Roberto MD   Infectious Disease   Available on TEAMS. After 5PM and on weekends please page fellow on call or call [phone]

## 2022-03-01 NOTE — PROGRESS NOTE ADULT - SUBJECTIVE AND OBJECTIVE BOX
Podiatry pager #: [phone] ([place])/ [phone] ([facility])    Patient is a 61y old  Male who presents with a chief complaint of gangrene (25 Feb 2022 07:16)       INTERVAL HPI/OVERNIGHT EVENTS:  Patient seen and evaluated at bedside.  Pt is resting comfortable in NAD. Denies N/V/F/C.     Allergies    No Known Allergies    Intolerances        Vital Signs Last 24 Hrs  T(C): 37.2 (01 Mar 2022 09:13), Max: 37.7 (28 Feb 2022 21:41)  T(F): 98.9 (01 Mar 2022 09:13), Max: 99.9 (28 Feb 2022 21:41)  HR: 73 (01 Mar 2022 09:13) (70 - 88)  BP: 112/59 (01 Mar 2022 09:13) (106/62 - 160/72)  BP(mean): --  RR: 18 (01 Mar 2022 09:13) (18 - 18)  SpO2: 94% (01 Mar 2022 09:13) (94% - 98%)    LABS:                        10.2   12.65 )-----------( 385      ( 01 Mar 2022 06:56 )             32.6     03-01    133<L>  |  99  |  8   ----------------------------<  241<H>  3.9   |  26  |  0.67    Ca    8.1<L>      01 Mar 2022 06:56  Phos  2.7     03-01  Mg     1.6     03-01      PT/INR - ( 27 Feb 2022 14:28 )   PT: 16.9 sec;   INR: 1.40 ratio         PTT - ( 27 Feb 2022 14:28 )  PTT:30.2 sec    CAPILLARY BLOOD GLUCOSE      POCT Blood Glucose.: 225 mg/dL (01 Mar 2022 07:40)  POCT Blood Glucose.: 241 mg/dL (28 Feb 2022 21:28)  POCT Blood Glucose.: 225 mg/dL (28 Feb 2022 17:09)  POCT Blood Glucose.: 238 mg/dL (28 Feb 2022 13:20)      Lower Extremity Physical Exam:  Vascular: DP/PT 0/4 B/L, CFT >3 seconds B/L, Temperature gradient warm to cool B/L  Neuro: Epicritic sensation intact to the level of digits B/L  Musculoskeletal/Ortho: unremarkable   Skin: s/p 2/15/22 right foot TMA open & revisional TMA by [facility] sx 2/27: flaps cool to touch but still viable, plantar incision and flap with no signs of necrosis, no drainage, no purulence, no malodor, left foot no open wounds or lesions    RADIOLOGY & ADDITIONAL TESTS:

## 2022-03-01 NOTE — DISCHARGE NOTE PROVIDER - NSDCCPTREATMENT_GEN_ALL_CORE_FT
PRINCIPAL PROCEDURE  Procedure: Femoral tibial bypass with vein  Findings and Treatment:       SECONDARY PROCEDURE  Procedure: Endarterectomy, common femoral  Findings and Treatment:     Procedure: Transmetatarsal amputation  Findings and Treatment:      PRINCIPAL PROCEDURE  Procedure: Femoral tibial bypass with vein  Findings and Treatment: Recovering from surgery  WOUND CARE: continue with wound vac therapy of right TMA site. Staples will be removed in the office.   BATHING: Please do not submerge wound underwater. Do not take a bath. You may shower and/or sponge bathe.  ACTIVITY: No heavy lifting or straining; do not lift anything greater than 10 pounds. Otherwise, you may return to your usual level of physical activity. If you are taking narcotic pain medication (such as Percocet), do NOT drive a car, operate machinery or make important decisions.  DIET: Return to your usual diet.  NOTIFY YOUR SURGEON IF: You have any bleeding that does not stop, any pus draining from your wound, any fever (over 100.4 F) or chills, persistent nausea/vomiting, persistent diarrhea, or if your pain is not controlled on your discharge pain medications.  FOLLOW-UP:  1. Please call to make a follow-up appointment within one week of discharge with Dr. Hawkins.   2. Please follow up with your primary care physician in one week regarding your hospitalization.        SECONDARY PROCEDURE  Procedure: Endarterectomy, common femoral  Findings and Treatment:     Procedure: Transmetatarsal amputation  Findings and Treatment:      PRINCIPAL PROCEDURE  Procedure: Femoral tibial bypass with vein  Findings and Treatment: Recovering from surgery  WOUND CARE: continue with wound vac therapy of right TMA site. Staples will be removed in the office.   BATHING: Please do not submerge wound underwater. Do not take a bath. You may shower and/or sponge bathe.  ACTIVITY: No heavy lifting or straining; do not lift anything greater than 10 pounds. Otherwise, you may return to your usual level of physical activity. If you are taking narcotic pain medication (such as Percocet), do NOT drive a car, operate machinery or make important decisions.  DIET: Return to your usual diet.  NOTIFY YOUR SURGEON IF: You have any bleeding that does not stop, any pus draining from your wound, any fever (over 100.4 F) or chills, persistent nausea/vomiting, persistent diarrhea, or if your pain is not controlled on your discharge pain medications.  FOLLOW-UP:  1. Please call to make a follow-up appointment within one week of discharge with Dr. Hawkins.   2. Please follow up with your primary care physician in one week regarding your hospitalization.  Please STOP taking Plavix and start taking Aspirin and Xarelto.         SECONDARY PROCEDURE  Procedure: Endarterectomy, common femoral  Findings and Treatment:     Procedure: Transmetatarsal amputation  Findings and Treatment:

## 2022-03-01 NOTE — PROGRESS NOTE ADULT - SUBJECTIVE AND OBJECTIVE BOX
VASCULAR SURGERY DAILY PROGRESS NOTE:     SUBJECTIVE/ROS: Patient feels well, Denies nausea, vomiting, chest pain, shortness of breath       MEDICATIONS  (STANDING):  ascorbic acid 500 milliGRAM(s) Oral daily  aspirin enteric coated 81 milliGRAM(s) Oral daily  atorvastatin 40 milliGRAM(s) Oral at bedtime  cefepime   IVPB      cefepime   IVPB 1000 milliGRAM(s) IV Intermittent every 8 hours  dextrose 40% Gel 15 Gram(s) Oral once  dextrose 5%. 1000 milliLiter(s) (100 mL/Hr) IV Continuous <Continuous>  dextrose 50% Injectable 25 Gram(s) IV Push once  dextrose 50% Injectable 12.5 Gram(s) IV Push once  gabapentin 400 milliGRAM(s) Oral three times a day  glucagon  Injectable 1 milliGRAM(s) IntraMuscular once  insulin glargine Injectable (LANTUS) 9 Unit(s) SubCutaneous at bedtime  insulin lispro (ADMELOG) corrective regimen sliding scale   SubCutaneous three times a day before meals  insulin lispro (ADMELOG) corrective regimen sliding scale   SubCutaneous at bedtime  insulin lispro Injectable (ADMELOG) 3 Unit(s) SubCutaneous three times a day before meals  insulin regular  human recombinant. 5 Unit(s) IV Push once  magnesium sulfate  IVPB 2 Gram(s) IV Intermittent once  metoprolol tartrate 12.5 milliGRAM(s) Oral two times a day  metroNIDAZOLE    Tablet 500 milliGRAM(s) Oral three times a day  nicotine - 21 mG/24Hr(s) Patch 1 patch Transdermal daily  pantoprazole  Injectable 40 milliGRAM(s) IV Push every 12 hours  polyethylene glycol 3350 17 Gram(s) Oral daily  potassium phosphate / sodium phosphate Powder (PHOS-NaK) 1 Packet(s) Oral once  rivaroxaban 2.5 milliGRAM(s) Oral two times a day  senna 2 Tablet(s) Oral at bedtime  sucralfate 1 Gram(s) Oral four times a day    MEDICATIONS  (PRN):  acetaminophen     Tablet .. 650 milliGRAM(s) Oral every 6 hours PRN Mild Pain (1 - 3), Moderate Pain (4 - 6)  aluminum hydroxide/magnesium hydroxide/simethicone Suspension 30 milliLiter(s) Oral every 6 hours PRN Dyspepsia  melatonin 3 milliGRAM(s) Oral at bedtime PRN Insomnia  oxyCODONE    IR 5 milliGRAM(s) Oral every 4 hours PRN Severe Pain (7 - 10)  simethicone 80 milliGRAM(s) Chew every 6 hours PRN Dyspepsia      OBJECTIVE:    Vital Signs Last 24 Hrs  T(C): 36.9 (01 Mar 2022 05:06), Max: 37.7 (28 Feb 2022 21:41)  T(F): 98.5 (01 Mar 2022 05:06), Max: 99.9 (28 Feb 2022 21:41)  HR: 88 (01 Mar 2022 05:06) (70 - 88)  BP: 106/62 (01 Mar 2022 05:06) (106/62 - 160/72)  BP(mean): --  RR: 18 (01 Mar 2022 05:06) (18 - 18)  SpO2: 97% (01 Mar 2022 05:06) (95% - 98%)        I&O's Detail    28 Feb 2022 07:01  -  01 Mar 2022 07:00  --------------------------------------------------------  IN:    Lactated Ringers: 300 mL    Oral Fluid: 580 mL  Total IN: 880 mL    OUT:    Indwelling Catheter - Urethral (mL): 550 mL    Voided (mL): 375 mL  Total OUT: 925 mL    Total NET: -45 mL      01 Mar 2022 07:01  -  01 Mar 2022 08:29  --------------------------------------------------------  IN:  Total IN: 0 mL    OUT:    Voided (mL): 300 mL  Total OUT: 300 mL    Total NET: -300 mL          Daily     Daily     LABS:                        10.2   12.65 )-----------( 385      ( 01 Mar 2022 06:56 )             32.6     03-01    133<L>  |  99  |  8   ----------------------------<  241<H>  3.9   |  26  |  0.67    Ca    8.1<L>      01 Mar 2022 06:56  Phos  2.7     03-01  Mg     1.6     03-01      PT/INR - ( 27 Feb 2022 14:28 )   PT: 16.9 sec;   INR: 1.40 ratio         PTT - ( 27 Feb 2022 14:28 )  PTT:30.2 sec      Physical Exam:   GEN: resting in bed comfortably in NAD  RESP: no increased WOB  EXTR: aquacel over R groin and R lower calf c/d/i, PT palpable. ACE wrap over R foot c/d/i  NEURO: awake, alert

## 2022-03-01 NOTE — DISCHARGE NOTE PROVIDER - PROVIDER TOKENS
PROVIDER:[TOKEN:[[id number]]],PROVIDER:[TOKEN:[[id number]]] PROVIDER:[TOKEN:[[id number]],FOLLOWUP:[1 week]],PROVIDER:[TOKEN:[[id number]],FOLLOWUP:[1 week]]

## 2022-03-01 NOTE — DISCHARGE NOTE PROVIDER - HOSPITAL COURSE
62 y/o M w/ PMHx DM, PAD, presented initially to [facility] for R foot 4th/5th digit dry gangrene w/ non healing R medial malleolar ulcer. Patient found to have no palpable pulses distal to femoral b/l. MRI was done which was not suggestive of acute osteomyelitis; fluid collection was suspected. Patient underwent LE Arterial Doppler, which showed occluded R superficial femoral and popliteal arteries with multiple stents and Occluded proximal and mid left superficial femoral artery with reconstitution distally. Patient was initially treated with  foot wound with zosyn 2/11-2/15, and then switched over to cefepime and flagyl. Patient underwent R foot TMA on 2/15. Wound culture of R foot abscess grew numerous staph aureus, serratia marcescens, and strep agalactase. Intra-operative there was a concern for viability. Therefore, plan was to transfer patient to [facility] for IR thrombectomy for R superficial femoral and popliteal artery occlusion with multiple stents and Occluded proximal and mid left superficial femoral artery with reconstitution distally seen on LE arterial doppler.    Pt initially admitted to medicine and later transferred to surgery. Evaluated by pods rec cont iv abx, plan for RF wound debridement with application of graft pending IR. On 2/22 sp IR for RLE angiogram and angioplasty; L CFA puncture. RLE angiogram w/ patent R DESHAUN, IIA, EIA, and CFA. The SFA, including stent, was completely occluded. The profunda femoris was patent. There was reconstitution of the popliteal artery and single vessel runoff to the foot via the posterior tibial artery. Wire recannulization of the SFA/popliteal stent was performed followed by angioplasty with an 8 mm balloon. There was persistent poor flow through the stent. The profunda femoris artery remained patent with reconstitution of the popliteal artery. The case was aborted. The L groin was closed w/ a Celt device; rec vascular consult for potential revascularization options and resume home plavix. Evaluated by vascular, underwent b/l gsv vein mapping    ID consulted for foot infection, rec cont with cefepime/flagyl. Cardiology and medicine assisted with pre operative optimization. Cards rec stress test- showed areas of infarct and ischemia, rec'd cath and pt agreeable, planned for 2/25. [facility] [place] with mild-mod LAD disease and severe RPL disease; per cards given pt asymptomatic and no LM/LAD severe disease will manage CAD medically, ASA. statin, Metoprolol. 2/27 sp OR for R femoral endarterectomy, fem-tib bypass with GSV conduit; operative findings- right common femoral endarterectomy with bovine patch angioplasty.  GSV harvest and spliced, superior half in situ, inferior half reversed.  PT bypass. TMA revision The patient tolerated the operation well and there were no post-operative complications identified. Re evaluated by pods- plan for revisional TMA w/ graft application. ************* UPDATE    PT evaluated pt and recommended-    Patient continued to recover appropriately and was deemed stable for dc to ********   At the At time of discharge, the patient was hemodynamically stable, tolerating PO diet, voiding urine, passing stool, ambulating with assistance and was comfortable with adequate pain control. The patient was instructed to follow up with Dr. Hawkins, podiatry, cardiology and PMD within 1-2 weeks after discharge. 60 y/o M w/ PMHx DM, PAD, presented initially to [facility] for R foot 4th/5th digit dry gangrene w/ non healing R medial malleolar ulcer. Patient found to have no palpable pulses distal to femoral b/l. MRI was done which was not suggestive of acute osteomyelitis; fluid collection was suspected. Patient underwent LE Arterial Doppler, which showed occluded R superficial femoral and popliteal arteries with multiple stents and Occluded proximal and mid left superficial femoral artery with reconstitution distally. Patient was initially treated with  foot wound with zosyn 2/11-2/15, and then switched over to cefepime and flagyl. Patient underwent R foot TMA on 2/15. Wound culture of R foot abscess grew numerous staph aureus, serratia marcescens, and strep agalactase. Intra-operative there was a concern for viability. Therefore, plan was to transfer patient to [facility] for IR thrombectomy for R superficial femoral and popliteal artery occlusion with multiple stents and Occluded proximal and mid left superficial femoral artery with reconstitution distally seen on LE arterial doppler.    Pt initially admitted to medicine and later transferred to surgery. Evaluated by pods rec cont iv abx, plan for RF wound debridement with application of graft pending IR. On 2/22 sp IR for RLE angiogram and angioplasty; L CFA puncture. RLE angiogram w/ patent R DESHAUN, IIA, EIA, and CFA. The SFA, including stent, was completely occluded. The profunda femoris was patent. There was reconstitution of the popliteal artery and single vessel runoff to the foot via the posterior tibial artery. Wire recannulization of the SFA/popliteal stent was performed followed by angioplasty with an 8 mm balloon. There was persistent poor flow through the stent. The profunda femoris artery remained patent with reconstitution of the popliteal artery. The case was aborted. The L groin was closed w/ a Celt device; rec vascular consult for potential revascularization options and resume home plavix. Evaluated by vascular, underwent b/l gsv vein mapping    ID consulted for foot infection, rec cont with cefepime/flagyl. Cardiology and medicine assisted with pre operative optimization. Cards rec stress test- showed areas of infarct and ischemia, rec'd cath and pt agreeable, planned for 2/25. [facility] [place] with mild-mod LAD disease and severe RPL disease; per cards given pt asymptomatic and no LM/LAD severe disease will manage CAD medically, ASA. statin, Metoprolol. 2/27 sp OR for R femoral endarterectomy, fem-tib bypass with GSV conduit; operative findings- right common femoral endarterectomy with bovine patch angioplasty.  GSV harvest and spliced, superior half in situ, inferior half reversed.  PT bypass. TMA revision The patient tolerated the operation well and there were no post-operative complications identified.     PT evaluated pt and recommended home with outpatient PT services and rollator.     Patient continued to recover appropriately and was deemed stable for dc to home with wound vac.    At the At time of discharge, the patient was hemodynamically stable, tolerating PO diet, voiding urine, passing stool, ambulating with assistance and was comfortable with adequate pain control. The patient was instructed to follow up with Dr. Hawkins, podiatry, cardiology and PMD within 1-2 weeks after discharge. 62 y/o M w/ PMHx DM, PAD, presented initially to [facility] [place] for R foot 4th/5th digit dry gangrene w/ non healing R medial malleolar ulcer. Patient found to have no palpable pulses distal to femoral b/l. MRI was done which was not suggestive of acute osteomyelitis; fluid collection was suspected. Patient underwent LE Arterial Doppler, which showed occluded R superficial femoral and popliteal arteries with multiple stents and Occluded proximal and mid left superficial femoral artery with reconstitution distally. Patient was initially treated with  foot wound with zosyn 2/11-2/15, and then switched over to cefepime and flagyl. Patient underwent R foot TMA on 2/15. Wound culture of R foot abscess grew numerous staph aureus, serratia marcescens, and strep agalactase. Intra-operative there was a concern for viability. Therefore, plan was to transfer patient to [facility] for IR thrombectomy for R superficial femoral and popliteal artery occlusion with multiple stents and Occluded proximal and mid left superficial femoral artery with reconstitution distally seen on LE arterial doppler.    Pt initially admitted to medicine and later transferred to surgery. Evaluated by pods rec cont iv abx, plan for RF wound debridement with application of graft pending IR. On 2/22 sp IR for RLE angiogram and angioplasty; L CFA puncture. RLE angiogram w/ patent R DESHAUN, IIA, EIA, and CFA. The SFA, including stent, was completely occluded. The profunda femoris was patent. There was reconstitution of the popliteal artery and single vessel runoff to the foot via the posterior tibial artery. Wire recannulization of the SFA/popliteal stent was performed followed by angioplasty with an 8 mm balloon. There was persistent poor flow through the stent. The profunda femoris artery remained patent with reconstitution of the popliteal artery. The case was aborted. The L groin was closed w/ a Celt device; rec vascular consult for potential revascularization options and resume home plavix. Evaluated by vascular, underwent b/l gsv vein mapping    ID consulted for foot infection, rec cont with cefepime/flagyl. Cardiology and medicine assisted with pre operative optimization. Cards rec stress test- showed areas of infarct and ischemia, rec'd cath and pt agreeable, planned for 2/25. [facility] with mild-mod LAD disease and severe RPL disease; per cards given pt asymptomatic and no LM/LAD severe disease will manage CAD medically, ASA. statin, Metoprolol. 2/27 sp OR for R femoral endarterectomy, fem-tib bypass with GSV conduit; operative findings- right common femoral endarterectomy with bovine patch angioplasty. GSV harvest and spliced, superior half in situ, inferior half reversed PT bypass. TMA revision The patient tolerated the operation well and there were no post-operative complications identified.     PT evaluated pt and recommended home with outpatient PT services and rollator.     Patient continued to recover appropriately and was deemed stable for dc to home with wound vac.   At time of discharge, the patient was hemodynamically stable, tolerating PO diet, voiding urine, passing stool, ambulating with assistance and was comfortable with adequate pain control. The patient was instructed to follow up with Dr. Hawkins, podiatry, cardiology and PMD within 1-2 weeks after discharge. 62 y/o M w/ PMHx DM, PAD, presented initially to [facility] for R foot 4th/5th digit dry gangrene w/ non healing R medial malleolar ulcer. Patient found to have no palpable pulses distal to femoral b/l. MRI was done which was not suggestive of acute osteomyelitis; fluid collection was suspected. Patient underwent LE Arterial Doppler, which showed occluded R superficial femoral and popliteal arteries with multiple stents and Occluded proximal and mid left superficial femoral artery with reconstitution distally. Patient was initially treated with  foot wound with zosyn 2/11-2/15, and then switched over to cefepime and flagyl. Patient underwent R foot TMA on 2/15. Wound culture of R foot abscess grew numerous staph aureus, serratia marcescens, and strep agalactase. Intra-operative there was a concern for viability. Therefore, plan was to transfer patient to [facility] for IR thrombectomy for R superficial femoral and popliteal artery occlusion with multiple stents and Occluded proximal and mid left superficial femoral artery with reconstitution distally seen on LE arterial doppler.    Pt initially admitted to medicine and later transferred to surgery. Evaluated by pods rec cont iv abx, plan for RF wound debridement with application of graft pending IR. On 2/22 sp IR for RLE angiogram and angioplasty; L CFA puncture. RLE angiogram w/ patent R DESHAUN, IIA, EIA, and CFA. The SFA, including stent, was completely occluded. The profunda femoris was patent. There was reconstitution of the popliteal artery and single vessel runoff to the foot via the posterior tibial artery. Wire recannulization of the SFA/popliteal stent was performed followed by angioplasty with an 8 mm balloon. There was persistent poor flow through the stent. The profunda femoris artery remained patent with reconstitution of the popliteal artery. The case was aborted. The L groin was closed w/ a Celt device; rec vascular consult for potential revascularization options and resume home plavix. Evaluated by vascular, underwent b/l gsv vein mapping    ID consulted for foot infection, rec cont with cefepime/flagyl. Cardiology and medicine assisted with pre operative optimization. Cards rec stress test- showed areas of infarct and ischemia, rec'd cath and pt agreeable, planned for 2/25. [facility] with mild-mod LAD disease and severe RPL disease; per cards given pt asymptomatic and no LM/LAD severe disease will manage CAD medically, ASA. statin, Metoprolol. 2/27 sp OR for R femoral endarterectomy, fem-tib bypass with GSV conduit; operative findings- right common femoral endarterectomy with bovine patch angioplasty. GSV harvest and spliced, superior half in situ, inferior half reversed PT bypass. TMA revision The patient tolerated the operation well and there were no post-operative complications identified.     PT evaluated pt and recommended home with outpatient PT services and rollator.     Patient continued to recover appropriately and was deemed stable for dc to home with wound vac.   At time of discharge, the patient was hemodynamically stable, tolerating PO diet, voiding urine, passing stool, ambulating with assistance and was comfortable with adequate pain control. The patient was instructed to follow up with Dr. Hawkins, podiatry, cardiology and PMD within 1-2 weeks after discharge. Please follow-up with your Gastroenterologist Dr. Morfin within 1 week of your discharge for recent EGD showing candida esophagitis.

## 2022-03-01 NOTE — DISCHARGE NOTE PROVIDER - NSDCCPCAREPLAN_GEN_ALL_CORE_FT
PRINCIPAL DISCHARGE DIAGNOSIS  Diagnosis: Gangrene of right foot  Assessment and Plan of Treatment:       SECONDARY DISCHARGE DIAGNOSES  Diagnosis: CAD (coronary artery disease)  Assessment and Plan of Treatment:     Diagnosis: DM (diabetes mellitus)  Assessment and Plan of Treatment:     Diagnosis: Atrial fibrillation  Assessment and Plan of Treatment:      PRINCIPAL DISCHARGE DIAGNOSIS  Diagnosis: Gangrene of right foot  Assessment and Plan of Treatment:       SECONDARY DISCHARGE DIAGNOSES  Diagnosis: DM (diabetes mellitus)  Assessment and Plan of Treatment:     Diagnosis: Atrial fibrillation  Assessment and Plan of Treatment: Please continue to take Xarelto 2.5mg today times a day.    Diagnosis: CAD (coronary artery disease)  Assessment and Plan of Treatment: Please follow up outpatient with Dr. Burger on 3/14 at 9am.   s/p [facility] with mild-mod LAD disease and severe RPL disease  -- given asymptomatic and no LM/LAD severe disease will manage CAD medically, ASA. statin, Metoprolol       PRINCIPAL DISCHARGE DIAGNOSIS  Diagnosis: Gangrene of right foot  Assessment and Plan of Treatment:       SECONDARY DISCHARGE DIAGNOSES  Diagnosis: DM (diabetes mellitus)  Assessment and Plan of Treatment:     Diagnosis: Atrial fibrillation  Assessment and Plan of Treatment: Please continue to take Xarelto 2.5mg today times a day.    Diagnosis: CAD (coronary artery disease)  Assessment and Plan of Treatment: Please follow up outpatient with Dr. Burger on 3/14 at 9am.   s/p [facility] with mild-mod LAD disease and severe RPL disease  -- given asymptomatic and no LM/LAD severe disease will manage CAD medically, ASA. statin, Metoprolol  Please stop taking Enalapril. A new prescription for metoprolol succinate was sent to your pharmacy.        PRINCIPAL DISCHARGE DIAGNOSIS  Diagnosis: Gangrene of right foot  Assessment and Plan of Treatment:       SECONDARY DISCHARGE DIAGNOSES  Diagnosis: DM (diabetes mellitus)  Assessment and Plan of Treatment: Please follow up outpatient with your endocrinologist and PCP within 1-2 weeks of discharge.    Diagnosis: Atrial fibrillation  Assessment and Plan of Treatment: Please continue to take Xarelto 2.5mg today times a day.    Diagnosis: CAD (coronary artery disease)  Assessment and Plan of Treatment: Please follow up outpatient with Dr. Burger on 3/14 at 9am.   s/p [facility] with mild-mod LAD disease and severe RPL disease  -- given asymptomatic and no LM/LAD severe disease will manage CAD medically, ASA. statin, Metoprolol  Please stop taking Enalapril. A new prescription for metoprolol succinate was sent to your pharmacy.

## 2022-03-01 NOTE — DISCHARGE NOTE PROVIDER - NSDCHHNEEDSERVICE_GEN_ALL_CORE
Is This A New Presentation, Or A Follow-Up?: Skin Lesions How Severe Is Your Skin Lesion?: mild Have Your Skin Lesions Been Treated?: not been treated Other, specify...

## 2022-03-01 NOTE — PROGRESS NOTE ADULT - SUBJECTIVE AND OBJECTIVE BOX
Follow Up:  DM foot    Interval History/ROS: Feels good. Pain under control. No fevers or chills. No diarrhea. No need for surgery tomorrow, already had TMA revision done along with bypass.     Allergies  No Known Allergies        ANTIMICROBIALS:  cefepime   IVPB    cefepime   IVPB 1000 every 8 hours  metroNIDAZOLE    Tablet 500 three times a day      OTHER MEDS:  acetaminophen     Tablet .. 650 milliGRAM(s) Oral every 6 hours PRN  aluminum hydroxide/magnesium hydroxide/simethicone Suspension 30 milliLiter(s) Oral every 6 hours PRN  ascorbic acid 500 milliGRAM(s) Oral daily  aspirin enteric coated 81 milliGRAM(s) Oral daily  atorvastatin 40 milliGRAM(s) Oral at bedtime  dextrose 40% Gel 15 Gram(s) Oral once  dextrose 5%. 1000 milliLiter(s) IV Continuous <Continuous>  dextrose 50% Injectable 25 Gram(s) IV Push once  dextrose 50% Injectable 12.5 Gram(s) IV Push once  gabapentin 400 milliGRAM(s) Oral three times a day  glucagon  Injectable 1 milliGRAM(s) IntraMuscular once  insulin glargine Injectable (LANTUS) 9 Unit(s) SubCutaneous at bedtime  insulin lispro (ADMELOG) corrective regimen sliding scale   SubCutaneous three times a day before meals  insulin lispro (ADMELOG) corrective regimen sliding scale   SubCutaneous at bedtime  insulin lispro Injectable (ADMELOG) 3 Unit(s) SubCutaneous three times a day before meals  insulin regular  human recombinant. 5 Unit(s) IV Push once  melatonin 3 milliGRAM(s) Oral at bedtime PRN  metoprolol tartrate 12.5 milliGRAM(s) Oral two times a day  nicotine - 21 mG/24Hr(s) Patch 1 patch Transdermal daily  oxyCODONE    IR 5 milliGRAM(s) Oral every 4 hours PRN  pantoprazole  Injectable 40 milliGRAM(s) IV Push every 12 hours  polyethylene glycol 3350 17 Gram(s) Oral daily  rivaroxaban 2.5 milliGRAM(s) Oral two times a day  senna 2 Tablet(s) Oral at bedtime  simethicone 80 milliGRAM(s) Chew every 6 hours PRN  sucralfate 1 Gram(s) Oral four times a day      Vital Signs Last 24 Hrs  T(C): 36.9 (01 Mar 2022 13:41), Max: 37.7 (28 Feb 2022 21:41)  T(F): 98.5 (01 Mar 2022 13:41), Max: 99.9 (28 Feb 2022 21:41)  HR: 80 (01 Mar 2022 13:41) (70 - 88)  BP: 125/68 (01 Mar 2022 13:41) (106/62 - 150/69)  BP(mean): --  RR: 18 (01 Mar 2022 13:41) (18 - 18)  SpO2: 96% (01 Mar 2022 13:41) (94% - 98%)    Physical Exam:  General: non toxic  Cardio: regular rate   Respiratory: nonlabored on room air   abd: nondistended, soft, nontender   Musculoskeletal: right TMA dressed   Skin: right medial leg bypass sites dressed   psych: normal affect                          10.2   12.65 )-----------( 385      ( 01 Mar 2022 06:56 )             32.6       03-01    133<L>  |  99  |  8   ----------------------------<  241<H>  3.9   |  26  |  0.67    Ca    8.1<L>      01 Mar 2022 06:56  Phos  2.7     03-01  Mg     1.6     03-01            MICROBIOLOGY:  Surgical Swab RIGHT FOOT DEEP WOUND  02-16-22   Few Serratia marcescens  Rare Staphylococcus aureus  Few Streptococcus agalactiae (Group B) isolated  Group B streptococci are susceptible to ampicillin,  penicillin and cefazolin, but may be resistant to  erythromycin and clindamycin.  Recommendations for intrapartum prophylaxis for Group B  streptococci are penicillin or ampicillin.  --  Serratia marcescens  Staphylococcus aureus    .Abscess right foot  02-12-22   Numerous Staphylococcus aureus  Numerous Serratia marcescens  Numerous Streptococcus agalactiae (Group B) isolated  Group B streptococci are susceptible to ampicillin,  penicillin and cefazolin, but may be resistant to  erythromycin and clindamycin.  Recommendations for intrapartum prophylaxis for Group B  streptococci are penicillin or ampicillin.  Few Finegoldia magna "Susceptibilities not performed"  --  Staphylococcus aureus  Serratia marcescens      RADIOLOGY:  Images below reviewed personally  Xray Foot AP + Lateral + Oblique, Right (02.16.22 @ 15:16)   Status post first through fifth transmetatarsal amputations.

## 2022-03-02 ENCOUNTER — TRANSCRIPTION ENCOUNTER (OUTPATIENT)
Age: 62
End: 2022-03-02

## 2022-03-02 LAB
ANION GAP SERPL CALC-SCNC: 8 MMOL/L — SIGNIFICANT CHANGE UP (ref 5–17)
BUN SERPL-MCNC: 6 MG/DL — LOW (ref 7–23)
CALCIUM SERPL-MCNC: 8.5 MG/DL — SIGNIFICANT CHANGE UP (ref 8.4–10.5)
CHLORIDE SERPL-SCNC: 101 MMOL/L — SIGNIFICANT CHANGE UP (ref 96–108)
CO2 SERPL-SCNC: 26 MMOL/L — SIGNIFICANT CHANGE UP (ref 22–31)
CREAT SERPL-MCNC: 0.57 MG/DL — SIGNIFICANT CHANGE UP (ref 0.5–1.3)
EGFR: 111 ML/MIN/1.73M2 — SIGNIFICANT CHANGE UP
GLUCOSE BLDC GLUCOMTR-MCNC: 197 MG/DL — HIGH (ref 70–99)
GLUCOSE BLDC GLUCOMTR-MCNC: 249 MG/DL — HIGH (ref 70–99)
GLUCOSE BLDC GLUCOMTR-MCNC: 276 MG/DL — HIGH (ref 70–99)
GLUCOSE BLDC GLUCOMTR-MCNC: 327 MG/DL — HIGH (ref 70–99)
GLUCOSE SERPL-MCNC: 187 MG/DL — HIGH (ref 70–99)
HCT VFR BLD CALC: 31.9 % — LOW (ref 39–50)
HGB BLD-MCNC: 10.2 G/DL — LOW (ref 13–17)
MAGNESIUM SERPL-MCNC: 1.7 MG/DL — SIGNIFICANT CHANGE UP (ref 1.6–2.6)
MCHC RBC-ENTMCNC: 29.7 PG — SIGNIFICANT CHANGE UP (ref 27–34)
MCHC RBC-ENTMCNC: 32 GM/DL — SIGNIFICANT CHANGE UP (ref 32–36)
MCV RBC AUTO: 93 FL — SIGNIFICANT CHANGE UP (ref 80–100)
NRBC # BLD: 0 /100 WBCS — SIGNIFICANT CHANGE UP (ref 0–0)
PHOSPHATE SERPL-MCNC: 3.4 MG/DL — SIGNIFICANT CHANGE UP (ref 2.5–4.5)
PLATELET # BLD AUTO: 373 K/UL — SIGNIFICANT CHANGE UP (ref 150–400)
POTASSIUM SERPL-MCNC: 4.3 MMOL/L — SIGNIFICANT CHANGE UP (ref 3.5–5.3)
POTASSIUM SERPL-SCNC: 4.3 MMOL/L — SIGNIFICANT CHANGE UP (ref 3.5–5.3)
RBC # BLD: 3.43 M/UL — LOW (ref 4.2–5.8)
RBC # FLD: 14 % — SIGNIFICANT CHANGE UP (ref 10.3–14.5)
SODIUM SERPL-SCNC: 135 MMOL/L — SIGNIFICANT CHANGE UP (ref 135–145)
WBC # BLD: 11.34 K/UL — HIGH (ref 3.8–10.5)
WBC # FLD AUTO: 11.34 K/UL — HIGH (ref 3.8–10.5)

## 2022-03-02 RX ORDER — MAGNESIUM SULFATE 500 MG/ML
2 VIAL (ML) INJECTION ONCE
Refills: 0 | Status: COMPLETED | OUTPATIENT
Start: 2022-03-02 | End: 2022-03-02

## 2022-03-02 RX ADMIN — Medication 2: at 13:19

## 2022-03-02 RX ADMIN — Medication 1 GRAM(S): at 05:17

## 2022-03-02 RX ADMIN — Medication 3 UNIT(S): at 18:21

## 2022-03-02 RX ADMIN — Medication 1: at 21:42

## 2022-03-02 RX ADMIN — Medication 1 PATCH: at 13:20

## 2022-03-02 RX ADMIN — PANTOPRAZOLE SODIUM 40 MILLIGRAM(S): 20 TABLET, DELAYED RELEASE ORAL at 17:03

## 2022-03-02 RX ADMIN — Medication 25 GRAM(S): at 08:18

## 2022-03-02 RX ADMIN — OXYCODONE HYDROCHLORIDE 5 MILLIGRAM(S): 5 TABLET ORAL at 02:00

## 2022-03-02 RX ADMIN — OXYCODONE HYDROCHLORIDE 5 MILLIGRAM(S): 5 TABLET ORAL at 11:24

## 2022-03-02 RX ADMIN — Medication 650 MILLIGRAM(S): at 06:00

## 2022-03-02 RX ADMIN — Medication 650 MILLIGRAM(S): at 05:17

## 2022-03-02 RX ADMIN — OXYCODONE HYDROCHLORIDE 5 MILLIGRAM(S): 5 TABLET ORAL at 17:33

## 2022-03-02 RX ADMIN — OXYCODONE HYDROCHLORIDE 5 MILLIGRAM(S): 5 TABLET ORAL at 00:37

## 2022-03-02 RX ADMIN — Medication 500 MILLIGRAM(S): at 11:24

## 2022-03-02 RX ADMIN — Medication 1 PATCH: at 01:32

## 2022-03-02 RX ADMIN — RIVAROXABAN 2.5 MILLIGRAM(S): KIT at 17:03

## 2022-03-02 RX ADMIN — SENNA PLUS 2 TABLET(S): 8.6 TABLET ORAL at 21:41

## 2022-03-02 RX ADMIN — INSULIN GLARGINE 9 UNIT(S): 100 INJECTION, SOLUTION SUBCUTANEOUS at 21:42

## 2022-03-02 RX ADMIN — Medication 1 PATCH: at 19:45

## 2022-03-02 RX ADMIN — Medication 81 MILLIGRAM(S): at 11:24

## 2022-03-02 RX ADMIN — RIVAROXABAN 2.5 MILLIGRAM(S): KIT at 05:17

## 2022-03-02 RX ADMIN — OXYCODONE HYDROCHLORIDE 5 MILLIGRAM(S): 5 TABLET ORAL at 23:00

## 2022-03-02 RX ADMIN — OXYCODONE HYDROCHLORIDE 5 MILLIGRAM(S): 5 TABLET ORAL at 05:17

## 2022-03-02 RX ADMIN — Medication 3 UNIT(S): at 08:50

## 2022-03-02 RX ADMIN — Medication 12.5 MILLIGRAM(S): at 05:16

## 2022-03-02 RX ADMIN — OXYCODONE HYDROCHLORIDE 5 MILLIGRAM(S): 5 TABLET ORAL at 06:00

## 2022-03-02 RX ADMIN — PANTOPRAZOLE SODIUM 40 MILLIGRAM(S): 20 TABLET, DELAYED RELEASE ORAL at 05:17

## 2022-03-02 RX ADMIN — Medication 12.5 MILLIGRAM(S): at 17:02

## 2022-03-02 RX ADMIN — Medication 650 MILLIGRAM(S): at 21:41

## 2022-03-02 RX ADMIN — GABAPENTIN 400 MILLIGRAM(S): 400 CAPSULE ORAL at 05:17

## 2022-03-02 RX ADMIN — Medication 650 MILLIGRAM(S): at 23:00

## 2022-03-02 RX ADMIN — Medication 1: at 08:51

## 2022-03-02 RX ADMIN — Medication 1 GRAM(S): at 11:23

## 2022-03-02 RX ADMIN — Medication 1 PATCH: at 08:18

## 2022-03-02 RX ADMIN — Medication 4: at 18:21

## 2022-03-02 RX ADMIN — GABAPENTIN 400 MILLIGRAM(S): 400 CAPSULE ORAL at 13:20

## 2022-03-02 RX ADMIN — OXYCODONE HYDROCHLORIDE 5 MILLIGRAM(S): 5 TABLET ORAL at 11:55

## 2022-03-02 RX ADMIN — ATORVASTATIN CALCIUM 40 MILLIGRAM(S): 80 TABLET, FILM COATED ORAL at 21:42

## 2022-03-02 RX ADMIN — OXYCODONE HYDROCHLORIDE 5 MILLIGRAM(S): 5 TABLET ORAL at 17:03

## 2022-03-02 RX ADMIN — OXYCODONE HYDROCHLORIDE 5 MILLIGRAM(S): 5 TABLET ORAL at 21:41

## 2022-03-02 RX ADMIN — GABAPENTIN 400 MILLIGRAM(S): 400 CAPSULE ORAL at 21:42

## 2022-03-02 RX ADMIN — Medication 3 UNIT(S): at 13:19

## 2022-03-02 RX ADMIN — Medication 1 GRAM(S): at 17:03

## 2022-03-02 NOTE — PROGRESS NOTE ADULT - SUBJECTIVE AND OBJECTIVE BOX
DATE OF SERVICE: 03-02-22     Patient is a 61y old  Male who presents with a chief complaint of gangrene (25 Feb 2022 07:16)      INTERVAL HISTORY: feels ok    REVIEW OF SYSTEMS:  CONSTITUTIONAL: No weakness  EYES/ENT: No visual changes;  No throat pain   NECK: No pain or stiffness  RESPIRATORY: No cough, wheezing; No shortness of breath  CARDIOVASCULAR: No chest pain or palpitations  GASTROINTESTINAL: No abdominal  pain. No nausea, vomiting, or hematemesis  GENITOURINARY: No dysuria, frequency or hematuria  NEUROLOGICAL: No stroke like symptoms  SKIN: No rashes    MEDICATIONS:  metoprolol tartrate 12.5 milliGRAM(s) Oral two times a day        PHYSICAL EXAM:  T(C): 37.2 (03-02-22 @ 16:56), Max: 37.2 (03-02-22 @ 16:56)  HR: 75 (03-02-22 @ 16:56) (63 - 95)  BP: 123/71 (03-02-22 @ 16:56) (110/66 - 139/75)  RR: 18 (03-02-22 @ 16:56) (18 - 18)  SpO2: 94% (03-02-22 @ 16:56) (94% - 97%)  Wt(kg): --  I&O's Summary    01 Mar 2022 07:01  -  02 Mar 2022 07:00  --------------------------------------------------------  IN: 1060 mL / OUT: 1585 mL / NET: -525 mL    02 Mar 2022 07:01  -  02 Mar 2022 23:46  --------------------------------------------------------  IN: 1080 mL / OUT: 1165 mL / NET: -85 mL          Appearance: In no distress	  HEENT:    PERRL, EOMI	  Cardiovascular:  S1 S2, No JVD  Respiratory: Lungs clear to auscultation	  Gastrointestinal:  Soft, Non-tender, + BS	  Vascularature:  No edema of LE  Psychiatric: Appropriate affect   Neuro: no acute focal deficits                               10.2   11.34 )-----------( 373      ( 02 Mar 2022 07:03 )             31.9     03-02    135  |  101  |  6<L>  ----------------------------<  187<H>  4.3   |  26  |  0.57    Ca    8.5      02 Mar 2022 07:03  Phos  3.4     03-02  Mg     1.7     03-02          Labs personally reviewed      ASSESSMENT/PLAN: 	      62 y/o M w/ PMHx DM, PAD s/p R TMA 2/15 transferred from [facility] for IR thrombectomy for R superficial femoral and popliteal artery occlusion and likely surgical revision with podiatry with continued poor flow s/p IR procedure, now for vascular bypass surgery     Problem/Plan - 1:  ·  Problem: Peripheral artery disease.   ·  Plan: Pt transferred to [facility] for IR angiogram and angioplasty. Wire recannulization of the SFA/popliteal stent was performed followed by angioplasty on 2/22. There was persistent poor flow through the stent.   - Vascular consulted for RLE bypass  - s/p bypass 2/27  - c/w ASA & Lipitor.  - PT eval      Problem/Plan - 2:  ·  Problem: Paroxysmal Atrial fibrillation/flutter  ·  Plan: CHADSVASc of 2-3  - H/H stable with EGD showing esophagitis  - Echo showed EF 55-60% & moderate aortic stenosis  - started on xarelto       Problem/Plan - 3:  ·  Problem: Pre-operative risk stratification  ·  Plan: Pre-operative clearance for open vascular LE Bypass  - unable to assess functional capacity given limited by PAD  - heavy smoker and diabetic going to high risk surgery  - TTE with preserved EF and Mod AS  -NM stress test with areas of infarct and ischemia, r/b of cath discussed with pt, agreeable to proceed   - s/p LHC with mild-mod LAD disease and severe RPL disease  -- given asymptomatic and no LM/LAD severe disease will manage CAD medically, ASA. statin, Metoprolol  - Discussed with vascular surg Dr Hawkins and IC Dr Milian, all in agreement with plan  - Elevated risk for high risk vascular surgery, no contraindication to proceed with his nonelective limb salvage surgery   - 2/27 - tolerated surgery well   - pt on metoprolol, ASA and statin      Problem/Plan - 4:  ·  Problem: CAD  s/p [facility] with mild-mod LAD disease and severe RPL disease  -- given asymptomatic and no LM/LAD severe disease will manage CAD medically, ASA. statin, Metoprolol  - needs outpt cardio follow up           Pierre Burger DO [facility]  Cardiovascular Medicine  [address] [place] [address], [address]  Office: [phone]  Cell: [phone]

## 2022-03-02 NOTE — DISCHARGE NOTE NURSING/CASE MANAGEMENT/SOCIAL WORK - NSDCPEEMAIL_GEN_ALL_CORE
[facility] for Tobacco Control email [email or web address]

## 2022-03-02 NOTE — DISCHARGE NOTE NURSING/CASE MANAGEMENT/SOCIAL WORK - NSDCPEWEB_GEN_ALL_CORE
[facility] for Tobacco Control website --- [email or web address] website --- [email or web address]

## 2022-03-02 NOTE — DISCHARGE NOTE NURSING/CASE MANAGEMENT/SOCIAL WORK - NSDCPEFALRISK_GEN_ALL_CORE
For information on Fall & Injury Prevention, visit: [email or web address] OR  [email or web address] OR  https://www.cdc.gov/steadi/patient.html

## 2022-03-02 NOTE — PROGRESS NOTE ADULT - ASSESSMENT
60 YO M with right foot gangrene, TMA on 2/15, s/p failed aborted angiogram and stent placement in SFA with IR on 2/22. Vascular surgery consulted for potential revascularization options. Pt is s/p  R femoral endarterectomy, fem-tib bypass with GSV conduit 2/27    Plan:  - Daily dressing changes  - ASA restarted  - OOB and ambulating as tolerated  - F/u AM labs  - physical therapy   - dc planning    *INCOMPLETE NOTE*   Vascular  9001   60 YO M with right foot gangrene, TMA on 2/15, s/p failed aborted angiogram and stent placement in SFA with IR on 2/22. Vascular surgery consulted for potential revascularization options. Pt is s/p  R femoral endarterectomy, fem-tib bypass with GSV conduit 2/27    Plan:  - R foot wound has vac  - ASA restarted  - OOB and ambulating as tolerated  - F/u AM labs  - physical therapy   - dc planning    Vascular  [id number]

## 2022-03-02 NOTE — PROGRESS NOTE ADULT - SUBJECTIVE AND OBJECTIVE BOX
Vascular Surgery Progress Note    Subjective:   Patient seen at bedside this AM.    Objective:  Vital Signs  T(C): 36.9 (03-02 @ 01:02), Max: 37.7 (03-01 @ 21:10)  HR: 70 (03-02 @ 01:02) (70 - 93)  BP: 110/66 (03-02 @ 01:02) (106/62 - 125/68)  RR: 18 (03-02 @ 01:02) (18 - 18)  SpO2: 97% (03-02 @ 01:02) (94% - 97%)  02-28-22 @ 07:01  -  03-01-22 @ 07:00  --------------------------------------------------------  IN:  Total IN: 0 mL    OUT:    Indwelling Catheter - Urethral (mL): 550 mL    Voided (mL): 375 mL  Total OUT: 925 mL    Total NET: -925 mL      03-01-22 @ 07:01  -  03-02-22 @ 01:26  --------------------------------------------------------  IN:  Total IN: 0 mL    OUT:    Voided (mL): 1075 mL  Total OUT: 1075 mL    Total NET: -1075 mL        Physical Exam:   GEN: resting in bed comfortably in NAD  RESP: no increased WOB  EXTR: aquacel over R groin and R lower calf c/d/i, PT palpable. ACE wrap over R foot c/d/i  NEURO: awake, alert       Labs:                        10.2   12.65 )-----------( 385      ( 01 Mar 2022 06:56 )             32.6   03-01    133<L>  |  99  |  8   ----------------------------<  241<H>  3.9   |  26  |  0.67    Ca    8.1<L>      01 Mar 2022 06:56  Phos  2.7     03-01  Mg     1.6     03-01      CAPILLARY BLOOD GLUCOSE      POCT Blood Glucose.: 345 mg/dL (01 Mar 2022 21:33)  POCT Blood Glucose.: 258 mg/dL (01 Mar 2022 17:03)  POCT Blood Glucose.: 259 mg/dL (01 Mar 2022 12:10)  POCT Blood Glucose.: 225 mg/dL (01 Mar 2022 07:40)      Imaging:     Vascular Surgery Progress Note    Subjective:   Patient seen at bedside this AM. Pt's right foot wound has a wound vac in place. Pt denies any new complaints.      Objective:  Vital Signs  T(C): 36.9 (03-02 @ 01:02), Max: 37.7 (03-01 @ 21:10)  HR: 70 (03-02 @ 01:02) (70 - 93)  BP: 110/66 (03-02 @ 01:02) (106/62 - 125/68)  RR: 18 (03-02 @ 01:02) (18 - 18)  SpO2: 97% (03-02 @ 01:02) (94% - 97%)  02-28-22 @ 07:01  -  03-01-22 @ 07:00  --------------------------------------------------------  IN:  Total IN: 0 mL    OUT:    Indwelling Catheter - Urethral (mL): 550 mL    Voided (mL): 375 mL  Total OUT: 925 mL    Total NET: -925 mL      03-01-22 @ 07:01  -  03-02-22 @ 01:26  --------------------------------------------------------  IN:  Total IN: 0 mL    OUT:    Voided (mL): 1075 mL  Total OUT: 1075 mL    Total NET: -1075 mL        Physical Exam:   GEN: resting in bed comfortably in NAD  RESP: no increased WOB  EXTR: aquacel over R groin and R lower calf c/d/i, PT palpable. R foot wound vacced  NEURO: awake, alert       Labs:                        10.2   12.65 )-----------( 385      ( 01 Mar 2022 06:56 )             32.6   03-01    133<L>  |  99  |  8   ----------------------------<  241<H>  3.9   |  26  |  0.67    Ca    8.1<L>      01 Mar 2022 06:56  Phos  2.7     03-01  Mg     1.6     03-01      CAPILLARY BLOOD GLUCOSE      POCT Blood Glucose.: 345 mg/dL (01 Mar 2022 21:33)  POCT Blood Glucose.: 258 mg/dL (01 Mar 2022 17:03)  POCT Blood Glucose.: 259 mg/dL (01 Mar 2022 12:10)  POCT Blood Glucose.: 225 mg/dL (01 Mar 2022 07:40)      Imaging:

## 2022-03-02 NOTE — DISCHARGE NOTE NURSING/CASE MANAGEMENT/SOCIAL WORK - NSDCDMENAME_GEN_ALL_CORE_FT
[facility] [employer]  ext: [phone] - Rollator provided  [facility]  - Wound vac supplier

## 2022-03-02 NOTE — DISCHARGE NOTE NURSING/CASE MANAGEMENT/SOCIAL WORK - PATIENT PORTAL LINK FT
You can access the FollowMyHealth Patient Portal offered by [facility] by registering at the following website: [email or web address]. By joining [employer]’s FollowMyHealth portal, you will also be able to view your health information using other applications (apps) compatible with our system.

## 2022-03-03 VITALS
DIASTOLIC BLOOD PRESSURE: 74 MMHG | TEMPERATURE: 98 F | SYSTOLIC BLOOD PRESSURE: 138 MMHG | RESPIRATION RATE: 18 BRPM | HEART RATE: 74 BPM | OXYGEN SATURATION: 97 %

## 2022-03-03 LAB
ANION GAP SERPL CALC-SCNC: 11 MMOL/L — SIGNIFICANT CHANGE UP (ref 5–17)
BUN SERPL-MCNC: 6 MG/DL — LOW (ref 7–23)
CALCIUM SERPL-MCNC: 8.4 MG/DL — SIGNIFICANT CHANGE UP (ref 8.4–10.5)
CHLORIDE SERPL-SCNC: 99 MMOL/L — SIGNIFICANT CHANGE UP (ref 96–108)
CO2 SERPL-SCNC: 25 MMOL/L — SIGNIFICANT CHANGE UP (ref 22–31)
CREAT SERPL-MCNC: 0.56 MG/DL — SIGNIFICANT CHANGE UP (ref 0.5–1.3)
EGFR: 111 ML/MIN/1.73M2 — SIGNIFICANT CHANGE UP
GLUCOSE BLDC GLUCOMTR-MCNC: 208 MG/DL — HIGH (ref 70–99)
GLUCOSE BLDC GLUCOMTR-MCNC: 307 MG/DL — HIGH (ref 70–99)
GLUCOSE SERPL-MCNC: 197 MG/DL — HIGH (ref 70–99)
HCT VFR BLD CALC: 33.5 % — LOW (ref 39–50)
HGB BLD-MCNC: 10.6 G/DL — LOW (ref 13–17)
MAGNESIUM SERPL-MCNC: 1.7 MG/DL — SIGNIFICANT CHANGE UP (ref 1.6–2.6)
MCHC RBC-ENTMCNC: 29.2 PG — SIGNIFICANT CHANGE UP (ref 27–34)
MCHC RBC-ENTMCNC: 31.6 GM/DL — LOW (ref 32–36)
MCV RBC AUTO: 92.3 FL — SIGNIFICANT CHANGE UP (ref 80–100)
NRBC # BLD: 0 /100 WBCS — SIGNIFICANT CHANGE UP (ref 0–0)
PHOSPHATE SERPL-MCNC: 3.3 MG/DL — SIGNIFICANT CHANGE UP (ref 2.5–4.5)
PLATELET # BLD AUTO: 405 K/UL — HIGH (ref 150–400)
POTASSIUM SERPL-MCNC: 3.8 MMOL/L — SIGNIFICANT CHANGE UP (ref 3.5–5.3)
POTASSIUM SERPL-SCNC: 3.8 MMOL/L — SIGNIFICANT CHANGE UP (ref 3.5–5.3)
RBC # BLD: 3.63 M/UL — LOW (ref 4.2–5.8)
RBC # FLD: 14 % — SIGNIFICANT CHANGE UP (ref 10.3–14.5)
SODIUM SERPL-SCNC: 135 MMOL/L — SIGNIFICANT CHANGE UP (ref 135–145)
WBC # BLD: 8.49 K/UL — SIGNIFICANT CHANGE UP (ref 3.8–10.5)
WBC # FLD AUTO: 8.49 K/UL — SIGNIFICANT CHANGE UP (ref 3.8–10.5)

## 2022-03-03 PROCEDURE — 78452 HT MUSCLE IMAGE SPECT MULT: CPT

## 2022-03-03 PROCEDURE — C1773: CPT

## 2022-03-03 PROCEDURE — 84132 ASSAY OF SERUM POTASSIUM: CPT

## 2022-03-03 PROCEDURE — 85018 HEMOGLOBIN: CPT

## 2022-03-03 PROCEDURE — P9045: CPT

## 2022-03-03 PROCEDURE — 85730 THROMBOPLASTIN TIME PARTIAL: CPT

## 2022-03-03 PROCEDURE — 85610 PROTHROMBIN TIME: CPT

## 2022-03-03 PROCEDURE — 82565 ASSAY OF CREATININE: CPT

## 2022-03-03 PROCEDURE — 37226: CPT

## 2022-03-03 PROCEDURE — 82803 BLOOD GASES ANY COMBINATION: CPT

## 2022-03-03 PROCEDURE — C1768: CPT

## 2022-03-03 PROCEDURE — C8929: CPT

## 2022-03-03 PROCEDURE — C1760: CPT

## 2022-03-03 PROCEDURE — 88311 DECALCIFY TISSUE: CPT

## 2022-03-03 PROCEDURE — C1894: CPT

## 2022-03-03 PROCEDURE — 93970 EXTREMITY STUDY: CPT

## 2022-03-03 PROCEDURE — U0003: CPT

## 2022-03-03 PROCEDURE — 82962 GLUCOSE BLOOD TEST: CPT

## 2022-03-03 PROCEDURE — 82435 ASSAY OF BLOOD CHLORIDE: CPT

## 2022-03-03 PROCEDURE — 84295 ASSAY OF SERUM SODIUM: CPT

## 2022-03-03 PROCEDURE — U0005: CPT

## 2022-03-03 PROCEDURE — 97161 PT EVAL LOW COMPLEX 20 MIN: CPT

## 2022-03-03 PROCEDURE — 84100 ASSAY OF PHOSPHORUS: CPT

## 2022-03-03 PROCEDURE — 97530 THERAPEUTIC ACTIVITIES: CPT

## 2022-03-03 PROCEDURE — C9399: CPT

## 2022-03-03 PROCEDURE — 97116 GAIT TRAINING THERAPY: CPT

## 2022-03-03 PROCEDURE — 99152 MOD SED SAME PHYS/QHP 5/>YRS: CPT

## 2022-03-03 PROCEDURE — 83735 ASSAY OF MAGNESIUM: CPT

## 2022-03-03 PROCEDURE — 88305 TISSUE EXAM BY PATHOLOGIST: CPT

## 2022-03-03 PROCEDURE — C1889: CPT

## 2022-03-03 PROCEDURE — 86900 BLOOD TYPING SEROLOGIC ABO: CPT

## 2022-03-03 PROCEDURE — 86901 BLOOD TYPING SEROLOGIC RH(D): CPT

## 2022-03-03 PROCEDURE — 99153 MOD SED SAME PHYS/QHP EA: CPT

## 2022-03-03 PROCEDURE — 88304 TISSUE EXAM BY PATHOLOGIST: CPT

## 2022-03-03 PROCEDURE — A9500: CPT

## 2022-03-03 PROCEDURE — C1769: CPT

## 2022-03-03 PROCEDURE — 86850 RBC ANTIBODY SCREEN: CPT

## 2022-03-03 PROCEDURE — 83605 ASSAY OF LACTIC ACID: CPT

## 2022-03-03 PROCEDURE — 80053 COMPREHEN METABOLIC PANEL: CPT

## 2022-03-03 PROCEDURE — 80048 BASIC METABOLIC PNL TOTAL CA: CPT

## 2022-03-03 PROCEDURE — 85025 COMPLETE CBC W/AUTO DIFF WBC: CPT

## 2022-03-03 PROCEDURE — 97164 PT RE-EVAL EST PLAN CARE: CPT

## 2022-03-03 PROCEDURE — 82947 ASSAY GLUCOSE BLOOD QUANT: CPT

## 2022-03-03 PROCEDURE — 85014 HEMATOCRIT: CPT

## 2022-03-03 PROCEDURE — C1887: CPT

## 2022-03-03 PROCEDURE — 85027 COMPLETE CBC AUTOMATED: CPT

## 2022-03-03 PROCEDURE — 93005 ELECTROCARDIOGRAM TRACING: CPT

## 2022-03-03 PROCEDURE — 93458 L HRT ARTERY/VENTRICLE ANGIO: CPT

## 2022-03-03 PROCEDURE — 36415 COLL VENOUS BLD VENIPUNCTURE: CPT

## 2022-03-03 PROCEDURE — 93017 CV STRESS TEST TRACING ONLY: CPT

## 2022-03-03 PROCEDURE — 82330 ASSAY OF CALCIUM: CPT

## 2022-03-03 RX ORDER — CLOPIDOGREL BISULFATE 75 MG/1
1 TABLET, FILM COATED ORAL
Qty: 0 | Refills: 0 | DISCHARGE

## 2022-03-03 RX ORDER — ACETAMINOPHEN 500 MG
2 TABLET ORAL
Qty: 0 | Refills: 0 | DISCHARGE
Start: 2022-03-03

## 2022-03-03 RX ORDER — FLUCONAZOLE 150 MG/1
TABLET ORAL
Refills: 0 | Status: DISCONTINUED | OUTPATIENT
Start: 2022-03-03 | End: 2022-03-03

## 2022-03-03 RX ORDER — RIVAROXABAN 15 MG-20MG
1 KIT ORAL
Qty: 60 | Refills: 0
Start: 2022-03-03 | End: 2022-04-01

## 2022-03-03 RX ORDER — FLUCONAZOLE 150 MG/1
400 TABLET ORAL ONCE
Refills: 0 | Status: COMPLETED | OUTPATIENT
Start: 2022-03-03 | End: 2022-03-03

## 2022-03-03 RX ORDER — FLUCONAZOLE 150 MG/1
1 TABLET ORAL
Qty: 14 | Refills: 0
Start: 2022-03-03 | End: 2022-03-16

## 2022-03-03 RX ORDER — FLUCONAZOLE 150 MG/1
400 TABLET ORAL EVERY 24 HOURS
Refills: 0 | Status: DISCONTINUED | OUTPATIENT
Start: 2022-03-04 | End: 2022-03-03

## 2022-03-03 RX ORDER — ASPIRIN/CALCIUM CARB/MAGNESIUM 324 MG
1 TABLET ORAL
Qty: 30 | Refills: 0
Start: 2022-03-03 | End: 2022-04-01

## 2022-03-03 RX ORDER — ATORVASTATIN CALCIUM 80 MG/1
1 TABLET, FILM COATED ORAL
Qty: 30 | Refills: 0
Start: 2022-03-03 | End: 2022-04-01

## 2022-03-03 RX ORDER — METOPROLOL TARTRATE 50 MG
1 TABLET ORAL
Qty: 30 | Refills: 0
Start: 2022-03-03 | End: 2022-04-01

## 2022-03-03 RX ORDER — OXYCODONE HYDROCHLORIDE 5 MG/1
1 TABLET ORAL
Qty: 12 | Refills: 0
Start: 2022-03-03 | End: 2022-03-05

## 2022-03-03 RX ADMIN — Medication 2: at 08:37

## 2022-03-03 RX ADMIN — Medication 1 GRAM(S): at 11:11

## 2022-03-03 RX ADMIN — OXYCODONE HYDROCHLORIDE 5 MILLIGRAM(S): 5 TABLET ORAL at 09:18

## 2022-03-03 RX ADMIN — PANTOPRAZOLE SODIUM 40 MILLIGRAM(S): 20 TABLET, DELAYED RELEASE ORAL at 17:02

## 2022-03-03 RX ADMIN — Medication 12.5 MILLIGRAM(S): at 05:35

## 2022-03-03 RX ADMIN — Medication 12.5 MILLIGRAM(S): at 17:02

## 2022-03-03 RX ADMIN — RIVAROXABAN 2.5 MILLIGRAM(S): KIT at 05:34

## 2022-03-03 RX ADMIN — Medication 500 MILLIGRAM(S): at 11:11

## 2022-03-03 RX ADMIN — Medication 4: at 12:16

## 2022-03-03 RX ADMIN — OXYCODONE HYDROCHLORIDE 5 MILLIGRAM(S): 5 TABLET ORAL at 15:43

## 2022-03-03 RX ADMIN — Medication 1 PATCH: at 08:37

## 2022-03-03 RX ADMIN — Medication 3 UNIT(S): at 12:16

## 2022-03-03 RX ADMIN — OXYCODONE HYDROCHLORIDE 5 MILLIGRAM(S): 5 TABLET ORAL at 01:46

## 2022-03-03 RX ADMIN — Medication 1 GRAM(S): at 17:02

## 2022-03-03 RX ADMIN — Medication 81 MILLIGRAM(S): at 11:12

## 2022-03-03 RX ADMIN — Medication 650 MILLIGRAM(S): at 05:35

## 2022-03-03 RX ADMIN — OXYCODONE HYDROCHLORIDE 5 MILLIGRAM(S): 5 TABLET ORAL at 05:38

## 2022-03-03 RX ADMIN — PANTOPRAZOLE SODIUM 40 MILLIGRAM(S): 20 TABLET, DELAYED RELEASE ORAL at 05:34

## 2022-03-03 RX ADMIN — OXYCODONE HYDROCHLORIDE 5 MILLIGRAM(S): 5 TABLET ORAL at 06:59

## 2022-03-03 RX ADMIN — GABAPENTIN 400 MILLIGRAM(S): 400 CAPSULE ORAL at 13:31

## 2022-03-03 RX ADMIN — Medication 1 PATCH: at 12:15

## 2022-03-03 RX ADMIN — Medication 650 MILLIGRAM(S): at 07:00

## 2022-03-03 RX ADMIN — GABAPENTIN 400 MILLIGRAM(S): 400 CAPSULE ORAL at 05:34

## 2022-03-03 RX ADMIN — OXYCODONE HYDROCHLORIDE 5 MILLIGRAM(S): 5 TABLET ORAL at 02:45

## 2022-03-03 RX ADMIN — Medication 3 UNIT(S): at 08:37

## 2022-03-03 RX ADMIN — Medication 1 GRAM(S): at 00:42

## 2022-03-03 RX ADMIN — OXYCODONE HYDROCHLORIDE 5 MILLIGRAM(S): 5 TABLET ORAL at 09:48

## 2022-03-03 RX ADMIN — RIVAROXABAN 2.5 MILLIGRAM(S): KIT at 17:02

## 2022-03-03 RX ADMIN — Medication 1 GRAM(S): at 05:34

## 2022-03-03 RX ADMIN — FLUCONAZOLE 100 MILLIGRAM(S): 150 TABLET ORAL at 15:38

## 2022-03-03 NOTE — PROGRESS NOTE ADULT - SUBJECTIVE AND OBJECTIVE BOX
Vascular Surgery Progress Note    Subjective:   Patient seen at bedside this AM.    Objective:  Vital Signs  T(C): 37.1 (03-03 @ 01:26), Max: 37.2 (03-02 @ 16:56)  HR: 74 (03-03 @ 01:26) (63 - 95)  BP: 146/70 (03-03 @ 01:26) (123/69 - 146/70)  RR: 18 (03-03 @ 01:26) (18 - 18)  SpO2: 96% (03-03 @ 01:26) (94% - 97%)  03-01-22 @ 07:01  -  03-02-22 @ 07:00  --------------------------------------------------------  IN:  Total IN: 0 mL    OUT:    VAC (Vacuum Assisted Closure) System (mL): 60 mL    Voided (mL): 1525 mL  Total OUT: 1585 mL    Total NET: -1585 mL      03-02-22 @ 07:01  -  03-03-22 @ 01:50  --------------------------------------------------------  IN:  Total IN: 0 mL    OUT:    VAC (Vacuum Assisted Closure) System (mL): 15 mL    Voided (mL): 1450 mL  Total OUT: 1465 mL    Total NET: -1465 mL      Physical Exam:   GEN: resting in bed comfortably in NAD  RESP: no increased WOB  EXTR: aquacel over R groin and R lower calf c/d/i, PT palpable. R foot wound vacced  NEURO: awake, alert     Labs:                        10.2   11.34 )-----------( 373      ( 02 Mar 2022 07:03 )             31.9   03-02    135  |  101  |  6<L>  ----------------------------<  187<H>  4.3   |  26  |  0.57    Ca    8.5      02 Mar 2022 07:03  Phos  3.4     03-02  Mg     1.7     03-02      CAPILLARY BLOOD GLUCOSE      POCT Blood Glucose.: 276 mg/dL (02 Mar 2022 21:08)  POCT Blood Glucose.: 327 mg/dL (02 Mar 2022 17:29)  POCT Blood Glucose.: 249 mg/dL (02 Mar 2022 13:16)  POCT Blood Glucose.: 197 mg/dL (02 Mar 2022 08:42)      Imaging:     Vascular Surgery Progress Note    Subjective:   Patient seen at bedside this AM. Patient reports that he wants to go home this morning, regardless of wound vac delivery. Feels well with no complaints.     Objective:  Vital Signs  T(C): 37.1 (03-03 @ 01:26), Max: 37.2 (03-02 @ 16:56)  HR: 74 (03-03 @ 01:26) (63 - 95)  BP: 146/70 (03-03 @ 01:26) (123/69 - 146/70)  RR: 18 (03-03 @ 01:26) (18 - 18)  SpO2: 96% (03-03 @ 01:26) (94% - 97%)  03-01-22 @ 07:01  -  03-02-22 @ 07:00  --------------------------------------------------------  IN:  Total IN: 0 mL    OUT:    VAC (Vacuum Assisted Closure) System (mL): 60 mL    Voided (mL): 1525 mL  Total OUT: 1585 mL    Total NET: -1585 mL      03-02-22 @ 07:01  -  03-03-22 @ 01:50  --------------------------------------------------------  IN:  Total IN: 0 mL    OUT:    VAC (Vacuum Assisted Closure) System (mL): 15 mL    Voided (mL): 1450 mL  Total OUT: 1465 mL    Total NET: -1465 mL      Physical Exam:   GEN: resting in bed comfortably in NAD  RESP: no increased WOB  EXTR: aquacel over R groin and R lower calf c/d/i, PT palpable. R foot wound vac in place   NEURO: awake, alert     Labs:                        10.2   11.34 )-----------( 373      ( 02 Mar 2022 07:03 )             31.9   03-02    135  |  101  |  6<L>  ----------------------------<  187<H>  4.3   |  26  |  0.57    Ca    8.5      02 Mar 2022 07:03  Phos  3.4     03-02  Mg     1.7     03-02      CAPILLARY BLOOD GLUCOSE  POCT Blood Glucose.: 276 mg/dL (02 Mar 2022 21:08)  POCT Blood Glucose.: 327 mg/dL (02 Mar 2022 17:29)  POCT Blood Glucose.: 249 mg/dL (02 Mar 2022 13:16)  POCT Blood Glucose.: 197 mg/dL (02 Mar 2022 08:42)

## 2022-03-03 NOTE — PROGRESS NOTE ADULT - ASSESSMENT
62 YO M with right foot gangrene, TMA on 2/15, s/p failed aborted angiogram and stent placement in SFA with IR on 2/22. Vascular surgery consulted for potential revascularization options. Pt is s/p  R femoral endarterectomy, fem-tib bypass with GSV conduit 2/27    Plan:  - R foot wound has vac  - ASA restarted  - OOB and ambulating as tolerated  - F/u AM labs  - physical therapy   - dc planning    *INCOMPLETE NOTE*   Vascular  9009 62 YO M with right foot gangrene, TMA on 2/15, s/p failed aborted angiogram and stent placement in SFA with IR on 2/22. Vascular surgery consulted for potential revascularization options. Pt is s/p  R femoral endarterectomy, fem-tib bypass with GSV conduit 2/27    Plan:  - R foot wound has vac  - ASA restarted  - OOB and ambulating as tolerated  - F/u AM labs  - physical therapy: outpatient PT, patient given RX this morning (3/3)  - dc planning once wound vac arrives        Vascular  1142

## 2022-03-03 NOTE — PROGRESS NOTE ADULT - PROVIDER SPECIALTY LIST ADULT
Cardiology
Infectious Disease
Podiatry
Vascular Surgery
Vascular Surgery
Cardiology
Intervent Radiology
Podiatry
Vascular Surgery
Vascular Surgery
Infectious Disease
Internal Medicine
Vascular Surgery
Internal Medicine

## 2022-03-03 NOTE — PROGRESS NOTE ADULT - SUBJECTIVE AND OBJECTIVE BOX
Date of Service   03-03-22 @ 14:40    Patient is a 61y old  Male who presents with a chief complaint of gangrene (25 Feb 2022 07:16)      INTERVAL HISTORY: pt feels ok     REVIEW OF SYSTEMS:   CONSTITUTIONAL: No weakness  EYES/ENT: No visual changes; No throat pain  Neck: No pain or stiffness  Respiratory: No cough, wheezing, No shortness of breath  CARDIOVASCULAR: no chest pain or palpitations  GASTROINTESTINAL: No abdominal pain, no nausea, vomiting or hematemesis  GENITOURINARY: No dysuria, frequency or hematuria  NEUROLOGICAL: No stroke like symptoms  SKIN: No rashes    	  MEDICATIONS:  metoprolol tartrate 12.5 milliGRAM(s) Oral two times a day        PHYSICAL EXAM:  T(C): 36.9 (03-03-22 @ 13:11), Max: 37.2 (03-02-22 @ 16:56)  HR: 73 (03-03-22 @ 13:11) (61 - 75)  BP: 146/75 (03-03-22 @ 13:11) (123/71 - 159/77)  RR: 18 (03-03-22 @ 13:11) (18 - 18)  SpO2: 98% (03-03-22 @ 13:11) (94% - 98%)  Wt(kg): --  I&O's Summary    02 Mar 2022 07:01  -  03 Mar 2022 07:00  --------------------------------------------------------  IN: 1680 mL / OUT: 1815 mL / NET: -135 mL    03 Mar 2022 07:01  -  03 Mar 2022 14:40  --------------------------------------------------------  IN: 540 mL / OUT: 600 mL / NET: -60 mL          Appearance: In no distress	  HEENT:    PERRL, EOMI	  Cardiovascular:  S1 S2, No JVD  Respiratory: Lungs clear to auscultation	  Gastrointestinal:  Soft, Non-tender, + BS	  Vascularature:  No edema of LE  Psychiatric: Appropriate affect   Neuro: no acute focal deficits                               10.6   8.49  )-----------( 405      ( 03 Mar 2022 07:36 )             33.5     03-03    135  |  99  |  6<L>  ----------------------------<  197<H>  3.8   |  25  |  0.56    Ca    8.4      03 Mar 2022 07:31  Phos  3.3     03-03  Mg     1.7     03-03          Labs personally reviewed      ASSESSMENT/PLAN: 	  60 y/o M w/ PMHx DM, PAD s/p R TMA 2/15 transferred from [facility] for IR thrombectomy for R superficial femoral and popliteal artery occlusion and likely surgical revision with podiatry with continued poor flow s/p IR procedure, now for vascular bypass surgery     Problem/Plan - 1:  ·  Problem: Peripheral artery disease.   ·  Plan: Pt transferred to [facility] for IR angiogram and angioplasty. Wire recannulization of the SFA/popliteal stent was performed followed by angioplasty on 2/22. There was persistent poor flow through the stent.   - Vascular consulted for RLE bypass  - s/p bypass 2/27  - c/w ASA & Lipitor.  - PT eval      Problem/Plan - 2:  ·  Problem: Paroxysmal Atrial fibrillation/flutter  ·  Plan: CHADSVASc of 2-3  - H/H stable with EGD showing esophagitis  - Echo showed EF 55-60% & moderate aortic stenosis  - on xarelto       Problem/Plan - 3:  ·  Problem: Pre-operative risk stratification  ·  Plan: Pre-operative clearance for open vascular LE Bypass  - unable to assess functional capacity given limited by PAD  - heavy smoker and diabetic going to high risk surgery  - TTE with preserved EF and Mod AS  -NM stress test with areas of infarct and ischemia, r/b of cath discussed with pt, agreeable to proceed   - s/p LHC with mild-mod LAD disease and severe RPL disease  -- given asymptomatic and no LM/LAD severe disease will manage CAD medically, ASA. statin, Metoprolol  - Discussed with vascular surg Dr Hawkins and IC Dr Milian, all in agreement with plan  - Elevated risk for high risk vascular surgery, no contraindication to proceed with his nonelective limb salvage surgery   - 2/27 - tolerated surgery well   - pt on metoprolol, ASA and statin      Problem/Plan - 4:  ·  Problem: CAD  s/p [facility] with mild-mod LAD disease and severe RPL disease  -- given asymptomatic and no LM/LAD severe disease will manage CAD medically, ASA. statin, Metoprolol      outpt cardio follow up with Dr. Burger 3/14/22 at 0940      Yessy Hawkins [facility]   Pierre Burger DO [facility]  Cardiovascular Medicine  [address] [place] [address], [address]  Office: [phone]  Cell: [phone] Date of Service   03-03-22 @ 14:40    Patient is a 61y old  Male who presents with a chief complaint of gangrene (25 Feb 2022 07:16)      INTERVAL HISTORY: pt feels ok     REVIEW OF SYSTEMS:   CONSTITUTIONAL: No weakness  EYES/ENT: No visual changes; No throat pain  Neck: No pain or stiffness  Respiratory: No cough, wheezing, No shortness of breath  CARDIOVASCULAR: no chest pain or palpitations  GASTROINTESTINAL: No abdominal pain, no nausea, vomiting or hematemesis  GENITOURINARY: No dysuria, frequency or hematuria  NEUROLOGICAL: No stroke like symptoms  SKIN: No rashes    	  MEDICATIONS:  metoprolol tartrate 12.5 milliGRAM(s) Oral two times a day        PHYSICAL EXAM:  T(C): 36.9 (03-03-22 @ 13:11), Max: 37.2 (03-02-22 @ 16:56)  HR: 73 (03-03-22 @ 13:11) (61 - 75)  BP: 146/75 (03-03-22 @ 13:11) (123/71 - 159/77)  RR: 18 (03-03-22 @ 13:11) (18 - 18)  SpO2: 98% (03-03-22 @ 13:11) (94% - 98%)  Wt(kg): --  I&O's Summary    02 Mar 2022 07:01  -  03 Mar 2022 07:00  --------------------------------------------------------  IN: 1680 mL / OUT: 1815 mL / NET: -135 mL    03 Mar 2022 07:01  -  03 Mar 2022 14:40  --------------------------------------------------------  IN: 540 mL / OUT: 600 mL / NET: -60 mL          Appearance: In no distress	  HEENT:    PERRL, EOMI	  Cardiovascular:  S1 S2, No JVD  Respiratory: Lungs clear to auscultation	  Gastrointestinal:  Soft, Non-tender, + BS	  Vascularature:  No edema of LE  Psychiatric: Appropriate affect   Neuro: no acute focal deficits                               10.6   8.49  )-----------( 405      ( 03 Mar 2022 07:36 )             33.5     03-03    135  |  99  |  6<L>  ----------------------------<  197<H>  3.8   |  25  |  0.56    Ca    8.4      03 Mar 2022 07:31  Phos  3.3     03-03  Mg     1.7     03-03          Labs personally reviewed      ASSESSMENT/PLAN: 	  60 y/o M w/ PMHx DM, PAD s/p R TMA 2/15 transferred from [facility] for IR thrombectomy for R superficial femoral and popliteal artery occlusion and likely surgical revision with podiatry with continued poor flow s/p IR procedure, now for vascular bypass surgery     Problem/Plan - 1:  ·  Problem: Peripheral artery disease.   ·  Plan: Pt transferred to [facility] for IR angiogram and angioplasty. Wire recannulization of the SFA/popliteal stent was performed followed by angioplasty on 2/22. There was persistent poor flow through the stent.   - Vascular consulted for RLE bypass  - s/p bypass 2/27  - c/w ASA & Lipitor.  - PT eval      Problem/Plan - 2:  ·  Problem: Paroxysmal Atrial fibrillation/flutter  ·  Plan: CHADSVASc of 2-3  - H/H stable with EGD showing esophagitis  - Echo showed EF 55-60% & moderate aortic stenosis  - on xarelto       Problem/Plan - 3:  ·  Problem: Pre-operative risk stratification  ·  Plan: Pre-operative clearance for open vascular LE Bypass  - unable to assess functional capacity given limited by PAD  - heavy smoker and diabetic going to high risk surgery  - TTE with preserved EF and Mod AS  -NM stress test with areas of infarct and ischemia, r/b of cath discussed with pt, agreeable to proceed   - s/p LHC with mild-mod LAD disease and severe RPL disease  -- given asymptomatic and no LM/LAD severe disease will manage CAD medically, ASA. statin, Metoprolol  - Discussed with vascular surg Dr Hawkins and IC Dr Milian, all in agreement with plan  - Elevated risk for high risk vascular surgery, no contraindication to proceed with his nonelective limb salvage surgery   - 2/27 - tolerated surgery well   - pt on metoprolol, ASA and statin      Problem/Plan - 4:  ·  Problem: CAD  s/p [facility] with mild-mod LAD disease and severe RPL disease  -- given asymptomatic and no LM/LAD severe disease will manage CAD medically, ASA. statin, Metoprolol     Problem/Plan - 4:  ·  Problem: CAD  Plan- well controlled on Metoprolol, will remain off Enalapril, will consider restarting as OP if BP elevated     outpt cardio follow up with Dr. Burger 3/14/22 at 0940      Yessy Hawkins [facility]   Pierre Burger DO [facility]  Cardiovascular Medicine  [address] [place] [address], [address]  Office: [phone]  Cell: [phone]

## 2022-03-03 NOTE — PROGRESS NOTE ADULT - ATTENDING COMMENTS
Pt care and plan discussed and reviewed with NP. Plan as outlined above edited by me to reflect our discussion. Thirty five minutes spent on encounter, of which more than fifty percent of the encounter was spent on counseling and/or coordinating care by the attending physician.
Pt care and plan discussed and reviewed with NP. Plan as outlined above edited by me to reflect our discussion.  Plan of care discussed with patient after the evaluation. Patient expresses clear understanding and satisfaction with the plan of care. OMT on six regions for acute somatic dysfunctions done at the bedside. Thirty five minutes spent on encounter, of which more than fifty percent of the encounter was spent on counseling and/or coordinating care by the attending physician.
Pt care and plan discussed and reviewed with NP. Plan as outlined above edited by me to reflect our discussion. I had a prolonged conversation with the patient regarding hospital course, differential diagnosis and results of diagnostic tests.  Plan of care discussed with patient after the evaluation. Patient expresses clear understanding and satisfaction with the plan of care. OMT on six regions for acute somatic dysfunctions done at the bedside. Sixty five minutes spent on encounter, of which more than fifty percent of the encounter was spent on counseling and/or coordinating care by the attending physician.
Patient seen and evaluated. No complaints.     #PAD c/b wet gangrene s/p TMA with residual infection  -IR apprecaited, unable to open up vascular flow, vascular surgery consulted, for Bypass surgery next week.  -stress test today. pending results will determine med/card clearance.   -continue Abx, appreciate ID.      #a-fib  CHADVASC at least 3, will need to weigh r/b/a of A/c in setting of GIB    #gastritis  -PPI    #DM type 2  -goal glucose 120-180.   -monitor glucose with diet and will likely need to start bolus insulin next 24-48 hours.   rest as above
Patient seen and evaluated. No complaints.     #PAD c/b wet gangrene s/p TMA with residual infection  -IR apprecaited, unable to open up vascular flow, vascular surgery consulted, for Bypass surgery next week.  -stress test with moderate reversible ischemia. For diagnostic cath 2/25  -continue Abx, appreciate ID.      #a-fib  CHADVASC at least 3, will need to weigh r/b/a of A/c in setting of GIB, especially as for 1 month will likely need to be triple therapy.    #gastritis  -PPI    #DM type 2  -goal glucose 120-180.   -conitnue with insulin regemin monitoring for hyper or hypoglycemia.   rest as above
Patient seen and evaluated. No complaints. Awaiting IR proceudre     #PAD  -IR atherectomy today. Discussed personally with podiatry. If IR procedure successful, transfer back to [place] for further care with podiatry.   -if not successful, will keep here for further vascular workup/  -on ASA, will need to weigh r/b/a of A/c given pad and a-fib but in setting of recent GIB  -podiatry to follow for further debridement after IR procedure    #a-fib  CHADVASC at least 3, will need to weigh r/b/a of A/c in setting of GIB    #gastritis  -PPI    #DM type 2  -start basal regimen, will be npo so dose .2mg/kg basal regimen.;  -start bolus once starts eating.    rest as above
Patient seen and evaluated. No complaints. Awaiting IR proceudre     #PAD c/b wet gangrene s/p TMA with residual infection  -IR apprecaited, unable to open up vascular flow, vascular surgery consulted, for Bypass surgery next week.  -Patient with RCRI of 1 for an elevated risk procedure, cardiology called per vascular request. Pending their clearance, there is no further medical workup required prior to intervention.     #a-fib  CHADVASC at least 3, will need to weigh r/b/a of A/c in setting of GIB    #gastritis  -PPI    #DM type 2  -start basal regimen, will be npo so dose .2mg/kg basal regimen.;  -goal glucose 120-180.   -monitor glucose with diet and will likely need to start bolus insulin next 24-48 hours.   rest as above
61M with PMH of T2DM, gastritis, PAD s/p R TMA 2/15 transferred from [facility] for IR angiogram and angioplasty. Wire recannulization of the SFA/popliteal stent was performed followed by angioplasty on 2/22.   -Appreciate cardiology pre-op eval, s/p LHC 2/25 with mild-mod LAD disease and severe RPL disease; medical management recommended for now  -pending vascular bypass surgery, planned for 2/27  -cont antibiotics per ID recs for gangrene  -NPO after MN for procedure tomorrow  Rest as above    [facility] Division of Hospital Medicine  Ting Ramos MD  Pager (M-F, 8A-5P): [phone]  Other Times:  [phone]

## 2022-03-08 LAB — SURGICAL PATHOLOGY STUDY: SIGNIFICANT CHANGE UP

## 2022-03-15 ENCOUNTER — APPOINTMENT (OUTPATIENT)
Dept: VASCULAR SURGERY | Facility: CLINIC | Age: 62
End: 2022-03-15
Payer: COMMERCIAL

## 2022-03-15 VITALS
BODY MASS INDEX: 22.07 KG/M2 | HEIGHT: 74 IN | WEIGHT: 172 LBS | DIASTOLIC BLOOD PRESSURE: 66 MMHG | HEART RATE: 92 BPM | SYSTOLIC BLOOD PRESSURE: 110 MMHG

## 2022-03-15 PROCEDURE — 99024 POSTOP FOLLOW-UP VISIT: CPT

## 2022-03-15 RX ORDER — OXYCODONE AND ACETAMINOPHEN 5; 325 MG/1; MG/1
5-325 TABLET ORAL
Qty: 18 | Refills: 0 | Status: DISCONTINUED | COMMUNITY
Start: 2020-03-31 | End: 2022-03-15

## 2022-03-15 RX ORDER — OXYCODONE 5 MG/1
5 TABLET ORAL
Qty: 20 | Refills: 0 | Status: DISCONTINUED | COMMUNITY
Start: 2020-03-10 | End: 2022-03-15

## 2022-03-17 NOTE — PHYSICAL EXAM
[2+] : right 2+ [Alert] : alert [Calm] : calm [JVD] : no jugular venous distention  [de-identified] : appears well  [de-identified] : right tma with pink granular base small area of fibrinous exudate over the dorsum of the foot bleeding nicely, right medial malleolus ulcer with overlying fibrinous exudate, lateral heal wound superficial with fibrinous exudate.

## 2022-03-17 NOTE — DISCUSSION/SUMMARY
[FreeTextEntry1] : 62 yo male with a history of pad s/p right fem pt bypass presents for follow up \par \par incision clean and dry \par every other staple removed \par palpable pulse over graft in the thigh \par medihoney to the wounds and dry dressing applied \par vns to follow up tomorrow and reapply wound vac \par pt to follow up in 2 weeks

## 2022-03-17 NOTE — REASON FOR VISIT
[de-identified] : right fem to pt bypass with insitu gsv with common femoral and profunda artery endarterectomy and revision of right tma  [de-identified] : 2/27/22 [de-identified] : pt doing well with right lower extremity wound vac on tma site\par

## 2022-03-29 ENCOUNTER — APPOINTMENT (OUTPATIENT)
Dept: VASCULAR SURGERY | Facility: CLINIC | Age: 62
End: 2022-03-29
Payer: COMMERCIAL

## 2022-03-29 PROCEDURE — 11042 DBRDMT SUBQ TIS 1ST 20SQCM/<: CPT | Mod: 58

## 2022-03-29 PROCEDURE — 99024 POSTOP FOLLOW-UP VISIT: CPT

## 2022-03-29 NOTE — REASON FOR VISIT
[de-identified] : right fem to pt bypass with insitu gsv with common femoral and profunda artery endarterectomy and revision of right tma  [de-identified] : 2/27/22 [de-identified] : pt doing well with right lower extremity wound vac on tma site\par

## 2022-03-29 NOTE — PHYSICAL EXAM
[JVD] : no jugular venous distention  [2+] : right 2+ [Alert] : alert [Calm] : calm [de-identified] : appears well  [FreeTextEntry1] : Excellent graft pulse [de-identified] : right tma with pink granular base small area of fibrinous exudate over the dorsum of the foot bleeding nicely, right medial malleolus ulcer with overlying fibrinous exudate, lateral heal wound superficial with fibrinous exudate.

## 2022-03-29 NOTE — DISCUSSION/SUMMARY
[FreeTextEntry1] : 60 yo male with a history of pad s/p right fem pt bypass presents for follow up\par \par In the office today staples were removed.\par A sharp excisional debridement was performed of the TMA site down to the level of the subcutaneous tissue.\par \par Patient to follow-up in approximately 10 days for a graft duplex.  He will be scheduled for a right skin graft.

## 2022-04-06 ENCOUNTER — OUTPATIENT (OUTPATIENT)
Dept: OUTPATIENT SERVICES | Facility: HOSPITAL | Age: 62
LOS: 1 days | End: 2022-04-06
Payer: COMMERCIAL

## 2022-04-06 ENCOUNTER — TRANSCRIPTION ENCOUNTER (OUTPATIENT)
Age: 62
End: 2022-04-06

## 2022-04-06 VITALS
WEIGHT: 156.09 LBS | OXYGEN SATURATION: 95 % | SYSTOLIC BLOOD PRESSURE: 118 MMHG | DIASTOLIC BLOOD PRESSURE: 74 MMHG | HEART RATE: 77 BPM | TEMPERATURE: 98 F | RESPIRATION RATE: 16 BRPM | HEIGHT: 74 IN

## 2022-04-06 DIAGNOSIS — E11.9 TYPE 2 DIABETES MELLITUS WITHOUT COMPLICATIONS: ICD-10-CM

## 2022-04-06 DIAGNOSIS — Z98.890 OTHER SPECIFIED POSTPROCEDURAL STATES: Chronic | ICD-10-CM

## 2022-04-06 DIAGNOSIS — Z89.431 ACQUIRED ABSENCE OF RIGHT FOOT: Chronic | ICD-10-CM

## 2022-04-06 DIAGNOSIS — Z01.818 ENCOUNTER FOR OTHER PREPROCEDURAL EXAMINATION: ICD-10-CM

## 2022-04-06 DIAGNOSIS — I73.9 PERIPHERAL VASCULAR DISEASE, UNSPECIFIED: Chronic | ICD-10-CM

## 2022-04-06 DIAGNOSIS — I97.51 ACCIDENTAL PUNCTURE AND LACERATION OF A CIRCULATORY SYSTEM ORGAN OR STRUCTURE DURING A CIRCULATORY SYSTEM PROCEDURE: ICD-10-CM

## 2022-04-06 LAB
ANION GAP SERPL CALC-SCNC: 14 MMOL/L — SIGNIFICANT CHANGE UP (ref 5–17)
BUN SERPL-MCNC: 11 MG/DL — SIGNIFICANT CHANGE UP (ref 7–23)
CALCIUM SERPL-MCNC: 9.5 MG/DL — SIGNIFICANT CHANGE UP (ref 8.4–10.5)
CHLORIDE SERPL-SCNC: 103 MMOL/L — SIGNIFICANT CHANGE UP (ref 96–108)
CO2 SERPL-SCNC: 24 MMOL/L — SIGNIFICANT CHANGE UP (ref 22–31)
CREAT SERPL-MCNC: 0.73 MG/DL — SIGNIFICANT CHANGE UP (ref 0.5–1.3)
EGFR: 103 ML/MIN/1.73M2 — SIGNIFICANT CHANGE UP
GLUCOSE SERPL-MCNC: 126 MG/DL — HIGH (ref 70–99)
HCT VFR BLD CALC: 41.4 % — SIGNIFICANT CHANGE UP (ref 39–50)
HGB BLD-MCNC: 12.7 G/DL — LOW (ref 13–17)
MCHC RBC-ENTMCNC: 28.1 PG — SIGNIFICANT CHANGE UP (ref 27–34)
MCHC RBC-ENTMCNC: 30.7 GM/DL — LOW (ref 32–36)
MCV RBC AUTO: 91.6 FL — SIGNIFICANT CHANGE UP (ref 80–100)
NRBC # BLD: 0 /100 WBCS — SIGNIFICANT CHANGE UP (ref 0–0)
PLATELET # BLD AUTO: 415 K/UL — HIGH (ref 150–400)
POTASSIUM SERPL-MCNC: 4.4 MMOL/L — SIGNIFICANT CHANGE UP (ref 3.5–5.3)
POTASSIUM SERPL-SCNC: 4.4 MMOL/L — SIGNIFICANT CHANGE UP (ref 3.5–5.3)
RBC # BLD: 4.52 M/UL — SIGNIFICANT CHANGE UP (ref 4.2–5.8)
RBC # FLD: 14.9 % — HIGH (ref 10.3–14.5)
SODIUM SERPL-SCNC: 141 MMOL/L — SIGNIFICANT CHANGE UP (ref 135–145)
WBC # BLD: 11.14 K/UL — HIGH (ref 3.8–10.5)
WBC # FLD AUTO: 11.14 K/UL — HIGH (ref 3.8–10.5)

## 2022-04-06 PROCEDURE — G0463: CPT

## 2022-04-06 PROCEDURE — 85027 COMPLETE CBC AUTOMATED: CPT

## 2022-04-06 PROCEDURE — 80048 BASIC METABOLIC PNL TOTAL CA: CPT

## 2022-04-06 PROCEDURE — 83036 HEMOGLOBIN GLYCOSYLATED A1C: CPT

## 2022-04-06 PROCEDURE — 36415 COLL VENOUS BLD VENIPUNCTURE: CPT

## 2022-04-06 RX ORDER — EMPAGLIFLOZIN 10 MG/1
1 TABLET, FILM COATED ORAL
Qty: 0 | Refills: 0 | DISCHARGE

## 2022-04-06 RX ORDER — GABAPENTIN 400 MG/1
1 CAPSULE ORAL
Qty: 0 | Refills: 0 | DISCHARGE

## 2022-04-06 RX ORDER — CEFAZOLIN SODIUM 1 G
2000 VIAL (EA) INJECTION ONCE
Refills: 0 | Status: DISCONTINUED | OUTPATIENT
Start: 2022-04-13 | End: 2022-04-28

## 2022-04-06 NOTE — H&P PST ADULT - PROBLEM SELECTOR PLAN 1
Pt is scheduled for a Right Leg Split Thickness Skin Graft on 4/13/22 with Dr. Shruthi Rodriguez PCR test scheduled for 4/10/22 at [facility]  CBC, BMP ordered and obtained at [facility]  Pending Cardiac Evaluation

## 2022-04-06 NOTE — H&P PST ADULT - NSICDXPASTMEDICALHX_GEN_ALL_CORE_FT
PAST MEDICAL HISTORY:  DM (diabetes mellitus) on metformin, glipize and jardiance was just added - pt unsure of most recent HGA1C    Former smoker 1.5 PPD x 45 years; Quit 2 months ago    Peripheral artery disease pending skin graft on 4/13/22

## 2022-04-06 NOTE — H&P PST ADULT - HISTORY OF PRESENT ILLNESS
61 year old male with PMH DMT2 and PAD recently presented to [facility] for R foot 4th/5th digit dry gangrene w/ non-healing R medial malleolar ulcer. Patient found to have no palpable pulses distal to femoral B/L. MRI was done which was not suggestive of acute osteomyelitis; fluid collection was suspected. Patient underwent LE Arterial Doppler, which showed occluded R superficial femoral and popliteal arteries with multiple stents and Occluded proximal and mid left superficial femoral artery with reconstitution distally. Patient was initially treated with  foot wound with zosyn 2/11-2/15, and then switched over to cefepime and flagyl. Patient underwent R foot TMA on 2/15. Wound culture of R foot abscess grew numerous staph aureus, serratia marcescens, and strep agalactase. Intra-operative there was a concern for viability. Therefore, plan was to transfer patient to [facility] for IR thrombectomy for R superficial femoral and popliteal artery occlusion with multiple stents and Occluded proximal and mid left superficial femoral artery with reconstitution distally seen on LE arterial doppler.    Covid PCR test scheduled for 4/10/22 at [facility]  Covid vaccine Pfizer 2nd dose received 3/24/2021; Pfizer booster received 10/6/2021  Denies recent travel, exposure or Covid symptoms  61 year old male with PMH Former Smoker (1.5 PPD x 45 years; Quit 2 months ago), DMT2 and PAD recently presented to [facility] for R foot 4th/5th digit dry gangrene w/ non-healing R medial malleolar ulcer. Patient found to have no palpable pulses distal to femoral B/L. MRI was done which was not suggestive of acute osteomyelitis; fluid collection was suspected. Patient underwent LE Arterial Doppler, which showed occluded R superficial femoral and popliteal arteries with multiple stents and Occluded proximal and mid left superficial femoral artery with reconstitution distally. Patient was initially treated with zosyn 2/11-2/15, and then switched over to cefepime and flagyl. Patient underwent R foot TMA on 2/15/22. Wound culture of R foot abscess grew numerous staph aureus, serratia marcescens, and strep agalactase. Intra-operatively, there was a concern for viability. Pt was transferred to [facility] s/p right fem to pt bypass with insitu gsv with common femoral and profunda artery endarterectomy and revision of right TMA with Dr. Hawkins at the end of February 2022. Pt now presents to PST for scheduled Right Leg Split Thickness Skin Graft with Dr. Hawkins on 4/13/22.    Covid PCR test scheduled for 4/10/22 at [facility]  Covid vaccine Pfizer 2nd dose received 3/24/2021; Pfizer booster received 10/6/2021  Denies recent travel, exposure or Covid symptoms

## 2022-04-06 NOTE — H&P PST ADULT - PROBLEM SELECTOR PROBLEM 1
Accidental puncture and laceration of a circulatory system organ or structure during a circulatory system procedure

## 2022-04-06 NOTE — H&P PST ADULT - PROBLEM SELECTOR PLAN 2
HGA1C ordered and obtained at [facility]  FS on admit  Advised to hold metformin and glipizide 24 hours prior to surgery; Last doses on 4/12/22 AM  Advised pt to hold Jardiance 3 days prior to procedure; Last dose on 4/10/22 AM

## 2022-04-06 NOTE — H&P PST ADULT - NSICDXPASTSURGICALHX_GEN_ALL_CORE_FT
PAST SURGICAL HISTORY:  History of endarterectomy right fem to pt bypass with insitu gsv with common femoral and profunda artery endarterectomy and revision of right tma end of february 2/2022    PAD (peripheral artery disease) s/p stent placement in RLE in 2020    S/P transmetatarsal amputation of foot, right 2/15/2022

## 2022-04-06 NOTE — H&P PST ADULT - LAST STRESS TEST
2/24/22 Nuclear - As preoperative evaluation prior to amputation - Results in chart 2/24/22 Nuclear; As preoperative evaluation prior to amputation - Results in chart

## 2022-04-06 NOTE — H&P PST ADULT - VISION (WITH CORRECTIVE LENSES IF THE PATIENT USUALLY WEARS THEM):
Corrective glasses/Partially impaired: cannot see medication labels or newsprint, but can see obstacles in path, and the surrounding layout; can count fingers at arm's length

## 2022-04-07 ENCOUNTER — APPOINTMENT (OUTPATIENT)
Dept: VASCULAR SURGERY | Facility: CLINIC | Age: 62
End: 2022-04-07
Payer: COMMERCIAL

## 2022-04-07 LAB
A1C WITH ESTIMATED AVERAGE GLUCOSE RESULT: 8.3 % — HIGH (ref 4–5.6)
ESTIMATED AVERAGE GLUCOSE: 192 MG/DL — HIGH (ref 68–114)

## 2022-04-07 PROCEDURE — 99024 POSTOP FOLLOW-UP VISIT: CPT

## 2022-04-07 PROCEDURE — 93926 LOWER EXTREMITY STUDY: CPT

## 2022-04-07 NOTE — PHYSICAL EXAM
[JVD] : no jugular venous distention  [2+] : right 2+ [Alert] : alert [Calm] : calm [de-identified] : appears well  [FreeTextEntry1] : Excellent graft pulse [de-identified] : right tma with pink granular base small area of fibrinous exudate over the dorsum of the foot bleeding nicely, right medial malleolus ulcer with overlying fibrinous exudate, lateral heal wound superficial with fibrinous exudate.

## 2022-04-07 NOTE — DISCUSSION/SUMMARY
[FreeTextEntry1] : 60 yo male with a history of pad s/p right fem pt bypass presents for follow up\par \par In the office today patient underwent a duplex study which shows a patent bypass.  In addition the wound is healing well.  Patient is scheduled for a split-thickness skin graft next week.

## 2022-04-07 NOTE — REASON FOR VISIT
[de-identified] : right fem to pt bypass with insitu gsv with common femoral and profunda artery endarterectomy and revision of right tma  [de-identified] : 2/27/22 [de-identified] : pt doing well with right lower extremity wound vac on tma site\par

## 2022-04-10 ENCOUNTER — OUTPATIENT (OUTPATIENT)
Dept: OUTPATIENT SERVICES | Facility: HOSPITAL | Age: 62
LOS: 1 days | End: 2022-04-10
Payer: COMMERCIAL

## 2022-04-10 DIAGNOSIS — Z11.52 ENCOUNTER FOR SCREENING FOR COVID-19: ICD-10-CM

## 2022-04-10 DIAGNOSIS — I73.9 PERIPHERAL VASCULAR DISEASE, UNSPECIFIED: Chronic | ICD-10-CM

## 2022-04-10 DIAGNOSIS — Z89.431 ACQUIRED ABSENCE OF RIGHT FOOT: Chronic | ICD-10-CM

## 2022-04-10 DIAGNOSIS — Z98.890 OTHER SPECIFIED POSTPROCEDURAL STATES: Chronic | ICD-10-CM

## 2022-04-10 LAB — SARS-COV-2 RNA SPEC QL NAA+PROBE: SIGNIFICANT CHANGE UP

## 2022-04-10 PROCEDURE — U0003: CPT

## 2022-04-10 PROCEDURE — U0005: CPT

## 2022-04-10 PROCEDURE — C9803: CPT

## 2022-04-12 ENCOUNTER — TRANSCRIPTION ENCOUNTER (OUTPATIENT)
Age: 62
End: 2022-04-12

## 2022-04-13 ENCOUNTER — TRANSCRIPTION ENCOUNTER (OUTPATIENT)
Age: 62
End: 2022-04-13

## 2022-04-13 ENCOUNTER — APPOINTMENT (OUTPATIENT)
Dept: VASCULAR SURGERY | Facility: HOSPITAL | Age: 62
End: 2022-04-13
Payer: COMMERCIAL

## 2022-04-13 ENCOUNTER — OUTPATIENT (OUTPATIENT)
Dept: OUTPATIENT SERVICES | Facility: HOSPITAL | Age: 62
LOS: 1 days | End: 2022-04-13
Payer: COMMERCIAL

## 2022-04-13 VITALS
RESPIRATION RATE: 17 BRPM | TEMPERATURE: 98 F | HEART RATE: 71 BPM | WEIGHT: 156.09 LBS | OXYGEN SATURATION: 98 % | DIASTOLIC BLOOD PRESSURE: 68 MMHG | SYSTOLIC BLOOD PRESSURE: 103 MMHG | HEIGHT: 74 IN

## 2022-04-13 VITALS
OXYGEN SATURATION: 100 % | HEART RATE: 62 BPM | DIASTOLIC BLOOD PRESSURE: 74 MMHG | RESPIRATION RATE: 16 BRPM | SYSTOLIC BLOOD PRESSURE: 152 MMHG

## 2022-04-13 DIAGNOSIS — I97.51 ACCIDENTAL PUNCTURE AND LACERATION OF A CIRCULATORY SYSTEM ORGAN OR STRUCTURE DURING A CIRCULATORY SYSTEM PROCEDURE: ICD-10-CM

## 2022-04-13 DIAGNOSIS — Z89.431 ACQUIRED ABSENCE OF RIGHT FOOT: Chronic | ICD-10-CM

## 2022-04-13 DIAGNOSIS — Z98.890 OTHER SPECIFIED POSTPROCEDURAL STATES: Chronic | ICD-10-CM

## 2022-04-13 DIAGNOSIS — I73.9 PERIPHERAL VASCULAR DISEASE, UNSPECIFIED: Chronic | ICD-10-CM

## 2022-04-13 LAB
GLUCOSE BLDC GLUCOMTR-MCNC: 150 MG/DL — HIGH (ref 70–99)
GLUCOSE BLDC GLUCOMTR-MCNC: 172 MG/DL — HIGH (ref 70–99)

## 2022-04-13 PROCEDURE — 15120 SPLT AGRFT F/S/N/H/F/G/M 1ST: CPT | Mod: RT,58

## 2022-04-13 PROCEDURE — 15120 SPLT AGRFT F/S/N/H/F/G/M 1ST: CPT

## 2022-04-13 PROCEDURE — 82962 GLUCOSE BLOOD TEST: CPT

## 2022-04-13 RX ORDER — LIDOCAINE HCL 20 MG/ML
0.2 VIAL (ML) INJECTION ONCE
Refills: 0 | Status: DISCONTINUED | OUTPATIENT
Start: 2022-04-13 | End: 2022-04-13

## 2022-04-13 RX ORDER — OXYCODONE HYDROCHLORIDE 5 MG/1
1 TABLET ORAL
Qty: 12 | Refills: 0
Start: 2022-04-13

## 2022-04-13 RX ORDER — METFORMIN HYDROCHLORIDE 850 MG/1
1 TABLET ORAL
Qty: 0 | Refills: 0 | DISCHARGE

## 2022-04-13 RX ORDER — FENTANYL CITRATE 50 UG/ML
25 INJECTION INTRAVENOUS
Refills: 0 | Status: DISCONTINUED | OUTPATIENT
Start: 2022-04-13 | End: 2022-04-13

## 2022-04-13 RX ORDER — HYDROMORPHONE HYDROCHLORIDE 2 MG/ML
0.5 INJECTION INTRAMUSCULAR; INTRAVENOUS; SUBCUTANEOUS
Refills: 0 | Status: DISCONTINUED | OUTPATIENT
Start: 2022-04-13 | End: 2022-04-13

## 2022-04-13 RX ORDER — EMPAGLIFLOZIN 10 MG/1
1 TABLET, FILM COATED ORAL
Qty: 0 | Refills: 0 | DISCHARGE

## 2022-04-13 RX ORDER — SODIUM CHLORIDE 9 MG/ML
3 INJECTION INTRAMUSCULAR; INTRAVENOUS; SUBCUTANEOUS EVERY 8 HOURS
Refills: 0 | Status: DISCONTINUED | OUTPATIENT
Start: 2022-04-13 | End: 2022-04-13

## 2022-04-13 RX ORDER — OXYCODONE HYDROCHLORIDE 5 MG/1
5 TABLET ORAL ONCE
Refills: 0 | Status: DISCONTINUED | OUTPATIENT
Start: 2022-04-13 | End: 2022-04-13

## 2022-04-13 RX ORDER — ONDANSETRON 8 MG/1
4 TABLET, FILM COATED ORAL ONCE
Refills: 0 | Status: DISCONTINUED | OUTPATIENT
Start: 2022-04-13 | End: 2022-04-13

## 2022-04-13 RX ADMIN — SODIUM CHLORIDE 3 MILLILITER(S): 9 INJECTION INTRAMUSCULAR; INTRAVENOUS; SUBCUTANEOUS at 14:43

## 2022-04-13 RX ADMIN — HYDROMORPHONE HYDROCHLORIDE 0.5 MILLIGRAM(S): 2 INJECTION INTRAMUSCULAR; INTRAVENOUS; SUBCUTANEOUS at 18:13

## 2022-04-13 RX ADMIN — OXYCODONE HYDROCHLORIDE 5 MILLIGRAM(S): 5 TABLET ORAL at 18:01

## 2022-04-13 RX ADMIN — HYDROMORPHONE HYDROCHLORIDE 0.5 MILLIGRAM(S): 2 INJECTION INTRAMUSCULAR; INTRAVENOUS; SUBCUTANEOUS at 18:15

## 2022-04-13 RX ADMIN — OXYCODONE HYDROCHLORIDE 5 MILLIGRAM(S): 5 TABLET ORAL at 18:15

## 2022-04-13 NOTE — ASU DISCHARGE PLAN (ADULT/PEDIATRIC) - ASU DC SPECIAL INSTRUCTIONSFT
DO NOT REMOVE DRESSINGS. Dr. Hawkins's office will call you to schedule follow-up and remove dressing.

## 2022-04-13 NOTE — ASU PATIENT PROFILE, ADULT - FALL HARM RISK - UNIVERSAL INTERVENTIONS
(4) walks frequently
Bed in lowest position, wheels locked, appropriate side rails in place/Call bell, personal items and telephone in reach/Instruct patient to call for assistance before getting out of bed or chair/Non-slip footwear when patient is out of bed/Roann to call system/Physically safe environment - no spills, clutter or unnecessary equipment/Purposeful Proactive Rounding/Room/bathroom lighting operational, light cord in reach

## 2022-04-13 NOTE — PRE-ANESTHESIA EVALUATION ADULT - NSANTHPMHFT_GEN_ALL_CORE
61 year old male with PMH Former Smoker (1.5 PPD x 45 years; Quit 2 months ago), DMT2 and PAD recently presented to [facility] for R foot 4th/5th digit dry gangrene w/ non-healing R medial malleolar ulcer. Patient found to have no palpable pulses distal to femoral B/L. MRI was done which was not suggestive of acute osteomyelitis; fluid collection was suspected. Patient underwent LE Arterial Doppler, which showed occluded R superficial femoral and popliteal arteries with multiple stents and Occluded proximal and mid left superficial femoral artery with reconstitution distally. Patient was initially treated with zosyn 2/11-2/15, and then switched over to cefepime and flagyl. Patient underwent R foot TMA on 2/15/22. Wound culture of R foot abscess grew numerous staph aureus, serratia marcescens, and strep agalactase. Intra-operatively, there was a concern for viability. Pt was transferred to [facility] s/p right fem to pt bypass with insitu gsv with common femoral and profunda artery endarterectomy and revision of right TMA with Dr. Hawkins at the end of February 2022.    Patient with non healing lesion now for d Right Leg Split Thickness Skin Graft with Dr. Hawkisn on 4/13/22.

## 2022-04-13 NOTE — ASU DISCHARGE PLAN (ADULT/PEDIATRIC) - NS MD DC FALL RISK RISK
For information on Fall & Injury Prevention, visit: [email or web address] OR  [email or web address] OR  https://www.cdc.gov/steadi/patient.html

## 2022-04-13 NOTE — ASU DISCHARGE PLAN (ADULT/PEDIATRIC) - CARE PROVIDER_API CALL
Brent Hawkins)  Vascular Surgery  [address], [address]  [place] [address]  Phone: [phone]  Fax: [phone]  Follow Up Time: 2 weeks

## 2022-04-14 PROBLEM — E11.9 TYPE 2 DIABETES MELLITUS WITHOUT COMPLICATIONS: Chronic | Status: ACTIVE | Noted: 2019-11-25

## 2022-04-14 PROBLEM — I73.9 PERIPHERAL VASCULAR DISEASE, UNSPECIFIED: Chronic | Status: ACTIVE | Noted: 2022-02-21

## 2022-04-14 PROBLEM — Z87.891 PERSONAL HISTORY OF NICOTINE DEPENDENCE: Chronic | Status: ACTIVE | Noted: 2022-04-06

## 2022-04-19 ENCOUNTER — APPOINTMENT (OUTPATIENT)
Dept: ENDOVASCULAR SURGERY | Facility: CLINIC | Age: 62
End: 2022-04-19
Payer: COMMERCIAL

## 2022-04-19 ENCOUNTER — NON-APPOINTMENT (OUTPATIENT)
Age: 62
End: 2022-04-19

## 2022-04-19 PROCEDURE — 99024 POSTOP FOLLOW-UP VISIT: CPT

## 2022-04-19 NOTE — PHYSICAL EXAM
[FreeTextEntry1] : The right foot wound was undressed.  There was almost 100% take of the graft.  The left thigh wound looks excellent.

## 2022-04-19 NOTE — DISCUSSION/SUMMARY
[FreeTextEntry1] : Patient will continue with bacitracin and Xeroform to the right foot.  The left side may be open to air.  Patient to follow-up in 2 weeks.

## 2022-04-19 NOTE — REASON FOR VISIT
[de-identified] : Right foot split-thickness skin graft [de-identified] : Status post split-thickness skin graft to the right foot.  The harvest was from the left thigh.  Patient is here for dressing change

## 2022-04-28 ENCOUNTER — APPOINTMENT (OUTPATIENT)
Dept: VASCULAR SURGERY | Facility: CLINIC | Age: 62
End: 2022-04-28
Payer: COMMERCIAL

## 2022-04-28 VITALS
DIASTOLIC BLOOD PRESSURE: 65 MMHG | HEIGHT: 74 IN | HEART RATE: 73 BPM | SYSTOLIC BLOOD PRESSURE: 101 MMHG | WEIGHT: 172 LBS | BODY MASS INDEX: 22.07 KG/M2

## 2022-04-28 PROCEDURE — 99024 POSTOP FOLLOW-UP VISIT: CPT

## 2022-04-28 RX ORDER — OXYCODONE 10 MG/1
10 TABLET ORAL EVERY 8 HOURS
Qty: 40 | Refills: 0 | Status: ACTIVE | COMMUNITY
Start: 2022-03-15 | End: 1900-01-01

## 2022-04-28 NOTE — REASON FOR VISIT
[de-identified] : Right foot split-thickness skin graft [de-identified] : Status post split-thickness skin graft to the right foot.  The harvest was from the left thigh.  Patient is here for dressing change

## 2022-04-28 NOTE — DISCUSSION/SUMMARY
[FreeTextEntry1] : Patient will continue with bacitracin and Xeroform to the right foot.  The left side may be open to air.  Patient to follow-up in 3 weeks.

## 2022-05-03 ENCOUNTER — TRANSCRIPTION ENCOUNTER (OUTPATIENT)
Age: 62
End: 2022-05-03

## 2022-05-03 RX ORDER — OXYCODONE 10 MG/1
10 TABLET ORAL EVERY 6 HOURS
Qty: 40 | Refills: 0 | Status: DISCONTINUED | COMMUNITY
Start: 2022-03-31 | End: 2022-05-03

## 2022-05-03 RX ORDER — HYDROMORPHONE HYDROCHLORIDE 4 MG/1
4 TABLET ORAL
Qty: 60 | Refills: 0 | Status: DISCONTINUED | COMMUNITY
Start: 2022-04-14 | End: 2022-05-03

## 2022-05-10 ENCOUNTER — APPOINTMENT (OUTPATIENT)
Dept: VASCULAR SURGERY | Facility: CLINIC | Age: 62
End: 2022-05-10
Payer: COMMERCIAL

## 2022-05-10 VITALS
BODY MASS INDEX: 22.07 KG/M2 | SYSTOLIC BLOOD PRESSURE: 89 MMHG | DIASTOLIC BLOOD PRESSURE: 57 MMHG | HEIGHT: 74 IN | WEIGHT: 172 LBS | HEART RATE: 82 BPM

## 2022-05-10 PROCEDURE — 99024 POSTOP FOLLOW-UP VISIT: CPT

## 2022-05-10 NOTE — DISCUSSION/SUMMARY
[FreeTextEntry1] : Patient will continue with bacitracin and Xeroform to the right foot.  \par looks much better \par f/u 1 month

## 2022-05-10 NOTE — REASON FOR VISIT
[de-identified] : Right foot split-thickness skin graft [de-identified] : Status post split-thickness skin graft to the right foot.  The harvest was from the left thigh.  Patient is here for dressing change

## 2022-06-16 ENCOUNTER — APPOINTMENT (OUTPATIENT)
Dept: VASCULAR SURGERY | Facility: CLINIC | Age: 62
End: 2022-06-16
Payer: COMMERCIAL

## 2022-06-16 VITALS
BODY MASS INDEX: 22.07 KG/M2 | SYSTOLIC BLOOD PRESSURE: 167 MMHG | HEIGHT: 74 IN | WEIGHT: 172 LBS | DIASTOLIC BLOOD PRESSURE: 75 MMHG | HEART RATE: 69 BPM

## 2022-06-16 PROCEDURE — 99024 POSTOP FOLLOW-UP VISIT: CPT

## 2022-06-16 PROCEDURE — 93926 LOWER EXTREMITY STUDY: CPT

## 2022-06-16 RX ORDER — OXYCODONE AND ACETAMINOPHEN 5; 325 MG/1; MG/1
5-325 TABLET ORAL
Qty: 30 | Refills: 0 | Status: ACTIVE | COMMUNITY
Start: 2022-06-16 | End: 1900-01-01

## 2022-06-16 NOTE — REASON FOR VISIT
[de-identified] : Right foot split-thickness skin graft [de-identified] : Status post split-thickness skin graft to the right foot.  The harvest was from the left thigh.  Patient is here for dressing change

## 2022-07-28 ENCOUNTER — APPOINTMENT (OUTPATIENT)
Dept: VASCULAR SURGERY | Facility: CLINIC | Age: 62
End: 2022-07-28

## 2022-07-28 VITALS
HEART RATE: 75 BPM | DIASTOLIC BLOOD PRESSURE: 66 MMHG | SYSTOLIC BLOOD PRESSURE: 106 MMHG | WEIGHT: 177 LBS | BODY MASS INDEX: 22.72 KG/M2 | HEIGHT: 74 IN | TEMPERATURE: 97.2 F

## 2022-07-28 PROCEDURE — 99213 OFFICE O/P EST LOW 20 MIN: CPT

## 2022-07-28 NOTE — HISTORY OF PRESENT ILLNESS
[FreeTextEntry1] : 61 yo male active daily smoker with a history of dm presents for follow up s/p right lower extremity fem to pt bypass, tma with split level skin graft.  pt states that he does not have a molded shoe at this time and has been wearing slippers.  pt also states that he has not been showering because of the wound and only "spritzing the foot" with water.  \par

## 2022-07-28 NOTE — ASSESSMENT
[FreeTextEntry1] : 63 yo male active daily smoker with a history of dm presents for follow up s/p right lower extremity fem to pt bypass, tma with split level skin graft\par \par callus area was debrided \par pt advised to wash the feet daily with soap and water\par pt to follow up with podiatry for molded shoe \par pt to follow up in 2 months with duplex of the right lower extremity bypass graft

## 2022-07-28 NOTE — PHYSICAL EXAM
[JVD] : no jugular venous distention  [Alert] : alert [Calm] : calm [de-identified] : appears well  [de-identified] : right foot tma site with callus over the distal aspect and 2 small open areas about the size of a dime.  no discharge, pink granular base

## 2022-09-29 ENCOUNTER — APPOINTMENT (OUTPATIENT)
Dept: VASCULAR SURGERY | Facility: CLINIC | Age: 62
End: 2022-09-29

## 2022-09-29 PROCEDURE — 99214 OFFICE O/P EST MOD 30 MIN: CPT

## 2022-09-29 PROCEDURE — 93926 LOWER EXTREMITY STUDY: CPT

## 2022-09-29 NOTE — ASSESSMENT
[FreeTextEntry1] : 63 yo male recently quit smoking with a history of dm presents for follow up s/p right lower extremity fem to pt bypass, tma with split level skin graft. \par \par duplex shows patent fem - pt bypass with now high end 50-75% stenosis of the below the knee bypass \par \par pt advised to take the plavix every day \par pt also advised to wash feet daily \par \par will repeat duplex in 6 weeks given stenosis found on duplex

## 2022-09-29 NOTE — PHYSICAL EXAM
[Alert] : alert [Calm] : calm [JVD] : no jugular venous distention  [Normal Breath Sounds] : Normal breath sounds [2+] : left 2+ [Ankle Swelling (On Exam)] : not present [Varicose Veins Of Lower Extremities] : not present [] : not present [de-identified] : appears well [de-identified] : right tma skin graft healed with dry scaled thickened skin overlying the tma

## 2022-09-29 NOTE — HISTORY OF PRESENT ILLNESS
[FreeTextEntry1] : 63 yo male recently quit smoking with a history of dm presents for follow up s/p right lower extremity fem to pt bypass, tma with split level skin graft.  pt states that he does not have a molded shoe at this time and has been wearing slippers.  pt also states that he has been cleaning the foot every other day and taking his plavix every other day.  pt denies any pain in the right foot at this time

## 2022-10-31 NOTE — PROGRESS NOTE ADULT - ASSESSMENT
62 yo male w/ history of DM II, PAD, p/w R 4th/5th digit dry gangrene w/ non healing R medial malleolar ulcer, no palpable pulses distal to femoral b/l.     R foot gangrene w/ pus  - podiatry and vascular following  - MRI not suggestive of acute osteomyelitis, but was a poor study due to motion artifact   - vascular study showed an unobtainable ankle brachial index w/ diffusely calcified noncompressible vessels limiting evaluation and diminished flow below the right knee  - US showed occluded right superficial femoral and popliteal arteries with multiple stents   - CTA showed occluded right superficial femoral and popliteal arteries containing multiple overlapping stents with reconstitution in the mid popliteal artery, three-vessel runoff into b/l feet, occluded left superficial femoral artery with reconstitution distally, moderate narrowing in the mid left popliteal artery and moderate narrowing in the proximal left external iliac artery with associated areas of ulcerated plaque  - abscess cultures grew Serratia, GBS and MSSA  - as per ID, will c/w Cefepime & Flagyl  - s/p TMA 2/15 - surgical culture and pathology are pending  - c/w morphine and percocet for pain    Hypophosphatemia     - replace w/ PO Phos    PAD  - US showed occluded right superficial femoral and popliteal arteries with multiple stents   - CTA showed occluded right superficial femoral and popliteal arteries containing multiple overlapping stents with reconstitution in the mid popliteal artery, three-vessel runoff into b/l feet, occluded left superficial femoral artery with reconstitution distally, moderate narrowing in the mid left popliteal artery and moderate narrowing in the proximal left external iliac artery with associated areas of ulcerated plaque  - as per Dr. Garcia, patient to be transferred to [facility] 2/16 for IR thrombectomy 2/17  - c/w ASA & Lipitor    A. fib on post TMA EKG per anaesthesia   - no more episodes of A. fib on tele today  - Echo showed EF 55-60% & moderate aortic stenosis  - as per cardio, would ideally start AC for A. fib but in light of recent GIB/coffee grounds emesis will hold off  - started metoprolol   - ASA on hold as per GI - will restart on 2/20    Coffee ground emesis on 2/14  - c/w Protonix & carafate  - FOBT positive  - EGD on 2/18 showed distal moderate esophagitis s/p biopsy, proximal-mid esophageal whitish exudate s/p biopsy, mild antral gastritis s/p biopsy & mild duodenitis s/p biopsy   - H&H is stable    Hiccups  - c/w Protonix  - c/w gabapentin    Constipation  - c/w Senna  - add lactulose  - c/w PRN Miralax    DM II  - c/w ISS  - Hgb A1C is 8.6    Severe protein-calorie malnutrition  - nutrition consult     Smoker  - c/w Nicotine Patch    Prophylaxis:  DVT: subcutaneous Heparin on hold post TMA and due to possible GI bleed  GI: Protonix    Plan pending transfer to [facility] for IR thrombectomy on Monday or Tuesday. I called and gave sign out to Dr. Dotson at [facility] on 2/18.     NOK: Richa [phone]  31-Oct-2022 16:30

## 2022-11-15 ENCOUNTER — APPOINTMENT (OUTPATIENT)
Dept: VASCULAR SURGERY | Facility: CLINIC | Age: 62
End: 2022-11-15

## 2022-11-15 VITALS
WEIGHT: 182 LBS | BODY MASS INDEX: 23.36 KG/M2 | SYSTOLIC BLOOD PRESSURE: 130 MMHG | HEART RATE: 78 BPM | DIASTOLIC BLOOD PRESSURE: 75 MMHG | HEIGHT: 74 IN

## 2022-11-15 PROCEDURE — 99214 OFFICE O/P EST MOD 30 MIN: CPT

## 2022-11-15 PROCEDURE — 93926 LOWER EXTREMITY STUDY: CPT

## 2022-11-15 NOTE — PHYSICAL EXAM
[JVD] : no jugular venous distention  [Normal Breath Sounds] : Normal breath sounds [2+] : left 2+ [Ankle Swelling (On Exam)] : not present [] : not present [Varicose Veins Of Lower Extremities] : not present [Abdomen Tenderness] : ~T ~M No abdominal tenderness [Alert] : alert [Calm] : calm [de-identified] : appears well [de-identified] : right tma skin graft healed with dry scaled thickened skin overlying the tma

## 2022-11-15 NOTE — ASSESSMENT
[FreeTextEntry1] : 61 yo male recently quit smoking with a history of dm presents for follow up s/p right lower extremity fem to pt bypass, tma with split level skin graft. \par \par duplex shows patent fem - pt bypass with now high end >75% stenosis of the below the knee bypass \par \par pt advised to take the plavix every day \par To be scheduled for angiogram given the greater than 75% stenosis in the bypass.

## 2022-12-05 ENCOUNTER — LABORATORY RESULT (OUTPATIENT)
Age: 62
End: 2022-12-05

## 2022-12-07 PROBLEM — J84.9 INTERSTITIAL LUNG DISEASE: Status: ACTIVE | Noted: 2022-12-07

## 2022-12-07 PROBLEM — J44.9 COPD (CHRONIC OBSTRUCTIVE PULMONARY DISEASE): Status: ACTIVE | Noted: 2022-12-07

## 2022-12-07 PROBLEM — E78.5 HLD (HYPERLIPIDEMIA): Status: ACTIVE | Noted: 2022-12-07

## 2022-12-07 PROBLEM — I10 HTN (HYPERTENSION): Status: ACTIVE | Noted: 2022-12-07

## 2022-12-07 PROBLEM — I25.10 CAD (CORONARY ARTERY DISEASE): Status: ACTIVE | Noted: 2022-12-07

## 2022-12-08 ENCOUNTER — LABORATORY RESULT (OUTPATIENT)
Age: 62
End: 2022-12-08

## 2022-12-08 ENCOUNTER — RESULT REVIEW (OUTPATIENT)
Age: 62
End: 2022-12-08

## 2022-12-08 ENCOUNTER — APPOINTMENT (OUTPATIENT)
Dept: ENDOVASCULAR SURGERY | Facility: CLINIC | Age: 62
End: 2022-12-08

## 2022-12-08 VITALS
HEART RATE: 73 BPM | SYSTOLIC BLOOD PRESSURE: 119 MMHG | OXYGEN SATURATION: 96 % | HEIGHT: 74 IN | WEIGHT: 182 LBS | DIASTOLIC BLOOD PRESSURE: 70 MMHG | TEMPERATURE: 97.5 F | BODY MASS INDEX: 23.36 KG/M2 | RESPIRATION RATE: 16 BRPM

## 2022-12-08 DIAGNOSIS — E78.5 HYPERLIPIDEMIA, UNSPECIFIED: ICD-10-CM

## 2022-12-08 DIAGNOSIS — J44.9 CHRONIC OBSTRUCTIVE PULMONARY DISEASE, UNSPECIFIED: ICD-10-CM

## 2022-12-08 DIAGNOSIS — I10 ESSENTIAL (PRIMARY) HYPERTENSION: ICD-10-CM

## 2022-12-08 DIAGNOSIS — I25.10 ATHEROSCLEROTIC HEART DISEASE OF NATIVE CORONARY ARTERY W/OUT ANGINA PECTORIS: ICD-10-CM

## 2022-12-08 DIAGNOSIS — J84.9 INTERSTITIAL PULMONARY DISEASE, UNSPECIFIED: ICD-10-CM

## 2022-12-08 PROCEDURE — 75625 CONTRAST EXAM ABDOMINL AORTA: CPT

## 2022-12-08 PROCEDURE — 37225Z: CUSTOM | Mod: RT

## 2022-12-08 PROCEDURE — 37221Z: CUSTOM | Mod: 59,LT

## 2022-12-08 RX ORDER — EMPAGLIFLOZIN 25 MG/1
25 TABLET, FILM COATED ORAL
Qty: 90 | Refills: 0 | Status: ACTIVE | COMMUNITY
Start: 2022-07-25

## 2022-12-08 RX ORDER — ENALAPRIL MALEATE 5 MG/1
5 TABLET ORAL
Qty: 90 | Refills: 0 | Status: ACTIVE | COMMUNITY
Start: 2022-07-26

## 2022-12-08 RX ORDER — PREGABALIN 300 MG/1
300 CAPSULE ORAL
Qty: 60 | Refills: 0 | Status: ACTIVE | COMMUNITY
Start: 2022-08-24

## 2022-12-08 RX ORDER — ROSUVASTATIN CALCIUM 10 MG/1
10 TABLET, FILM COATED ORAL
Qty: 90 | Refills: 0 | Status: ACTIVE | COMMUNITY
Start: 2022-07-26

## 2022-12-08 NOTE — PROCEDURE
[D/C IV on discharge] : D/C IV on discharge [Resume diet] : resume diet [FreeTextEntry1] : aortogram, right leg angiogram, angioplasty, atherectomy, stent

## 2022-12-08 NOTE — PAST MEDICAL HISTORY
[Increasing age ( >40 years old)] : Increasing age ( >40 years old) [No therapy indicated for cases scheduled for less than one hour] : No therapy indicated for cases scheduled for less than one hour. [FreeTextEntry1] : Malignant Hyperthermia Screening Tool and Risk of Bleeding Assessment\par \par Mr. BAI RAYMOND denies family history of unexpected death following Anesthesia or Exercise.\par Denies Family history of Malignant Hyperthermia, Muscle or Neuromuscular disorder and High Temperature following exercise.\par \par Mr. BIA RAYMOND denies history of Muscle Spasm, Dark or Chocolate - Colored urine and Unanticipated fever immediately following anesthesia or serious exercise. \par Mr. RAYMOND also denies bleeding tendencies/ Risks of Bleeding.\par

## 2022-12-08 NOTE — HISTORY OF PRESENT ILLNESS
[FreeTextEntry1] : Cr:0.90 11/20/2022\par covid not detected 12/5/2022\par accompanied by wife Richa [phone]\par plavix/aspirin at 640\par feels ok\par  [FreeTextEntry5] : yesterday at 830p [FreeTextEntry6] : Dr. Levine

## 2022-12-08 NOTE — ASSESSMENT
[Arterial/Venous Disease] : arterial/venous disease [FreeTextEntry1] : 61 yo male recently quit smoking with a history of dm presents  s/p right lower extremity fem to pt bypass, tma with split level skin graft. \par \par duplex shows patent fem - pt bypass with now high end >75% stenosis of the below the knee bypass \par \par plan for angiogram given the greater than 75% stenosis in the bypass.

## 2022-12-08 NOTE — PHYSICAL EXAM
[Normal Breath Sounds] : Normal breath sounds [2+] : left 2+ [Alert] : alert [Calm] : calm [JVD] : no jugular venous distention  [Ankle Swelling (On Exam)] : not present [Varicose Veins Of Lower Extremities] : not present [] : not present [Abdomen Tenderness] : ~T ~M No abdominal tenderness [de-identified] : appears well [de-identified] : right tma skin graft healed with dry scaled thickened skin overlying the tma

## 2023-01-03 ENCOUNTER — APPOINTMENT (OUTPATIENT)
Dept: VASCULAR SURGERY | Facility: CLINIC | Age: 63
End: 2023-01-03
Payer: COMMERCIAL

## 2023-01-03 PROCEDURE — 93926 LOWER EXTREMITY STUDY: CPT

## 2023-01-03 PROCEDURE — 99214 OFFICE O/P EST MOD 30 MIN: CPT

## 2023-01-03 NOTE — ASSESSMENT
[FreeTextEntry1] : 63 yo male recently quit smoking with a history of dm presents for follow up s/p right lower extremity fem to pt bypass, tma with split level skin graft. \par \par duplex shows patent fem - pt bypass with no significant stenosis.\par pt advised to take the plavix every day \par Patient to follow-up in 3 months.

## 2023-01-03 NOTE — PHYSICAL EXAM
[JVD] : no jugular venous distention  [Normal Breath Sounds] : Normal breath sounds [2+] : left 2+ [Ankle Swelling (On Exam)] : not present [Varicose Veins Of Lower Extremities] : not present [] : not present [Abdomen Tenderness] : ~T ~M No abdominal tenderness [Alert] : alert [Calm] : calm [de-identified] : appears well [de-identified] : right tma skin graft healed with dry scaled thickened skin overlying the tma

## 2023-01-03 NOTE — HISTORY OF PRESENT ILLNESS
[FreeTextEntry1] : 61 yo male recently quit smoking with a history of dm presents for follow up s/p right lower extremity fem to pt bypass, tma with split level skin graft.  pt states that he does not have a molded shoe at this time and has been wearing slippers.  pt also states that he has been cleaning the foot every other day and taking his plavix every other day.  pt denies any pain in the right foot at this time at his last visit patient was found to have a high-grade stenosis in the vein graft.  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  Atherectomy and angioplasty was performed.  Patient now returns for follow-up.

## 2023-04-11 ENCOUNTER — APPOINTMENT (OUTPATIENT)
Dept: VASCULAR SURGERY | Facility: CLINIC | Age: 63
End: 2023-04-11

## 2023-04-13 ENCOUNTER — APPOINTMENT (OUTPATIENT)
Dept: VASCULAR SURGERY | Facility: CLINIC | Age: 63
End: 2023-04-13

## 2023-04-19 ENCOUNTER — APPOINTMENT (OUTPATIENT)
Dept: VASCULAR SURGERY | Facility: CLINIC | Age: 63
End: 2023-04-19
Payer: COMMERCIAL

## 2023-04-19 PROCEDURE — 93926 LOWER EXTREMITY STUDY: CPT

## 2023-04-28 NOTE — PRE-OP CHECKLIST - NSSDAENDDT_GEN_ALL_CORE
18-Feb-2022 15:06
15-Feb-2022 18:09
No. RONY screening performed.  STOP BANG Legend: 0-2 = LOW Risk; 3-4 = INTERMEDIATE Risk; 5-8 = HIGH Risk

## 2023-06-28 NOTE — H&P PST ADULT - PRO ARRIVE FROM
Mohs Histo Method Verbiage: Each section was then chromacoded and processed in the Mohs lab using the Mohs protocol and submitted for frozen section, utilizing hematoxylin and eosin histochemical stains. home

## 2023-08-06 NOTE — CONSULT NOTE ADULT - ASSESSMENT
1300 Discussed with Josie at [facility].  Updates provided.  62yo M w/ right foot gangrene   - pt seen and evaluated  - afebrile, WBC 10  -s/p 2/15/22 right foot TMA open: mild strikethrough dressing, flaps cool to touch but still viable, plantar incision and flap with no signs of necrosis, sanguinous drainage, no purulence, no malodor, left foot no open wounds or lesions  -per intraop findings high concern for residual infection and viability  - continue IV abx  - Intra op culture growing serratia and group B strep prelim  - IR thrombectomy today  - podiatry plan for RF wound debridement w/ application of graft pending IR/vascular recs  - Med clearance noted  - discussed w/ attending No

## 2023-08-22 ENCOUNTER — APPOINTMENT (OUTPATIENT)
Dept: VASCULAR SURGERY | Facility: CLINIC | Age: 63
End: 2023-08-22
Payer: COMMERCIAL

## 2023-08-22 PROCEDURE — 93926 LOWER EXTREMITY STUDY: CPT

## 2023-08-22 PROCEDURE — 99214 OFFICE O/P EST MOD 30 MIN: CPT

## 2023-08-22 NOTE — HISTORY OF PRESENT ILLNESS
[FreeTextEntry1] : 64 yo male recently quit smoking with a history of dm presents for follow up s/p right lower extremity fem to pt bypass, tma with split level skin graft.  pt states that he does not have a molded shoe at this time and has been wearing slippers.  pt also states that he has been cleaning the foot every other day and taking his plavix every other day.  pt denies any pain in the right foot at this time at his last visit patient was found to have a high-grade stenosis in the vein graft.  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  Atherectomy and angioplasty was performed.  Patient now returns for follow-up.

## 2023-08-22 NOTE — ASSESSMENT
[FreeTextEntry1] : 62 yo male recently quit smoking with a history of dm presents for follow up s/p right lower extremity fem to pt bypass, tma with split level skin graft.   duplex shows patent fem - pt bypass with no significant stenosis. pt advised to take the plavix every day  Patient to follow-up in 6 months.

## 2023-08-22 NOTE — PHYSICAL EXAM
[JVD] : no jugular venous distention  [Normal Breath Sounds] : Normal breath sounds [2+] : left 2+ [Ankle Swelling (On Exam)] : not present [Varicose Veins Of Lower Extremities] : not present [] : not present [Abdomen Tenderness] : ~T ~M No abdominal tenderness [Alert] : alert [Calm] : calm [de-identified] : appears well [de-identified] : right tma skin graft healed with dry scaled thickened skin overlying the tma

## 2023-10-25 NOTE — ED ADULT NURSE NOTE - NSFALLRSKOUTCOME_ED_ALL_ED
Please contact direct nurses line Monday through Friday 8 AM to 5 PM @ [phone]    After-hours contact cardiology office at [phone].    Coronary CT angiogram   Continue current medications  Cause of chest pain possible coronary spasm or esophageal spasm.   
Universal Safety Interventions

## 2024-02-27 ENCOUNTER — APPOINTMENT (OUTPATIENT)
Dept: VASCULAR SURGERY | Facility: CLINIC | Age: 64
End: 2024-02-27
Payer: COMMERCIAL

## 2024-02-27 DIAGNOSIS — I73.9 PERIPHERAL VASCULAR DISEASE, UNSPECIFIED: ICD-10-CM

## 2024-02-27 PROCEDURE — 99213 OFFICE O/P EST LOW 20 MIN: CPT

## 2024-02-27 PROCEDURE — 93880 EXTRACRANIAL BILAT STUDY: CPT

## 2024-02-27 PROCEDURE — 93925 LOWER EXTREMITY STUDY: CPT

## 2024-02-27 NOTE — HISTORY OF PRESENT ILLNESS
[FreeTextEntry1] : 63 year old male recently quit smoking with a history significant for hypertension, hyperlipidemia, coronary artery disease, diabetes, and peripheral arterial disease s/p right lower extremity fem to pt bypass, TMA with split level skin graft, s/p percutaneous right lower extremity revascularization who presents the office today for routine follow-up.  Patient reports pain has significantly improved postprocedure.  Denies any present tissue loss.

## 2024-02-27 NOTE — PHYSICAL EXAM
[Normal Breath Sounds] : Normal breath sounds [JVD] : no jugular venous distention  [Normal Rate and Rhythm] : normal rate and rhythm [2+] : right 2+ [Ankle Swelling (On Exam)] : not present [Varicose Veins Of Lower Extremities] : not present [] : not present [Abdomen Tenderness] : ~T ~M No abdominal tenderness [Alert] : alert [Calm] : calm [de-identified] : appears well [de-identified] : right tma skin graft healed with dry scaled thickened skin overlying the tma

## 2024-02-27 NOTE — ASSESSMENT
[FreeTextEntry1] : 63 year old male with peripheral arterial disease s/p right lower extremity fem to pt bypass, TMA with split level skin graft, s/p percutaneous left lower extremity revascularization  In the office today, patient underwent duplex which demonstrates a widely patent right common femoral to posterior tibial artery bypass with no evidence of stenosis.  Left EIA stent is widely patent.  Carotid duplex demonstrates less than 50% ICA stenosis bilaterally.  Patient to continue conservative management with aspirin, Plavix, and rosuvastatin. Patient advised to follow-up with podiatry regularly post TMA.  Patient to follow-up with lower extremity arterial duplex.    [Medication Management] : medication management [Arterial/Venous Disease] : arterial/venous disease

## 2024-09-05 ENCOUNTER — APPOINTMENT (OUTPATIENT)
Dept: VASCULAR SURGERY | Facility: CLINIC | Age: 64
End: 2024-09-05
Payer: MEDICARE

## 2024-09-05 PROCEDURE — 93880 EXTRACRANIAL BILAT STUDY: CPT

## 2024-09-05 PROCEDURE — 99204 OFFICE O/P NEW MOD 45 MIN: CPT | Mod: 25

## 2024-09-05 PROCEDURE — G0506: CPT

## 2024-09-05 PROCEDURE — 93925 LOWER EXTREMITY STUDY: CPT

## 2024-09-05 NOTE — HISTORY OF PRESENT ILLNESS
[FreeTextEntry1] : 64 year old male recently quit smoking with a history significant for hypertension, hyperlipidemia, coronary artery disease, diabetes, and peripheral arterial disease s/p right lower extremity fem to pt bypass, TMA with split level skin graft, s/p percutaneous right lower extremity revascularization who presents the office today for routine follow-up.   Denies any present tissue loss.

## 2024-09-05 NOTE — PHYSICAL EXAM
[Normal Breath Sounds] : Normal breath sounds [Normal Rate and Rhythm] : normal rate and rhythm [2+] : left 2+ [No Rash or Lesion] : No rash or lesion [Alert] : alert [Calm] : calm [JVD] : no jugular venous distention  [Ankle Swelling (On Exam)] : not present [Varicose Veins Of Lower Extremities] : not present [] : not present [Abdomen Tenderness] : ~T ~M No abdominal tenderness [Skin Ulcer] : no ulcer [de-identified] : appears well [de-identified] : Intact

## 2024-09-05 NOTE — ASSESSMENT
[Arterial/Venous Disease] : arterial/venous disease [Medication Management] : medication management [FreeTextEntry1] : 64 year old male with history of coronary artery disease, COPD, diabetes, hyperlipidemia, hypertension with peripheral arterial disease s/p right lower extremity fem to pt bypass, TMA with split level skin graft, s/p percutaneous left lower extremity revascularization  In the office today, patient underwent duplex which demonstrates a widely patent right common femoral to posterior tibial artery bypass with no evidence of stenosis.  Left EIA stent is widely patent.  Carotid duplex demonstrates less than 50% ICA stenosis bilaterally.  Patient to continue conservative management with aspirin, Plavix, and rosuvastatin.   Patient to follow-up in 6 months with aortic duplex.  Follow-up 1 year for bypass surveillance and carotid duplex.

## 2025-04-17 ENCOUNTER — APPOINTMENT (OUTPATIENT)
Dept: VASCULAR SURGERY | Facility: CLINIC | Age: 65
End: 2025-04-17

## 2025-09-11 ENCOUNTER — APPOINTMENT (OUTPATIENT)
Dept: VASCULAR SURGERY | Facility: CLINIC | Age: 65
End: 2025-09-11

## (undated) DEVICE — DRSG VAC WHITEFOAM LARGE (WHITE)

## (undated) DEVICE — COTTONBALL LG

## (undated) DEVICE — CLAMP BULLDOG MIDI 45 DEGREE (GREEN) DISP

## (undated) DEVICE — TUNNELER SHEATH GREEN LARGE

## (undated) DEVICE — SUT PROLENE 7-0 4-24" BV-1

## (undated) DEVICE — ZIMMER DERMACARRIER SKIN GRAFT MESH 3:1

## (undated) DEVICE — GLV 7 PROTEXIS (WHITE)

## (undated) DEVICE — GLV 7.5 PROTEXIS (WHITE)

## (undated) DEVICE — TUBING SUCTION 20FT

## (undated) DEVICE — WARMING BLANKET LOWER ADULT

## (undated) DEVICE — GLV 6.5 PROTEXIS (WHITE)

## (undated) DEVICE — PREP CHLORAPREP HI-LITE ORANGE 26ML

## (undated) DEVICE — VISITEC 4X4

## (undated) DEVICE — POSITIONER FOAM EGG CRATE ULNAR 2PCS (PINK)

## (undated) DEVICE — GOWN XL

## (undated) DEVICE — MEDICATION LABELS W MARKER

## (undated) DEVICE — STAPLER SKIN VISI-STAT 35 WIDE

## (undated) DEVICE — SOL IRR POUR H2O 250ML

## (undated) DEVICE — DRSG TEGADERM 6"X8"

## (undated) DEVICE — GOWN XL W TOWEL

## (undated) DEVICE — WARMING BLANKET UPPER ADULT

## (undated) DEVICE — SOL IRR POUR NS 0.9% 500ML

## (undated) DEVICE — MARKING PEN W RULER

## (undated) DEVICE — ZIMMER BLADE DERMATONE

## (undated) DEVICE — BULLDOG SPRING CLIP 6MM SOFT/SOFT

## (undated) DEVICE — SUCTION YANKAUER NO CONTROL VENT

## (undated) DEVICE — ZIMMER DERMACARRIER SKIN GRAFT MESH 8"

## (undated) DEVICE — DRSG OPSITE 13.75 X 4"

## (undated) DEVICE — DRAPE TOWEL BLUE 17" X 24"

## (undated) DEVICE — DRSG KLING 6"

## (undated) DEVICE — ELCTR HEX BLADE

## (undated) DEVICE — SPECIMEN CONTAINER 100ML

## (undated) DEVICE — NDL SAFETY BUTTERFLY 21G X 3/4

## (undated) DEVICE — DRAPE INSTRUMENT POUCH 6.75" X 11"

## (undated) DEVICE — PACK EXTREMITY

## (undated) DEVICE — SOL INJ NS 0.9% 500ML 1-PORT

## (undated) DEVICE — STOPCOCK 4-WAY W SWIVEL MALE LUER LOCK NON VENTED RED CAP

## (undated) DEVICE — DRSG XEROFORM 5 X 9"

## (undated) DEVICE — DRAPE MAYO STAND 30"

## (undated) DEVICE — FOLEY TRAY 16FR 5CC LTX UMETER CLOSED

## (undated) DEVICE — DRSG AQUACEL 3.5 X 14"

## (undated) DEVICE — PACK FEM/POP

## (undated) DEVICE — ZIMMER DERMACARRIER SKIN GRAFT MESH 1.5:1

## (undated) DEVICE — SUT PROLENE 6-0 4-30" BV-1

## (undated) DEVICE — OSTOMY STOMAHESIVE PASTE 2OZ TUBE

## (undated) DEVICE — DRSG COMBINE 5X9"

## (undated) DEVICE — GLV 8 PROTEXIS (WHITE)

## (undated) DEVICE — LAP PAD 18 X 18"

## (undated) DEVICE — DRAIN JACKSON PRATT 7MM FLAT FULL NO TROCAR

## (undated) DEVICE — SUT HISTOACRYL BLUE

## (undated) DEVICE — DRSG STERISTRIPS 0.5 X 4"

## (undated) DEVICE — DRSG AQUACEL 3.5 X 6"

## (undated) DEVICE — VENODYNE/SCD SLEEVE CALF LARGE

## (undated) DEVICE — DRAPE LIGHT HANDLE COVER (BLUE)

## (undated) DEVICE — SYR ASEPTO

## (undated) DEVICE — DRAPE IOBAN 23" X 23"

## (undated) DEVICE — SAW BLADE MICROAIRE SAGITTAL 9.4MMX25.4MMX0.6MM

## (undated) DEVICE — DRSG ADAPTIC 3 X 8"

## (undated) DEVICE — TUBING TRUWAVE PRESSURE MALE/FEMALE 72"

## (undated) DEVICE — BLADE SCALPEL SAFETYLOCK #15

## (undated) DEVICE — BLADE SCALPEL SAFETYLOCK #10

## (undated) DEVICE — CONN Y

## (undated) DEVICE — DRSG AQUACEL 3.5 X 12"

## (undated) DEVICE — DRSG COBAN 6"